# Patient Record
Sex: FEMALE | Race: WHITE | NOT HISPANIC OR LATINO | Employment: OTHER | URBAN - METROPOLITAN AREA
[De-identification: names, ages, dates, MRNs, and addresses within clinical notes are randomized per-mention and may not be internally consistent; named-entity substitution may affect disease eponyms.]

---

## 2017-01-03 ENCOUNTER — TREATMENT (OUTPATIENT)
Dept: PHYSICAL THERAPY | Facility: CLINIC | Age: 67
End: 2017-01-03

## 2017-01-03 ENCOUNTER — TRANSCRIBE ORDERS (OUTPATIENT)
Dept: PHYSICAL THERAPY | Facility: CLINIC | Age: 67
End: 2017-01-03

## 2017-01-03 ENCOUNTER — OFFICE VISIT (OUTPATIENT)
Dept: PHYSICAL THERAPY | Facility: CLINIC | Age: 67
End: 2017-01-03

## 2017-01-03 DIAGNOSIS — M75.92 SUPRASPINATUS TENDONITIS, LEFT: ICD-10-CM

## 2017-01-03 DIAGNOSIS — M79.7 FIBROMYALGIA: ICD-10-CM

## 2017-01-03 DIAGNOSIS — M18.12 PRIMARY OSTEOARTHRITIS OF FIRST CARPOMETACARPAL JOINT OF LEFT HAND: Primary | ICD-10-CM

## 2017-01-03 DIAGNOSIS — M18.32 POST-TRAUMATIC OSTEOARTHRITIS OF FIRST CARPOMETACARPAL JOINT OF LEFT HAND: Primary | ICD-10-CM

## 2017-01-03 DIAGNOSIS — S16.1XXD CERVICAL STRAIN, SUBSEQUENT ENCOUNTER: ICD-10-CM

## 2017-01-03 DIAGNOSIS — M53.3 SACROILIAC JOINT DYSFUNCTION: ICD-10-CM

## 2017-01-03 DIAGNOSIS — M75.22 BICIPITAL TENDONITIS OF LEFT SHOULDER: Primary | ICD-10-CM

## 2017-01-03 DIAGNOSIS — M25.512 LEFT SHOULDER PAIN, UNSPECIFIED CHRONICITY: ICD-10-CM

## 2017-01-03 PROCEDURE — 97035 APP MDLTY 1+ULTRASOUND EA 15: CPT | Performed by: PHYSICAL THERAPIST

## 2017-01-03 PROCEDURE — L3919 HO W/O JOINTS CF: HCPCS | Performed by: PHYSICAL THERAPIST

## 2017-01-03 PROCEDURE — 97530 THERAPEUTIC ACTIVITIES: CPT | Performed by: PHYSICAL THERAPIST

## 2017-01-03 PROCEDURE — 97140 MANUAL THERAPY 1/> REGIONS: CPT | Performed by: PHYSICAL THERAPIST
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
## 2017-01-03 NOTE — PROGRESS NOTES
The patient was sent for splint fabrication/fitting only.  They are independent with donning and doffing of splint.  The patient is discharged from physical therapy.  Declan Diaz P.T., SHIRAZT

## 2017-01-19 ENCOUNTER — TREATMENT (OUTPATIENT)
Dept: PHYSICAL THERAPY | Facility: CLINIC | Age: 67
End: 2017-01-19

## 2017-01-19 DIAGNOSIS — S16.1XXD CERVICAL STRAIN, SUBSEQUENT ENCOUNTER: ICD-10-CM

## 2017-01-19 DIAGNOSIS — M75.22 BICIPITAL TENDONITIS OF LEFT SHOULDER: Primary | ICD-10-CM

## 2017-01-19 DIAGNOSIS — M75.92 SUPRASPINATUS TENDONITIS, LEFT: ICD-10-CM

## 2017-01-19 DIAGNOSIS — M25.512 LEFT SHOULDER PAIN, UNSPECIFIED CHRONICITY: ICD-10-CM

## 2017-01-19 DIAGNOSIS — M79.7 FIBROMYALGIA: ICD-10-CM

## 2017-01-19 PROCEDURE — 97035 APP MDLTY 1+ULTRASOUND EA 15: CPT | Performed by: PHYSICAL THERAPIST

## 2017-01-19 PROCEDURE — 97140 MANUAL THERAPY 1/> REGIONS: CPT | Performed by: PHYSICAL THERAPIST

## 2017-01-19 PROCEDURE — 97112 NEUROMUSCULAR REEDUCATION: CPT | Performed by: PHYSICAL THERAPIST

## 2017-01-19 NOTE — PROGRESS NOTES
"Physical Therapy Note      SUBJECTIVE:     Changes since last seen:    \"I am going to need a new theraband, my dog ripped up the one given last visit.\"   \"I am a little better\". \"I am realizing the healing is slow\".  \"My left thumb is better\", she is wearing her new splint on and off.  \"Sometimes I forget my left shoulder bothers me and then it acts up when I don't expect it.\"  \"I have been putting a pillow under my left arm in sitting like instructed on last visit and it has helped.\"  \"Still feels a strain when doing the ROM exercises\".   She had headaches 1-2/week a couple of weeks ago, in frontal area, none this week.   Pt did a little yard work this am, blew some leaves, so her neck a little sore, but hasn't put any Voltaren gel on today  Pt is pleased that when she self corrects her SI joints after doing something, it decreases pain whereas before her hips were replaced it didn't always work.     Current level of pain:      1/10 left shoulder    1/10 neck          0/10  Low back   Lowest level of pain since last seen:              0/10      0/10    0/10    Highest level of pain since last seen:   2/10       1/10       1/10 after mopping floor, self corrected and helped      OBJECTIVE:     Palpation:    Muscle/Bone Irritation        09/27/16   10/04/16 10/27/16   12/01/16 12/15/16 01/03/17   01/19/17         Right    Left Right  Left  Right    Left  Right  Left Right  Left Right  Left Right  Left        Suboccipitals  +            +    +         +    +          +      +         +    +          +    +         +    +         +        Spinous Processes                     rotated to  C2-7     C2-7  C2-7   C2-7  C2-7 C2-7 C2-7             rotated to                Upper traps    ++       ++     +        ++   +           ++ slight        + Slight  slight Slight  slight Slight  slight        Ant/Mid Scalenes    +      + slight     +          +   +           ++     +         ++ Slight     + Slight  slight Slight  " slight        Sternocleidomastoid    ++         +     +         +   +            +  Slight  slight           Levator Scapula    +           +               +          +   +          ++   slight      + Slight      + Slight      +    -       slight        Pectoralis Major   +           +     +           +  -             +   -             +             slight            slight            slight        Pectoralis Minor   +           -      +         -  -            +    _           +            slight            slight            slight        Sternocostals   +           +     +          +  +             +    +           +    +            +   +           +   +      slight        Bicipital tendon   -            +     -           +   -           ++     -         ++    -              +    -           ++               +        Supraspinatus   +          +    Slight     +  -            ++     -        ++    -              +    -           ++           slight        Infraspinatus   +            -     +           -   -            ++    -           +    -         slight   -            +           slight        Lats Insertion   -            -        -               -    -           -           Rhomboid, Major    -           -    -             -    -           -           Rhomboid, Minor   -             -    -            -    -            -           Lateral Epicondyle   +           +     +          +   +           +   +           + Slight  slight Slight  slight  not tested        Medial Epicondyle   -            -         -           -           Triceps Insertion   -            -      -          + +                  +                 +          Right    Left Right   Left Right    Left Right Left Right  Left Right  Left Right  Left        Pain on resisted...               shld flexion     -             +    -            +     -           +    -       +    -          -    -            -     -          -        shld abduction  Slight        +   Slight     +    Slight     + Slight  ++    -         +    -            +     -           +        Elbow flex             slight    -       slight     -     slight   -        +               -    -             -     -          -                              Muscle/Bone Irritation    10/04/16 10/27/16  12/01/16    12/15/16 01/03/17 01/19/17          Right  Left Right   Left Right Left Right  Left Right  Left Right  Left        Piriformis     +         +  slight  slight Slight slight  slight slight slight   slight         Gr. Trochanter     +         +    -            -            IT Band     -           -             Quadr. Lumborum     -           -             Ischial Tuberosity     -           -             Sacrococcygeal Ligs     -          -             Paraspinals     -           -   +                      +                 +         Spinous Processes                      T rotated to      -           -                    L rotated to   L3-5  L1-5 L4-5             L4-5             L3-5         Adductor Fabio     -             -             Iliopsoas     -              -             Quad Origin     -             -             SI Joint    ++         +   ++       ++   ++        -     +          -   Slight    +    -            -        Pubic Symphysis   not tested                                            Left thumb CMC                     +                 +                  +              10/04/16 10/27/16  12/01/16 12/15/16 01/03/17 01/19/17        SI Joint Positioning  Right   Left Right    Left Right Left   Right  Left Right  Left  Right  Left            Innominate       Symmetrical!               Anterior                                   x                 x              x                x                x                Posterior           x     x      x     x     x            Sacrum                         Unilateral rotation            x     x      x     x     x Neutral                            SI Joint Testing  Right   Left Right    Left Right  Left Right  Left Right  Left Right  Left                Distraction           +           +    +           +  slight slight    -         -  Slight  slight                 Beatriz's           -            -    -            -    -            -                   Compression          -            -    -            -    -            -                   Prone Press Up     -            -    -            -   -             -                          Special Tests    Right   Left Right    Left  Right Left Right  Left Right  Left Right  Left           Straight Leg Raise                               Active      -         -    -           -    -         -                   Passive     -         -      -           -     -         -               Hip Scour Test     -          -     -           -     -          -                                             Monthly Objective Measurement/Functional Activity     Date: 09/27/16 10/27/16 12/01/16 01/03/17       Neck Disability Index 33/100 38/100 24/100 34/100       Headache Index - -         Quick Dash Shoulder Pain Score 38.6 43.1  40.9                  AROM Cervical Spine:  Degrees  Degrees Degrees Degrees          Flexion 51 58 56 51          Extension 58 pain 61 65 57          Right Lat. Flexion 45 45 50 56          Left Lat. Flexion 41 pain right UT 49 45 49          Right Rotation 35 pain right UT 32 35 36          Left Rotation 38 35 39 42                  AROM Shoulders: Degrees Right    Left Right     Left Right  Left Right  Left           Flexion 175     158 175     168      178     162 178     158          Abduction 175     155 175       170 176    168   178      147          Int. Rotation 90       85 93         54 94      90 95     88          Ext. Rotation  90      75 91        90 92      79 93    90        Functional Strength: in lbs Right     Left Right     Left Right  Left Right  Left            Strength  Trial 1 45        35 45       45 25      40 35        40                                  Trial 2 44       47 46       36 45      35 45       45                                  Trial 3 58        47 45        35 45    35 55        40          Michaels Pinch                          11          9 11      8 10       7 10        6                  Root Level  -    Motor Right      Left Right      Left Right  Left Right  Left          C4                Shld Elevation 5/5          5/5 5/5         5/5 5/5      5/5  5/5       5/5          C5                Shld Abduction 5/5          4/5 5/5          5/5 5/5      4+/5 5/5         5/5                               Shld Flexion 5/5        4+/5 5/5          5/5 5/5   4+/5   5/5        5/5          C6                Elbow Flexion 4+/5        4+/5 5/5          5/5 5/5    4+/5 5/5       5/5                              Wrist Extension    4+/5        4+/5 5/5          5/5 5/5     5/5 5/5        5/5          C7               Elbow Extension 5/5        5/5 5/5          5/5 5/5      5/5  5/5        5/5                              Wrist Flexion 5/5        5/5 5/5         5/5 5/5       5/5 5/5        5/5          C8               Thumb Extension 4+/5        4+/5 5/5         4/5 5/5     4+/5 5/5       4/4          T1               Hand Intrinsics 4+/5        4+/5 5/5         5/5 5/5       5/5 5/5        5/5                  Reflexes Right       Left Right      Left Right  Left Right  Left          C5 Biceps +1       +1     -         - -        - -          -          C6 Brachioradialis +1        +1    -         - -        - -          -          C7 Triceps +1        +1 -          - -         - -          -                  Functional Activities:              Sit w/o pain? yes/ 30 min No/60  Low back Yes/ 60 min Yes/ 60 min          Stand w/o pain? Yes/60 min No/15 min low back Yes/ 60 min Yes/ 60 min          Walk w/o pain? Yes/60 min Yes/ 120 min Yes/ 120 min Yes/ 90 min          Drive  "w/o pain? Yes/ 2 hours  Yes/ 1 hour, neck Yes/ 1 hr Yes/ 4 hrs!           Work w/o paiin?  In garden Yes/ 60 min No/ 60 min Yes/ 30 min Yes/ 60 min          # times wakes w/o pain? No/1-2x No/ 3x, left shld  1x back No. 2x neck/ left shoulder No, 4x left shoulder          Self [place] w/o pain? \"Pretty much\" yes if uses hair dryer \"pretty much\" except hair dryer No/  Yes            ASSESSMENT:  Problem List:  1.  Left supraspinatus tendonitis  2.  Left bicipital tendonitis  3.  Beginning adhesive capsulitis, left  4.  Cervical strain  5.  Flare-up SI joint dysfunction    Discussion:   Was doing the biceps stretch wrong, was doing with theraband, corrected this today and reviewed the theraband exercises.  Progressed to prone arm lifts with the inner unit on.  Pt did fairly well with this on the left but had a hard time doing with the right UE.  She is beginning to realize how much she has inappropriately used the upper traps and levator scapulas to stabilize the neck and trunk.  The only pain elicited today was at the end of left shoulder flexion and abduction with capsular stretching.  Left shoulder flexion was 165 and left shoulder abduction was 162, both with pain. Reminded pt to continue with ROM daily.     Short Term Goals:                                     Date Met:                    1.   pt independent in postural correction to minimize cervical and shoulder strain  10/27/16  2.   pt independent in exer to decrease post. cervical disc pressures    10/27/16  3.   pt able to sleep through night without pain  4.   Reduce pt pain to no greater than 3/10 (neck pain)      12/01/16              Reduce Shoulder pain to no greater than 3/10      12/15/16  5.  Decrease Neck Pain Score to no greater than 30/100     12/01/16  6.  Decrease Quick Dash Shoulder Pain Score to no greater than 32  7.  Pt independent in SI joint self-correction technique      12/01/16  8.  Pt able to do rotator cuff theraband exercises without " pain, x10    01/19/17    Long Term Goals:           Date Met:      1.  pt independent in self-management of symptoms       2.  pt independent in home program      3.  pt able to lift arm above head without pain       01/19/17      4.  Reduce pt pain to no greater than 1/10      5.  pt able to sit 60 min without pain        12/01/16      6.  Decrease Neck Pain Score to no greater than 26/100      12/01/16      7.  Decrease Quick Dash Shoulder Pain Score to no greater than 24    PLAN OF CARE  1.  Ultrasound to increase extensibility, decrease pain, if needed  2.  Electrical stimulation to facilitate muscle relaxaation, decrease pain, if needed, possible use of iontophoresis  3.  Manual therapy to increase function, decrease pain  4.  Therapeutic exercise to increase function, decrease pain  5.  Home programming and d/c planning to increase function and decrease pain    Frequency & Duration:  Pt will be seen on a once/week basis for 6 more visits      TREATMENT TODAY:    Total Treatment Time:   (min in clinic):  70   Timed Code Treatment Minutes:   65    Self Care Home Management Training/ Patient Education:    Purpose of RX:  Need for inner unit on when carrying groceries etc.   Proper Body Mechanics - support of left UE in sitting and standing  Postural Instruction - need for sternum-up posturing to reduce tension on rotator cuff  Compliance w/HEP    Orthotic/Equipmt Usage - use of ice pack on shoulder, low back and neck      Manual Therapy.  (Man)  RX min:  25  Manual cervical distraction  Scalene and upper trap stretching   Anterior/Inferior Mobilization of Left shoulder  Sternocostal mobs  Myofascial release upper quarter  Positional Isometric Contraction C2-7    Therapeutic Activities:  (TA)  RX min:  5  Neuromuscular Reeducation:  (NMR)  Rx min:  -  15    Ultrasound:  RX min: 20       1.6 piper/cm2       Location:  Left bicipital and left supraspinatus tendons, ant/inf left shoulder capsule  Supplies given:  -      Procedures:  Code Procedure/Instruction 09/27/16 10/04/16 10/27/16 12/01/16 12/15/16 01/03/17 01/19/17          TA            Sleeping Positions                  reviewed               TA Sternum-Up Posture    review             TA SI jt. self-correction                 TA Use of lumbar pillow reviewed                TA Use of cervical roll reviewed                TA Use of orthotics                 TA Use of SI belt                 TA Use of Nada chair                 TA Lumb Facet PIC                 TA Order of Self-Correct                 TA Decreasing Disc Pressures                 NMR Pelvic Floor Isolation                 NMR Transverse Abdominus                 NMR Multifidus Isolation                 NMR InnerUnit agns [place]                 NMR Sit-stand w/ inner unit                 NMR Roll w/ inner unit                 NMR Walk w/ inner unit                  NMR Up/down steps w/ inner unit                 NMR Water Exer Instruction                 NMR Hip Abd w/o periformis                 NMR Hip Abd w/o iliop/ham                  NMR Lower abs in supine                 NMR Abd obliques in supine                 NMR Prone Knee Flex                 NMR Prone glut max                 NMR Retro Step                 NMR Retro Walk                 NMR Forward walk w/ inner unit                 NMR Balance Instruction                 NMR Ball sit A/P & Lateral                 NMR Ball Catch/Core/Supine                 NMR Ball Catch/Core/Stand                 NMR Ball All 4's Arm Life                 NMR Ball Prone Walk Out                 NMR Ball Supine Obliques                 NMR Ball sit-supine-sit                 NMR Walking Prog Progress                 TA Home prg sequencing                 TA Hamstring stretch                 TA Hip Ext Rot Stretch                 TA Hip Int Rot Stretch                 TA Hip Flex/IT Stretch                 TA Hip Add Stretch                 TA Lats Dorsi  Stretch                 TA Gastric/soleus stretch                 TA QuadLumbormStretch                 TA Quad Stretch                 NMR Lateral Bridge Drop                 NMR [place]                 NMR O'Feldt Lat Pull Down                 NMR O'Feldt Hip Ext                 NMR O'Feldt Trunk Ext                 TA CervDiscExtSup/stnd   instructed              TA Cerv Stretches  instructed               TA Neural Gliding                 NMR Ant/Mid Scalene Strch                 NMR Prone Arm Lifts       instructed          NMR Scapula Stabilization                 NMR Occulomotor Isometrics                 NMR Cervical Isometrics                 TA Ant. Chest Stretch                 TA Standing Wall Slides                 TA Rotator Cuff Stretch  instructed reviewed corrected reviewed            TA Rotator Cuff Theraband     instructed reviewed reviewed          TA ShldAROM w/bar instructed reviewed reviewed  reviewed reviewed           TA Biceps Stretch  instructed reviewed corrected  reviewed reviewed          TA SelfPICThorSpine                 TA   Ant Scalene stretch  instructed reviewed              TA Icing shoulder  instructed reviewed reviewed             TA [employer] for upper trap   instructed              TA Sidelying rot cuff strengthen   instructed                                                                                                                              Karen Chatterjee, PT, MS  Physical Therapist  [place]# [id number]

## 2017-01-23 ENCOUNTER — DOCUMENTATION (OUTPATIENT)
Dept: PHYSICAL THERAPY | Facility: CLINIC | Age: 67
End: 2017-01-23

## 2017-02-02 ENCOUNTER — TREATMENT (OUTPATIENT)
Dept: PHYSICAL THERAPY | Facility: CLINIC | Age: 67
End: 2017-02-02

## 2017-02-02 DIAGNOSIS — M53.3 SACROILIAC JOINT DYSFUNCTION: ICD-10-CM

## 2017-02-02 DIAGNOSIS — M79.7 FIBROMYALGIA: ICD-10-CM

## 2017-02-02 DIAGNOSIS — M75.22 BICIPITAL TENDONITIS OF LEFT SHOULDER: Primary | ICD-10-CM

## 2017-02-02 DIAGNOSIS — M75.92 SUPRASPINATUS TENDONITIS, LEFT: ICD-10-CM

## 2017-02-02 DIAGNOSIS — S16.1XXD CERVICAL STRAIN, SUBSEQUENT ENCOUNTER: ICD-10-CM

## 2017-02-02 PROCEDURE — 97035 APP MDLTY 1+ULTRASOUND EA 15: CPT | Performed by: PHYSICAL THERAPIST

## 2017-02-02 PROCEDURE — 97112 NEUROMUSCULAR REEDUCATION: CPT | Performed by: PHYSICAL THERAPIST

## 2017-02-02 PROCEDURE — 97140 MANUAL THERAPY 1/> REGIONS: CPT | Performed by: PHYSICAL THERAPIST

## 2017-02-02 NOTE — PROGRESS NOTES
Physical Therapy Note      SUBJECTIVE:     Changes since last seen:    I need to know how to walk my dogs, cause I walked them holding my arm up and now I can tell I have tweaked my SI joint/low back  Drove to [place] last week and the low back was good going but slept on a soft bed and it hurt her low back on way home and also strained her right shoulder on the drive home.  Two weeks ago she was scrubbing her kitchen floor and felt pinch in the right rhomboid area, she used moist heat and tried Voltaren gel but couldn't reach back to get it on   No headaches this past week.  While in [place], she had to sit on a plastic bleacher chair and after an hour she needed to get up.   She hasn't been able to do the theraband as she was afraid to do it after tweaking the shoulder  Did do the prone arm lifts but is wondering if this it what set off her right shoulder pain      Current level of pain:      0/10 left shoulder    0/10 neck          3/10  Low back   Lowest level of pain since last seen:              0/10      0/10    0/10    Highest level of pain since last seen:   2/10       0/10       3/10 after walking the dogs 2 days ago.      OBJECTIVE:     Palpation:    Muscle/Bone Irritation        09/27/16   10/04/16 10/27/16   12/01/16 12/15/16 01/03/17   01/19/17 02/02/17        Right    Left Right  Left  Right    Left  Right  Left Right  Left Right  Left Right  Left Right  Left       Suboccipitals  +            +    +         +    +          +      +         +    +          +    +         +    +         +        Spinous Processes                     rotated to  C2-7     C2-7  C2-7   C2-7  C2-7 C2-7 C2-7             rotated to                Upper traps    ++       ++     +        ++   +           ++ slight        + Slight  slight Slight  slight Slight  slight        Ant/Mid Scalenes    +      + slight     +          +   +           ++     +         ++ Slight     + Slight  slight Slight  slight         Sternocleidomastoid    ++         +     +         +   +            +  Slight  slight           Levator Scapula    +           +               +          +   +          ++   slight      + Slight      + Slight      +    -       slight        Pectoralis Major   +           +     +           +  -             +   -             +             slight            slight            slight        Pectoralis Minor   +           -      +         -  -            +    _           +            slight            slight            slight        Sternocostals   +           +     +          +  +             +    +           +    +            +   +           +   +      slight        Bicipital tendon   -            +     -           +   -           ++     -         ++    -              +    -           ++               +        Supraspinatus   +          +    Slight     +  -            ++     -        ++    -              +    -           ++           slight        Infraspinatus   +            -     +           -   -            ++    -           +    -         slight   -            +           slight        Lats Insertion   -            -        -               -    -           -           Rhomboid, Major    -           -    -             -    -           -           Rhomboid, Minor   -             -    -            -    -            -           Lateral Epicondyle   +           +     +          +   +           +   +           + Slight  slight Slight  slight  not tested        Medial Epicondyle   -            -         -           -           Triceps Insertion   -            -      -          + +                  +                 +          Right    Left Right   Left Right    Left Right Left Right  Left Right  Left Right  Left Right  Left       Pain on resisted...               shld flexion     -             +    -            +     -           +    -       +    -          -    -            -     -          -        shld abduction Slight         +   Slight     +    Slight     + Slight  ++    -         +    -            +     -           +        Elbow flex             slight    -       slight     -     slight   -        +               -    -             -     -          -                              Muscle/Bone Irritation    10/04/16 10/27/16  12/01/16    12/15/16 01/03/17 01/19/17 02/02/17         Right  Left Right   Left Right Left Right  Left Right  Left Right  Left Right  Left       Piriformis     +         +  slight  slight Slight slight  slight slight slight   slight         Gr. Trochanter     +         +    -            -            IT Band     -           -             Quadr. Lumborum     -           -             Ischial Tuberosity     -           -             Sacrococcygeal Ligs     -          -             Paraspinals     -           -   +                      +                 +         Spinous Processes                      T rotated to      -           -                    L rotated to   L3-5  L1-5 L4-5             L4-5             L3-5   L2-5        Adductor Fabio     -             -             Iliopsoas     -              -             Quad Origin     -             -             SI Joint    ++         +   ++       ++   ++        -     +          -   Slight    +    -            -    +          +       Pubic Symphysis   not tested                                            Left thumb CMC                     +                 +                  +              10/04/16 10/27/16  12/01/16 12/15/16 01/03/17 01/19/17 02/02/17       SI Joint Positioning  Right   Left Right    Left Right Left   Right  Left Right  Left  Right  Left Right  Left           Innominate       Symmetrical!               Anterior                                   x                 x              x                x                x                 x              Posterior           x     x      x     x     x      x          Sacrum                         Unilateral  rotation            x     x      x     x     x Neutral       x                         SI Joint Testing  Right   Left Right    Left Right  Left Right  Left Right  Left Right  Left Right  Left               Distraction           +           +    +           +  slight slight    -         -  Slight  slight                 Beatriz's           -            -    -            -    -            -                   Compression          -            -    -            -    -            -                   Prone Press Up     -            -    -            -   -             -                          Special Tests    Right   Left Right    Left  Right Left Right  Left Right  Left Right  Left Right  Left          Straight Leg Raise                               Active      -         -    -           -    -         -                   Passive     -         -      -           -     -         -               Hip Scour Test     -          -     -           -     -          -                                             Monthly Objective Measurement/Functional Activity     Date: 09/27/16 10/27/16 12/01/16 01/03/17 02/02/17      Neck Disability Index 33/100 38/100 24/100 34/100       Headache Index - -         Quick Dash Shoulder Pain Score 38.6 43.1  40.9                  AROM Cervical Spine:  Degrees  Degrees Degrees Degrees Degrees         Flexion 51 58 56 51 52 pulls         Extension 58 pain 61 65 57 57         Right Lat. Flexion 45 45 50 56 53         Left Lat. Flexion 41 pain right UT 49 45 49 47         Right Rotation 35 pain right UT 32 35 36 38         Left Rotation 38 35 39 42 43                 AROM Shoulders: Degrees Right    Left Right     Left Right  Left Right  Left  Right  Left         Flexion 175     158 175     168      178     162 178     158 178      170         Abduction 175     155 175       170 176    168   178      147 180    178         Int. Rotation 90       85 93         54 94      90 95     88 95      90          Ext. Rotation  90      75 91        90 92      79 93    90  93      91      Functional Strength: in lbs Right     Left Right     Left Right  Left Right  Left  Right  Left          Strength Trial 1 45        35 45       45 25      40 35        40 40      29                                 Trial 2 44       47 46       36 45      35 45       45 45      31                                 Trial 3 58        47 45        35 45    35 55        40 41     36         Michaels Pinch                          11          9 11      8 10       7 10        6 12       8                 Root Level  -    Motor Right      Left Right      Left Right  Left Right  Left Right  Left         C4                Shld Elevation 5/5          5/5 5/5         5/5 5/5      5/5  5/5       5/5          C5                Shld Abduction 5/5          4/5 5/5          5/5 5/5      4+/5 5/5         5/5                               Shld Flexion 5/5        4+/5 5/5          5/5 5/5   4+/5   5/5        5/5          C6                Elbow Flexion 4+/5        4+/5 5/5          5/5 5/5    4+/5 5/5       5/5                              Wrist Extension    4+/5        4+/5 5/5          5/5 5/5     5/5 5/5        5/5          C7               Elbow Extension 5/5        5/5 5/5          5/5 5/5      5/5  5/5        5/5                              Wrist Flexion 5/5        5/5 5/5         5/5 5/5       5/5 5/5        5/5          C8               Thumb Extension 4+/5        4+/5 5/5         4/5 5/5     4+/5 5/5       4/4          T1               Hand Intrinsics 4+/5        4+/5 5/5         5/5 5/5       5/5 5/5        5/5                  Reflexes Right       Left Right      Left Right  Left Right  Left Right  Left         C5 Biceps +1       +1     -         - -        - -          -          C6 Brachioradialis +1        +1    -         - -        - -          -          C7 Triceps +1        +1 -          - -         - -          -                  Functional  "Activities:              Sit w/o pain? yes/ 30 min No/60  Low back Yes/ 60 min Yes/ 60 min Yes/ 60 min         Stand w/o pain? Yes/60 min No/15 min low back Yes/ 60 min Yes/ 60 min Yes/ 60 min         Walk w/o pain? Yes/60 min Yes/ 120 min Yes/ 120 min Yes/ 90 min Yes/ 60 min         Drive w/o pain? Yes/ 2 hours  Yes/ 1 hour, neck Yes/ 1 hr Yes/ 4 hrs!           Work w/o pain?  In garden Yes/ 60 min No/ 60 min Yes/ 30 min Yes/ 60 min Yes/ 60 min         # times wakes w/o pain? No/1-2x No/ 3x, left shld  1x back No. 2x neck/ left shoulder No, 4x left shoulder 2-3x          Self Groom w/o pain? \"Pretty much\" yes if uses hair dryer \"pretty much\" except hair dryer No/  Yes  yes          ASSESSMENT:  Problem List:  1.  Left supraspinatus tendonitis  2.  Left bicipital tendonitis  3.  Beginning adhesive capsulitis, left  4.  Cervical strain  5.  Flare-up SI joint dysfunction    Discussion:   7 of 8 short term goals met.  6 of 7 long term goals met.     Discharge Summary  Discharge Summary from Physical Therapy Report      Dates  PT visit:    09/27/16 through 02/02/17  Number of Visits:     8    Discharge Status of Patient: See today's Physical Therapy Note dated, 02/02/17    Goals: Partially Met    Discharge Plan: Continue with current home exercise program as instructed    Comments:  Mrs. Hernandez has a thorough home program and is independent in it and in self management.  She should be able to meet the remaining goals on her own as she continues with her home program. If she needs re-assessment and progression of her home program in the future, will be happy to assist as needed. This pt is discharged from physical therapy.     Date of Discharge:  02/02/17    Karen Chatterjee, PT, MS  Physical Therapist        Short Term Goals:                                     Date Met:                    1.   pt independent in postural correction to minimize cervical and shoulder strain  10/27/16  2.   pt independent in exer to decrease " post. cervical disc pressures    10/27/16  3.   pt able to sleep through night without pain  4.   Reduce pt pain to no greater than 3/10 (neck pain)      12/01/16              Reduce Shoulder pain to no greater than 3/10      12/15/16  5.  Decrease Neck Pain Score to no greater than 30/100     12/01/16  6.  Decrease Quick Dash Shoulder Pain Score to no greater than 32    02/02/17  7.  Pt independent in SI joint self-correction technique      12/01/16  8.  Pt able to do rotator cuff theraband exercises without pain, x10    01/19/17    Long Term Goals:           Date Met:      1.  pt independent in self-management of symptoms      02/02/17      2.  pt independent in home program        02/02/17      3.  pt able to lift arm above head without pain       01/19/17      4.  Reduce pt pain to no greater than 1/10      5.  pt able to sit 60 min without pain        12/01/16      6.  Decrease Neck Pain Score to no greater than 26/100      12/01/16      7.  Decrease Quick Dash Shoulder Pain Score to no greater than 24      TREATMENT TODAY:    Total Treatment Time:   (min in clinic):   60  Timed Code Treatment Minutes:   60    Self Care Home Management Training/ Patient Education:    Purpose of RX:  Need for inner unit on when carrying groceries etc.   Proper Body Mechanics - when on floor playing with grandchildren  Postural Instruction - need for sternum-up posturing to reduce tension on rotator cuff  Compliance w/HEP    Orthotic/Equipmt Usage - use of ice pack on shoulder, low back and neck      Manual Therapy.  (Man)  RX min:   25  Manual cervical distraction  Scalene and upper trap stretching   Anterior/Inferior Mobilization of Left shoulder  Sternocostal mobs  Myofascial release upper quarter  Positional Isometric Contraction C2-7    Therapeutic Activities:  (TA)  RX min:   5  Neuromuscular Reeducation:  (NMR)  Rx min:  -   10    Ultrasound:  RX min:  20     1.6 piper/cm2       Location:  Left bicipital and left  supraspinatus tendons, ant/inf left shoulder capsule  Supplies given:  -     Procedures:  Code Procedure/Instruction 09/27/16 10/04/16 10/27/16 12/01/16 12/15/16 01/03/17 01/19/17 02/02/17         TA            Sleeping Positions                  reviewed               TA Sternum-Up Posture    review             TA SI jt. self-correction                 TA Use of lumbar pillow reviewed                TA Use of cervical roll reviewed                TA Use of orthotics                 TA Use of SI belt                 TA Use of Nada chair                 TA Lumb Facet PIC                 TA Order of Self-Correct                 TA Decreasing Disc Pressures                 NMR Pelvic Floor Isolation                 NMR Transverse Abdominus                 NMR Multifidus Isolation                 NMR InnerUnit agns [place]                 NMR Sit-stand w/ inner unit                 NMR Roll w/ inner unit                 NMR Walk w/ inner unit                  NMR Up/down steps w/ inner unit                 NMR Water Exer Instruction                 NMR Hip Abd w/o periformis                 NMR Hip Abd w/o iliop/ham                  NMR Lower abs in supine                 NMR Abd obliques in supine                 NMR Prone Knee Flex                 NMR Prone glut max                 NMR Retro Step                 NMR Retro Walk                 NMR Forward walk w/ inner unit                 NMR Balance Instruction                 NMR Ball sit A/P & Lateral                 NMR Ball Catch/Core/Supine                 NMR Ball Catch/Core/Stand                 NMR Ball All 4's Arm Life                 NMR Ball Prone Walk Out                 NMR Ball Supine Obliques                 NMR Ball sit-supine-sit                 NMR Walking Prog Progress                 TA Home prg sequencing                 TA Hamstring stretch                 TA Hip Ext Rot Stretch                 TA Hip Int Rot Stretch                 TA Hip  Flex/IT Stretch                 TA Hip Add Stretch                 TA Lats Dorsi Stretch                 TA Gastric/soleus stretch                 TA QuadLumbormStretch                 TA Quad Stretch                 NMR Lateral Bridge Drop                 NMR Waiters [place]                 NMR O'Feldt Lat Pull Down                 NMR O'Feldt Hip Ext                 NMR O'Feldt Trunk Ext                 TA CervDiscExtSup/stnd   instructed              TA Cerv Stretches  instructed               TA Neural Gliding                 NMR Ant/Mid Scalene Strch                 NMR Prone Arm Lifts       instructed reviewed         NMR Scapula Stabilization                 NMR Occulomotor Isometrics                 NMR Cervical Isometrics                 TA Ant. Chest Stretch                 TA Standing Wall Slides                 TA Rotator Cuff Stretch  instructed reviewed corrected reviewed            TA Rotator Cuff Theraband     instructed reviewed reviewed          TA ShldAROM w/bar instructed reviewed reviewed  reviewed reviewed  reviewed         TA Biceps Stretch  instructed reviewed corrected  reviewed reviewed reviewed         TA SelfPICThorSpine                 TA   Ant Scalene stretch  instructed reviewed              TA Icing shoulder  instructed reviewed reviewed             TA [employer] for upper trap   instructed              TA Sidelying rot cuff strengthen   instructed                                                                                                                              Karen Chatterjee, PT, MS  Physical Therapist  [place]# [id number]

## 2017-02-24 ENCOUNTER — OFFICE VISIT (OUTPATIENT)
Dept: INTERNAL MEDICINE | Facility: CLINIC | Age: 67
End: 2017-02-24

## 2017-02-24 VITALS
TEMPERATURE: 97.5 F | BODY MASS INDEX: 21.51 KG/M2 | WEIGHT: 121.4 LBS | SYSTOLIC BLOOD PRESSURE: 106 MMHG | DIASTOLIC BLOOD PRESSURE: 74 MMHG | HEIGHT: 63 IN

## 2017-02-24 DIAGNOSIS — E03.9 HYPOTHYROIDISM (ACQUIRED): Primary | ICD-10-CM

## 2017-02-24 LAB
T4 FREE SERPL-MCNC: 1.56 NG/DL (ref 0.89–1.76)
TSH SERPL DL<=0.05 MIU/L-ACNC: 0.07 MIU/ML (ref 0.35–5.35)

## 2017-02-24 PROCEDURE — 86800 THYROGLOBULIN ANTIBODY: CPT | Performed by: FAMILY MEDICINE

## 2017-02-24 PROCEDURE — 86376 MICROSOMAL ANTIBODY EACH: CPT | Performed by: FAMILY MEDICINE

## 2017-02-24 PROCEDURE — 84443 ASSAY THYROID STIM HORMONE: CPT | Performed by: FAMILY MEDICINE

## 2017-02-24 PROCEDURE — 99214 OFFICE O/P EST MOD 30 MIN: CPT | Performed by: FAMILY MEDICINE

## 2017-02-24 PROCEDURE — 84439 ASSAY OF FREE THYROXINE: CPT | Performed by: FAMILY MEDICINE

## 2017-02-24 NOTE — PATIENT INSTRUCTIONS
"1. ENDO- hypothyroid. Time spent today in edu re the pathophysiology and the complexities of interpreting the labs. Will check a TSH, free T4 and thyroid antibodies today. Will adjust the dose as indicated but I expect she needs a lower dose based on her hx and current symptoms. Pt will continue to use brand only, taken fasting, etc.   2. CV- hyperlipid- discussed that this may be primary or secondary to her thyroid. Will recheck her lipid once her thyroid is at goal.  2. RECHECK- 6wk, fasting (will recheck her lipid then with the thyroid recheck)    Patient education regarding hypothyroidism provided today in layman's terms. Pt advised regarding the location and function of the thyroid (thyroid hormone as a regulator for other body systems). Discussed common signs or symptoms of low thyroid including fatigue, hair loss, weight gain, hoarseness, depression, slow speech, dry skin, brittle nails, feeling cold, constipation and joint/ muscle pain. Also discussed the signs/ symptoms of too much (high) thyroid including fatigue, fast heart rate, weight loss, increased appetite, anxiousness, sweating, muscle aches and loose stools.    Discussed that the goal is to be \"euthryoid\", meaning that the patient has the correct amount of thyroid hormone available. This is accomplished using synthetic hormone, levothyroxine (Synthroid). Discussed that decisions regarding the dose of levothyroxine are made using a lab called TSH (thyroid stimulating hormone) and patient symptoms. The patient is also advised of the importance of taking levothyroxine correctly (on an empty stomach and waiting 1 hour before eating, drinking or taking other medicines) to ensure adequate absorption of the medicine.   "

## 2017-02-24 NOTE — PROGRESS NOTES
"Subjective   Barbara Hernandez is a 66 y.o. female.      History of Present Illness   New patient, here to establish. Sees psych. Has an eye cyst that is scheduled for resection. Needs surgical clearance. Has labs in chart from previous PCP from 9/21/16 with normal CBC, CMP and vit D. TSH was 0.078. Lipid was high with , tg 87, HDL 71, . Had her [facility] 10/2016. Has colonoscopy scheduled 3/2017.    ENDO- hypothyroid, currently on synthroid 100. Today pt reports she was diagnosed at 45yo. Does not know if she has thyroid ab's. Saw Dr Heredia for many years but then was stable and changed to her PCP a few years ago. She is taking brand, correctly. Has not done well with generics. Has symptoms of: fatigue, dizziness, feeling anxious, hoarse voice, trouble concentrating, feeding sad, dry skin, feeling cold, muscle aches and bowel issues. Has alt C/D. Is very careful with her diet with no weight loss or gain. Was increased on synthroid from 88 to 100. Last TSH 9/2016 was at 100; had been on 88 for many years while seeing Dr Heredia.     CV- hyperlipid. Today pt reports she has a h/o cardiomyopathy with a persistent low EF. Was seeing Dr Pablo. Last stress echo was done at [facility] after Dr Pablo retired due to dizziness but was advised it was \"ok\". Was released by cardio. On discussion, the cholesterol is a new issue and has not been treated yet.    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 Has \"fibromyalgia\" currently on gabapentin and mobic. Was diagnosed with fibro by rheum around 2008 based on 18 trigger points. Does not sleep well.    The following portions of the patient's history were reviewed and updated as appropriate: current medications, past family history, past medical history, past social history, past surgical history and problem list.    Review of Systems   Constitutional: Positive for appetite change.   HENT: Positive for trouble swallowing.    Eyes: Positive for redness and itching.    " "    Dry eyes   Cardiovascular: Negative for chest pain.        Heart rate slow   Gastrointestinal: Positive for abdominal pain, diarrhea and nausea. Negative for abdominal distention.        Frequent heartburn   Endocrine: Positive for heat intolerance.   Genitourinary: Positive for frequency.   Musculoskeletal: Positive for arthralgias, joint swelling and myalgias.        Chronic pain   Skin: Negative for color change.        itching   Neurological: Positive for dizziness. Negative for tremors, speech difficulty and headaches.        Tingling   Psychiatric/Behavioral: Positive for sleep disturbance. Negative for agitation and confusion. The patient is nervous/anxious.         Depression, sadness   All other systems reviewed and are negative.        Current Outpatient Prescriptions:   •  estradiol (VAGIFEM) 10 MCG tablet vaginal tablet, Insert 1 tablet into the vagina 2 (Two) Times a Week. Patient will use 2-3 times/week as needed, Disp: 10 tablet, Rfl: 10  •  gabapentin (NEURONTIN) 100 MG capsule, Take  by mouth., Disp: , Rfl:   •  levothyroxine (SYNTHROID) 100 MCG tablet, Take 1 tablet by mouth daily., Disp: 90 tablet, Rfl: 1  •  levothyroxine (SYNTHROID, LEVOTHROID) 100 MCG tablet, Take 1 tablet by mouth daily., Disp: 30 tablet, Rfl: 5  •  meloxicam (MOBIC) 7.5 MG tablet, , Disp: , Rfl:     Objective     Visit Vitals   • /74   • Temp 97.5 °F (36.4 °C)   • Ht 62.75\" (159.4 cm)   • Wt 121 lb 6.4 oz (55.1 kg)   • LMP  (LMP Unknown)   • BMI 21.68 kg/m2       Physical Exam   Constitutional: She is oriented to person, place, and time. She appears well-developed and well-nourished.   HENT:   Right Ear: Tympanic membrane and ear canal normal.   Left Ear: Tympanic membrane and ear canal normal.   Mouth/Throat: Oropharynx is clear and moist.   Eyes: Conjunctivae and EOM are normal. Pupils are equal, round, and reactive to light.   Neck: No thyromegaly present.   Cardiovascular: Normal rate and regular rhythm.  "   Pulmonary/Chest: Effort normal and breath sounds normal.   Neurological: She is alert and oriented to person, place, and time.   Skin: Skin is warm and dry.   Psychiatric: She has a normal mood and affect.   Vitals reviewed.      Assessment/Plan   Barbara was seen today for establish care.    Diagnoses and all orders for this visit:    Hypothyroidism (acquired)  -     TSH  -     T4, Free  -     Thyroid Antibodies      1. ENDO- hypothyroid. Time spent today in edu re the pathophysiology and the complexities of interpreting the labs. Will check a TSH, free T4 and thyroid antibodies today. Will adjust the dose as indicated but I expect she needs a lower dose based on her hx and current symptoms. Pt will continue to use brand only, taken fasting, etc.   2. CV- hyperlipid- discussed that this may be primary or secondary to her thyroid. Will recheck her lipid once her thyroid is at goal.  2. RECHECK- 6wk, fasting (will recheck her lipid then with the thyroid recheck)

## 2017-02-27 LAB
THYROGLOB AB SERPL-ACNC: <1 IU/ML (ref 0–0.9)
THYROPEROXIDASE AB SERPL-ACNC: 78 IU/ML (ref 0–34)

## 2017-03-03 RX ORDER — LEVOTHYROXINE SODIUM 88 MCG
88 TABLET ORAL EVERY MORNING
Qty: 30 TABLET | Refills: 1 | Status: SHIPPED | OUTPATIENT
Start: 2017-03-03 | End: 2017-04-02

## 2017-03-03 NOTE — TELEPHONE ENCOUNTER
Pt informed of lab results. And new dose of SONAL Synthroid sent to [address] per pt request.

## 2017-03-22 ENCOUNTER — OFFICE VISIT (OUTPATIENT)
Dept: CARDIOLOGY | Facility: CLINIC | Age: 67
End: 2017-03-22

## 2017-03-22 VITALS
WEIGHT: 120.3 LBS | HEART RATE: 84 BPM | SYSTOLIC BLOOD PRESSURE: 110 MMHG | DIASTOLIC BLOOD PRESSURE: 64 MMHG | HEIGHT: 62 IN | BODY MASS INDEX: 22.14 KG/M2

## 2017-03-22 DIAGNOSIS — I42.9 CARDIOMYOPATHY (HCC): Primary | ICD-10-CM

## 2017-03-22 DIAGNOSIS — E78.2 MIXED HYPERLIPIDEMIA: ICD-10-CM

## 2017-03-22 PROCEDURE — 99213 OFFICE O/P EST LOW 20 MIN: CPT | Performed by: INTERNAL MEDICINE

## 2017-03-22 NOTE — PROGRESS NOTES
[facility] at [facility]  Office Progress Note  Barbara Hernandez  1950  [phone]      Visit Date: 03/22/2017    PCP: Christen Mccarty MD  [address]  [facility] [address]    IDENTIFICATION: A 66 y.o. female former / for [facility] [employer] and resident of [place], [place].    Chief Complaint   Patient presents with   • Follow-up     HLD       PROBLEM LIST:   1. Postprandial dizziness/lightheadedness.  2. History of cardiomyopathy:  a. Left heart catheterization, 06/06/2002:  i. Normal coronary artery anatomy.  ii. LVEF 55% to 60%.    3. Palpitations:  a. Holter monitor, May 2002:  i. Sinus rhythm with rare PAC's, PVC's.  b. Short IN interval.  4. Dyslipidemia.  5. VHD         [id number] multiple echo, [facility]          5/16 SE per [facility] EF 55% mild AI, mild diastolic dysfxn  6. Remote tobacco use.  7. Hypothyroidism.  8. Osteoarthritis.  9. Gastritis.  10. Irritable bowel syndrome/spastic colon.  11. Hay fever.  12. Perimenopause.  13. Herpes zoster, October 2008, (affecting left ear).  14. Lichen sclerosis (affecting vaginal tissue).  15. Fibromyalgia.  16. Surgeries:  a. Right hip replacement, 11/15/2010.  b. Septoplasty, March 2011.  c. Left hip replacement, November 2012.  Allergies  Allergies   Allergen Reactions   • Augmentin [Amoxicillin-Pot Clavulanate]    • Celecoxib    • Ciprofloxacin    • Doxycycline    • Levofloxacin    • Lyrica [Pregabalin]    • Lortab  [Hydrocodone-Acetaminophen]    • Nexium  [Esomeprazole]    • Omeprazole    • Sulfa Antibiotics    • Tramadol        Current Medications    Current Outpatient Prescriptions:   •  estradiol (VAGIFEM) 10 MCG tablet vaginal tablet, Insert 1 tablet into the vagina 2 (Two) Times a Week. Patient will use 2-3 times/week as needed, Disp: 10 tablet, Rfl: 10  •  gabapentin (NEURONTIN) 100 MG capsule, Take  by mouth., Disp: , Rfl:   •  meloxicam (MOBIC) 7.5 MG tablet, , Disp: , Rfl:   •  SYNTHROID 88 MCG tablet,  "Take 1 tablet by mouth Every Morning for 30 days., Disp: 30 tablet, Rfl: 1      History of Present Illness     Pt denies any chest pain, dyspnea, dyspnea on exertion, orthopnea, PND, palpitations, lower extremity edema.  She notes that she is very concerned regarding her most recent stress echo his previous [facility] of which her formal report revealed multiple  \"abnormalities.\"  She received a note from the  stating that it was within normal limits however.  She has had issues regarding lipid modifying therapy with multiple intolerances to statins historically and states that her total to HDL ratio has always been below 4 however.  She wants further evaluation to make sure that she is not at high risk for vascular event.    ROS:  All systems have been reviewed and are negative with the exception of those mentioned in the HPI.    OBJECTIVE:  Vitals:    03/22/17 1412   BP: 110/64   BP Location: Right arm   Patient Position: Sitting   Pulse: 84   Weight: 120 lb 4.8 oz (54.6 kg)   Height: 62\" (157.5 cm)     Physical Exam   Constitutional: She appears well-developed and well-nourished.   Neck: Normal range of motion. Neck supple. No hepatojugular reflux and no JVD present. Carotid bruit is not present. No tracheal deviation present. No thyromegaly present.   Cardiovascular: Normal rate, regular rhythm, S1 normal, S2 normal, intact distal pulses and normal pulses.  PMI is not displaced.  Exam reveals no gallop, no distant heart sounds, no friction rub, no midsystolic click and no opening snap.    No murmur heard.  Pulses:       Radial pulses are 2+ on the right side, and 2+ on the left side.        Dorsalis pedis pulses are 2+ on the right side, and 2+ on the left side.        Posterior tibial pulses are 2+ on the right side, and 2+ on the left side.   Pulmonary/Chest: Effort normal and breath sounds normal. She has no wheezes. She has no rales.   Abdominal: Soft. Bowel sounds are normal. She " exhibits no mass. There is no tenderness. There is no guarding.       Diagnostic Data:  Procedures      ASSESSMENT:   Diagnosis Plan   1. Cardiomyopathy     2. Mixed hyperlipidemia         PLAN:  Cardiomyopathy nonischemic I have discussed with Ms. Hernandez that nonischemic R mouth is excellent prognosis with now preserved LV function.  Certainly it could require titration of her thyroid therapy in that she is had some lability of her ejection fraction over the last 15 years it appears.  Given her low normal blood pressure and sensitivity multiple historical medications only if she has decline in LV function and/or change in New [place] Heart Association class heart failure would I consider re-addition of medications at this point.    Dyslipidemia with historically normal coronaries I would document cardiac CRP with her upcoming recheck of lipid profile and thyroid serologies    Regarding her abnormal echocardiogram and discussed with her that impaired diastolic relaxation and trivial valvular regurgitation were within normal limits for adult population    Christen Mccarty MD, thank you for referring Ms. Hernandez for evaluation.  I have forwarded my electronically generated recommendations to you for review.  Please do not hesitate to call with any questions.      Jonathon Bermudez MD, FACC

## 2017-04-13 ENCOUNTER — OFFICE VISIT (OUTPATIENT)
Dept: INTERNAL MEDICINE | Facility: CLINIC | Age: 67
End: 2017-04-13

## 2017-04-13 VITALS
BODY MASS INDEX: 21.94 KG/M2 | SYSTOLIC BLOOD PRESSURE: 102 MMHG | DIASTOLIC BLOOD PRESSURE: 74 MMHG | WEIGHT: 119.2 LBS | TEMPERATURE: 97.5 F | HEIGHT: 62 IN

## 2017-04-13 DIAGNOSIS — E78.00 PURE HYPERCHOLESTEROLEMIA: ICD-10-CM

## 2017-04-13 DIAGNOSIS — M19.90 ARTHRITIS: ICD-10-CM

## 2017-04-13 DIAGNOSIS — E03.9 ACQUIRED HYPOTHYROIDISM: Primary | ICD-10-CM

## 2017-04-13 LAB
ARTICHOKE IGE QN: 165 MG/DL (ref 0–130)
CHOLEST SERPL-MCNC: 250 MG/DL (ref 0–200)
HDLC SERPL-MCNC: 72 MG/DL (ref 40–60)
T4 FREE SERPL-MCNC: 1.76 NG/DL (ref 0.89–1.76)
TRIGL SERPL-MCNC: 83 MG/DL (ref 0–150)
TSH SERPL DL<=0.05 MIU/L-ACNC: 0.18 MIU/ML (ref 0.35–5.35)

## 2017-04-13 PROCEDURE — 99214 OFFICE O/P EST MOD 30 MIN: CPT | Performed by: FAMILY MEDICINE

## 2017-04-13 PROCEDURE — 84439 ASSAY OF FREE THYROXINE: CPT | Performed by: FAMILY MEDICINE

## 2017-04-13 PROCEDURE — 80061 LIPID PANEL: CPT | Performed by: FAMILY MEDICINE

## 2017-04-13 PROCEDURE — 84443 ASSAY THYROID STIM HORMONE: CPT | Performed by: FAMILY MEDICINE

## 2017-04-13 RX ORDER — LEVOTHYROXINE SODIUM 88 UG/1
TABLET ORAL
Qty: 90 TABLET | Refills: 1 | Status: SHIPPED | OUTPATIENT
Start: 2017-04-13 | End: 2017-11-01 | Stop reason: SDUPTHER

## 2017-04-13 RX ORDER — MELOXICAM 7.5 MG/1
TABLET ORAL
Qty: 30 TABLET | Refills: 5 | Status: SHIPPED | OUTPATIENT
Start: 2017-04-13 | End: 2018-04-19 | Stop reason: SDUPTHER

## 2017-04-13 NOTE — PROGRESS NOTES
"Subjective   Barbara Hernandez is a 66 y.o. female.     History of Present Illness   Here for 6wk recheck. Pt was seen 2/24/17 as a new patient. Sees psych. Lab 2/24/17 demo TSH 0.067, free T4 1.56, + thyroid abs. Lab from previous PCP from 9/21/16 with normal CBC, CMP and vit D. TSH was 0.078. Lipid was high with , tg 87, HDL 71, . Had her MCW 10/2016. Had colonoscopy 3/2017 with no polyps (just diverticulosis).     1.ENDO- hypothyroid, diagnosed 43yo. Pt initially on synthroid 100. Takes brand, correctly. Was having fatigue, dizziness, feeling anxious, hoarse voice, trouble concentrating, feeding sad, dry skin, feeling cold, muscle aches and bowel issues. Has alt C/D. Is very careful with her diet with no weight loss or gain. Was increased on synthroid from 88 to 100 by previous provider but felt better while on synthrouid 88 by endo. TSH was over suppressed and pt was reduced back to synthroid 88. Today pt reports she had flu 3wk ago. Other than that she has felt at goal on her thyroid. No persistent jitters.     2.CV- hyperlipid. Pt has h/o cardiomyopathy with a persistent low EF. Was seeing Dr Pablo. Last stress echo was done at [facility] after Dr Pablo retired due to dizziness but was advised it was \"ok\", by Dr Bermudez. Was released by cardio. Hyperlipid is new. Pt asked to fast today. Today pt reports she did fast. She did see Dr Bermudez recently as she read her echo and had questions. Was advised she needs a CRP with her lipid today. Has not tolerated statins in past; pravachol gave her GI upset, zocor gave her palpitations.     3.ORTHO- hx left total hip 2010 and right total hip 2012. Has \"fibromyalgia\" currently on gabapentin and mobic. Was diagnosed with fibro by rheum around 2008 based on 18 trigger points. Today pt reports she needs RF on mobic and voltaren gel. Is allergic to celebrex with no issues with these NSAIDS. Will be seeing rheumatology for the gabapentin.     The following portions of " "the patient's history were reviewed and updated as appropriate: current medications, past family history, past medical history, past social history, past surgical history and problem list.    Review of Systems   Cardiovascular: Negative for chest pain.   Gastrointestinal: Negative for abdominal distention and abdominal pain.   Skin: Negative for color change.   Neurological: Negative for tremors, speech difficulty and headaches.   Psychiatric/Behavioral: Negative for agitation and confusion.   All other systems reviewed and are negative.        Current Outpatient Prescriptions:   •  diclofenac (VOLTAREN) 1 % gel gel, Apply 4 g topically 4 (Four) Times a Day As Needed (arthritis)., Disp: 100 g, Rfl: 5  •  estradiol (VAGIFEM) 10 MCG tablet vaginal tablet, Insert 1 tablet into the vagina 2 (Two) Times a Week. Patient will use 2-3 times/week as needed, Disp: 10 tablet, Rfl: 10  •  gabapentin (NEURONTIN) 100 MG capsule, Take  by mouth., Disp: , Rfl:   •  levothyroxine (SYNTHROID) 88 MCG tablet, Take 1 tab po qam fasting and wait 1hr for food or other meds. BRAND ONLY., Disp: 90 tablet, Rfl: 1  •  meloxicam (MOBIC) 7.5 MG tablet, Take 1 tab po qd prn pain or inflammation, Disp: 30 tablet, Rfl: 5    Objective     /74  Temp 97.5 °F (36.4 °C)  Ht 62\" (157.5 cm)  Wt 119 lb 3.2 oz (54.1 kg)  LMP  (LMP Unknown)  BMI 21.8 kg/m2    Physical Exam   Constitutional: She is oriented to person, place, and time. She appears well-developed and well-nourished.   HENT:   Right Ear: Tympanic membrane and ear canal normal.   Left Ear: Tympanic membrane and ear canal normal.   Mouth/Throat: Oropharynx is clear and moist.   Eyes: Conjunctivae and EOM are normal. Pupils are equal, round, and reactive to light.   Neck: No thyromegaly present.   Cardiovascular: Normal rate and regular rhythm.    Pulmonary/Chest: Effort normal and breath sounds normal.   Neurological: She is alert and oriented to person, place, and time.   Skin: Skin " is warm and dry.   Psychiatric: She has a normal mood and affect.   Vitals reviewed.      Assessment/Plan   Barbara was seen today for follow-up.    Diagnoses and all orders for this visit:    Acquired hypothyroidism  -     levothyroxine (SYNTHROID) 88 MCG tablet; Take 1 tab po qam fasting and wait 1hr for food or other meds. BRAND ONLY.  -     TSH  -     T4, Free    Pure hypercholesterolemia  -     Lipid Panel    Arthritis  -     meloxicam (MOBIC) 7.5 MG tablet; Take 1 tab po qd prn pain or inflammation  -     diclofenac (VOLTAREN) 1 % gel gel; Apply 4 g topically 4 (Four) Times a Day As Needed (arthritis).      1. ENDO- hypothyroid- clinically at goal on synthroid 88. Will continue this with RF today. Will do TSH and free T4 today to establish her target goal.   2. CV- hyperlipid- discussed today that she has a h/o high cholesterol but she had a good ratio and was not able to tolerate a statin in the past. Will check a CRP and the lipid profile and only recommend treatment if indicated. However, I also advised her that her S/E were all likely GI (including the palpitations which can be esophageal spasm) and we may be able to find a statin she tolerates if needed.  3. GI- diverticulosis. edu re the pathophysiology provided. Discussed there are not concerns with this unless it gets infected which would cause abd pain with fever.   4. ORTHO- will RF her mobic and voltaren gel (pt not allergic to these).  5. RECHECK- 6mo routine with [employer]

## 2017-04-21 ENCOUNTER — OFFICE VISIT (OUTPATIENT)
Dept: INTERNAL MEDICINE | Facility: CLINIC | Age: 67
End: 2017-04-21

## 2017-04-21 VITALS — TEMPERATURE: 97.8 F | HEIGHT: 62 IN | WEIGHT: 117.2 LBS | BODY MASS INDEX: 21.57 KG/M2

## 2017-04-21 DIAGNOSIS — J06.9 ACUTE URI: Primary | ICD-10-CM

## 2017-04-21 DIAGNOSIS — E03.9 ACQUIRED HYPOTHYROIDISM: ICD-10-CM

## 2017-04-21 DIAGNOSIS — E78.00 PURE HYPERCHOLESTEROLEMIA: ICD-10-CM

## 2017-04-21 LAB — CRP SERPL-MCNC: 0.07 MG/DL (ref 0–1)

## 2017-04-21 PROCEDURE — 86140 C-REACTIVE PROTEIN: CPT | Performed by: FAMILY MEDICINE

## 2017-04-21 PROCEDURE — 99214 OFFICE O/P EST MOD 30 MIN: CPT | Performed by: FAMILY MEDICINE

## 2017-04-21 NOTE — PROGRESS NOTES
"Subjective   Barbara Hernandez is a 66 y.o. female.     History of Present Illness   Here today as a work inf or right ear pain. Last seen 4/13/17 for 6wk recheck. Was seen as a new patient 2/24/17.Pt was seen 2/24/17 as a new patient. Sees psych. Lab 4/13/17 demo TSH 0.181 and free T4 1.76 on synthroid 88. Lipid demo , tg 83, HDL 72, . Lab 2/24/17 demo TSH 0.067, free T4 1.56, + thyroid abs. Lab from previous PCP from 9/21/16 with normal CBC, CMP and vit D. TSH was 0.078. Lipid was high with , tg 87, HDL 71, . Had her MCW 10/2016. Had colonoscopy 3/2017 with no polyps (just diverticulosis).     1.ENDO- hypothyroid, diagnosed 45yo. Pt initially on synthroid 100. Takes brand, correctly. Was having fatigue, dizziness, feeling anxious, hoarse voice, trouble concentrating, feeding sad, dry skin, feeling cold, muscle aches and bowel issues. Has alt C/D. Is very careful with her diet with no weight loss or gain. Was increased on synthroid from 88 to 100 by previous provider but felt better while on synthrouid 88 by endo. TSH was over suppressed and pt was reduced back to synthroid 88. At her recheck she felt well with resolution of jitters. Recheck TSH 1.81 with free T4 1.76; pt continued on synthroid 88. .   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 Has \"fibromyalgia\" currently on gabapentin and mobic. Was diagnosed with fibro by rheum around 2008 based on 18 trigger points. Today pt reports she needs RF on mobic and voltaren gel. Is allergic to celebrex with no issues with these NSAIDS. Will be seeing rheumatology for the gabapentin.     3.CV- hyperlipid. Pt has h/o cardiomyopathy with a persistent low EF. Sees Dr Bermudez. Has new issues with elevated lipid. Came in fasting with , tg 83, HDL 72, . CRP was ordered but deleted by [facility]. Has not tolerated statins in the past.      4.RESP- today pt reports that this started last night with an earache. Had flu 4wk ago. " "Has had issues in past with fluid in her ear. Is also having right neck pain and hoarseness with PND. No other sinus symptoms. No fever but is feeling fatigued.     The following portions of the patient's history were reviewed and updated as appropriate: current medications, past family history, past medical history, past social history, past surgical history and problem list.    Review of Systems   HENT: Positive for ear pain (right side).    Cardiovascular: Negative for chest pain.   Gastrointestinal: Negative for abdominal distention and abdominal pain.   Skin: Negative for color change.   Neurological: Negative for tremors, speech difficulty and headaches.   Psychiatric/Behavioral: Negative for agitation and confusion.   All other systems reviewed and are negative.        Current Outpatient Prescriptions:   •  clarithromycin XL (BIAXIN XL) 500 MG 24 hr tablet, Take 2 tablets by mouth Daily., Disp: 14 tablet, Rfl: 0  •  diclofenac (VOLTAREN) 1 % gel gel, Apply 4 g topically 4 (Four) Times a Day As Needed (arthritis)., Disp: 100 g, Rfl: 5  •  estradiol (VAGIFEM) 10 MCG tablet vaginal tablet, Insert 1 tablet into the vagina 2 (Two) Times a Week. Patient will use 2-3 times/week as needed, Disp: 10 tablet, Rfl: 10  •  gabapentin (NEURONTIN) 100 MG capsule, Take  by mouth., Disp: , Rfl:   •  levothyroxine (SYNTHROID) 88 MCG tablet, Take 1 tab po qam fasting and wait 1hr for food or other meds. BRAND ONLY., Disp: 90 tablet, Rfl: 1  •  meloxicam (MOBIC) 7.5 MG tablet, Take 1 tab po qd prn pain or inflammation, Disp: 30 tablet, Rfl: 5    Objective     Temp 97.8 °F (36.6 °C)  Ht 62\" (157.5 cm)  Wt 117 lb 3.2 oz (53.2 kg)  LMP  (LMP Unknown)  BMI 21.44 kg/m2    Physical Exam   Constitutional: She is oriented to person, place, and time. She appears well-developed and well-nourished.   HENT:   Right Ear: Tympanic membrane and ear canal normal.   Left Ear: Tympanic membrane and ear canal normal.   Mouth/Throat: Oropharynx " is clear and moist.   Eyes: Conjunctivae and EOM are normal. Pupils are equal, round, and reactive to light.   Neck: No thyromegaly present.   Cardiovascular: Normal rate and regular rhythm.    Pulmonary/Chest: Effort normal and breath sounds normal.   Neurological: She is alert and oriented to person, place, and time.   Skin: Skin is warm and dry.   Psychiatric: She has a normal mood and affect.   Vitals reviewed.      Assessment/Plan   Barbara was seen today for earache.    Diagnoses and all orders for this visit:    Acute URI  -     clarithromycin XL (BIAXIN XL) 500 MG 24 hr tablet; Take 2 tablets by mouth Daily.    Acquired hypothyroidism    Pure hypercholesterolemia  -     C-reactive Protein        1. RESP- URI. Discussed that this is currently a virus. To use an antihistamine of her choice. Discussed the signs and symptoms of a full sinus infection (staying sick longer than a week, discolored phlegm or a found taste or odor). Will write for an antibiotic for her to add if this occurs.  2.ENDO- hypothyroid- discussed that her TSH is still slightly suppressed but this is her goal range due to her thyroid antiboides. Her T4 is at goal and she feels at goal. Will continue the synthroid 88.  3. CV- hypperlipid- will re order the CRP today. Discussed that her overall ratio is good but her LDL is still high enough that if she has a high CRP we may still consider treatment.  4. RECHECK- prn

## 2017-04-21 NOTE — PATIENT INSTRUCTIONS
1. RESP- URI. Discussed that this is currently a virus. To use an antihistamine of her choice. Discussed the signs and symptoms of a full sinus infection (staying sick longer than a week, discolored phlegm or a found taste or odor). Will write for an antibiotic for her to add if this occurs.  2.ENDO- hypothyroid- discussed that her TSH is still slightly suppressed but this is her goal range due to her thyroid antiboides. Her T4 is at goal and she feels at goal. Will continue the synthroid 88.  3. CV- hypperlipid- will re order the CRP today. Discussed that her overall ratio is good but her LDL is still high enough that if she has a high CRP we may still consider treatment.  4. RECHECK- prn

## 2017-05-01 ENCOUNTER — TELEPHONE (OUTPATIENT)
Dept: INTERNAL MEDICINE | Facility: CLINIC | Age: 67
End: 2017-05-01

## 2017-05-01 RX ORDER — CEFDINIR 300 MG/1
300 CAPSULE ORAL DAILY
Qty: 10 CAPSULE | Refills: 0 | Status: SHIPPED | OUTPATIENT
Start: 2017-05-01 | End: 2017-05-11

## 2017-05-23 ENCOUNTER — TELEPHONE (OUTPATIENT)
Dept: INTERNAL MEDICINE | Facility: CLINIC | Age: 67
End: 2017-05-23

## 2017-05-24 ENCOUNTER — OFFICE VISIT (OUTPATIENT)
Dept: OBSTETRICS AND GYNECOLOGY | Facility: CLINIC | Age: 67
End: 2017-05-24

## 2017-05-24 VITALS
HEIGHT: 62 IN | WEIGHT: 119.6 LBS | BODY MASS INDEX: 22.01 KG/M2 | DIASTOLIC BLOOD PRESSURE: 72 MMHG | SYSTOLIC BLOOD PRESSURE: 106 MMHG

## 2017-05-24 DIAGNOSIS — Z13.820 SCREENING FOR OSTEOPOROSIS: ICD-10-CM

## 2017-05-24 DIAGNOSIS — N90.4 LICHEN SCLEROSUS ET ATROPHICUS OF THE VULVA: ICD-10-CM

## 2017-05-24 DIAGNOSIS — Z78.0 MENOPAUSE: Primary | ICD-10-CM

## 2017-05-24 DIAGNOSIS — N95.2 VAGINAL ATROPHY: ICD-10-CM

## 2017-05-24 DIAGNOSIS — B37.31 RECURRENT CANDIDIASIS OF VAGINA: ICD-10-CM

## 2017-05-24 PROCEDURE — 99214 OFFICE O/P EST MOD 30 MIN: CPT | Performed by: OBSTETRICS & GYNECOLOGY

## 2017-05-24 RX ORDER — FLUCONAZOLE 150 MG/1
150 TABLET ORAL EVERY OTHER DAY
Qty: 3 TABLET | Refills: 2 | Status: SHIPPED | OUTPATIENT
Start: 2017-05-24 | End: 2017-05-29

## 2017-05-24 RX ORDER — ESTRADIOL 10 UG/1
1 INSERT VAGINAL 2 TIMES WEEKLY
Qty: 10 TABLET | Refills: 12 | Status: SHIPPED | OUTPATIENT
Start: 2017-05-25 | End: 2017-05-25

## 2017-05-25 ENCOUNTER — TELEPHONE (OUTPATIENT)
Dept: OBSTETRICS AND GYNECOLOGY | Facility: CLINIC | Age: 67
End: 2017-05-25

## 2017-05-25 RX ORDER — ESTRADIOL 10 UG/1
1 INSERT VAGINAL 3 TIMES WEEKLY
Qty: 12 TABLET | Refills: 12 | Status: SHIPPED | OUTPATIENT
Start: 2017-05-26 | End: 2018-05-31 | Stop reason: ALTCHOICE

## 2017-06-06 ENCOUNTER — TREATMENT (OUTPATIENT)
Dept: PHYSICAL THERAPY | Facility: CLINIC | Age: 67
End: 2017-06-06

## 2017-06-06 DIAGNOSIS — M70.62 GREATER TROCHANTERIC BURSITIS OF LEFT HIP: ICD-10-CM

## 2017-06-06 DIAGNOSIS — M53.3 SACROILIAC JOINT DYSFUNCTION: Primary | ICD-10-CM

## 2017-06-06 PROCEDURE — PTSPVT PR CUSTOM PT TREATMENT OF SELF PAY PATIENT: Performed by: PHYSICAL THERAPIST

## 2017-06-06 NOTE — PROGRESS NOTES
"Physical Therapy Re-Evaluation      SUBJECTIVE:     Changes since last seen:  Mrs. Hernandez was discharged on 02/02/17.  Since that time she was doing well until the month of March and April when she got the flu, strain B, then a sinus infection.  She reports she has never coughed as hard as she did over the two months.  Nor can she remember ever being this sick.  She was sitting a lot more than normal and coughing continually.     \"My neck has been pretty good, but using Voltaren gel every day after takes a shower and it has allowed her to work in the garden without significant pain\".     However, she had onset of \"unbelievable pain the last 4 weeks in left buttock and now having a lot of low back pain\".  She tried to self correct her SI joints but now it is worse.  She can barely walk after sitting, doesn't matter the length of time she is sitting. It takes 20 min of walking and moving around to get rid of or decrease the pain.    She also has pain in the left met heads of her foot.  Today she is starting to have in the right calf.     Pt also complains of onset of bilateral tingling in feet.  \"They feel like they have gone to sleep\".  It comes on after squatting in the garden and seems to go away with movement.     Pt requests to be seen on a self pay basis today.     Updated Medical Hx:   1. The anxiety she has had, was related to her thyroid, it was rechecked and she was put on lower level of thyroid med and much improvement in GI issues, anxiety etc.  2.  Onset of neuroma pain in left foot between 2nd and 3rd toes  3.  Flare up of arthritic pain both feet, met heads of 2nd and 3rd toes      Current level of pain:      0/10 left shoulder    0/10 neck          3/10  Left buttock   Lowest level of pain since last seen:              0/10      0/10    0/10    Highest level of pain since last seen:   1/10       1/10       5-6/10 down left leg when moves to standing       OBJECTIVE:     Palpation:    Monthly Objective " Measurement/Functional Activity    Date: 06/06/17          Oswestry Pain Score           LE Functional Scale                      AROM Lumbar Spine: Degrees   Degrees      Degrees      Degrees      Degrees      Degrees      Degrees    Degrees      Flexion 103                       Extension 17                       Right Lat. Flexion 25                       Left Lat. Flexion 20                       Right Lat Shift Pelvis No change                       Left Lat Shift Pelvis Pain/stiff                               AROM Hips:  (degrees) Right      Left Right Left Right  Left Right  Left Right  Left Right  Left Right  Left Right  Left      Flexion 115     112                       Abduction 45       45                       Int. Rotation 40      38                       Ext. Rotation  45       45                               Flexibility:   (degrees) Right    Left Right  Left Right  Left Right  Left Right  Left Right  Left Right  Left Right  Left      Hamstrings -28       -26                      Quadriceps 55       50                      HipExt/Rot(piriformis) 45       39             Hip Int/Rotators 40       38             Hip Flexors (iliopsoas) Not tested                   IT Band Not tested                          Reflexes: Right      Left Right  Left Right  Left Right  Left Right  Left Right  Left Right  Left Right  Left      L4 (Quad) -       -             S1 (Achilles) -       -                     Root Level  - Motor Right      Left Right  Left Right  Left Right  Left Right  Left Right  Left Right  Left Right  Left     T12             Rectus Abdominus 4+/5             L1              Paraspinals 5/5      5/5             L2              Hip Flexion 5/5      5/5             L3             Quads 5/5      5/5             L4              Anterior Tibialis 5/5      5/5             L5             Gr. Toe Extension Not tested             S1            Gastroc/Sol/Peroneals Not tested                      Functional Strength:             Single LegStanceTime (seconds) R:-  L:-   R:      L: R:      L: R:     L: R:      L: R:      L: R:      L: R:      L:     Pelvic Floor Isolation (seconds) 10 sec             Inner Unit  Isolation (seconds) 10 sec                     Other Functional Outcome Tests               LE Functional Scale               30 Second Chair Rise Test               Timed Up & Go, 9.8 feet                  (fall risk if > 11.3 seconds)               Toe Tap Test                (Young 47 taps, Older 34 taps)                      Functional Activities:              Sit w/o pain? Pain increases (minutes) Yes/ 30 min             Stand w/o pain? Yes/ 15 min             Walk w/o pain? Yes/ 10-15 min             Steps w/o pain? Yes/ 1 fl             Drive w/o pain? Yes/ pain getting in and out              Work w/o pain? n/a             # times wakes w/ pain? 0x               Muscle/Bone Irritation    10/04/16 10/27/16  12/01/16    12/15/16 01/03/17 01/19/17 02/02/17   06/06/17        Right  Left Right   Left Right Left Right  Left Right  Left Right  Left Right  Left Right Left      Piriformis     +         +  slight  slight Slight slight  slight slight slight   slight   Slight   ++      Gr. Trochanter     +         +    -            -        -           +      IT Band     -           -         -       slight      Quadr. Lumborum     -           -             Ischial Tuberosity     -           -             Sacrococcygeal Ligs     -          -             Paraspinals     -           -   +                      +                 +     +      Spinous Processes                      T rotated to      -           -                    L rotated to   L3-5  L1-5 L4-5             L4-5             L3-5   L2-5  L2-5      Adductor Fabio     -             -             Iliopsoas     -              -             Quad Origin     -             -             SI Joint    ++         +   ++       ++   ++        -     +           -   Slight    +    -            -    +          +  ++        +      Pubic Symphysis   not tested                                            Left thumb CMC                     +                 +                  +              10/04/16 10/27/16  12/01/16 12/15/16 01/03/17 01/19/17 02/02/17 06/06/17       SI Joint Positioning  Right   Left Right    Left Right Left   Right  Left Right  Left  Right  Left Right  Left Right Left          Innominate       Symmetrical!               Anterior                                   x                 x              x                x                x                 x     x             Posterior           x     x      x     x     x      x                 x         Sacrum                         Unilateral rotation            x     x      x     x     x Neutral       x    X                         SI Joint Testing  Right   Left Right    Left Right  Left Right  Left Right  Left Right  Left Right  Left Right Left              Distraction           +           +    +           +  slight slight    -         -  Slight  slight      +         +              Beatriz's           -            -    -            -    -            -                   Compression          -            -    -            -    -            -                   Prone Press Up     -            -    -            -   -             -                          Special Tests    Right   Left Right    Left  Right Left Right  Left Right  Left Right  Left Right  Left Right Left          Straight Leg Raise                                      Active      -         -    -           -    -         -        -          -              Passive     -         -      -           -     -         -        -          -          Hip Scour Test     -          -     -           -     -          -                                             ASSESSMENT:  Problem List:  1.  Flare-up SI joint dysfunction  2.  Secondary left greater trochanteric  bursitis.     Discussion:   Neural tension of both LEs is negative.  Pt will monitor the tingling of her feet and see if she can relate this to any movement/position or activity.   Pt will try Voltaren gel on the met heads of both feet as this has worked so well previously.  She will continue with Voltaren gel on the left gr trochanter.  She will also ice the left gr trochanter.    The flare up of the SI joint symptoms is certainly understandable/explainable with the increased sitting and severe coughing over the last couple of months.  She responded well today with manual work.    She requests to return in a few weeks on a self pay basis to reassess and do manual work as needed if she is not able to self manage her symptoms. She has a thorough understanding of how to self correct the sacral position what is needed in terms of posturing and positioning to decrease posterior lumbar disc pressures.  She even noted today that she incorporates these principles into her ADLs without even thinking about it.       Short Term Goals:                                     Date Met:                    1.  Decrease left hip/buttock pain to no greater than 4/10        2.  Eliminate left buttock pain.     Long Term Goals:           Date Met:      1.  pt independent in self-management of symptoms       2.  Decrease left hip/buttock pain to no greater than 2/10           TREATMENT TODAY:    Total Treatment Time:   (min in clinic):   60  Timed Code Treatment Minutes:   55      Manual Therapy.  (Man)  RX min:   45  Manual posterior rotation of the right innominate  Mobs of right hip  PIC left iliopsoas  PIC multifidus  Quadratus lumborum stretching  Piriformis stretching  Myofascial work of LE and trunk  Rib mobs    Therapeutic Activities:  (TA)  RX min:   10  Neuromuscular Reeducation:  (NMR)  Rx min:      Ultrasound:  RX min:       1.6 piper/cm2       Location:  Left bicipital and left supraspinatus tendons, ant/inf left shoulder  capsule  Supplies given:  -     Procedures:  Code Procedure/Instruction 09/27/16 10/04/16 10/27/16 12/01/16 12/15/16 01/03/17 01/19/17 02/02/17  06/06/17       TA            Sleeping Positions                  reviewed               TA Sternum-Up Posture    review             TA SI jt. self-correction          reviewed       TA Use of lumbar pillow reviewed                TA Use of cervical roll reviewed                TA Use of orthotics                 TA Use of SI belt                 TA Use of Nada chair                 TA Lumb Facet PIC          reviewed       TA Order of Self-Correct          reviewed       TA Decreasing Disc Pressures                 NMR Pelvic Floor Isolation                 NMR Transverse Abdominus                 NMR Multifidus Isolation                 NMR InnerUnit agns [place]                 NMR Sit-stand w/ inner unit                 NMR Roll w/ inner unit                 NMR Walk w/ inner unit                  NMR Up/down steps w/ inner unit                 NMR Water Exer Instruction                 NMR Hip Abd w/o periformis                 NMR Hip Abd w/o iliop/ham                  NMR Lower abs in supine                 NMR Abd obliques in supine                 NMR Prone Knee Flex                 NMR Prone glut max                 NMR Retro Step                 NMR Retro Walk                 NMR Forward walk w/ inner unit                 NMR Balance Instruction                 NMR Ball sit A/P & Lateral                 NMR Ball Catch/Core/Supine                 NMR Ball Catch/Core/Stand                 NMR Ball All 4's Arm Life                 NMR Ball Prone Walk Out                 NMR Ball Supine Obliques                 NMR Ball sit-supine-sit                 NMR Walking Prog Progress                 TA Home prg sequencing                 TA Hamstring stretch                 TA Hip Ext Rot Stretch                 TA Hip Int Rot Stretch                 TA Hip Flex/IT Stretch                  TA Hip Add Stretch                 TA Lats Dorsi Stretch                 TA Gastric/soleus stretch                 TA QuadLumbormStretch                 TA Quad Stretch                 NMR Lateral Bridge Drop                 NMR Waiters [place]                 NMR O'Feldt Lat Pull Down                 NMR O'Feldt Hip Ext                 NMR O'Feldt Trunk Ext                 TA CervDiscExtSup/stnd   instructed              TA Cerv Stretches  instructed               TA Neural Gliding                 NMR Ant/Mid Scalene Strch                 NMR Prone Arm Lifts       instructed reviewed         NMR Scapula Stabilization                 NMR Occulomotor Isometrics                 NMR Cervical Isometrics                 TA Ant. Chest Stretch                 TA Standing Wall Slides                 TA Rotator Cuff Stretch  instructed reviewed corrected reviewed            TA Rotator Cuff Theraband     instructed reviewed reviewed          TA ShldAROM w/bar instructed reviewed reviewed  reviewed reviewed  reviewed         TA Biceps Stretch  instructed reviewed corrected  reviewed reviewed reviewed         TA SelfPICThorSpine                 TA   Ant Scalene stretch  instructed reviewed              TA Icing shoulder  instructed reviewed reviewed             TA [employer] for upper trap   instructed              TA Sidelying rot cuff strengthen   instructed                                                                                                                              Karen Chatterjee, PT, MS  Physical Therapist  [place]# [id number]

## 2017-06-07 ENCOUNTER — TELEPHONE (OUTPATIENT)
Dept: CARDIOLOGY | Facility: CLINIC | Age: 67
End: 2017-06-07

## 2017-06-07 NOTE — TELEPHONE ENCOUNTER
Patient Called and stated she has been having some numbness and tingling in her feet and legs. She stated she does have back issues along with fibromyalgia. Patient stated her PT said she didn't think its her back. Advised her to f/u with PCP and call if they want her to f/u with Dr Bermudez. Patient verbalized understanding.

## 2017-06-08 ENCOUNTER — TELEPHONE (OUTPATIENT)
Dept: INTERNAL MEDICINE | Facility: CLINIC | Age: 67
End: 2017-06-08

## 2017-06-08 NOTE — TELEPHONE ENCOUNTER
----- Message from Luz Elena Nichole sent at 6/7/2017 10:08 AM EDT -----  Contact: PATIENT  PATIENT STATES THAT SHE HAS BEEN HAVING TINGLING IN HER FEET FOR THE PAST FEW MONTHS THAT GETS WORSE WITH PHYSICAL ACTIVITY. HER PHYSICAL THERAPIST BELIEVES SHE'S HAVING PERIPHERAL VASCULAR ISSUES. SHE CONTACTED HER CARDIOLOGIST BUT COULDN'T GET AN APPT YET AND WANTED TO KNOW IF SHE COULD BE SEEN ASAP. SHE CAN BE REACHED [phone]

## 2017-06-08 NOTE — TELEPHONE ENCOUNTER
I spoke to Dr. Mccarty and she states that Cardio will need to cover this and she will need to keep the appt with them. Pt informed via Luz Elena.

## 2017-06-14 ENCOUNTER — OFFICE VISIT (OUTPATIENT)
Dept: CARDIOLOGY | Facility: CLINIC | Age: 67
End: 2017-06-14

## 2017-06-14 VITALS
BODY MASS INDEX: 21.71 KG/M2 | DIASTOLIC BLOOD PRESSURE: 70 MMHG | HEART RATE: 78 BPM | WEIGHT: 118 LBS | HEIGHT: 62 IN | SYSTOLIC BLOOD PRESSURE: 116 MMHG

## 2017-06-14 DIAGNOSIS — R20.2 PARESTHESIA OF FOOT, BILATERAL: ICD-10-CM

## 2017-06-14 DIAGNOSIS — I73.9 CLAUDICATION (HCC): Primary | ICD-10-CM

## 2017-06-14 DIAGNOSIS — E78.00 PURE HYPERCHOLESTEROLEMIA: ICD-10-CM

## 2017-06-14 PROCEDURE — 99213 OFFICE O/P EST LOW 20 MIN: CPT | Performed by: INTERNAL MEDICINE

## 2017-06-14 RX ORDER — AMOXICILLIN 500 MG
2 CAPSULE ORAL DAILY
COMMUNITY

## 2017-06-14 RX ORDER — VITAMIN E 268 MG
400 CAPSULE ORAL DAILY
COMMUNITY

## 2017-06-14 RX ORDER — ASPIRIN 81 MG/1
81 TABLET ORAL 3 TIMES WEEKLY
COMMUNITY
End: 2019-05-24

## 2017-06-14 RX ORDER — GLUCOSAMINE HCL 500 MG
1 TABLET ORAL DAILY
COMMUNITY

## 2017-06-14 RX ORDER — ACETAMINOPHEN 500 MG
1000 TABLET ORAL
COMMUNITY

## 2017-06-14 RX ORDER — DIMENHYDRINATE 50 MG
200 TABLET ORAL DAILY
COMMUNITY

## 2017-06-14 NOTE — PROGRESS NOTES
[facility] at [facility]  Office Progress Note  Barbara Hernandez  1950  [phone]      Visit Date: 06/14/2017    PCP: Christen Mccarty MD  [address]  [facility] [address]    IDENTIFICATION: A 66 y.o. 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 Postprandial dizziness/lightheadedness.  2. History of cardiomyopathy:  a. Left heart catheterization, 06/06/2002:  i. Normal coronary artery anatomy.  b. LVEF 55% to 60%.   3. Palpitations:  4. Holter monitor, May 2002:  a. Sinus rhythm with rare PAC's, PVC's.  b. Short SC interval.  5. Dyslipidemia.  6. VHD  a. [id number] multiple echo, muga   b. 5/16 SE per CAK EF 55% mild AI, mild diastolic dysfxn  7. Remote tobacco use.  8. Hypothyroidism.  9. Osteoarthritis.  10. Gastritis.  11. Irritable bowel syndrome/spastic colon.  12. Hay fever.  13. Perimenopause.  14. Herpes zoster, October 2008, (affecting left ear).  15. Lichen sclerosis (affecting vaginal tissue).  16. Fibromyalgia.  17. Surgeries:  a. Right hip replacement, 11/15/2010.  b. Septoplasty, March 2011.  c. Left hip replacement, November 2012.    Allergies  Allergies   Allergen Reactions   • Augmentin [Amoxicillin-Pot Clavulanate]    • Celecoxib    • Ciprofloxacin    • Clarithromycin    • Doxycycline    • Erythromycin    • Levofloxacin    • Lyrica [Pregabalin]    • Lortab  [Hydrocodone-Acetaminophen]    • Nexium  [Esomeprazole]    • Omeprazole    • Sulfa Antibiotics    • Tramadol        Current Medications    Current Outpatient Prescriptions:   •  acetaminophen (TYLENOL) 325 MG tablet, Take 650 mg by mouth every night at bedtime., Disp: , Rfl:   •  aspirin 81 MG EC tablet, Take 81 mg by mouth 3 (Three) Times a Week., Disp: , Rfl:   •  Calcium Carbonate-Vit D-Min (CALCIUM 1200  "PO), Take 1,200 mg by mouth., Disp: , Rfl:   •  Cholecalciferol (VITAMIN D3) 3000 UNITS tablet, Take  by mouth., Disp: , Rfl:   •  Coenzyme Q10 (CO Q-10) 100 MG capsule, Take 400 mg by mouth., Disp: , Rfl:   •  diclofenac (VOLTAREN) 1 % gel gel, Apply 4 g topically 4 (Four) Times a Day As Needed (arthritis)., Disp: 100 g, Rfl: 5  •  estradiol (VAGIFEM) 10 MCG tablet vaginal tablet, Insert 1 tablet into the vagina 3 (Three) Times a Week., Disp: 12 tablet, Rfl: 12  •  gabapentin (NEURONTIN) 100 MG capsule, Take 100 mg by mouth every night at bedtime., Disp: , Rfl:   •  Glucosamine HCl 1000 MG tablet, Take  by mouth., Disp: , Rfl:   •  levothyroxine (SYNTHROID) 88 MCG tablet, Take 1 tab po qam fasting and wait 1hr for food or other meds. BRAND ONLY., Disp: 90 tablet, Rfl: 1  •  meloxicam (MOBIC) 7.5 MG tablet, Take 1 tab po qd prn pain or inflammation, Disp: 30 tablet, Rfl: 5  •  Misc Natural Products (LUTEIN 20 PO), Take 20 mg by mouth., Disp: , Rfl:   •  Multiple Vitamins-Minerals (PRESERVISION AREDS PO), Take  by mouth., Disp: , Rfl:   •  Omega-3 Fatty Acids (FISH OIL) 1200 MG capsule capsule, Take 1,200 mg by mouth Daily., Disp: , Rfl:   •  vitamin E 400 UNIT capsule, Take 400 Units by mouth Daily., Disp: , Rfl:       History of Present Illness   Pt reports having lower extremity pain, especially when walking, onset in February. She endorses some paresthesias as well with this.   Pt denies any chest pain, dyspnea, dyspnea on exertion, orthopnea, PND, palpitations, lower extremity edema.    ROS:  All systems have been reviewed and are negative with the exception of those mentioned in the HPI.    OBJECTIVE:  Vitals:    06/14/17 1354   BP: 116/70   BP Location: Left arm   Patient Position: Sitting   Pulse: 78   Weight: 118 lb (53.5 kg)   Height: 62\" (157.5 cm)     Physical Exam   Constitutional: She is oriented to person, place, and time. She appears well-developed and well-nourished. No distress.   Neck: Neck supple. " No JVD present. No tracheal deviation present.   Cardiovascular: Normal rate, regular rhythm, normal heart sounds and intact distal pulses.    No murmur heard.  Pulses:       Radial pulses are 2+ on the right side, and 2+ on the left side.        Dorsalis pedis pulses are 1+ on the right side, and 1+ on the left side.        Posterior tibial pulses are 1+ on the right side, and 1+ on the left side.   Pulmonary/Chest: Effort normal and breath sounds normal. She has no wheezes. She has no rales.   Abdominal: Soft. Bowel sounds are normal. There is no tenderness. There is no guarding.   Musculoskeletal: She exhibits no edema or tenderness.   Neurological: She is alert and oriented to person, place, and time.   Nursing note and vitals reviewed.      Diagnostic Data:  Procedures      ASSESSMENT:   Diagnosis Plan   1. Claudication     2. Pure hypercholesterolemia     3. Paresthesia of foot, bilateral         PLAN:  1. Will evaluate for arterial disease with arterial doppler of the lower extremities    Christen Mccarty MD, thank you for referring Ms. Hernandez for evaluation.  I have forwarded my electronically generated recommendations to you for review.  Please do not hesitate to call with any questions.    Scribed for Jonathon Bermudez MD by Rosa Ford PA-C. 6/14/2017  2:12 PM  I, Jonathon Bermudez MD, personally performed the services described in this documentation as scribed by the above named individual in my presence, and it is both accurate and complete.  6/14/2017  2:23 PM    Jonathon Bermudez MD, [facility]

## 2017-06-15 ENCOUNTER — HOSPITAL ENCOUNTER (OUTPATIENT)
Dept: BONE DENSITY | Facility: HOSPITAL | Age: 67
Discharge: HOME | End: 2017-06-15
Attending: OBSTETRICS & GYNECOLOGY

## 2017-06-21 DIAGNOSIS — I73.9 CLAUDICATION (HCC): Primary | ICD-10-CM

## 2017-06-22 ENCOUNTER — HOSPITAL ENCOUNTER (OUTPATIENT)
Dept: ULTRASOUND IMAGING | Facility: HOSPITAL | Age: 67
End: 2017-06-22

## 2017-06-28 ENCOUNTER — HOSPITAL ENCOUNTER (OUTPATIENT)
Dept: BONE DENSITY | Facility: HOSPITAL | Age: 67
Discharge: HOME | End: 2017-06-28
Attending: OBSTETRICS & GYNECOLOGY | Admitting: OBSTETRICS & GYNECOLOGY

## 2017-06-28 DIAGNOSIS — Z13.820 SCREENING FOR OSTEOPOROSIS: ICD-10-CM

## 2017-06-28 PROCEDURE — 77080 DXA BONE DENSITY AXIAL: CPT | Performed by: RADIOLOGY

## 2017-06-28 PROCEDURE — 77080 DXA BONE DENSITY AXIAL: CPT

## 2017-06-29 ENCOUNTER — OFFICE VISIT (OUTPATIENT)
Dept: INTERNAL MEDICINE | Facility: CLINIC | Age: 67
End: 2017-06-29

## 2017-06-29 VITALS
WEIGHT: 119 LBS | TEMPERATURE: 97.8 F | DIASTOLIC BLOOD PRESSURE: 72 MMHG | SYSTOLIC BLOOD PRESSURE: 110 MMHG | BODY MASS INDEX: 21.9 KG/M2 | HEIGHT: 62 IN

## 2017-06-29 DIAGNOSIS — G62.9 PERIPHERAL POLYNEUROPATHY: Primary | ICD-10-CM

## 2017-06-29 DIAGNOSIS — E03.9 ACQUIRED HYPOTHYROIDISM: ICD-10-CM

## 2017-06-29 LAB
ALBUMIN SERPL-MCNC: 4.4 G/DL (ref 3.2–4.8)
ALBUMIN/GLOB SERPL: 1.6 G/DL (ref 1.5–2.5)
ALP SERPL-CCNC: 78 U/L (ref 25–100)
ALT SERPL W P-5'-P-CCNC: 14 U/L (ref 7–40)
ANION GAP SERPL CALCULATED.3IONS-SCNC: 5 MMOL/L (ref 3–11)
AST SERPL-CCNC: 17 U/L (ref 0–33)
BASOPHILS # BLD AUTO: 0.03 10*3/MM3 (ref 0–0.2)
BASOPHILS NFR BLD AUTO: 0.5 % (ref 0–1)
BILIRUB SERPL-MCNC: 0.4 MG/DL (ref 0.3–1.2)
BUN BLD-MCNC: 29 MG/DL (ref 9–23)
BUN/CREAT SERPL: 36.3 (ref 7–25)
CALCIUM SPEC-SCNC: 9.8 MG/DL (ref 8.7–10.4)
CHLORIDE SERPL-SCNC: 103 MMOL/L (ref 99–109)
CO2 SERPL-SCNC: 32 MMOL/L (ref 20–31)
CREAT BLD-MCNC: 0.8 MG/DL (ref 0.6–1.3)
DEPRECATED RDW RBC AUTO: 45.5 FL (ref 37–54)
EOSINOPHIL # BLD AUTO: 0.13 10*3/MM3 (ref 0–0.3)
EOSINOPHIL NFR BLD AUTO: 2.3 % (ref 0–3)
ERYTHROCYTE [DISTWIDTH] IN BLOOD BY AUTOMATED COUNT: 12.8 % (ref 11.3–14.5)
GFR SERPL CREATININE-BSD FRML MDRD: 72 ML/MIN/1.73
GLOBULIN UR ELPH-MCNC: 2.8 GM/DL
GLUCOSE BLD-MCNC: 92 MG/DL (ref 70–100)
HCT VFR BLD AUTO: 40.8 % (ref 34.5–44)
HGB BLD-MCNC: 13.2 G/DL (ref 11.5–15.5)
IMM GRANULOCYTES # BLD: 0.01 10*3/MM3 (ref 0–0.03)
IMM GRANULOCYTES NFR BLD: 0.2 % (ref 0–0.6)
LYMPHOCYTES # BLD AUTO: 1.44 10*3/MM3 (ref 0.6–4.8)
LYMPHOCYTES NFR BLD AUTO: 25.2 % (ref 24–44)
MCH RBC QN AUTO: 31.6 PG (ref 27–31)
MCHC RBC AUTO-ENTMCNC: 32.4 G/DL (ref 32–36)
MCV RBC AUTO: 97.6 FL (ref 80–99)
MONOCYTES # BLD AUTO: 0.6 10*3/MM3 (ref 0–1)
MONOCYTES NFR BLD AUTO: 10.5 % (ref 0–12)
NEUTROPHILS # BLD AUTO: 3.51 10*3/MM3 (ref 1.5–8.3)
NEUTROPHILS NFR BLD AUTO: 61.3 % (ref 41–71)
PLATELET # BLD AUTO: 234 10*3/MM3 (ref 150–450)
PMV BLD AUTO: 10.3 FL (ref 6–12)
POTASSIUM BLD-SCNC: 4.2 MMOL/L (ref 3.5–5.5)
PROT SERPL-MCNC: 7.2 G/DL (ref 5.7–8.2)
RBC # BLD AUTO: 4.18 10*6/MM3 (ref 3.89–5.14)
SODIUM BLD-SCNC: 140 MMOL/L (ref 132–146)
T4 FREE SERPL-MCNC: 1.63 NG/DL (ref 0.89–1.76)
TSH SERPL DL<=0.05 MIU/L-ACNC: 0.23 MIU/ML (ref 0.35–5.35)
VIT B12 BLD-MCNC: 349 PG/ML (ref 211–911)
WBC NRBC COR # BLD: 5.72 10*3/MM3 (ref 3.5–10.8)

## 2017-06-29 PROCEDURE — 82607 VITAMIN B-12: CPT | Performed by: FAMILY MEDICINE

## 2017-06-29 PROCEDURE — 85025 COMPLETE CBC W/AUTO DIFF WBC: CPT | Performed by: FAMILY MEDICINE

## 2017-06-29 PROCEDURE — 99214 OFFICE O/P EST MOD 30 MIN: CPT | Performed by: FAMILY MEDICINE

## 2017-06-29 PROCEDURE — 80053 COMPREHEN METABOLIC PANEL: CPT | Performed by: FAMILY MEDICINE

## 2017-06-29 PROCEDURE — 84439 ASSAY OF FREE THYROXINE: CPT | Performed by: FAMILY MEDICINE

## 2017-06-29 PROCEDURE — 84443 ASSAY THYROID STIM HORMONE: CPT | Performed by: FAMILY MEDICINE

## 2017-06-29 RX ORDER — ALPRAZOLAM 0.5 MG/1
TABLET ORAL
Qty: 3 TABLET | Refills: 0 | Status: SHIPPED | OUTPATIENT
Start: 2017-06-29 | End: 2018-05-31

## 2017-06-29 RX ORDER — GABAPENTIN 100 MG/1
100 CAPSULE ORAL
Qty: 30 CAPSULE | Refills: 5 | Status: SHIPPED | OUTPATIENT
Start: 2017-06-29 | End: 2018-01-29 | Stop reason: SDUPTHER

## 2017-06-29 NOTE — PROGRESS NOTES
"Subjective   Barbara Hernandez is a 66 y.o. female.     History of Present Illness   Here today for numbness in her legs and feet. Last seen 4/21/17 for a URI. Was seen 4/13/17 for 6wk recheck. Was seen as a new patient 2/24/17.Pt was seen 2/24/17 as a new patient. Sees psych. Lab 4/13/17 demo TSH 0.181 and free T4 1.76 on synthroid 88. Lipid demo , tg 83, HDL 72, . Lab 2/24/17 demo TSH 0.067, free T4 1.56, + thyroid abs. Lab from previous PCP from 9/21/16 with normal CBC, CMP and vit D. TSH was 0.078. Lipid was high with , tg 87, HDL 71, . Had her MCW 10/2016. Had colonoscopy 3/2017 with no polyps (just diverticulosis).      1.ENDO- hypothyroid, diagnosed 43yo. Pt initially on synthroid 100. Takes brand, correctly. Was having fatigue, dizziness, feeling anxious, hoarse voice, trouble concentrating, feeding sad, dry skin, feeling cold, muscle aches and bowel issues. Has alt C/D. Is very careful with her diet with no weight loss or gain. Was increased on synthroid from 88 to 100 by previous provider but felt better while on synthrouid 88 by endo. TSH was over suppressed and pt was reduced back to synthroid 88. At her recheck she felt well with resolution of jitters. Recheck TSH 1.81 with free T4 1.76; pt continued on synthroid 88.   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 Has \"fibromyalgia\" currently on gabapentin and mobic. Was diagnosed with fibro by rheum around 2008 based on 18 trigger points. Today pt reports she needs RF on mobic and voltaren gel. Is allergic to celebrex with no issues with these NSAIDS. Will be seeing rheumatology for the gabapentin.   3.CV- hyperlipid. Pt has h/o cardiomyopathy with a persistent low EF. Sees Dr Bermudez. Has new issues with elevated lipid. Came in fasting with , tg 83, HDL 72, . CRP was normal with decision to not use a statin.       4. NEURO- today pt reports she is having numbness in her legs and feet since 2/2017. It " occurs when she sits or walks or if she stands for a prolonged period. Does not occur when lying down. Describes that her feet feel cold when lying down but no numbness.  Does not She saw cardio and was advised she had good pulses. Has venous doppler pending but they did not feel that this was likely the issue. She is taking calcium, vit D and B12.     The following portions of the patient's history were reviewed and updated as appropriate: current medications, past family history, past medical history, past social history, past surgical history and problem list.    Review of Systems   Cardiovascular: Negative for chest pain.   Gastrointestinal: Negative for abdominal distention and abdominal pain.   Skin: Negative for color change.   Neurological: Positive for weakness and numbness (hands). Negative for tremors, speech difficulty and headaches.        Tingling in feet and legs   Psychiatric/Behavioral: Negative for agitation and confusion.   All other systems reviewed and are negative.        Current Outpatient Prescriptions:   •  acetaminophen (TYLENOL) 325 MG tablet, Take 650 mg by mouth every night at bedtime., Disp: , Rfl:   •  aspirin 81 MG EC tablet, Take 81 mg by mouth 3 (Three) Times a Week., Disp: , Rfl:   •  Calcium Carbonate-Vit D-Min (CALCIUM 1200 PO), Take 1,200 mg by mouth., Disp: , Rfl:   •  Cholecalciferol (VITAMIN D3) 3000 UNITS tablet, Take  by mouth., Disp: , Rfl:   •  Coenzyme Q10 (CO Q-10) 100 MG capsule, Take 400 mg by mouth., Disp: , Rfl:   •  diclofenac (VOLTAREN) 1 % gel gel, Apply 4 g topically 4 (Four) Times a Day As Needed (arthritis)., Disp: 100 g, Rfl: 5  •  estradiol (VAGIFEM) 10 MCG tablet vaginal tablet, Insert 1 tablet into the vagina 3 (Three) Times a Week., Disp: 12 tablet, Rfl: 12  •  gabapentin (NEURONTIN) 100 MG capsule, Take 100 mg by mouth every night at bedtime., Disp: , Rfl:   •  Glucosamine HCl 1000 MG tablet, Take  by mouth., Disp: , Rfl:   •  levothyroxine (SYNTHROID)  "88 MCG tablet, Take 1 tab po qam fasting and wait 1hr for food or other meds. BRAND ONLY., Disp: 90 tablet, Rfl: 1  •  meloxicam (MOBIC) 7.5 MG tablet, Take 1 tab po qd prn pain or inflammation, Disp: 30 tablet, Rfl: 5  •  Misc Natural Products (LUTEIN 20 PO), Take 20 mg by mouth., Disp: , Rfl:   •  Multiple Vitamins-Minerals (PRESERVISION AREDS PO), Take  by mouth., Disp: , Rfl:   •  Omega-3 Fatty Acids (FISH OIL) 1200 MG capsule capsule, Take 1,200 mg by mouth Daily., Disp: , Rfl:   •  vitamin E 400 UNIT capsule, Take 400 Units by mouth Daily., Disp: , Rfl:     Objective     /72  Temp 97.8 °F (36.6 °C)  Ht 62\" (157.5 cm)  Wt 119 lb (54 kg)  LMP  (LMP Unknown)  BMI 21.77 kg/m2    Physical Exam   Constitutional: She is oriented to person, place, and time. She appears well-developed and well-nourished.   HENT:   Right Ear: Tympanic membrane and ear canal normal.   Left Ear: Tympanic membrane and ear canal normal.   Mouth/Throat: Oropharynx is clear and moist.   Eyes: Conjunctivae and EOM are normal. Pupils are equal, round, and reactive to light.   Neck: No thyromegaly present.   Cardiovascular: Normal rate and regular rhythm.    Pulmonary/Chest: Effort normal and breath sounds normal.   Neurological: She is alert and oriented to person, place, and time.   Skin: Skin is warm and dry.   Psychiatric: She has a normal mood and affect.   Vitals reviewed.      Assessment/Plan   Barbara was seen today for follow-up.    Diagnoses and all orders for this visit:    Peripheral polyneuropathy      1. NEURO- BLE neuropathy. Discussed that this could be metabolic. Will check labs including recheck of her thyroid now. Advised to stop the calcium but continue vit D at 2933-3758 IU a day. Ok to continue B12 unless the lab shows over replacement.  Discussed that this is more likely a pinched nerve from her back arthritis. Discussed and MRI or a nerve conduction study. Will do an MRI and then she will address this with her " ortho ([facility]). Will use #3 xanax for the MRI. Will start to Rx her gabapentin here as she only sees her rheumatologist annually and this is now controlled. Will sign contract now and start Rx here today.  2. RECHECK- keep routine

## 2017-06-29 NOTE — PATIENT INSTRUCTIONS
1. NEURO- BLE neuropathy. Discussed that this could be metabolic. Will check labs including recheck of her thyroid now. Advised to stop the calcium but continue vit D at 3758-1157 IU a day. Ok to continue B12 unless the lab shows over replacement.  Discussed that this is more likely a pinched nerve from her back arthritis. Discussed and MRI or a nerve conduction study. Will do an MRI and then she will address this with her ortho ([facility]). Will use #3 xanax for the MRI. Will start to Rx her gabapentin here as she only sees her rheumatologist annually and this is now controlled. Will sign contract now and start Rx here today.  2. RECHECK- keep routine

## 2017-07-03 ENCOUNTER — APPOINTMENT (OUTPATIENT)
Dept: CARDIOLOGY | Facility: HOSPITAL | Age: 67
End: 2017-07-03
Attending: INTERNAL MEDICINE

## 2017-07-05 ENCOUNTER — HOSPITAL ENCOUNTER (OUTPATIENT)
Dept: MRI IMAGING | Facility: HOSPITAL | Age: 67
Discharge: HOME | End: 2017-07-05
Attending: FAMILY MEDICINE | Admitting: FAMILY MEDICINE

## 2017-07-05 PROCEDURE — A9577 INJ MULTIHANCE: HCPCS | Performed by: FAMILY MEDICINE

## 2017-07-05 PROCEDURE — 72158 MRI LUMBAR SPINE W/O & W/DYE: CPT

## 2017-07-05 PROCEDURE — 0 GADOBENATE DIMEGLUMINE 529 MG/ML SOLUTION: Performed by: FAMILY MEDICINE

## 2017-07-05 RX ADMIN — GADOBENATE DIMEGLUMINE 10 ML: 529 INJECTION, SOLUTION INTRAVENOUS at 15:05

## 2017-07-18 ENCOUNTER — HOSPITAL ENCOUNTER (OUTPATIENT)
Dept: CARDIOLOGY | Facility: HOSPITAL | Age: 67
Discharge: HOME | End: 2017-07-18
Attending: INTERNAL MEDICINE | Admitting: INTERNAL MEDICINE

## 2017-07-18 DIAGNOSIS — I73.9 CLAUDICATION (HCC): ICD-10-CM

## 2017-07-18 PROCEDURE — 93923 UPR/LXTR ART STDY 3+ LVLS: CPT | Performed by: INTERNAL MEDICINE

## 2017-07-18 PROCEDURE — 93923 UPR/LXTR ART STDY 3+ LVLS: CPT

## 2017-07-19 LAB
BH CV LOWER ARTERIAL LEFT ABI RATIO: 1.07
BH CV LOWER ARTERIAL LEFT DORSALIS PEDIS SYS MAX: 136 MMHG
BH CV LOWER ARTERIAL LEFT POST EX ABI RATIO: 1.05
BH CV LOWER ARTERIAL LEFT POST TIBIAL SYS MAX: 135 MMHG
BH CV LOWER ARTERIAL RIGHT ABI RATIO: 1.05
BH CV LOWER ARTERIAL RIGHT DORSALIS PEDIS SYS MAX: 133 MMHG
BH CV LOWER ARTERIAL RIGHT POST EX ABI RATIO: 1.14
BH CV LOWER ARTERIAL RIGHT POST TIBIAL SYS MAX: 131 MMHG
UPPER ARTERIAL LEFT ARM BRACHIAL SYS MAX: 127 MMHG
UPPER ARTERIAL RIGHT ARM BRACHIAL SYS MAX: 121 MMHG

## 2017-08-17 ENCOUNTER — TELEPHONE (OUTPATIENT)
Dept: INTERNAL MEDICINE | Facility: CLINIC | Age: 67
End: 2017-08-17

## 2017-08-17 NOTE — TELEPHONE ENCOUNTER
Pt called and said that she had pain in her ear, dizzness and lost balance. Pt said that it scared her really bad. This happened yesterday and balance is still off but not as bad as yesterday. Pt wants to no is she needs to come in and be seen or if she can just get the meds that she was on last time for the same problem back in may. The name of the meds was cesdinir 300 mg caps take it 1 time a day for 10 days. I told pt that you would call them back

## 2017-08-18 ENCOUNTER — OFFICE VISIT (OUTPATIENT)
Dept: INTERNAL MEDICINE | Facility: CLINIC | Age: 67
End: 2017-08-18

## 2017-08-18 VITALS — HEIGHT: 62 IN | BODY MASS INDEX: 22.23 KG/M2 | WEIGHT: 120.8 LBS | TEMPERATURE: 97.8 F

## 2017-08-18 DIAGNOSIS — G57.51 TARSAL TUNNEL SYNDROME, RIGHT: ICD-10-CM

## 2017-08-18 DIAGNOSIS — H81.11 BENIGN PAROXYSMAL VERTIGO, RIGHT: Primary | ICD-10-CM

## 2017-08-18 PROBLEM — G57.50 TARSAL TUNNEL SYNDROME: Status: ACTIVE | Noted: 2017-08-18

## 2017-08-18 PROCEDURE — 99214 OFFICE O/P EST MOD 30 MIN: CPT | Performed by: FAMILY MEDICINE

## 2017-08-18 RX ORDER — MECLIZINE HCL 12.5 MG/1
12.5 TABLET ORAL 3 TIMES DAILY PRN
Qty: 30 TABLET | Refills: 0 | Status: SHIPPED | OUTPATIENT
Start: 2017-08-18 | End: 2018-04-07

## 2017-08-18 NOTE — PROGRESS NOTES
Subjective   Barbara Henrandez is a 66 y.o. female.     History of Present Illness   Here today as a work in for dizziness and ear pain. Last seen 6/29/17 for peripheral neuropathy. Was seen 4/21/16 for URI, treated with biaxin. Called with intolerance and was changed to omnicef. Lab 4/13/17 demo TSH 0.181 and free T4 1.76 on synthroid 88. Lipid demo , tg 83, HDL 72, . Lab 2/24/17 demo TSH 0.067, free T4 1.56, + thyroid abs.     ENT- today pt reports that 2 days ago she turned her head to the right and felt like she lost consciousness for 1 second. She then had a sharp pain in her right ear. Tried to stand up and lost her balance. Was off balance with ear pain for several hours. Took mobic which helped the pain. She is not dizzy today but if she moves her head a certain way she has ear pain. On discussion she hit her head a couple of days ago.    NEURO- at visit 6/29/17 pt was seen c/o numbness in her legs and feet since 2/2017. Occurred with prlonged stnading, no reclining. Had seen cardio with good pulses. Was seen here and had MRI L spine with no source. Today pt reports she tried using voltaren gel on her feet and all of the pain and numbness improved. Has a h/o neuromas. On additional discussion, issues started after she used a shovel in non protective shoes.    The following portions of the patient's history were reviewed and updated as appropriate: current medications, past family history, past medical history, past social history, past surgical history and problem list.    Review of Systems   HENT: Positive for ear pain.    Cardiovascular: Negative for chest pain.   Gastrointestinal: Negative for abdominal distention and abdominal pain.   Skin: Negative for color change.   Neurological: Positive for dizziness. Negative for tremors, speech difficulty and headaches.   Psychiatric/Behavioral: Negative for agitation and confusion.   All other systems reviewed and are negative.        Current Outpatient  "Prescriptions:   •  acetaminophen (TYLENOL) 325 MG tablet, Take 650 mg by mouth every night at bedtime., Disp: , Rfl:   •  ALPRAZolam (XANAX) 0.5 MG tablet, As directed for MRI, Disp: 3 tablet, Rfl: 0  •  aspirin 81 MG EC tablet, Take 81 mg by mouth 3 (Three) Times a Week., Disp: , Rfl:   •  Calcium Carbonate-Vit D-Min (CALCIUM 1200 PO), Take 1,200 mg by mouth., Disp: , Rfl:   •  Cholecalciferol (VITAMIN D3) 3000 UNITS tablet, Take  by mouth., Disp: , Rfl:   •  Coenzyme Q10 (CO Q-10) 100 MG capsule, Take 400 mg by mouth., Disp: , Rfl:   •  diclofenac (VOLTAREN) 1 % gel gel, Apply 4 g topically 4 (Four) Times a Day As Needed (arthritis)., Disp: 100 g, Rfl: 5  •  estradiol (VAGIFEM) 10 MCG tablet vaginal tablet, Insert 1 tablet into the vagina 3 (Three) Times a Week., Disp: 12 tablet, Rfl: 12  •  gabapentin (NEURONTIN) 100 MG capsule, Take 1 capsule by mouth every night at bedtime., Disp: 30 capsule, Rfl: 5  •  Glucosamine HCl 1000 MG tablet, Take  by mouth., Disp: , Rfl:   •  levothyroxine (SYNTHROID) 88 MCG tablet, Take 1 tab po qam fasting and wait 1hr for food or other meds. BRAND ONLY., Disp: 90 tablet, Rfl: 1  •  meclizine (ANTIVERT) 12.5 MG tablet, Take 1 tablet by mouth 3 (Three) Times a Day As Needed for dizziness., Disp: 30 tablet, Rfl: 0  •  meloxicam (MOBIC) 7.5 MG tablet, Take 1 tab po qd prn pain or inflammation, Disp: 30 tablet, Rfl: 5  •  Misc Natural Products (LUTEIN 20 PO), Take 20 mg by mouth., Disp: , Rfl:   •  Multiple Vitamins-Minerals (PRESERVISION AREDS PO), Take  by mouth., Disp: , Rfl:   •  Omega-3 Fatty Acids (FISH OIL) 1200 MG capsule capsule, Take 1,200 mg by mouth Daily., Disp: , Rfl:   •  vitamin E 400 UNIT capsule, Take 400 Units by mouth Daily., Disp: , Rfl:     Objective     Temp 97.8 °F (36.6 °C)  Ht 62\" (157.5 cm)  Wt 120 lb 12.8 oz (54.8 kg)  LMP  (LMP Unknown)  BMI 22.09 kg/m2    Physical Exam   Constitutional: She is oriented to person, place, and time. She appears " well-developed and well-nourished.   HENT:   Right Ear: Tympanic membrane and ear canal normal.   Left Ear: Tympanic membrane and ear canal normal.   Mouth/Throat: Oropharynx is clear and moist.   Eyes: Conjunctivae and EOM are normal. Pupils are equal, round, and reactive to light.   Neck: No thyromegaly present.   Cardiovascular: Normal rate and regular rhythm.    Pulmonary/Chest: Effort normal and breath sounds normal.   Neurological: She is alert and oriented to person, place, and time.   Skin: Skin is warm and dry.   Psychiatric: She has a normal mood and affect.   Vitals reviewed.      Assessment/Plan   Barbara was seen today for illness.    Diagnoses and all orders for this visit:    Benign paroxysmal vertigo, right  -     meclizine (ANTIVERT) 12.5 MG tablet; Take 1 tablet by mouth 3 (Three) Times a Day As Needed for dizziness.    Tarsal tunnel syndrome, right      1. ORTHO- tarsal tunnel. Discussed that her symptoms and results suggest that she actually had a tendontitis in her feet that was pinching a nerve and causing referred numbness, most c/w tarsal tunnel. Education provided re neuroma/ ganglion cysts.  2. RESP- bening positional vertigo. Discussed that this was caused by the head trauma with naif pathophysiology describes. Will treat with meclizine. Pt instructed in extinguishing maneuver to do at home.  3. RECHECK- prn

## 2017-09-08 ENCOUNTER — OFFICE VISIT (OUTPATIENT)
Dept: INTERNAL MEDICINE | Facility: CLINIC | Age: 67
End: 2017-09-08

## 2017-09-08 ENCOUNTER — TRANSCRIBE ORDERS (OUTPATIENT)
Dept: PHYSICAL THERAPY | Facility: CLINIC | Age: 67
End: 2017-09-08

## 2017-09-08 VITALS
TEMPERATURE: 97 F | HEIGHT: 62 IN | SYSTOLIC BLOOD PRESSURE: 128 MMHG | WEIGHT: 120 LBS | DIASTOLIC BLOOD PRESSURE: 76 MMHG | BODY MASS INDEX: 22.08 KG/M2

## 2017-09-08 DIAGNOSIS — H81.10 BENIGN PAROXYSMAL POSITIONAL VERTIGO, UNSPECIFIED LATERALITY: Primary | ICD-10-CM

## 2017-09-08 DIAGNOSIS — IMO0002 POSITIONAL VERTIGO, UNSPECIFIED LATERALITY: Primary | ICD-10-CM

## 2017-09-08 DIAGNOSIS — K29.00 OTHER ACUTE GASTRITIS: ICD-10-CM

## 2017-09-08 DIAGNOSIS — K58.9 IRRITABLE BOWEL SYNDROME, UNSPECIFIED TYPE: ICD-10-CM

## 2017-09-08 PROCEDURE — 99214 OFFICE O/P EST MOD 30 MIN: CPT | Performed by: FAMILY MEDICINE

## 2017-09-08 RX ORDER — RANITIDINE 300 MG/1
TABLET ORAL
Qty: 30 TABLET | Refills: 0 | Status: SHIPPED | OUTPATIENT
Start: 2017-09-08 | End: 2017-10-18 | Stop reason: SDUPTHER

## 2017-09-08 RX ORDER — DICYCLOMINE HYDROCHLORIDE 10 MG/1
CAPSULE ORAL
Qty: 30 CAPSULE | Refills: 0 | Status: SHIPPED | OUTPATIENT
Start: 2017-09-08 | End: 2017-11-06 | Stop reason: SDUPTHER

## 2017-09-08 NOTE — PATIENT INSTRUCTIONS
"1. ENT- BPPV- will refer to PT.  2. GI- gastritis and IBS- education today re the pathophysiology of gastritis with secondary IBS provided today. Will start with zantac 300 and \"as needed\" bentyl. Education printout re gastritis/ GERD provided. If at recheck pt is still having IBS, will address it as a primary issue.   3. RECHECK- 1mo GI    Patient education provided today in layman's terms re GERD (acid reflux) and gastritis. The patient is shown a diagram of the gastrointestinal tract with a brief description of the digestive process. The role of stomach acid (to kill germs and digest food) and stomach mucus (to protect the stomach against its own acid) is discussed. The need for a balance between these and the problems that result when this balance is disrupted are discussed.     The patient is advised that things that promote too much stomach acid include:  - NSAIDs (anti inflammatories such as aspirin, ibuprofen, naproxen and certain prescriptions)  - high acid foods (tomato products, onions, citrus, etc)  - caffeine  - tobacco  - alcohol  - stress.   It is discussed that long term or high exposure to these can lead to an acid imbalance resulting in microscopic burns in the stomach or esophagus. Furthermore, that with repeated burns, a visible wound called an ulcer can form.    The treatment options are discussed, including avoiding the causes if possible. The first level of treatment can include antacids which neutralize the acid after the fact, such as TUMS, rolaids or gaviscon. The second level of treatment includes H2 blockers which reduce the production of acid in the stomach and include medicines such as pepcid (famotadine) and zantac (ranitadine). The third level of treatment includes PPI's which turn the stomach acid off and include meducines such as prilosec (omeprazole), prevacid (lansoprazole) or protonix (pantoprazole). Caution is advised regarding the long term use of PPI's due to the possible side " effect of malabsorption of key nutrients such as B12 or calcium if the acid level is driven too low for too long.

## 2017-09-08 NOTE — PROGRESS NOTES
"Subjective   Barbara Hernandez is a 67 y.o. female.     History of Present Illness   Here for stomach pain. Last seen 8/18/17 as a work in for vertigo. Was seen 6/29/17 for peripheral neuropathy. Was seen 4/21/16 for URI, treated with omnicef (called intolerant to biaxin). Lab 4/13/17 demo TSH 0.181 and free T4 1.76 on synthroid 88. Lipid demo , tg 83, HDL 72, . Lab 2/24/17 demo TSH 0.067, free T4 1.56, + thyroid abs.       1. GI- today pt reports she has a h/o gastritis and IBS. Had been treating with diet but since she had thyroid issues she has had a flair that is not responding to diet. Is having pp nausea followed by diffuse abd pain and diarrhea. Has also woken up at night with diarrhea. Tried to see Dr Saldana but could not get in. Gets joint pain with prilosec. Was on protonix in the past. Has done well with zantac prn.     2.ENT- vertigo. Today pt reports that this has come back since she was laid back in the dental chair. Would like to do PT.    3.NEURO- at visit 6/29/17 pt was seen c/o numbness in her legs and feet since 2/2017. Occurred with prolonged standing, none with reclining. Had seen cardio with good pulses. Was seen here and had MRI L spine with no source. Was eventually diagnosed with tarsal tunnel syndrome with h/o ganglion; did well with voltaren gel.    4. 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 Has \"fibromyalgia\" currently on gabapentin and mobic. Was diagnosed with fibro by rheum around 2008 based on 18 trigger points. Today pt reports she needs RF on mobic and voltaren gel. Is allergic to celebrex with no issues with these NSAIDS. Will be seeing rheumatology for the gabapentin.   5.CV- hyperlipid. Pt has h/o cardiomyopathy with a persistent low EF. Sees Dr Bermudez. Has new issues with elevated lipid. Came in fasting with , tg 83, HDL 72, . CRP was normal with decision to not use a statin.  6.ENDO- hypothyroid, diagnosed 43yo. Pt initially on synthroid " 100. Takes brand, correctly. Was having fatigue, dizziness, feeling anxious, hoarse voice, trouble concentrating, feeding sad, dry skin, feeling cold, muscle aches and bowel issues. Has alt C/D. Is very careful with her diet with no weight loss or gain. Was increased on synthroid from 88 to 100 by previous provider but felt better while on synthrouid 88 by endo. TSH was over suppressed and pt was reduced back to synthroid 88. At her recheck she felt well with resolution of jitters. Recheck TSH 1.81 with free T4 1.76; pt continued on synthroid 88.    The following portions of the patient's history were reviewed and updated as appropriate: current medications, past family history, past medical history, past social history, past surgical history and problem list.    Review of Systems   Cardiovascular: Negative for chest pain.   Gastrointestinal: Positive for abdominal pain and diarrhea. Negative for abdominal distention.   Skin: Negative for color change.   Neurological: Positive for dizziness (would like PT order). Negative for tremors, speech difficulty and headaches.   Psychiatric/Behavioral: Negative for agitation and confusion.   All other systems reviewed and are negative.        Current Outpatient Prescriptions:   •  acetaminophen (TYLENOL) 325 MG tablet, Take 650 mg by mouth every night at bedtime., Disp: , Rfl:   •  ALPRAZolam (XANAX) 0.5 MG tablet, As directed for MRI, Disp: 3 tablet, Rfl: 0  •  aspirin 81 MG EC tablet, Take 81 mg by mouth 3 (Three) Times a Week., Disp: , Rfl:   •  Calcium Carbonate-Vit D-Min (CALCIUM 1200 PO), Take 1,200 mg by mouth., Disp: , Rfl:   •  Cholecalciferol (VITAMIN D3) 3000 UNITS tablet, Take  by mouth., Disp: , Rfl:   •  Coenzyme Q10 (CO Q-10) 100 MG capsule, Take 400 mg by mouth., Disp: , Rfl:   •  diclofenac (VOLTAREN) 1 % gel gel, Apply 4 g topically 4 (Four) Times a Day As Needed (arthritis)., Disp: 100 g, Rfl: 5  •  dicyclomine (BENTYL) 10 MG capsule, Take 1 cap q8hr prn  "bowel spasm or diarrhea, Disp: 30 capsule, Rfl: 0  •  estradiol (VAGIFEM) 10 MCG tablet vaginal tablet, Insert 1 tablet into the vagina 3 (Three) Times a Week., Disp: 12 tablet, Rfl: 12  •  gabapentin (NEURONTIN) 100 MG capsule, Take 1 capsule by mouth every night at bedtime., Disp: 30 capsule, Rfl: 5  •  Glucosamine HCl 1000 MG tablet, Take  by mouth., Disp: , Rfl:   •  levothyroxine (SYNTHROID) 88 MCG tablet, Take 1 tab po qam fasting and wait 1hr for food or other meds. BRAND ONLY., Disp: 90 tablet, Rfl: 1  •  meclizine (ANTIVERT) 12.5 MG tablet, Take 1 tablet by mouth 3 (Three) Times a Day As Needed for dizziness., Disp: 30 tablet, Rfl: 0  •  meloxicam (MOBIC) 7.5 MG tablet, Take 1 tab po qd prn pain or inflammation, Disp: 30 tablet, Rfl: 5  •  Misc Natural Products (LUTEIN 20 PO), Take 20 mg by mouth., Disp: , Rfl:   •  Multiple Vitamins-Minerals (PRESERVISION AREDS PO), Take  by mouth., Disp: , Rfl:   •  Omega-3 Fatty Acids (FISH OIL) 1200 MG capsule capsule, Take 1,200 mg by mouth Daily., Disp: , Rfl:   •  raNITIdine (ZANTAC) 300 MG tablet, 1 tab po qd prn stomach acid or acid reflux, Disp: 30 tablet, Rfl: 0  •  vitamin E 400 UNIT capsule, Take 400 Units by mouth Daily., Disp: , Rfl:     Objective     /76  Temp 97 °F (36.1 °C)  Ht 62\" (157.5 cm)  Wt 120 lb (54.4 kg)  LMP  (LMP Unknown)  BMI 21.95 kg/m2    Physical Exam   Constitutional: She is oriented to person, place, and time. She appears well-developed and well-nourished.   HENT:   Right Ear: Tympanic membrane and ear canal normal.   Left Ear: Tympanic membrane and ear canal normal.   Mouth/Throat: Oropharynx is clear and moist.   Eyes: Conjunctivae and EOM are normal. Pupils are equal, round, and reactive to light.   Neck: No thyromegaly present.   Cardiovascular: Normal rate and regular rhythm.    Pulmonary/Chest: Effort normal and breath sounds normal.   Neurological: She is alert and oriented to person, place, and time.   Skin: Skin is warm " "and dry.   Psychiatric: She has a normal mood and affect.   Vitals reviewed.      Assessment/Plan   Barbara was seen today for follow-up.    Diagnoses and all orders for this visit:    Benign paroxysmal positional vertigo, unspecified laterality    Irritable bowel syndrome, unspecified type  -     dicyclomine (BENTYL) 10 MG capsule; Take 1 cap q8hr prn bowel spasm or diarrhea    Other acute gastritis  -     raNITIdine (ZANTAC) 300 MG tablet; 1 tab po qd prn stomach acid or acid reflux      1. ENT- BPPV- will refer to PT.  2. GI- gastritis and IBS- education today re the pathophysiology of gastritis with secondary IBS provided today. Will start with zantac 300 and \"as needed\" bentyl. Education printout re gastritis/ GERD provided. If at recheck pt is still having IBS, will address it as a primary issue.   3. RECHECK- 1mo GI       "

## 2017-09-11 ENCOUNTER — TREATMENT (OUTPATIENT)
Dept: PHYSICAL THERAPY | Facility: CLINIC | Age: 67
End: 2017-09-11

## 2017-09-11 DIAGNOSIS — R42 DIZZINESS: ICD-10-CM

## 2017-09-11 DIAGNOSIS — H81.10 BPPV (BENIGN PAROXYSMAL POSITIONAL VERTIGO), UNSPECIFIED LATERALITY: ICD-10-CM

## 2017-09-11 DIAGNOSIS — R42 DISEQUILIBRIUM: Primary | ICD-10-CM

## 2017-09-11 PROCEDURE — G8979 MOBILITY GOAL STATUS: HCPCS | Performed by: PHYSICAL THERAPIST

## 2017-09-11 PROCEDURE — 97162 PT EVAL MOD COMPLEX 30 MIN: CPT | Performed by: PHYSICAL THERAPIST

## 2017-09-11 PROCEDURE — G8978 MOBILITY CURRENT STATUS: HCPCS | Performed by: PHYSICAL THERAPIST

## 2017-09-11 NOTE — PROGRESS NOTES
Physical Therapy Initial Evaluation and Plan of Care        Subjective Evaluation    History of Present Illness  Mechanism of injury: About 3 weeks hit her head, and few days later she started experiencing dizziness. Was improving a little, until she recently saw the dentist and dizziness/nausea became severe. Balance has been off and dizziness has been intermittently.  But has had 2-3 really good days now.    Subjective comment: Dizziness and poor balancePain  Location: Dizziness/room spinning/poor balance  Quality: Rolling over in bed especially to the left, or tilting the head to the right.  Aggravating factors: overhead activity and sleeping  Progression: improved             Objective     Special Questions  Patient is experiencing dizziness.     Postural Observations  Seated posture: fair  Standing posture: fair  Correction of posture: has no consistent effect        Neurological Testing   Sensation   Cervical/Thoracic   Left   Intact: light touch, pin prick and sharp/dull discrimination    Right   Intact: light touch, pin prick and sharp/dull discrimination    Reflexes   Left   Biceps (C5/C6): trace (1+)  Brachioradialis (C6): trace (1+)  Triceps (C7): trace (1+)    Right   Biceps (C5/C6): trace (1+)  Brachioradialis (C6): trace (1+)  Triceps (C7): trace (1+)    Active Range of Motion   Cervical/Thoracic Spine   Cervical    Flexion: 60 degrees   Extension: 50 degrees   Left lateral flexion: 30 degrees   Right lateral flexion: 30 degrees   Left rotation: 55 degrees   Right rotation: 45 degrees     Tests   Cervical     Left   Negative Spurling's sign.     Right   Negative Spurling's sign.     Additional Tests Details  Stepping Fakuda= negative  Cuero-Hallpike= negative bilaterally  Roll Test=Negative bilaterally  Smooth Pursuit= Normal  Saccades=Normal           Assessment & Plan     Assessment  Impairments: abnormal gait, activity intolerance and impaired balance  Other impairment: DHI score=34, ABC  scale=22%  Assessment details: This patient history sounds as though she may have recently been experiencing BPPV, at a relatively severe level.  Today all test are negative for BPPV and her balance is good today.  It appears, as she looks today, that the pathology has self-resolved.  However, this kind of pathology may be easily aroused on some days better than other days.  I feel we may need to treat the pt in future the next time symptoms arise.  We did talk to pt about diet and dehydration precautions that can contribute to exacerbating BPPV issues.  It was best not to treat patient today since she is symptom free and not knowing which canal is involved could be counter productive.  Prognosis: good  Prognosis details:   GOALS  STG to be met in 4 weeks  1.  Resolve symptoms that arise through positioning therapy.  2.  Pt DHI score improves from 34 to 25.  LTG to be met in 12 weeks  1.  Pt can self correct symptoms independently.  2.  Pt is independent with HEP.  3.  Pt DHI score improves to 10 or less.  Functional Limitations: walking, standing, stooping and reaching overhead  Plan  Therapy options: will be seen for skilled physical therapy services  Planned therapy interventions: gait training, home exercise program, manual therapy, balance/weight-bearing training, therapeutic activities and neuromuscular re-education  Other planned therapy interventions: JOO shea  Frequency: 1x week  Duration in weeks: 12  Plan details: We will basically see pt on PRN bases if symptoms return, we can better determine which canal may be involved and treat her appropriately.          PT SIGNATURE: Declan Diaz, PT   DATE TREATMENT INITIATED: 9/11/2017    Medicare Initial Certification  Certification Period: 12/10/2017  I certify that the therapy services are furnished while this patient is under my care.  The services outlined above are required by this patient, and will be reviewed every 90  days.     PHYSICIAN:_________________________________________________________        Christen Mccarty MD        DATE: _____________________________________________________________    Please sign and return via fax to [phone]. Thank you, [facility].

## 2017-10-18 ENCOUNTER — OFFICE VISIT (OUTPATIENT)
Dept: INTERNAL MEDICINE | Facility: CLINIC | Age: 67
End: 2017-10-18

## 2017-10-18 VITALS
BODY MASS INDEX: 22.23 KG/M2 | WEIGHT: 120.8 LBS | DIASTOLIC BLOOD PRESSURE: 74 MMHG | SYSTOLIC BLOOD PRESSURE: 126 MMHG | TEMPERATURE: 97.8 F | HEIGHT: 62 IN

## 2017-10-18 DIAGNOSIS — Z00.00 ANNUAL PHYSICAL EXAM: Primary | ICD-10-CM

## 2017-10-18 DIAGNOSIS — K21.9 GASTROESOPHAGEAL REFLUX DISEASE, ESOPHAGITIS PRESENCE NOT SPECIFIED: ICD-10-CM

## 2017-10-18 DIAGNOSIS — Z23 ENCOUNTER FOR IMMUNIZATION: ICD-10-CM

## 2017-10-18 LAB
ALBUMIN SERPL-MCNC: 4.5 G/DL (ref 3.2–4.8)
ALBUMIN/GLOB SERPL: 1.7 G/DL (ref 1.5–2.5)
ALP SERPL-CCNC: 77 U/L (ref 25–100)
ALT SERPL W P-5'-P-CCNC: 17 U/L (ref 7–40)
ANION GAP SERPL CALCULATED.3IONS-SCNC: 17 MMOL/L (ref 3–11)
ARTICHOKE IGE QN: 188 MG/DL (ref 0–130)
AST SERPL-CCNC: 18 U/L (ref 0–33)
BASOPHILS # BLD AUTO: 0.03 10*3/MM3 (ref 0–0.2)
BASOPHILS NFR BLD AUTO: 0.6 % (ref 0–1)
BILIRUB SERPL-MCNC: 0.7 MG/DL (ref 0.3–1.2)
BUN BLD-MCNC: 29 MG/DL (ref 9–23)
BUN/CREAT SERPL: 41.4 (ref 7–25)
CALCIUM SPEC-SCNC: 9.7 MG/DL (ref 8.7–10.4)
CHLORIDE SERPL-SCNC: 97 MMOL/L (ref 99–109)
CHOLEST SERPL-MCNC: 261 MG/DL (ref 0–200)
CO2 SERPL-SCNC: 23 MMOL/L (ref 20–31)
CREAT BLD-MCNC: 0.7 MG/DL (ref 0.6–1.3)
DEPRECATED RDW RBC AUTO: 44.2 FL (ref 37–54)
EOSINOPHIL # BLD AUTO: 0.1 10*3/MM3 (ref 0–0.3)
EOSINOPHIL NFR BLD AUTO: 2 % (ref 0–3)
ERYTHROCYTE [DISTWIDTH] IN BLOOD BY AUTOMATED COUNT: 12.5 % (ref 11.3–14.5)
GFR SERPL CREATININE-BSD FRML MDRD: 84 ML/MIN/1.73
GLOBULIN UR ELPH-MCNC: 2.7 GM/DL
GLUCOSE BLD-MCNC: 89 MG/DL (ref 70–100)
HCT VFR BLD AUTO: 41.3 % (ref 34.5–44)
HDLC SERPL-MCNC: 75 MG/DL (ref 40–60)
HGB BLD-MCNC: 13.5 G/DL (ref 11.5–15.5)
IMM GRANULOCYTES # BLD: 0.01 10*3/MM3 (ref 0–0.03)
IMM GRANULOCYTES NFR BLD: 0.2 % (ref 0–0.6)
LYMPHOCYTES # BLD AUTO: 1.47 10*3/MM3 (ref 0.6–4.8)
LYMPHOCYTES NFR BLD AUTO: 28.8 % (ref 24–44)
MCH RBC QN AUTO: 31.5 PG (ref 27–31)
MCHC RBC AUTO-ENTMCNC: 32.7 G/DL (ref 32–36)
MCV RBC AUTO: 96.3 FL (ref 80–99)
MONOCYTES # BLD AUTO: 0.54 10*3/MM3 (ref 0–1)
MONOCYTES NFR BLD AUTO: 10.6 % (ref 0–12)
NEUTROPHILS # BLD AUTO: 2.96 10*3/MM3 (ref 1.5–8.3)
NEUTROPHILS NFR BLD AUTO: 57.8 % (ref 41–71)
PLATELET # BLD AUTO: 258 10*3/MM3 (ref 150–450)
PMV BLD AUTO: 9.9 FL (ref 6–12)
POTASSIUM BLD-SCNC: 4.1 MMOL/L (ref 3.5–5.5)
PROT SERPL-MCNC: 7.2 G/DL (ref 5.7–8.2)
RBC # BLD AUTO: 4.29 10*6/MM3 (ref 3.89–5.14)
SODIUM BLD-SCNC: 137 MMOL/L (ref 132–146)
T4 FREE SERPL-MCNC: 1.66 NG/DL (ref 0.89–1.76)
TRIGL SERPL-MCNC: 78 MG/DL (ref 0–150)
TSH SERPL DL<=0.05 MIU/L-ACNC: 0.33 MIU/ML (ref 0.35–5.35)
VIT B12 BLD-MCNC: 607 PG/ML (ref 211–911)
WBC NRBC COR # BLD: 5.11 10*3/MM3 (ref 3.5–10.8)

## 2017-10-18 PROCEDURE — 85025 COMPLETE CBC W/AUTO DIFF WBC: CPT | Performed by: FAMILY MEDICINE

## 2017-10-18 PROCEDURE — 82607 VITAMIN B-12: CPT | Performed by: FAMILY MEDICINE

## 2017-10-18 PROCEDURE — 80053 COMPREHEN METABOLIC PANEL: CPT | Performed by: FAMILY MEDICINE

## 2017-10-18 PROCEDURE — 80061 LIPID PANEL: CPT | Performed by: FAMILY MEDICINE

## 2017-10-18 PROCEDURE — 84443 ASSAY THYROID STIM HORMONE: CPT | Performed by: FAMILY MEDICINE

## 2017-10-18 PROCEDURE — G0439 PPPS, SUBSEQ VISIT: HCPCS | Performed by: FAMILY MEDICINE

## 2017-10-18 PROCEDURE — 84439 ASSAY OF FREE THYROXINE: CPT | Performed by: FAMILY MEDICINE

## 2017-10-18 RX ORDER — RANITIDINE 300 MG/1
TABLET ORAL
Qty: 30 TABLET | Refills: 5 | Status: SHIPPED | OUTPATIENT
Start: 2017-10-18 | End: 2018-09-14 | Stop reason: SDUPTHER

## 2017-10-18 NOTE — PATIENT INSTRUCTIONS
1. Medicare Wellness- no paps indicated. Labs today. Will continue annual mammo. Next bone density due in 1yr; can be ordered by Gyn or here. Prevnar 13 today and then pt is complete with the pneumonia sereis. Will do the flu shot when available. Pt will bring in a copy of her Living Will.   2. RECHECK- 6mo routine

## 2017-10-18 NOTE — PROGRESS NOTES
QUICK REFERENCE INFORMATION:  The ABCs of the Annual Wellness Visit    Subsequent Medicare Wellness Visit    HEALTH RISK ASSESSMENT    1950    Recent Hospitalizations:  No hospitalization(s) within the last year..        Current Medical Providers:  Patient Care Team:  Christen Mccarty MD as PCP - General (Family Medicine)  Christen Mccarty MD as PCP - Claims Attributed  Zain France MD as Consulting Physician (Gynecology)        Smoking Status:  History   Smoking Status   • Former Smoker   Smokeless Tobacco   • Never Used     Comment: 19 YR PACK HISTORY       Alcohol Consumption:  History   Alcohol Use   • Yes     Comment: SOCIAL       Depression Screen:   PHQ-2/PHQ-9 Depression Screening 10/18/2017   Little interest or pleasure in doing things 0   Feeling down, depressed, or hopeless 0   Trouble falling or staying asleep, or sleeping too much 3   Feeling tired or having little energy 3   Poor appetite or overeating 0   Feeling bad about yourself - or that you are a failure or have let yourself or your family down 0   Trouble concentrating on things, such as reading the newspaper or watching television 0   Moving or speaking so slowly that other people could have noticed. Or the opposite - being so fidgety or restless that you have been moving around a lot more than usual 0   Thoughts that you would be better off dead, or of hurting yourself in some way 0   Total Score 6   If you checked off any problems, how difficult have these problems made it for you to do your work, take care of things at home, or get along with other people? Somewhat difficult       Health Habits and Functional and Cognitive Screening:  Functional & Cognitive Status 10/18/2017   Do you have difficulty preparing food and eating? No   Do you have difficulty bathing yourself? No   Do you have difficulty getting dressed? No   Do you have difficulty using the toilet? No   Do you have difficulty moving around from place to place?  No   In the past year have you fallen or experienced a near fall? No   Do you need help using the phone?  No   Are you deaf or do you have serious difficulty hearing?  No   Do you need help with transportation? No   Do you need help shopping? No   Do you need help preparing meals?  No   Do you need help with housework?  No   Do you need help with laundry? No   Do you need help taking your medications? No   Do you need help managing money? No   Do you have difficulty concentrating, remembering or making decisions? No       Health Habits  Current Diet: Well Balanced Diet  Dental Exam: Up to date  Eye Exam: Up to date  Exercise (times per week): 3 times per week  Current Exercise Activities Include: Gardening      Does the patient have evidence of cognitive impairment? No    Aspirin use counseling: Taking ASA appropriately as indicated      Recent Lab Results:  CMP:  Lab Results   Component Value Date    BUN 29 (H) 06/29/2017    CREATININE 0.80 06/29/2017    EGFRIFNONA 72 06/29/2017    BCR 36.3 (H) 06/29/2017     06/29/2017    K 4.2 06/29/2017    CO2 32.0 (H) 06/29/2017    CALCIUM 9.8 06/29/2017    ALBUMIN 4.40 06/29/2017    LABIL2 1.6 06/29/2017    BILITOT 0.4 06/29/2017    ALKPHOS 78 06/29/2017    AST 17 06/29/2017    ALT 14 06/29/2017     Lipid Panel:  Lab Results   Component Value Date    CHOL 250 (H) 04/13/2017    TRIG 83 04/13/2017    HDL 72 (H) 04/13/2017    LDLDIRECT 165 (H) 04/13/2017     HbA1c:       Visual Acuity:  No exam data present    Age-appropriate Screening Schedule:  Refer to the list below for future screening recommendations based on patient's age, sex and/or medical conditions. Orders for these recommended tests are listed in the plan section. The patient has been provided with a written plan.    Health Maintenance   Topic Date Due   • INFLUENZA VACCINE  08/01/2017   • PNEUMOCOCCAL VACCINES (65+ LOW/MEDIUM RISK) (2 of 2 - PPSV23) 09/21/2017   • LIPID PANEL  04/13/2018   • DXA SCAN   "06/28/2019   • MAMMOGRAM  08/21/2019   • TDAP/TD VACCINES (2 - Td) 08/06/2022   • COLONOSCOPY  03/02/2027   • ZOSTER VACCINE  Completed        Subjective   History of Present Illness    Barbara Hernandez is a 67 y.o. female.Here for [facility]. Last seen 8/18/17 as a work in for vertigo. Was seen 6/29/17 for peripheral neuropathy. Was seen 4/21/16 for URI, treated with omnicef (called intolerant to biaxin). Pt was seen 2/24/17 as a new patient. Sees psych. Lab 2/24/17 demo TSH 0.067, free T4 1.56, + thyroid abs. Lab from previous PCP from 9/21/16 with normal CBC, CMP and vit D. TSH was 0.078. Lipid was high with , tg 87, HDL 71, .       1. NEURO- at visit 6/29/17 pt was seen c/o numbness in her legs and feet since 2/2017. Occurred with prolonged standing, none with reclining. Had seen cardio with good pulses. Was seen here and had MRI L spine with no source. Was eventually diagnosed with tarsal tunnel syndrome with h/o ganglion; did well with voltaren gel.    2. 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 Has \"fibromyalgia\" currently on gabapentin and mobic. Was diagnosed with fibro by rheum around 2008 based on 18 trigger points. Today pt reports she needs RF on mobic and voltaren gel. Is allergic to celebrex with no issues with these NSAIDS. Will be seeing rheumatology for the gabapentin.   3. CV- hyperlipid. Pt has h/o cardiomyopathy with a persistent low EF. Sees Dr Bermudez. Has new issues with elevated lipid. Came in fasting with , tg 83, HDL 72, . CRP was normal with decision to not use a statin.  4. ENDO- hypothyroid, diagnosed 45yo. Pt initially on synthroid 100. Takes brand, correctly. Was having fatigue, dizziness, feeling anxious, hoarse voice, trouble concentrating, feeding sad, dry skin, feeling cold, muscle aches and bowel issues. Has alt C/D. Is very careful with her diet with no weight loss or gain. Was increased on synthroid from 88 to 100 by previous provider but felt " better while on synthrouid 88 by endo. TSH was over suppressed and pt was reduced back to synthroid 88. At her recheck she felt well with resolution of jitters. Recheck TSH 1.81 with free T4 1.76; pt continued on synthroid 88.  5. GI- gastritis, IBS. Pt was diagnosed with gastritis based on excess gas. Was given zantac 300. Today she reports she did well with this. Does need it qd.    6. [facility]- previous 10/2016 by previous PCP  SCREENING:   Bone Density: 5/19/16 with osteopenia   Colonoscopy: 3/2017 with no polyps (just diverticulosis).   Mammogram: 8/20/17   Pap: NA. No DUB. Still sees Gyn for lichen sclerosis.   Glaucoma: [facility] 8/2017  IMMUNIZATIONS:   Pneumovax: 9/21/16   Zostavax: 8/4/14   Flu: due   Hep B series: NA              TDaP: within last 10 yr  LIVING WILL: yes.   SPECIALTY: GYN- Dr France   CARDIO- Dr Bermudez   RHEUMATOLOGY- Dr Norman    The following portions of the patient's history were reviewed and updated as appropriate: current medications, past family history, past medical history, past social history, past surgical history and problem list.    Outpatient Medications Prior to Visit   Medication Sig Dispense Refill   • acetaminophen (TYLENOL) 325 MG tablet Take 650 mg by mouth every night at bedtime.     • ALPRAZolam (XANAX) 0.5 MG tablet As directed for MRI 3 tablet 0   • aspirin 81 MG EC tablet Take 81 mg by mouth 3 (Three) Times a Week.     • Calcium Carbonate-Vit D-Min (CALCIUM 1200 PO) Take 1,200 mg by mouth.     • Cholecalciferol (VITAMIN D3) 3000 UNITS tablet Take  by mouth.     • Coenzyme Q10 (CO Q-10) 100 MG capsule Take 400 mg by mouth.     • diclofenac (VOLTAREN) 1 % gel gel Apply 4 g topically 4 (Four) Times a Day As Needed (arthritis). 100 g 5   • dicyclomine (BENTYL) 10 MG capsule Take 1 cap q8hr prn bowel spasm or diarrhea 30 capsule 0   • estradiol (VAGIFEM) 10 MCG tablet vaginal tablet Insert 1 tablet into the vagina 3 (Three) Times a Week. 12 tablet 12   • gabapentin  (NEURONTIN) 100 MG capsule Take 1 capsule by mouth every night at bedtime. 30 capsule 5   • Glucosamine HCl 1000 MG tablet Take  by mouth.     • levothyroxine (SYNTHROID) 88 MCG tablet Take 1 tab po qam fasting and wait 1hr for food or other meds. BRAND ONLY. 90 tablet 1   • meclizine (ANTIVERT) 12.5 MG tablet Take 1 tablet by mouth 3 (Three) Times a Day As Needed for dizziness. 30 tablet 0   • meloxicam (MOBIC) 7.5 MG tablet Take 1 tab po qd prn pain or inflammation 30 tablet 5   • Misc Natural Products (LUTEIN 20 PO) Take 20 mg by mouth.     • Multiple Vitamins-Minerals (PRESERVISION AREDS PO) Take  by mouth.     • Omega-3 Fatty Acids (FISH OIL) 1200 MG capsule capsule Take 1,200 mg by mouth Daily.     • vitamin E 400 UNIT capsule Take 400 Units by mouth Daily.     • raNITIdine (ZANTAC) 300 MG tablet 1 tab po qd prn stomach acid or acid reflux 30 tablet 0     No facility-administered medications prior to visit.        Patient Active Problem List   Diagnosis   • Osteopenia   • Arthritis   • Lichen sclerosus et atrophicus of the vulva   • GERD (gastroesophageal reflux disease)   • Fibromyalgia   • Acquired hypothyroidism   • Pure hypercholesterolemia   • ETD (eustachian tube dysfunction)   • Menopause   • IBS (irritable bowel syndrome)   • H/O cardiomyopathy   • Vaginal atrophy   • Tarsal tunnel syndrome       Advance Care Planning:  has an advance directive - a copy HAS NOT been provided    Identification of Risk Factors:  Risk factors include: chronic pain.    Review of Systems   Cardiovascular: Negative for chest pain.   Gastrointestinal: Negative for abdominal distention and abdominal pain.   Skin: Negative for color change.   Neurological: Negative for tremors, speech difficulty and headaches.   Psychiatric/Behavioral: Negative for agitation and confusion.   All other systems reviewed and are negative.      Compared to one year ago, the patient feels her physical health is better.  Compared to one year ago, the  "patient feels her mental health is better.    Objective     Physical Exam   Constitutional: She is oriented to person, place, and time. She appears well-developed and well-nourished.   HENT:   Right Ear: Tympanic membrane and ear canal normal.   Left Ear: Tympanic membrane and ear canal normal.   Mouth/Throat: Oropharynx is clear and moist.   Eyes: Conjunctivae and EOM are normal. Pupils are equal, round, and reactive to light.   Neck: No thyromegaly present.   Cardiovascular: Normal rate and regular rhythm.    Pulmonary/Chest: Effort normal and breath sounds normal.   Neurological: She is alert and oriented to person, place, and time.   Skin: Skin is warm and dry.   Psychiatric: She has a normal mood and affect.   Vitals reviewed.      Vitals:    10/18/17 1036   BP: 126/74   Temp: 97.8 °F (36.6 °C)   Weight: 120 lb 12.8 oz (54.8 kg)   Height: 62\" (157.5 cm)       Body mass index is 22.09 kg/(m^2).  Discussed the patient's BMI with her. The BMI is in the acceptable range.    Assessment/Plan   Patient Self-Management and Personalized Health Advice  The patient has been provided with information about: Discussion today with patient regarding current guidelines for timing/ frequency of paps, mammograms, colonoscopy (or cologuard), bone density tests and lab tests.     Immunizations discussed today with current recommendations advised for DTaP, Zostavax, Pneumovax and Prevnar 13.    Appropriate diet and stretching discussed with handouts provided.    Visit Diagnoses:    ICD-10-CM ICD-9-CM   1. Annual physical exam Z00.00 V70.0   2. Encounter for immunization  Z23 V03.89   3. Gastroesophageal reflux disease, esophagitis presence not specified K21.9 530.81       Orders Placed This Encounter   Procedures   • Pneumococcal Conjugate Vaccine 13-Valent All   • Comprehensive Metabolic Panel   • Lipid Panel   • Vitamin B12   • TSH   • T4, Free   • CBC & Differential     Order Specific Question:   Manual Differential     Answer:  "  No       Outpatient Encounter Prescriptions as of 10/18/2017   Medication Sig Dispense Refill   • acetaminophen (TYLENOL) 325 MG tablet Take 650 mg by mouth every night at bedtime.     • ALPRAZolam (XANAX) 0.5 MG tablet As directed for MRI 3 tablet 0   • aspirin 81 MG EC tablet Take 81 mg by mouth 3 (Three) Times a Week.     • Calcium Carbonate-Vit D-Min (CALCIUM 1200 PO) Take 1,200 mg by mouth.     • Cholecalciferol (VITAMIN D3) 3000 UNITS tablet Take  by mouth.     • Coenzyme Q10 (CO Q-10) 100 MG capsule Take 400 mg by mouth.     • diclofenac (VOLTAREN) 1 % gel gel Apply 4 g topically 4 (Four) Times a Day As Needed (arthritis). 100 g 5   • dicyclomine (BENTYL) 10 MG capsule Take 1 cap q8hr prn bowel spasm or diarrhea 30 capsule 0   • estradiol (VAGIFEM) 10 MCG tablet vaginal tablet Insert 1 tablet into the vagina 3 (Three) Times a Week. 12 tablet 12   • gabapentin (NEURONTIN) 100 MG capsule Take 1 capsule by mouth every night at bedtime. 30 capsule 5   • Glucosamine HCl 1000 MG tablet Take  by mouth.     • levothyroxine (SYNTHROID) 88 MCG tablet Take 1 tab po qam fasting and wait 1hr for food or other meds. BRAND ONLY. 90 tablet 1   • meclizine (ANTIVERT) 12.5 MG tablet Take 1 tablet by mouth 3 (Three) Times a Day As Needed for dizziness. 30 tablet 0   • meloxicam (MOBIC) 7.5 MG tablet Take 1 tab po qd prn pain or inflammation 30 tablet 5   • Misc Natural Products (LUTEIN 20 PO) Take 20 mg by mouth.     • Multiple Vitamins-Minerals (PRESERVISION AREDS PO) Take  by mouth.     • Omega-3 Fatty Acids (FISH OIL) 1200 MG capsule capsule Take 1,200 mg by mouth Daily.     • raNITIdine (ZANTAC) 300 MG tablet 1 tab po qd prn stomach acid or acid reflux 30 tablet 5   • vitamin E 400 UNIT capsule Take 400 Units by mouth Daily.     • [DISCONTINUED] raNITIdine (ZANTAC) 300 MG tablet 1 tab po qd prn stomach acid or acid reflux 30 tablet 0     No facility-administered encounter medications on file as of 10/18/2017.         Reviewed use of high risk medication in the elderly: yes  Reviewed for potential of harmful drug interactions in the elderly: not applicable    Follow Up:  No Follow-up on file.     An After Visit Summary and PPPS with all of these plans were given to the patient.   1. Medicare Wellness- no paps indicated. Labs today. Will continue annual mammo. Next bone density due in 1yr; can be ordered by Gyn or here. Prevnar 13 today (OUT WILL DO WHEN BACK IN STOCK) and then pt is complete with the pneumonia sereis. Will do the flu shot when available. Pt will bring in a copy of her Living Will.   2. RECHECK- 6mo routine

## 2017-10-24 ENCOUNTER — CLINICAL SUPPORT (OUTPATIENT)
Dept: INTERNAL MEDICINE | Facility: CLINIC | Age: 67
End: 2017-10-24

## 2017-10-24 DIAGNOSIS — R35.0 FREQUENCY OF URINATION: ICD-10-CM

## 2017-10-24 DIAGNOSIS — Z23 NEED FOR VACCINATION: Primary | ICD-10-CM

## 2017-10-24 LAB
BILIRUB BLD-MCNC: NEGATIVE MG/DL
CLARITY, POC: CLEAR
COLOR UR: NORMAL
GLUCOSE UR STRIP-MCNC: NEGATIVE MG/DL
KETONES UR QL: NEGATIVE
LEUKOCYTE EST, POC: NEGATIVE
NITRITE UR-MCNC: NEGATIVE MG/ML
PH UR: 6 [PH] (ref 5–8)
PROT UR STRIP-MCNC: NEGATIVE MG/DL
RBC # UR STRIP: NEGATIVE /UL
SP GR UR: 1.01 (ref 1–1.03)
UROBILINOGEN UR QL: NORMAL

## 2017-10-24 PROCEDURE — 90662 IIV NO PRSV INCREASED AG IM: CPT | Performed by: FAMILY MEDICINE

## 2017-10-24 PROCEDURE — G0009 ADMIN PNEUMOCOCCAL VACCINE: HCPCS | Performed by: FAMILY MEDICINE

## 2017-10-24 PROCEDURE — 90670 PCV13 VACCINE IM: CPT | Performed by: FAMILY MEDICINE

## 2017-10-24 PROCEDURE — G0008 ADMIN INFLUENZA VIRUS VAC: HCPCS | Performed by: FAMILY MEDICINE

## 2017-10-24 PROCEDURE — 81003 URINALYSIS AUTO W/O SCOPE: CPT | Performed by: FAMILY MEDICINE

## 2017-10-31 ENCOUNTER — TELEPHONE (OUTPATIENT)
Dept: INTERNAL MEDICINE | Facility: CLINIC | Age: 67
End: 2017-10-31

## 2017-10-31 DIAGNOSIS — E03.9 ACQUIRED HYPOTHYROIDISM: ICD-10-CM

## 2017-11-01 RX ORDER — LEVOTHYROXINE SODIUM 88 MCG
TABLET ORAL
Qty: 90 TABLET | Refills: 1 | Status: SHIPPED | OUTPATIENT
Start: 2017-11-01 | End: 2018-04-23 | Stop reason: SDUPTHER

## 2017-11-01 NOTE — TELEPHONE ENCOUNTER
Pt informed via Eugenie Albrecht that her labs are fine and her Synthroid has been sent to the pharmacy.

## 2017-11-06 ENCOUNTER — TELEPHONE (OUTPATIENT)
Dept: INTERNAL MEDICINE | Facility: CLINIC | Age: 67
End: 2017-11-06

## 2017-11-06 DIAGNOSIS — K58.9 IRRITABLE BOWEL SYNDROME, UNSPECIFIED TYPE: ICD-10-CM

## 2017-11-06 RX ORDER — DICYCLOMINE HYDROCHLORIDE 10 MG/1
CAPSULE ORAL
Qty: 30 CAPSULE | Refills: 0 | Status: SHIPPED | OUTPATIENT
Start: 2017-11-06 | End: 2018-08-16 | Stop reason: SDUPTHER

## 2017-11-15 ENCOUNTER — TELEPHONE (OUTPATIENT)
Dept: INTERNAL MEDICINE | Facility: CLINIC | Age: 67
End: 2017-11-15

## 2017-12-23 ENCOUNTER — HOSPITAL ENCOUNTER (EMERGENCY)
Facility: HOSPITAL | Age: 67
Discharge: HOME | End: 2017-12-23
Attending: EMERGENCY MEDICINE | Admitting: EMERGENCY MEDICINE

## 2017-12-23 VITALS
OXYGEN SATURATION: 96 % | TEMPERATURE: 99.1 F | BODY MASS INDEX: 20.73 KG/M2 | HEIGHT: 63 IN | DIASTOLIC BLOOD PRESSURE: 68 MMHG | SYSTOLIC BLOOD PRESSURE: 114 MMHG | WEIGHT: 117 LBS | HEART RATE: 93 BPM | RESPIRATION RATE: 16 BRPM

## 2017-12-23 DIAGNOSIS — R10.84 GENERALIZED ABDOMINAL PAIN: Primary | ICD-10-CM

## 2017-12-23 DIAGNOSIS — R11.2 NAUSEA, VOMITING, AND DIARRHEA: ICD-10-CM

## 2017-12-23 DIAGNOSIS — K58.0 IRRITABLE BOWEL SYNDROME WITH DIARRHEA: ICD-10-CM

## 2017-12-23 DIAGNOSIS — R19.7 NAUSEA, VOMITING, AND DIARRHEA: ICD-10-CM

## 2017-12-23 LAB
ALBUMIN SERPL-MCNC: 4.9 G/DL (ref 3.2–4.8)
ALBUMIN/GLOB SERPL: 1.6 G/DL (ref 1.5–2.5)
ALP SERPL-CCNC: 77 U/L (ref 25–100)
ALT SERPL W P-5'-P-CCNC: 18 U/L (ref 7–40)
ANION GAP SERPL CALCULATED.3IONS-SCNC: 9 MMOL/L (ref 3–11)
AST SERPL-CCNC: 21 U/L (ref 0–33)
BACTERIA UR QL AUTO: ABNORMAL /HPF
BASOPHILS # BLD AUTO: 0.02 10*3/MM3 (ref 0–0.2)
BASOPHILS NFR BLD AUTO: 0.1 % (ref 0–1)
BILIRUB SERPL-MCNC: 0.8 MG/DL (ref 0.3–1.2)
BILIRUB UR QL STRIP: NEGATIVE
BUN BLD-MCNC: 35 MG/DL (ref 9–23)
BUN/CREAT SERPL: 50 (ref 7–25)
CALCIUM SPEC-SCNC: 10.1 MG/DL (ref 8.7–10.4)
CHLORIDE SERPL-SCNC: 102 MMOL/L (ref 99–109)
CLARITY UR: ABNORMAL
CO2 SERPL-SCNC: 27 MMOL/L (ref 20–31)
COLOR UR: YELLOW
CREAT BLD-MCNC: 0.7 MG/DL (ref 0.6–1.3)
DEPRECATED RDW RBC AUTO: 43.7 FL (ref 37–54)
EOSINOPHIL # BLD AUTO: 0.04 10*3/MM3 (ref 0–0.3)
EOSINOPHIL NFR BLD AUTO: 0.3 % (ref 0–3)
ERYTHROCYTE [DISTWIDTH] IN BLOOD BY AUTOMATED COUNT: 12.4 % (ref 11.3–14.5)
GFR SERPL CREATININE-BSD FRML MDRD: 84 ML/MIN/1.73
GLOBULIN UR ELPH-MCNC: 3 GM/DL
GLUCOSE BLD-MCNC: 126 MG/DL (ref 70–100)
GLUCOSE UR STRIP-MCNC: NEGATIVE MG/DL
HCT VFR BLD AUTO: 45 % (ref 34.5–44)
HGB BLD-MCNC: 15 G/DL (ref 11.5–15.5)
HGB UR QL STRIP.AUTO: NEGATIVE
HOLD SPECIMEN: NORMAL
HOLD SPECIMEN: NORMAL
HYALINE CASTS UR QL AUTO: ABNORMAL /LPF
IMM GRANULOCYTES # BLD: 0.07 10*3/MM3 (ref 0–0.03)
IMM GRANULOCYTES NFR BLD: 0.5 % (ref 0–0.6)
KETONES UR QL STRIP: NEGATIVE
LEUKOCYTE ESTERASE UR QL STRIP.AUTO: NEGATIVE
LIPASE SERPL-CCNC: 35 U/L (ref 6–51)
LYMPHOCYTES # BLD AUTO: 0.35 10*3/MM3 (ref 0.6–4.8)
LYMPHOCYTES NFR BLD AUTO: 2.3 % (ref 24–44)
MCH RBC QN AUTO: 32 PG (ref 27–31)
MCHC RBC AUTO-ENTMCNC: 33.3 G/DL (ref 32–36)
MCV RBC AUTO: 95.9 FL (ref 80–99)
MONOCYTES # BLD AUTO: 0.52 10*3/MM3 (ref 0–1)
MONOCYTES NFR BLD AUTO: 3.5 % (ref 0–12)
MUCOUS THREADS URNS QL MICRO: ABNORMAL /HPF
NEUTROPHILS # BLD AUTO: 13.98 10*3/MM3 (ref 1.5–8.3)
NEUTROPHILS NFR BLD AUTO: 93.3 % (ref 41–71)
NITRITE UR QL STRIP: NEGATIVE
PH UR STRIP.AUTO: <=5 [PH] (ref 5–8)
PLATELET # BLD AUTO: 207 10*3/MM3 (ref 150–450)
PMV BLD AUTO: 10 FL (ref 6–12)
POTASSIUM BLD-SCNC: 4.3 MMOL/L (ref 3.5–5.5)
PROT SERPL-MCNC: 7.9 G/DL (ref 5.7–8.2)
PROT UR QL STRIP: NEGATIVE
RBC # BLD AUTO: 4.69 10*6/MM3 (ref 3.89–5.14)
RBC # UR: ABNORMAL /HPF
REF LAB TEST METHOD: ABNORMAL
SODIUM BLD-SCNC: 138 MMOL/L (ref 132–146)
SP GR UR STRIP: 1.02 (ref 1–1.03)
SQUAMOUS #/AREA URNS HPF: ABNORMAL /HPF
UROBILINOGEN UR QL STRIP: ABNORMAL
WBC NRBC COR # BLD: 14.98 10*3/MM3 (ref 3.5–10.8)
WBC UR QL AUTO: ABNORMAL /HPF
WHOLE BLOOD HOLD SPECIMEN: NORMAL
WHOLE BLOOD HOLD SPECIMEN: NORMAL
YEAST URNS QL MICRO: ABNORMAL /HPF

## 2017-12-23 PROCEDURE — 25010000002 ONDANSETRON PER 1 MG: Performed by: EMERGENCY MEDICINE

## 2017-12-23 PROCEDURE — 96361 HYDRATE IV INFUSION ADD-ON: CPT

## 2017-12-23 PROCEDURE — 83690 ASSAY OF LIPASE: CPT | Performed by: EMERGENCY MEDICINE

## 2017-12-23 PROCEDURE — 96372 THER/PROPH/DIAG INJ SC/IM: CPT

## 2017-12-23 PROCEDURE — 85025 COMPLETE CBC W/AUTO DIFF WBC: CPT | Performed by: EMERGENCY MEDICINE

## 2017-12-23 PROCEDURE — 96374 THER/PROPH/DIAG INJ IV PUSH: CPT

## 2017-12-23 PROCEDURE — 87086 URINE CULTURE/COLONY COUNT: CPT | Performed by: EMERGENCY MEDICINE

## 2017-12-23 PROCEDURE — 25010000002 DICYCLOMINE PER 20 MG: Performed by: EMERGENCY MEDICINE

## 2017-12-23 PROCEDURE — 80053 COMPREHEN METABOLIC PANEL: CPT | Performed by: EMERGENCY MEDICINE

## 2017-12-23 PROCEDURE — 99284 EMERGENCY DEPT VISIT MOD MDM: CPT

## 2017-12-23 PROCEDURE — 25010000002 KETOROLAC TROMETHAMINE PER 15 MG: Performed by: EMERGENCY MEDICINE

## 2017-12-23 PROCEDURE — 96375 TX/PRO/DX INJ NEW DRUG ADDON: CPT

## 2017-12-23 PROCEDURE — 81001 URINALYSIS AUTO W/SCOPE: CPT | Performed by: EMERGENCY MEDICINE

## 2017-12-23 RX ORDER — ONDANSETRON 8 MG/1
8 TABLET, ORALLY DISINTEGRATING ORAL EVERY 8 HOURS PRN
Qty: 15 TABLET | Refills: 0 | Status: SHIPPED | OUTPATIENT
Start: 2017-12-23 | End: 2018-04-19

## 2017-12-23 RX ORDER — SODIUM CHLORIDE 0.9 % (FLUSH) 0.9 %
10 SYRINGE (ML) INJECTION AS NEEDED
Status: DISCONTINUED | OUTPATIENT
Start: 2017-12-23 | End: 2017-12-23 | Stop reason: HOSPADM

## 2017-12-23 RX ORDER — ONDANSETRON 2 MG/ML
4 INJECTION INTRAMUSCULAR; INTRAVENOUS ONCE
Status: COMPLETED | OUTPATIENT
Start: 2017-12-23 | End: 2017-12-23

## 2017-12-23 RX ORDER — KETOROLAC TROMETHAMINE 15 MG/ML
7.5 INJECTION, SOLUTION INTRAMUSCULAR; INTRAVENOUS ONCE
Status: COMPLETED | OUTPATIENT
Start: 2017-12-23 | End: 2017-12-23

## 2017-12-23 RX ORDER — DICYCLOMINE HYDROCHLORIDE 10 MG/ML
20 INJECTION INTRAMUSCULAR ONCE
Status: COMPLETED | OUTPATIENT
Start: 2017-12-23 | End: 2017-12-23

## 2017-12-23 RX ADMIN — KETOROLAC TROMETHAMINE 7.5 MG: 15 INJECTION, SOLUTION INTRAMUSCULAR; INTRAVENOUS at 16:14

## 2017-12-23 RX ADMIN — SODIUM CHLORIDE 1000 ML: 9 INJECTION, SOLUTION INTRAVENOUS at 15:27

## 2017-12-23 RX ADMIN — DICYCLOMINE HYDROCHLORIDE 20 MG: 20 INJECTION, SOLUTION INTRAMUSCULAR at 15:31

## 2017-12-23 RX ADMIN — ONDANSETRON 4 MG: 2 INJECTION INTRAMUSCULAR; INTRAVENOUS at 15:32

## 2017-12-25 LAB
BACTERIA SPEC AEROBE CULT: NORMAL
BACTERIA SPEC AEROBE CULT: NORMAL

## 2018-01-09 ENCOUNTER — TREATMENT (OUTPATIENT)
Dept: PHYSICAL THERAPY | Facility: CLINIC | Age: 68
End: 2018-01-09

## 2018-01-09 PROCEDURE — PTSPVT PR CUSTOM PT TREATMENT OF SELF PAY PATIENT: Performed by: PHYSICAL THERAPIST

## 2018-01-09 NOTE — PROGRESS NOTES
Physical Therapy Initial Evaluation and Plan of Care        Subjective Evaluation    History of Present Illness  Mechanism of injury: About 3 weeks hit her head, and few days later she started experiencing dizziness. Was improving a little, until she recently saw the dentist and dizziness/nausea became severe. Balance has been off and dizziness has been intermittently.  But has had 2-3 really good days now.    Subjective comment: Dizziness and poor balancePain  Location: Dizziness/room spinning/poor balance  Quality: Rolling over in bed especially to the left, or tilting the head to the right.  Aggravating factors: overhead activity and sleeping  Progression: improved             Objective     Special Questions  Patient is experiencing dizziness.       Postural Observations  Seated posture: fair  Standing posture: fair  Correction of posture: has no consistent effect        Neurological Testing     Sensation   Cervical/Thoracic   Left   Intact: light touch, pin prick and sharp/dull discrimination    Right   Intact: light touch, pin prick and sharp/dull discrimination    Reflexes   Left   Biceps (C5/C6): trace (1+)  Brachioradialis (C6): trace (1+)  Triceps (C7): trace (1+)    Right   Biceps (C5/C6): trace (1+)  Brachioradialis (C6): trace (1+)  Triceps (C7): trace (1+)    Active Range of Motion   Cervical/Thoracic Spine   Cervical    Flexion: 60 degrees   Extension: 50 degrees   Left lateral flexion: 30 degrees   Right lateral flexion: 30 degrees   Left rotation: 55 degrees   Right rotation: 45 degrees     Tests   Cervical     Left   Negative Spurling's sign.     Right   Negative Spurling's sign.     Additional Tests Details  Stepping Fakuda= negative  Lena-Hallpike= negative bilaterally  Roll Test=Negative bilaterally  Smooth Pursuit= Normal  Saccades=Normal           Assessment & Plan     Assessment  Impairments: abnormal gait, activity intolerance and impaired balance  Other impairment: DHI score=34, ABC  scale=22%  Assessment details: This patient history sounds as though she may have recently been experiencing BPPV, at a relatively severe level.  Today all test are negative for BPPV and her balance is good today.  It appears, as she looks today, that the pathology has self-resolved.  However, this kind of pathology may be easily aroused on some days better than other days.  I feel we may need to treat the pt in future the next time symptoms arise.  We did talk to pt about diet and dehydration precautions that can contribute to exacerbating BPPV issues.  It was best not to treat patient today since she is symptom free and not knowing which canal is involved could be counter productive.  Prognosis: good  Prognosis details:   GOALS  STG to be met in 4 weeks  1.  Resolve symptoms that arise through positioning therapy.  2.  Pt DHI score improves from 34 to 25.  LTG to be met in 12 weeks  1.  Pt can self correct symptoms independently.  2.  Pt is independent with HEP.  3.  Pt DHI score improves to 10 or less.  Functional Limitations: walking, standing, stooping and reaching overhead  Plan  Therapy options: will be seen for skilled physical therapy services  Planned therapy interventions: gait training, home exercise program, manual therapy, balance/weight-bearing training, therapeutic activities and neuromuscular re-education  Other planned therapy interventions: JOO shea  Frequency: 1x week  Duration in weeks: 12  Plan details: We will basically see pt on PRN bases if symptoms return, we can better determine which canal may be involved and treat her appropriately.          PT SIGNATURE: Karen Chatterjee, PT   DATE TREATMENT INITIATED: 1/9/2018    Medicare Initial Certification  Certification Period: 4/9/2018  I certify that the therapy services are furnished while this patient is under my care.  The services outlined above are required by this patient, and will be reviewed every 90  days.     PHYSICIAN:_________________________________________________________        Christen Mccarty MD        DATE: _____________________________________________________________    Please sign and return via fax to [phone]. Thank you, [facility].

## 2018-01-09 NOTE — PROGRESS NOTES
"Physical Therapy Re-Evaluation      SUBJECTIVE:     Changes since last seen:  Pt  had vertigo issues in Sept and saw Declan DiazPT but couldn't recreate her symptoms.  She cancelled her appointment with me the following day as she was still unsteady.   Once the vertigo resolved she got the flu and then gastroenteritis, ending up in the ER on 12/23/17.   She fell yesterday on behind her house, slipped on icy grass and slid down slight enbankment, took most of the weight of her body on her hands and shoulders so now tightness and soreness in shoulders. She arrives today for assessment.  She requests this be paid on self pay basis.      Prior to the fall, pt reports she had been doing fairly well.   \"The Voltarin gel has been keeping me going!\"   \"Most of my issues have been with my neck and shoulders not as much with the low back.  \"When I have low back pain its in the right QL area which seemed to originate in October from trimming bushes and leaning to right to do this.\"  I recreate that trimming position today and the pain returns      Current level of pain:      0/10 Right QL    0/10 neck            1/10  Both shoulders    Lowest level of pain since last seen:              0/10     0/10      0/10    Highest level of pain since last seen:   3/10  When moves    1/10 Stiffness        2/10  left > right ever since flu shot in left arm last year.       OBJECTIVE:   Slight lateral shift of pelvis to left, flattened thoracic and lumbar spine; forward head from C7    Palpation:    Monthly Objective Measurement/Functional Activity    Date: 06/06/17 01/09/18         Oswestry Pain Score           LE Functional Scale                      AROM Lumbar Spine: Degrees   Degrees      Degrees      Degrees      Degrees      Degrees      Degrees    Degrees      Flexion 103          88             Extension 17           29            Right Lat. Flexion 25           34            Left Lat. Flexion 20           30             Right " Lat Shift Pelvis No change   No change            Left Lat Shift Pelvis Pain/stiff  No change                     AROM Hips:  (degrees) Right      Left Right Left Right  Left Right  Left Right  Left Right  Left Right  Left Right  Left      Flexion 115     112  110    114               Abduction 45       45 45       45                Int. Rotation 40      38  38      37            Ext. Rotation  45       45  40      38                        Flexibility:   (degrees) Right    Left Right  Left Right  Left Right  Left Right  Left Right  Left Right  Left Right  Left      Hamstrings -28       -26 -29       -30             Quadriceps 55       50  Not tested             HipExt/Rot(piriformis) 45       39 38        37             Hip Int/Rotators 40       38 39         36            Hip Flexors (iliopsoas) Not tested       Not tested            IT Band Not tested      Not tesetd                    Reflexes: Right      Left Right  Left Right  Left Right  Left Right  Left Right  Left Right  Left Right  Left      L4 (Quad) -       -   -         -            S1 (Achilles) -       -   -          -                    Root Level  - Motor Right      Left Right  Left Right  Left Right  Left Right  Left Right  Left Right  Left Right  Left     T12             Rectus Abdominus 4+/5         4+/5            L1              Paraspinals 5/5      5/5 5/5         5/5            L2              Hip Flexion 5/5      5/5 5/5        5/5            L3             Quads 5/5      5/5 5/5        5/5            L4              Anterior Tibialis 5/5      5/5 5/5        5/5            L5             Gr. Toe Extension Not tested Not tested             S1            Gastroc/Sol/Peroneals Not tested Not tested                    Functional Strength:             Single LegStanceTime (seconds) R:-  L:-   R:      L: R:      L: R:     L: R:      L: R:      L: R:      L: R:      L:     Pelvic Floor Isolation (seconds) 10 sec   10 sec            Inner Unit   Isolation (seconds) 10 sec 10 sec                    Other Functional Outcome Tests               LE Functional Scale               30 Second Chair Rise Test               Timed Up & Go, 9.8 feet                  (fall risk if > 11.3 seconds)               Toe Tap Test                (Young 47 taps, Older 34 taps)                      Functional Activities:              Sit w/o pain? Pain increases (minutes) Yes/ 30 min Yes/ 30-60            Stand w/o pain? Yes/ 15 min Yes 15-30            Walk w/o pain? Yes/ 10-15 min Yes 15-30            Steps w/o pain? Yes/ 1 fl Yes / 1 fl            Drive w/o pain? Yes/ pain getting in and out  Yes/ pain getting out            Work w/o pain? n/a    n/a            # times wakes w/ pain? 0x       0x              Monthly Objective Measurement/Functional Activity     Date: 01/09/18                     AROM Cervical Spine:  Degrees  Degrees Degrees Degrees Degrees Degrees Degrees Degrees      Flexion 53             Extension 50             Right Lat. Flexion 48             Left Lat. Flexion 28 stiff, stuck             Right Rotation 55             Left Rotation 45 pain right upper trap                      AROM Shoulders: Degrees Right    Left Right  Left Right  Left Right  Left Right  Left Right  Left Right  Left Right  Left      Flexion 175     175             Abduction 175       172             Int. Rotation 90       90             Ext. Rotation  94       92          Functional Strength: in lbs Right     Left Right  Left Right  Left Right  Left Right  Left Right  Left Right  Left Right  Left       Strength Trial 1                                               Trial 2                                               Trial 3                       Michaels Pinch                                                        Root Level  -    Motor Right      Left Right  Left Right  Left Right  Left Right  Left Right  Left Right  Left Right  Leeft      C4                Shld Elevation 5/5        5/5             C5                Shld Abduction 5/5       5/5                                 Shld Flexion 5/5       5/5             C6                Elbow Flexion 5/5 5/5                                 Wrist Extension       Not tested                     C7               Elbow Extension Not tested                                         Wrist Flexion Not tested                     C8               Thumb Extension Not tested                     T1               Hand Intrinsics Not tested                             Reflexes Right       Left Right  Left Right  Left Right  Left Right  Left Right  Left Right  Left Right  Left      C5 Biceps                      C6 Brachioradialis                       C7 Triceps                               Functional Activities:              Sit w/o pain? yes             Stand w/o pain? yes             Walk w/o pain? yes             Drive w/o pain? no             Work w/o pain? n/a             # times wakes w/ pain? 0-1             Self Hoffman w/o pain? yes              Muscle/Bone Irritation   10/04/16 10/27/16  12/01/16    12/15/16 01/03/17 01/19/17 02/02/17   06/06/17  01/09/18      Right  Left Right   Left Right Left Right  Left Right  Left Right  Left Right  Left Right Left Right Left     Piriformis    +         +  slight  slight Slight slight  slight slight slight   slight   Slight   ++  sl        Sl      Gr. Trochanter    +         +    -            -        -           +   sl     IT Band    -           -         -       slight   +      Quadr. Lumborum    -           -             Ischial Tuberosity    -           -             Sacrococcygeal Ligs    -          -             Paraspinals    -           -   +                      +                 +     +      Spinous Processes                     T rotated to     -           -                    L rotated to  L3-5  L1-5 L4-5             L4-5             L3-5   L2-5  L2-5 L3-5      Adductor Fabio    -             -              Iliopsoas    -              -             Quad Origin    -             -             SI Joint   ++         +   ++       ++   ++        -     +          -   Slight    +    -            -    +          +  ++        +  ++     Pubic Symphysis  not tested                  +                                 Left thumb CMC                    +                 +                  +           01/09/18                  Right  Left Right  Left Right  Left Right  Left Right  Left Right  Left Right  Left Right  Left Right  Left Right  Left Right  Left Right  Left Right  Left Right  Left Right  Left   Suboccipitals  +       +                 Spinous Processes                                 C-spine rotated to           C2-5                      T-spine rotated to  T3-7                     Upper traps  sl       sl                 Ant/Mid Scalenes  sl       sl                 Sternocleidomastoid  sl       -                 Levator Scapula  sl       -                 Pectoralis Major  -       -                 Pectoralis Minor  -       -                 Sternocostals  -       -                 Bicipital tendon   +       sl                 Supraspinatus   -      -                 Infraspinatus   -      -                 Lats Insertion   -       -                 Rhomboid, Major   Sl          -                 Rhomboid, Minor   sl       -                 Lateral Epicondyle   +    -                 Medial Epicondyle   -       -                 Triceps Insertion   -       -                                                                                              10/04/16 10/27/16  12/01/16 12/15/16 01/03/17 01/19/17 02/02/17 06/06/17   01/09/18     SI Joint Positioning Right   Left Right    Left Right Left   Right  Left Right  Left  Right  Left Right  Left Right Left Right Left         Innominate      Symmetrical!               Anterior                x                 x              x                x                x                  x     x                x            Posterior     x     x      x     x     x      x                 x   x        Sacrum                        Unilateral rotation     x     x      x     x     x Neutral       x    X     x                      SI Joint Testing Right   Left Right    Left Right  Left Right  Left Right  Left Right  Left Right  Left Right Left Right left             Distraction    +           +    +           +  slight slight    -         -  Slight  slight      +         +   +          +             Beatriz's    -            -    -            -    -            -                   Compression    -            -    -            -    -            -                   Prone Press Up    -            -    -            -   -             -                         Special Tests  Right   Left Right    Left  Right Left Right  Left Right  Left Right  Left Right  Left Right Left  Right Left        Straight Leg Raise                                      Active     -         -    -           -    -         -        -          -   -         -             Passive    -         -      -           -     -         -        -          -   -          -         Hip Scour Test    -          -     -           -     -          -                                           ASSESSMENT:  Problem List:  1.  Flare-up SI joint dysfunction  2.  Secondary left greater trochanteric bursitis.     Discussion:   Maribel has had a slight flare up of upper quarter symptoms secondary to catching her self as she slid down an icy grassy embankment behind her house yesterday.  This also tweaked her SI joint a little.  Reviewed with her how to do the self corrections for any limitations/restriction or pain in the SI joints /QLs.    The upper quarter symptoms are strain from the fall and were easily resolved with manual work.        Short Term Goals:                                     Date Met:                    1.  Decrease hip/buttock pain to no  greater than 2/10        2.  Eliminate neck shoulder strain    Long Term Goals:           Date Met:      1.  pt independent in self-management of symptoms           PLAN:  Pt will return in 3 weeks for follow up and manual work if needed.  If she is doing well she will cancel this appointment.  She has a thorough home program and has been taught all posture and positioning as needed for SI joints/ lumbar spine, thoracic spine and cervical spine.  She will return on self pay basis if needed manual work to augment her home program.            TREATMENT TODAY:    Total Treatment Time:   (min in clinic):   60  Timed Code Treatment Minutes:   55      Manual Therapy.  (Man)  RX min:   45  Manual posterior rotation of the left innominate  Mobs of leftt hip  PIC right iliopsoas  PIC multifidus  Quadratus lumborum stretching  Piriformis stretching  Myofascial work of LE and trunk  Rib mobs  Manual cervical work  Myofascial work head/neck/trunk    Therapeutic Activities:  (TA)  RX min:   10  Neuromuscular Reeducation:  (NMR)  Rx min:      Ultrasound:  RX min:       1.6 piper/cm2       Location:  Left bicipital and left supraspinatus tendons, ant/inf left shoulder capsule  Supplies given:  -     Procedures:  Code Procedure/Instruction 09/27/16 10/04/16 10/27/16 12/01/16 12/15/16 01/03/17 01/19/17 02/02/17  06/06/17       TA            Sleeping Positions                  reviewed               TA Sternum-Up Posture    review             TA SI jt. self-correction          reviewed       TA Use of lumbar pillow reviewed                TA Use of cervical roll reviewed                TA Use of orthotics                 TA Use of SI belt                 TA Use of Nada chair                 TA Lumb Facet PIC          reviewed       TA Order of Self-Correct          reviewed       TA Decreasing Disc Pressures                 NMR Pelvic Floor Isolation                 NMR Transverse Abdominus                 NMR Multifidus Isolation                  NMR InnerUnit agns Capron                 NMR Sit-stand w/ inner unit                 NMR Roll w/ inner unit                 NMR Walk w/ inner unit                  NMR Up/down steps w/ inner unit                 NMR Water Exer Instruction                 NMR Hip Abd w/o periformis                 NMR Hip Abd w/o iliop/ham                  NMR Lower abs in supine                 NMR Abd obliques in supine                 NMR Prone Knee Flex                 NMR Prone glut max                 NMR Retro Step                 NMR Retro Walk                 NMR Forward walk w/ inner unit                 NMR Balance Instruction                 NMR Ball sit A/P & Lateral                 NMR Ball Catch/Core/Supine                 NMR Ball Catch/Core/Stand                 NMR Ball All 4's Arm Life                 NMR Ball Prone Walk Out                 NMR Ball Supine Obliques                 NMR Ball sit-supine-sit                 NMR Walking Prog Progress                 TA Home prg sequencing                 TA Hamstring stretch                 TA Hip Ext Rot Stretch                 TA Hip Int Rot Stretch                 TA Hip Flex/IT Stretch                 TA Hip Add Stretch                 TA Lats Dorsi Stretch                 TA Gastric/soleus stretch                 TA QuadLumbormStretch                 TA Quad Stretch                 NMR Lateral Bridge Drop                 NMR [place]                 NMR O'Feldt Lat Pull Down                 NMR O'Feldt Hip Ext                 NMR O'Feldt Trunk Ext                 TA CervDiscExtSup/stnd   instructed              TA Cerv Stretches  instructed               TA Neural Gliding                 NMR Ant/Mid Scalene Strch                 NMR Prone Arm Lifts       instructed reviewed         NMR Scapula Stabilization                 NMR Occulomotor Isometrics                 NMR Cervical Isometrics                 TA Ant. Chest Stretch                 TA  Standing Wall Slides                 TA Rotator Cuff Stretch  instructed reviewed corrected reviewed            TA Rotator Cuff Theraband     instructed reviewed reviewed          TA ShldAROM w/bar instructed reviewed reviewed  reviewed reviewed  reviewed         TA Biceps Stretch  instructed reviewed corrected  reviewed reviewed reviewed         TA SelfPICThorSpine                 TA   Ant Scalene stretch  instructed reviewed              TA Icing shoulder  instructed reviewed reviewed             TA [employer] for upper trap   instructed              TA Sidelying rot cuff strengthen   instructed                                                                                                                              Karen Chatterjee, PT, MS  Physical Therapist  [place]# [id number]

## 2018-01-27 DIAGNOSIS — G62.9 PERIPHERAL POLYNEUROPATHY: ICD-10-CM

## 2018-01-29 ENCOUNTER — TELEPHONE (OUTPATIENT)
Dept: INTERNAL MEDICINE | Facility: CLINIC | Age: 68
End: 2018-01-29

## 2018-01-29 DIAGNOSIS — G62.9 PERIPHERAL POLYNEUROPATHY: ICD-10-CM

## 2018-01-29 RX ORDER — GABAPENTIN 100 MG/1
CAPSULE ORAL
Qty: 30 CAPSULE | Refills: 3 | OUTPATIENT
Start: 2018-01-29

## 2018-01-29 RX ORDER — GABAPENTIN 100 MG/1
100 CAPSULE ORAL
Qty: 30 CAPSULE | Refills: 2 | OUTPATIENT
Start: 2018-01-29 | End: 2018-04-19 | Stop reason: SDUPTHER

## 2018-01-29 NOTE — TELEPHONE ENCOUNTER
Rx called in to pharmacy as the pt has been receiving this from us and has a signed contract in the chart.

## 2018-01-29 NOTE — TELEPHONE ENCOUNTER
PT IS CALLING BECAUSE SHE SAID THAT PHARMACY IS WANTING YOU TO SEND OVER SOMETHING FOR HER GABAPENTIN. PT SAID THAT SHE WILL BE RUNNING OUT SOON.

## 2018-01-30 ENCOUNTER — OFFICE VISIT (OUTPATIENT)
Dept: INTERNAL MEDICINE | Facility: CLINIC | Age: 68
End: 2018-01-30

## 2018-01-30 VITALS — TEMPERATURE: 99.2 F | HEIGHT: 62 IN | WEIGHT: 120 LBS | BODY MASS INDEX: 22.08 KG/M2

## 2018-01-30 DIAGNOSIS — J06.9 ACUTE URI: Primary | ICD-10-CM

## 2018-01-30 LAB
EXPIRATION DATE: NORMAL
FLUAV AG NPH QL: NORMAL
FLUBV AG NPH QL: NORMAL
INTERNAL CONTROL: NORMAL
Lab: NORMAL

## 2018-01-30 PROCEDURE — 99213 OFFICE O/P EST LOW 20 MIN: CPT | Performed by: FAMILY MEDICINE

## 2018-01-30 PROCEDURE — 87804 INFLUENZA ASSAY W/OPTIC: CPT | Performed by: FAMILY MEDICINE

## 2018-01-30 NOTE — PROGRESS NOTES
Subjective   Barbara Hernandez is a 67 y.o. female.     History of Present Illness   Here today as a work in for cough and fever. Last seen 10/18/17 for Medicare Wellness.    ERSP- today pt reports she had gastroenterits 12/23/17. Was seen in the ER. Had normal CBC, CMP and lipase other than elevated WBC and mildly elevated. States she resolved from this. Got sick with current 4 days ago. Is having head and chest congestion. Phlegm is clear.     The following portions of the patient's history were reviewed and updated as appropriate: current medications, past family history, past medical history, past social history, past surgical history and problem list.    Review of Systems   Constitutional: Positive for chills, fatigue and fever.   HENT: Positive for postnasal drip, rhinorrhea and sore throat.    Respiratory: Positive for cough.    Cardiovascular: Negative for chest pain.   Gastrointestinal: Negative for abdominal distention and abdominal pain.   Skin: Negative for color change.   Neurological: Positive for headaches. Negative for tremors and speech difficulty.   Psychiatric/Behavioral: Negative for agitation and confusion.   All other systems reviewed and are negative.        Current Outpatient Prescriptions:   •  acetaminophen (TYLENOL) 325 MG tablet, Take 650 mg by mouth every night at bedtime., Disp: , Rfl:   •  ALPRAZolam (XANAX) 0.5 MG tablet, As directed for MRI, Disp: 3 tablet, Rfl: 0  •  aspirin 81 MG EC tablet, Take 81 mg by mouth 3 (Three) Times a Week., Disp: , Rfl:   •  Calcium Carbonate-Vit D-Min (CALCIUM 1200 PO), Take 1,200 mg by mouth., Disp: , Rfl:   •  Cholecalciferol (VITAMIN D3) 3000 UNITS tablet, Take  by mouth., Disp: , Rfl:   •  Coenzyme Q10 (CO Q-10) 100 MG capsule, Take 400 mg by mouth., Disp: , Rfl:   •  diclofenac (VOLTAREN) 1 % gel gel, Apply 4 g topically 4 (Four) Times a Day As Needed (arthritis)., Disp: 100 g, Rfl: 5  •  dicyclomine (BENTYL) 10 MG capsule, Take 1 cap q8hr prn bowel  "spasm or diarrhea, Disp: 30 capsule, Rfl: 0  •  estradiol (VAGIFEM) 10 MCG tablet vaginal tablet, Insert 1 tablet into the vagina 3 (Three) Times a Week., Disp: 12 tablet, Rfl: 12  •  gabapentin (NEURONTIN) 100 MG capsule, Take 1 capsule by mouth every night at bedtime., Disp: 30 capsule, Rfl: 2  •  Glucosamine HCl 1000 MG tablet, Take  by mouth., Disp: , Rfl:   •  meclizine (ANTIVERT) 12.5 MG tablet, Take 1 tablet by mouth 3 (Three) Times a Day As Needed for dizziness., Disp: 30 tablet, Rfl: 0  •  meloxicam (MOBIC) 7.5 MG tablet, Take 1 tab po qd prn pain or inflammation, Disp: 30 tablet, Rfl: 5  •  Misc Natural Products (LUTEIN 20 PO), Take 20 mg by mouth., Disp: , Rfl:   •  Multiple Vitamins-Minerals (PRESERVISION AREDS PO), Take  by mouth., Disp: , Rfl:   •  Omega-3 Fatty Acids (FISH OIL) 1200 MG capsule capsule, Take 1,200 mg by mouth Daily., Disp: , Rfl:   •  ondansetron ODT (ZOFRAN-ODT) 8 MG disintegrating tablet, Take 1 tablet by mouth Every 8 (Eight) Hours As Needed for Nausea or Vomiting., Disp: 15 tablet, Rfl: 0  •  raNITIdine (ZANTAC) 300 MG tablet, 1 tab po qd prn stomach acid or acid reflux, Disp: 30 tablet, Rfl: 5  •  SYNTHROID 88 MCG tablet, Take 1 tab po qam fasting and wait 1hr for food or other meds. BRAND ONLY., Disp: 90 tablet, Rfl: 1  •  vitamin E 400 UNIT capsule, Take 400 Units by mouth Daily., Disp: , Rfl:     Objective     Temp 99.2 °F (37.3 °C)  Ht 157.5 cm (62\")  Wt 54.4 kg (120 lb)  LMP  (LMP Unknown) Comment: postmenopausal  BMI 21.95 kg/m2    Physical Exam   Constitutional: She is oriented to person, place, and time. She appears well-developed and well-nourished.   HENT:   Right Ear: Tympanic membrane and ear canal normal.   Left Ear: Tympanic membrane and ear canal normal.   Mouth/Throat: Oropharynx is clear and moist.   Eyes: Conjunctivae and EOM are normal. Pupils are equal, round, and reactive to light.   Neck: No thyromegaly present.   Cardiovascular: Normal rate and regular " rhythm.    Pulmonary/Chest: Effort normal and breath sounds normal.   Neurological: She is alert and oriented to person, place, and time.   Skin: Skin is warm and dry.   Psychiatric: She has a normal mood and affect.   Vitals reviewed.      Assessment/Plan   Barbara was seen today for illness.    Diagnoses and all orders for this visit:    Acute URI      1. RESP- flu test neg. Viral URI. Advised mucinex.  2. RECHECK- prn

## 2018-02-16 ENCOUNTER — OFFICE VISIT (OUTPATIENT)
Dept: INTERNAL MEDICINE | Facility: CLINIC | Age: 68
End: 2018-02-16

## 2018-02-16 VITALS
SYSTOLIC BLOOD PRESSURE: 138 MMHG | WEIGHT: 121 LBS | BODY MASS INDEX: 22.13 KG/M2 | TEMPERATURE: 97.8 F | DIASTOLIC BLOOD PRESSURE: 82 MMHG

## 2018-02-16 DIAGNOSIS — J01.90 ACUTE BACTERIAL SINUSITIS: Primary | ICD-10-CM

## 2018-02-16 DIAGNOSIS — B96.89 ACUTE BACTERIAL SINUSITIS: Primary | ICD-10-CM

## 2018-02-16 PROCEDURE — 99213 OFFICE O/P EST LOW 20 MIN: CPT | Performed by: FAMILY MEDICINE

## 2018-02-16 RX ORDER — CEFDINIR 300 MG/1
300 CAPSULE ORAL DAILY
Qty: 10 CAPSULE | Refills: 0 | Status: SHIPPED | OUTPATIENT
Start: 2018-02-16 | End: 2018-04-19

## 2018-02-16 NOTE — PROGRESS NOTES
Subjective   Barbara Hernandez is a 67 y.o. female.     History of Present Illness   Here today as a work in for cough and congestion. Pt was seen 1/30/17 for viral URI, neg flu test. Has done well with omnicef qd x10 days in past.    RESP- today pt reports that she never really got better after the cold 3wk ago. Is currently having head and chest congestion with cough productive of yellow phlegm.     The following portions of the patient's history were reviewed and updated as appropriate: allergies, past family history, past medical history, past social history, past surgical history and problem list.    Review of Systems   Cardiovascular: Negative for chest pain.   Gastrointestinal: Negative for abdominal distention and abdominal pain.   Skin: Negative for color change.   Neurological: Negative for tremors, speech difficulty and headaches.   Psychiatric/Behavioral: Negative for agitation and confusion.   All other systems reviewed and are negative.        Current Outpatient Prescriptions:   •  acetaminophen (TYLENOL) 325 MG tablet, Take 650 mg by mouth every night at bedtime., Disp: , Rfl:   •  ALPRAZolam (XANAX) 0.5 MG tablet, As directed for MRI, Disp: 3 tablet, Rfl: 0  •  aspirin 81 MG EC tablet, Take 81 mg by mouth 3 (Three) Times a Week., Disp: , Rfl:   •  Calcium Carbonate-Vit D-Min (CALCIUM 1200 PO), Take 1,200 mg by mouth., Disp: , Rfl:   •  Cholecalciferol (VITAMIN D3) 3000 UNITS tablet, Take  by mouth., Disp: , Rfl:   •  Coenzyme Q10 (CO Q-10) 100 MG capsule, Take 400 mg by mouth., Disp: , Rfl:   •  diclofenac (VOLTAREN) 1 % gel gel, Apply 4 g topically 4 (Four) Times a Day As Needed (arthritis)., Disp: 100 g, Rfl: 5  •  dicyclomine (BENTYL) 10 MG capsule, Take 1 cap q8hr prn bowel spasm or diarrhea, Disp: 30 capsule, Rfl: 0  •  estradiol (VAGIFEM) 10 MCG tablet vaginal tablet, Insert 1 tablet into the vagina 3 (Three) Times a Week., Disp: 12 tablet, Rfl: 12  •  gabapentin (NEURONTIN) 100 MG capsule, Take 1  capsule by mouth every night at bedtime., Disp: 30 capsule, Rfl: 2  •  Glucosamine HCl 1000 MG tablet, Take  by mouth., Disp: , Rfl:   •  meclizine (ANTIVERT) 12.5 MG tablet, Take 1 tablet by mouth 3 (Three) Times a Day As Needed for dizziness., Disp: 30 tablet, Rfl: 0  •  meloxicam (MOBIC) 7.5 MG tablet, Take 1 tab po qd prn pain or inflammation, Disp: 30 tablet, Rfl: 5  •  Misc Natural Products (LUTEIN 20 PO), Take 20 mg by mouth., Disp: , Rfl:   •  Multiple Vitamins-Minerals (PRESERVISION AREDS PO), Take  by mouth., Disp: , Rfl:   •  Omega-3 Fatty Acids (FISH OIL) 1200 MG capsule capsule, Take 1,200 mg by mouth Daily., Disp: , Rfl:   •  ondansetron ODT (ZOFRAN-ODT) 8 MG disintegrating tablet, Take 1 tablet by mouth Every 8 (Eight) Hours As Needed for Nausea or Vomiting., Disp: 15 tablet, Rfl: 0  •  raNITIdine (ZANTAC) 300 MG tablet, 1 tab po qd prn stomach acid or acid reflux, Disp: 30 tablet, Rfl: 5  •  SYNTHROID 88 MCG tablet, Take 1 tab po qam fasting and wait 1hr for food or other meds. BRAND ONLY., Disp: 90 tablet, Rfl: 1  •  vitamin E 400 UNIT capsule, Take 400 Units by mouth Daily., Disp: , Rfl:   •  cefdinir (OMNICEF) 300 MG capsule, Take 1 capsule by mouth Daily., Disp: 10 capsule, Rfl: 0    Objective     /82  Temp 97.8 °F (36.6 °C)  Wt 54.9 kg (121 lb)  LMP  (LMP Unknown) Comment: postmenopausal  BMI 22.13 kg/m2    Physical Exam   Constitutional: She is oriented to person, place, and time. She appears well-developed and well-nourished.   HENT:   Right Ear: Tympanic membrane and ear canal normal.   Left Ear: Tympanic membrane and ear canal normal.   Mouth/Throat: Oropharynx is clear and moist.   Eyes: Conjunctivae and EOM are normal. Pupils are equal, round, and reactive to light.   Neck: No thyromegaly present.   Cardiovascular: Normal rate and regular rhythm.    Pulmonary/Chest: Effort normal and breath sounds normal.   Neurological: She is alert and oriented to person, place, and time.    Skin: Skin is warm and dry.   Psychiatric: She has a normal mood and affect.   Vitals reviewed.      Assessment/Plan   Barbara was seen today for sinusitis.    Diagnoses and all orders for this visit:    Acute bacterial sinusitis  -     cefdinir (OMNICEF) 300 MG capsule; Take 1 capsule by mouth Daily.      1. RESP- sinusitis. Will treat with omnicef daily for 10 days.  2. RECHECK- prn

## 2018-04-07 PROBLEM — R21 RASH AND NONSPECIFIC SKIN ERUPTION: Status: ACTIVE | Noted: 2018-04-07

## 2018-04-19 ENCOUNTER — OFFICE VISIT (OUTPATIENT)
Dept: INTERNAL MEDICINE | Facility: CLINIC | Age: 68
End: 2018-04-19

## 2018-04-19 VITALS
TEMPERATURE: 97.7 F | WEIGHT: 119.4 LBS | SYSTOLIC BLOOD PRESSURE: 112 MMHG | HEIGHT: 62 IN | BODY MASS INDEX: 21.97 KG/M2 | DIASTOLIC BLOOD PRESSURE: 68 MMHG

## 2018-04-19 DIAGNOSIS — E78.00 PURE HYPERCHOLESTEROLEMIA: ICD-10-CM

## 2018-04-19 DIAGNOSIS — E03.9 ACQUIRED HYPOTHYROIDISM: Primary | ICD-10-CM

## 2018-04-19 DIAGNOSIS — K21.9 GASTROESOPHAGEAL REFLUX DISEASE, ESOPHAGITIS PRESENCE NOT SPECIFIED: ICD-10-CM

## 2018-04-19 DIAGNOSIS — G62.9 PERIPHERAL POLYNEUROPATHY: ICD-10-CM

## 2018-04-19 DIAGNOSIS — K29.30 CHRONIC SUPERFICIAL GASTRITIS WITHOUT BLEEDING: ICD-10-CM

## 2018-04-19 DIAGNOSIS — M19.90 ARTHRITIS: ICD-10-CM

## 2018-04-19 PROBLEM — R21 RASH AND NONSPECIFIC SKIN ERUPTION: Status: RESOLVED | Noted: 2018-04-07 | Resolved: 2018-04-19

## 2018-04-19 PROBLEM — G57.50 TARSAL TUNNEL SYNDROME: Status: RESOLVED | Noted: 2017-08-18 | Resolved: 2018-04-19

## 2018-04-19 LAB
T4 FREE SERPL-MCNC: 1.82 NG/DL (ref 0.89–1.76)
TSH SERPL DL<=0.05 MIU/L-ACNC: 0.4 MIU/ML (ref 0.35–5.35)

## 2018-04-19 PROCEDURE — 84481 FREE ASSAY (FT-3): CPT | Performed by: FAMILY MEDICINE

## 2018-04-19 PROCEDURE — 84443 ASSAY THYROID STIM HORMONE: CPT | Performed by: FAMILY MEDICINE

## 2018-04-19 PROCEDURE — 84439 ASSAY OF FREE THYROXINE: CPT | Performed by: FAMILY MEDICINE

## 2018-04-19 PROCEDURE — 99214 OFFICE O/P EST MOD 30 MIN: CPT | Performed by: FAMILY MEDICINE

## 2018-04-19 RX ORDER — MELOXICAM 7.5 MG/1
TABLET ORAL
Qty: 30 TABLET | Refills: 5 | Status: SHIPPED | OUTPATIENT
Start: 2018-04-19 | End: 2019-04-11 | Stop reason: SDUPTHER

## 2018-04-19 RX ORDER — GABAPENTIN 100 MG/1
100 CAPSULE ORAL
Qty: 30 CAPSULE | Refills: 5 | OUTPATIENT
Start: 2018-04-19 | End: 2018-10-26 | Stop reason: SDUPTHER

## 2018-04-19 NOTE — PATIENT INSTRUCTIONS
1. ENDO- hashimotos hypothyroid- will check TSH, free T4 and free T3. Will adjust her dose if indicated by any of these labs. Continue synthroid 88 now; may consider using combo cytomel (T3) and synthroid (T4). Will send RF once dose confirmed.  2. ORTHO- arthritis- discussed that the numbness is a pinched nerve that can be from a variety of sources. Discussed a nerve conduction study or ortho referral but as her symptoms are not severe, these can be postponed. Discussed that orthotic shoes should help. RF gabapentin and mobic x6mo. Will change the voltaren gel to pennsaid solution.  3. CV- hyperlipid- pros and cons of treatment discussed again today with decision not to treat.  4. RECHECK- 6mo MCW

## 2018-04-19 NOTE — PROGRESS NOTES
"Subjective   Barbara Hernandez is a 67 y.o. female.     History of Present Illness   Here for 6mo routine. Last seen 2/16/18 for sinusitis, treated with omnicef. Was seen 10/18/17 for MCW.. Pt was seen 2/24/17 as a new patient. Sees psych. Lab 10/18/17 demo normal CBC, CMP and B12. Thyroid was at goal on synthroid 88 with TSH 0.328, free T4 1.66. Lipid was still high with , tg 78, HDL 75, . Lab 2/24/17 demo + thyroid abs.      1. 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 Has \"fibromyalgia\" currently on gabapentin and mobic. Was diagnosed with fibro by rheum around 2008 based on 18 trigger points. Was seen with foot numbness with neg MRI L spine and then found to have a pinched nerve from a ganglion which improved on its own. Today pt reports she has had a recurrent of the tarsal tunnel with numbness but no pain. Is still on gabapentin 100 and cannot tolerate a higher dose.     2. CV- hyperlipid. Pt has h/o cardiomyopathy with a persistent low EF. Sees Dr Bermudez. Has new issues with elevated lipid. Came in fasting with , tg 83, HDL 72, . CRP was normal with decision to not use a statin. Lipid was higher on last recheck and pt was advised to discuss this with Dr Bermudez again. Today she reports tshe decided not to treat due to her overall ratio and her intolerance to the statins. Lastly, her father lived into his 80's with no MI and had high lipid.    3. ENDO- hashimotots hypothyroid, diagnosed at 45yo. Pt initially on synthroid 100. Takes brand, correctly. Was having fatigue, dizziness, feeling anxious, hoarse voice, trouble concentrating, feeding sad, dry skin, feeling cold, muscle aches and bowel issues. Has alt C/D. Is very careful with her diet with no weight loss or gain. Was increased on synthroid from 88 to 100 by previous provider but felt better while on synthrouid 88 by endo. TSH was over suppressed and pt was reduced back to synthroid 88. At her recheck she felt well with " resolution of jitters. Recheck TSH 1.81 with free T4 1.76; pt continued on synthroid 88. Lab at goal 10/18/17. Today she reports she cannot tell if she is at goal due to her other health issues.     4. GI- gastritis, IBS. Pt was diagnosed with gastritis based on excess gas and did well with zantac 300 qd.     The following portions of the patient's history were reviewed and updated as appropriate: current medications, past family history, past medical history, past social history, past surgical history and problem list.    Review of Systems   Cardiovascular: Negative for chest pain.   Gastrointestinal: Negative for abdominal distention and abdominal pain.   Skin: Negative for color change.   Neurological: Negative for tremors, speech difficulty and headaches.   Psychiatric/Behavioral: Negative for agitation and confusion.   All other systems reviewed and are negative.        Current Outpatient Prescriptions:   •  acetaminophen (TYLENOL) 325 MG tablet, Take 650 mg by mouth every night at bedtime., Disp: , Rfl:   •  ALPRAZolam (XANAX) 0.5 MG tablet, As directed for MRI, Disp: 3 tablet, Rfl: 0  •  aspirin 81 MG EC tablet, Take 81 mg by mouth 3 (Three) Times a Week., Disp: , Rfl:   •  Cholecalciferol (VITAMIN D3) 3000 UNITS tablet, Take  by mouth., Disp: , Rfl:   •  Coenzyme Q10 (CO Q-10) 100 MG capsule, Take 400 mg by mouth., Disp: , Rfl:   •  dicyclomine (BENTYL) 10 MG capsule, Take 1 cap q8hr prn bowel spasm or diarrhea, Disp: 30 capsule, Rfl: 0  •  estradiol (VAGIFEM) 10 MCG tablet vaginal tablet, Insert 1 tablet into the vagina 3 (Three) Times a Week., Disp: 12 tablet, Rfl: 12  •  gabapentin (NEURONTIN) 100 MG capsule, Take 1 capsule by mouth every night at bedtime., Disp: 30 capsule, Rfl: 5  •  meloxicam (MOBIC) 7.5 MG tablet, Take 1 tab po qd prn pain or inflammation, Disp: 30 tablet, Rfl: 5  •  Omega-3 Fatty Acids (FISH OIL) 1200 MG capsule capsule, Take 1,200 mg by mouth Daily., Disp: , Rfl:   •  raNITIdine  "(ZANTAC) 300 MG tablet, 1 tab po qd prn stomach acid or acid reflux, Disp: 30 tablet, Rfl: 5  •  SYNTHROID 88 MCG tablet, Take 1 tab po qam fasting and wait 1hr for food or other meds. BRAND ONLY., Disp: 90 tablet, Rfl: 1  •  triamcinolone (KENALOG) 0.025 % ointment, Apply  topically 3 (Three) Times a Day., Disp: 15 g, Rfl: 0  •  vitamin E 400 UNIT capsule, Take 400 Units by mouth Daily., Disp: , Rfl:     Objective     /68   Temp 97.7 °F (36.5 °C)   Ht 157.5 cm (62\")   Wt 54.2 kg (119 lb 6.4 oz)   LMP  (LMP Unknown) Comment: postmenopausal  BMI 21.84 kg/m²     Physical Exam   Constitutional: She is oriented to person, place, and time. She appears well-developed and well-nourished.   HENT:   Right Ear: Tympanic membrane and ear canal normal.   Left Ear: Tympanic membrane and ear canal normal.   Mouth/Throat: Oropharynx is clear and moist.   Eyes: Conjunctivae and EOM are normal. Pupils are equal, round, and reactive to light.   Neck: No thyromegaly present.   Cardiovascular: Normal rate and regular rhythm.    Pulmonary/Chest: Effort normal and breath sounds normal.   Neurological: She is alert and oriented to person, place, and time.   Skin: Skin is warm and dry.   Psychiatric: She has a normal mood and affect.   Vitals reviewed.      Assessment/Plan   Barbara was seen today for follow-up.    Diagnoses and all orders for this visit:    Acquired hypothyroidism  -     TSH  -     T4, Free  -     T3, Free    Chronic superficial gastritis without bleeding    Pure hypercholesterolemia    Peripheral polyneuropathy  -     gabapentin (NEURONTIN) 100 MG capsule; Take 1 capsule by mouth every night at bedtime.    Gastroesophageal reflux disease, esophagitis presence not specified    Arthritis  -     meloxicam (MOBIC) 7.5 MG tablet; Take 1 tab po qd prn pain or inflammation        1. ENDO- hashimotos hypothyroid- will check TSH, free T4 and free T3. Will adjust her dose if indicated by any of these labs. Continue " synthroid 88 now; may consider using combo cytomel (T3) and synthroid (T4). Will send RF once dose confirmed.  2. ORTHO- arthritis- discussed that the numbness is a pinched nerve that can be from a variety of sources. Discussed a nerve conduction study or ortho referral but as her symptoms are not severe, these can be postponed. Discussed that orthotic shoes should help. RF gabapentin and mobic x6mo. Will change the voltaren gel to pennsaid solution.  3. CV- hyperlipid- pros and cons of treatment discussed again today with decision not to treat.  4. RECHECK- 6mo MCW

## 2018-04-20 RX ORDER — GABAPENTIN 100 MG/1
CAPSULE ORAL
Qty: 30 CAPSULE | Refills: 1 | OUTPATIENT
Start: 2018-04-20

## 2018-04-21 LAB — T3FREE SERPL-MCNC: 3.1 PG/ML (ref 2–4.4)

## 2018-04-23 DIAGNOSIS — E03.9 ACQUIRED HYPOTHYROIDISM: ICD-10-CM

## 2018-04-23 RX ORDER — LEVOTHYROXINE SODIUM 88 MCG
TABLET ORAL
Qty: 90 TABLET | Refills: 1 | Status: SHIPPED | OUTPATIENT
Start: 2018-04-23 | End: 2018-10-26 | Stop reason: SDUPTHER

## 2018-05-31 ENCOUNTER — OFFICE VISIT (OUTPATIENT)
Dept: OBSTETRICS AND GYNECOLOGY | Facility: CLINIC | Age: 68
End: 2018-05-31

## 2018-05-31 VITALS
SYSTOLIC BLOOD PRESSURE: 102 MMHG | BODY MASS INDEX: 21.94 KG/M2 | WEIGHT: 119.2 LBS | DIASTOLIC BLOOD PRESSURE: 62 MMHG | HEIGHT: 62 IN

## 2018-05-31 DIAGNOSIS — Z01.419 ENCOUNTER FOR GYNECOLOGICAL EXAMINATION WITHOUT ABNORMAL FINDING: ICD-10-CM

## 2018-05-31 DIAGNOSIS — Z78.0 MENOPAUSE: Primary | ICD-10-CM

## 2018-05-31 DIAGNOSIS — N90.4 LICHEN SCLEROSUS ET ATROPHICUS OF THE VULVA: ICD-10-CM

## 2018-05-31 DIAGNOSIS — B37.31 RECURRENT CANDIDIASIS OF VAGINA: ICD-10-CM

## 2018-05-31 DIAGNOSIS — N95.2 VAGINAL ATROPHY: ICD-10-CM

## 2018-05-31 PROCEDURE — G0101 CA SCREEN;PELVIC/BREAST EXAM: HCPCS | Performed by: OBSTETRICS & GYNECOLOGY

## 2018-05-31 RX ORDER — ESTRADIOL 10 UG/1
1 INSERT VAGINAL 3 TIMES WEEKLY
Qty: 12 TABLET | Refills: 12 | Status: SHIPPED | OUTPATIENT
Start: 2018-06-01 | End: 2019-06-06 | Stop reason: SDUPTHER

## 2018-05-31 RX ORDER — FLUCONAZOLE 150 MG/1
150 TABLET ORAL EVERY OTHER DAY
Qty: 3 TABLET | Refills: 3 | Status: SHIPPED | OUTPATIENT
Start: 2018-05-31 | End: 2018-06-05

## 2018-05-31 RX ORDER — LUTEIN 6 MG
1 TABLET ORAL DAILY
COMMUNITY

## 2018-05-31 RX ORDER — ASCORBIC ACID 500 MG
500 TABLET ORAL DAILY
COMMUNITY
End: 2020-06-30

## 2018-05-31 NOTE — PROGRESS NOTES
Chief Complaint   Patient presents with   • Gynecologic Exam   • Med Refill       Barbara Hernandez is a 67 y.o. year old  presenting to be seen for her annual exam.This patient is menopausal and does not use systemic estrogen/progestin therapy.  She is treated for vaginal atrophy with Yuvafem, 10 µg 3 times a week.  She treats lichen sclerosis of the vulva with over-the-counter hydrocortisone ointment 1%.  She has a history of recurrent vaginal candidiasis and is intermittently treated with fluconazole, 150 mg.  She denies urinary symptoms.  She has irritable bowel syndrome with loose stools.    SCREENING TESTS    Year 2012   Age                         PAP                         HPV high risk                         Mammogram      benign                   KOREY score                         Breast MRI                         Lipids                         Vitamin D                         Colonoscopy                         DEXA  Frax (hip/any)      osteopenia                   Ovarian Screen                             She exercises regularly: yes.  She wears her seat belt: yes.  She has concerns about domestic violence: no.  She has noticed changes in height: no    GYN screening history:  · Last mammogram: was done on approximately 2017 and the result was: Birads I (Normal).  · Last DEXA: was done on approximately 2017 and the results were: osteopenia of the radius.    No Additional Complaints Reported    The following portions of the patient's history were reviewed and updated as appropriate:vital signs and   She  has a past medical history of ETD (eustachian tube dysfunction); Fibromyalgia; Fibromyositis; GERD (gastroesophageal reflux disease); H/O cardiomyopathy; HLD (hyperlipidemia); Hypothyroidism; IBS (irritable bowel syndrome); Lichen sclerosus et atrophicus of the vulva; Menopause;  Osteoarthritis; Osteopenia; and Preventative health care.  She  does not have any pertinent problems on file.  She  has a past surgical history that includes Total hip arthroplasty; Cryotherapy; Septoplasty; and Eye surgery (Right).  Her family history includes Alzheimer's disease in her father; Heart attack in her maternal grandmother; Hyperlipidemia in her brother; Hypertension in her brother; Hypothyroidism in her mother; Osteoarthritis in her mother; Stroke in her paternal grandmother.  She  reports that she has quit smoking. She has never used smokeless tobacco. She reports that she drinks alcohol. She reports that she does not use drugs.  Current Outpatient Prescriptions   Medication Sig Dispense Refill   • Multiple Vitamins-Minerals (ICAPS AREDS 2) capsule Take 1 capsule by mouth Daily.     • vitamin C (ASCORBIC ACID) 500 MG tablet Take 500 mg by mouth Daily.     • acetaminophen (TYLENOL) 325 MG tablet Take 650 mg by mouth every night at bedtime.     • aspirin 81 MG EC tablet Take 81 mg by mouth 3 (Three) Times a Week.     • Cholecalciferol (VITAMIN D3) 3000 UNITS tablet Take  by mouth.     • Coenzyme Q10 (CO Q-10) 100 MG capsule Take 400 mg by mouth.     • diclofenac (VOLTAREN) 1 % gel gel Apply 1 application topically As Needed.     • dicyclomine (BENTYL) 10 MG capsule Take 1 cap q8hr prn bowel spasm or diarrhea 30 capsule 0   • [START ON 6/1/2018] estradiol (VAGIFEM) 10 MCG tablet vaginal tablet Insert 1 tablet into the vagina 3 (Three) Times a Week. 12 tablet 12   • fluconazole (DIFLUCAN) 150 MG tablet Take 1 tablet by mouth Every Other Day for 3 doses. 1 Every other day 3 tablet 3   • gabapentin (NEURONTIN) 100 MG capsule Take 1 capsule by mouth every night at bedtime. 30 capsule 5   • Lutein 20 MG tablet Take 1 tablet by mouth Daily.     • meloxicam (MOBIC) 7.5 MG tablet Take 1 tab po qd prn pain or inflammation 30 tablet 5   • Omega-3 Fatty Acids (FISH OIL) 1200 MG capsule capsule Take 1,200 mg by  "mouth Daily.     • raNITIdine (ZANTAC) 300 MG tablet 1 tab po qd prn stomach acid or acid reflux 30 tablet 5   • SYNTHROID 88 MCG tablet Take 1 tab po qam fasting and wait 1hr for food or other meds. BRAND ONLY. 90 tablet 1   • vitamin E 400 UNIT capsule Take 400 Units by mouth Daily.       No current facility-administered medications for this visit.      She is allergic to celecoxib; ciprofloxacin; clarithromycin; doxycycline; erythromycin; levofloxacin; lortab [hydrocodone-acetaminophen]; lyrica [pregabalin]; nexium [esomeprazole]; omeprazole; phenergan [promethazine hcl]; tramadol; bacitracin-polymyxin b; and sulfa antibiotics..    Review of Systems  A comprehensive review of systems was taken.  Constitutional: negative for fever, chills, activity change, appetite change, fatigue and unexpected weight change.  Respiratory: negative  Cardiovascular: negative  Gastrointestinal: positive for diarrhea  Genitourinary:negative  Musculoskeletal:negative  Behavioral/Psych: negative       /62   Ht 157.5 cm (62\")   Wt 54.1 kg (119 lb 3.2 oz)   LMP  (LMP Unknown) Comment: postmenopausal  BMI 21.80 kg/m²     Physical Exam    General:  alert; cooperative; well developed; well nourished   Skin:  No suspicious lesions seen   Thyroid: normal to inspection and palpation   Lungs:  clear to auscultation bilaterally   Heart:  regular rate and rhythm, S1, S2 normal, no murmur, click, rub or gallop   Breasts:  Examined in supine position  Symmetric without masses or skin dimpling  Nipples normal without inversion, lesions or discharge  There are no palpable axillary nodes   Abdomen: soft, non-tender; no masses  no umbilical or inginual hernias are present  no hepato-splenomegaly   Pelvis: Clinical staff was present for exam  External genitalia:  Minimal lichen sclerosus  Vaginal:  normal pink mucosa without prolapse or lesions.  Cervix:  normal appearance.  Uterus:  normal size, shape and consistency. anteverted;  Adnexa:  " non palpable bilaterally.  Rectal:  anus visually normal appearing. recto-vaginal exam unremarkable and confirms findings;     Lab Review   No data reviewed    Imaging  Mammogram results- Birads I, and DEXA- moderate osteopenia of the left radius         ASSESSMENT  Problems Addressed this Visit        Musculoskeletal and Integument    Lichen sclerosus et atrophicus of the vulva    Relevant Medications    diclofenac (VOLTAREN) 1 % gel gel       Genitourinary    Menopause - Primary    Vaginal atrophy    Relevant Medications    estradiol (VAGIFEM) 10 MCG tablet vaginal tablet (Start on 6/1/2018)    Recurrent candidiasis of vagina    Relevant Medications    fluconazole (DIFLUCAN) 150 MG tablet      Other Visit Diagnoses     Encounter for gynecological examination without abnormal finding        Relevant Orders    Liquid-based Pap Smear, Screening          PLAN    Medications prescribed this encounter:    New Medications Ordered This Visit   Medications   • fluconazole (DIFLUCAN) 150 MG tablet     Sig: Take 1 tablet by mouth Every Other Day for 3 doses. 1 Every other day     Dispense:  3 tablet     Refill:  3     QOD   • estradiol (VAGIFEM) 10 MCG tablet vaginal tablet     Sig: Insert 1 tablet into the vagina 3 (Three) Times a Week.     Dispense:  12 tablet     Refill:  12   · Monthly self breast assessment and annual breast imaging  · Fall prevention instructions  · Pap test done  · Calcium, 600 mg/ Vit. D, 400 IU daily; regular weight-bearing exercise  · Follow up: 12 month(s)  *Please note that portions of this documentation may have been completed with a voice recognition program.  Efforts were made to edit this dictation, but occasional words may have been mistranscribed.       This note was electronically signed.    ANNE MARIE France MD  May 31, 2018  4:35 PM

## 2018-08-16 DIAGNOSIS — K58.9 IRRITABLE BOWEL SYNDROME, UNSPECIFIED TYPE: ICD-10-CM

## 2018-08-16 RX ORDER — DICYCLOMINE HYDROCHLORIDE 10 MG/1
CAPSULE ORAL
Qty: 30 CAPSULE | Refills: 0 | Status: SHIPPED | OUTPATIENT
Start: 2018-08-16 | End: 2019-01-17 | Stop reason: SDUPTHER

## 2018-09-10 ENCOUNTER — TELEPHONE (OUTPATIENT)
Dept: INTERNAL MEDICINE | Facility: CLINIC | Age: 68
End: 2018-09-10

## 2018-09-12 NOTE — TELEPHONE ENCOUNTER
OK to give sick visit for the stomach issues. I think she means that OTC stomach meds are not working.

## 2018-09-14 ENCOUNTER — OFFICE VISIT (OUTPATIENT)
Dept: INTERNAL MEDICINE | Facility: CLINIC | Age: 68
End: 2018-09-14

## 2018-09-14 VITALS — BODY MASS INDEX: 21.97 KG/M2 | TEMPERATURE: 97.9 F | HEIGHT: 62 IN | WEIGHT: 119.4 LBS

## 2018-09-14 DIAGNOSIS — K29.30 CHRONIC SUPERFICIAL GASTRITIS WITHOUT BLEEDING: Primary | ICD-10-CM

## 2018-09-14 PROCEDURE — 99213 OFFICE O/P EST LOW 20 MIN: CPT | Performed by: FAMILY MEDICINE

## 2018-09-14 RX ORDER — RANITIDINE 150 MG/1
TABLET ORAL
Qty: 30 TABLET | Refills: 5 | Status: SHIPPED | OUTPATIENT
Start: 2018-09-14 | End: 2018-10-26 | Stop reason: SDUPTHER

## 2018-09-14 NOTE — PROGRESS NOTES
"Subjective   Barbara Henrandez is a 68 y.o. female.     History of Present Illness   Here today as a work in for GI issues. Last seen 4/19/18 for 6mo routine. Last seen 2/16/18 for sinusitis, treated with omnicef. Was seen 10/18/17 for MCW.. Pt was seen 2/24/17 as a new patient. Sees psych.  Lab 4/19/18 demo TSH 0.399, T4 1.82 and T3 3.1 on synthroid 88. Lab 10/18/17 demo normal CBC, CMP and B12. Thyroid was at goal on synthroid 88 with TSH 0.328, free T4 1.66. Lipid was still high with , tg 78, HDL 75, . Lab 2/24/17 demo + thyroid abs.      1. 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 Has \"fibromyalgia\" currently on gabapentin and mobic. Was diagnosed with fibro by rheum around 2008 based on 18 trigger points. Was seen with foot numbness with neg MRI L spine and then found to have a pinched nerve from a ganglion.   2. CV- hyperlipid. Pt has h/o cardiomyopathy with a persistent low EF. Sees Dr Bermudez. Has new issues with elevated lipid. Came in fasting with , tg 83, HDL 72, . CRP was normal with decision to not use a statin. Lipid was higher on last recheck but ratio still good with decision with Dr Bermudez to not treat.    3. ENDO- hashimotots hypothyroid, diagnosed at 45yo. Pt initially on synthroid 100. Was having fatigue, dizziness, feeling anxious, hoarse voice, trouble concentrating, feeding sad, dry skin, feeling cold, muscle aches and bowel issues. Has alt C/D. Had been increased on synthroid from 88 to 100 by previous provider but felt better while on synthroid 88 by endo. TSH was over suppressed and pt was reduced back to synthroid 88.  Pt felt better with resolution of jitters. Lab 10/18/17 at goal; lab 4/19/18 demo slightly high T4 but TSH and T3 normal with pt still clinically at goal. No change in synthroid 88.      4. GI- gastritis, IBS. Pt was diagnosed with gastritis based on excess gas and did well with zantac 300 qd. Today pt reports she had cut down on the " zantac to 150 due to S/E of HA and then eventually went to prn about a month ago. Current symptoms started 2wk ago. One night after she ate she got watery diarrhea. The next day she started getting epigastric pain that was sharp with nausea and belching. Restarted zantac recently.    The following portions of the patient's history were reviewed and updated as appropriate: current medications, past family history, past medical history, past social history, past surgical history and problem list.    Review of Systems   Cardiovascular: Negative for chest pain.   Gastrointestinal: Positive for abdominal pain and diarrhea. Negative for abdominal distention.        Belching   Skin: Negative for color change.   Neurological: Negative for tremors, speech difficulty and headaches.   Psychiatric/Behavioral: Negative for agitation and confusion.   All other systems reviewed and are negative.        Current Outpatient Prescriptions:   •  acetaminophen (TYLENOL) 325 MG tablet, Take 650 mg by mouth every night at bedtime., Disp: , Rfl:   •  aspirin 81 MG EC tablet, Take 81 mg by mouth 3 (Three) Times a Week., Disp: , Rfl:   •  Cholecalciferol (VITAMIN D3) 3000 UNITS tablet, Take  by mouth., Disp: , Rfl:   •  Coenzyme Q10 (CO Q-10) 100 MG capsule, Take 400 mg by mouth., Disp: , Rfl:   •  diclofenac (VOLTAREN) 1 % gel gel, Apply 1 application topically As Needed., Disp: , Rfl:   •  dicyclomine (BENTYL) 10 MG capsule, TAKE ONE CAPSULE BY MOUTH EVERY 8 HOURS AS NEEDED FOR BOWEL SPASM OR DIARRHEA, Disp: 30 capsule, Rfl: 0  •  estradiol (VAGIFEM) 10 MCG tablet vaginal tablet, Insert 1 tablet into the vagina 3 (Three) Times a Week., Disp: 12 tablet, Rfl: 12  •  gabapentin (NEURONTIN) 100 MG capsule, Take 1 capsule by mouth every night at bedtime., Disp: 30 capsule, Rfl: 5  •  Lutein 20 MG tablet, Take 1 tablet by mouth Daily., Disp: , Rfl:   •  meloxicam (MOBIC) 7.5 MG tablet, Take 1 tab po qd prn pain or inflammation, Disp: 30 tablet,  "Rfl: 5  •  Multiple Vitamins-Minerals (ICAPS AREDS 2) capsule, Take 1 capsule by mouth Daily., Disp: , Rfl:   •  Omega-3 Fatty Acids (FISH OIL) 1200 MG capsule capsule, Take 1,200 mg by mouth Daily., Disp: , Rfl:   •  raNITIdine (ZANTAC) 150 MG tablet, 1 tab po qd prn stomach acid or acid reflux, Disp: 30 tablet, Rfl: 5  •  SYNTHROID 88 MCG tablet, Take 1 tab po qam fasting and wait 1hr for food or other meds. BRAND ONLY., Disp: 90 tablet, Rfl: 1  •  vitamin C (ASCORBIC ACID) 500 MG tablet, Take 500 mg by mouth Daily., Disp: , Rfl:   •  vitamin E 400 UNIT capsule, Take 400 Units by mouth Daily., Disp: , Rfl:     Objective     Temp 97.9 °F (36.6 °C)   Ht 157.5 cm (62\")   Wt 54.2 kg (119 lb 6.4 oz)   LMP  (LMP Unknown) Comment: postmenopausal  BMI 21.84 kg/m²     Physical Exam   Constitutional: She is oriented to person, place, and time. She appears well-developed and well-nourished.   HENT:   Right Ear: Tympanic membrane and ear canal normal.   Left Ear: Tympanic membrane and ear canal normal.   Mouth/Throat: Oropharynx is clear and moist.   Eyes: Pupils are equal, round, and reactive to light. Conjunctivae and EOM are normal.   Neck: No thyromegaly present.   Cardiovascular: Normal rate and regular rhythm.    Pulmonary/Chest: Effort normal and breath sounds normal.   Neurological: She is alert and oriented to person, place, and time.   Skin: Skin is warm and dry.   Psychiatric: She has a normal mood and affect.   Vitals reviewed.      Assessment/Plan   Barbara was seen today for abdominal pain.    Diagnoses and all orders for this visit:    Chronic superficial gastritis without bleeding  -     raNITIdine (ZANTAC) 150 MG tablet; 1 tab po qd prn stomach acid or acid reflux        1. GI- gastritis- education that she has chronic issues and needs to stay on zantac routinely but she can reduce the dose. Will go down to 150mg but if she gets side effects, she can cut this in half. Then if needed, she can go back up on " the dose depending on triggers. Education handout provided.  2. RECHECK- prn

## 2018-09-14 NOTE — PATIENT INSTRUCTIONS
1. GI- gastritis- education that she has chronic issues and needs to stay on zantac routinely but she can reduce the dose. Will go down to 150mg but if she gets side effects, she can cut this in half. Then if needed, she can go back up on the dose depending on triggers. Education handout provided.  2. RECHECK- prn    Patient education provided today in layman's terms re GERD (acid reflux) and gastritis. The patient is shown a diagram of the gastrointestinal tract with a brief description of the digestive process. The role of stomach acid (to kill germs and digest food) and stomach mucus (to protect the stomach against its own acid) is discussed. The need for a balance between these and the problems that result when this balance is disrupted are discussed.     The patient is advised that things that promote too much stomach acid include:  - NSAIDs (anti inflammatories such as aspirin, ibuprofen, naproxen and certain prescriptions)  - high acid foods (tomato products, onions, citrus, etc)  - caffeine  - tobacco  - alcohol  - stress.   It is discussed that long term or high exposure to these can lead to an acid imbalance resulting in microscopic burns in the stomach or esophagus. Furthermore, that with repeated burns, a visible wound called an ulcer can form.    The treatment options are discussed, including avoiding the causes if possible. The first level of treatment can include antacids which neutralize the acid after the fact, such as TUMS, rolaids or gaviscon. The second level of treatment includes H2 blockers which reduce the production of acid in the stomach and include medicines such as pepcid (famotadine) and zantac (ranitadine). The third level of treatment includes PPI's which turn the stomach acid off and include meducines such as prilosec (omeprazole), prevacid (lansoprazole) or protonix (pantoprazole). Caution is advised regarding the long term use of PPI's due to the possible side effect of malabsorption  of key nutrients such as B12 or calcium if the acid level is driven too low for too long.

## 2018-10-02 ENCOUNTER — FLU SHOT (OUTPATIENT)
Dept: INTERNAL MEDICINE | Facility: CLINIC | Age: 68
End: 2018-10-02

## 2018-10-02 PROCEDURE — 90662 IIV NO PRSV INCREASED AG IM: CPT | Performed by: FAMILY MEDICINE

## 2018-10-02 PROCEDURE — G0008 ADMIN INFLUENZA VIRUS VAC: HCPCS | Performed by: FAMILY MEDICINE

## 2018-10-10 ENCOUNTER — TELEPHONE (OUTPATIENT)
Dept: OBSTETRICS AND GYNECOLOGY | Facility: CLINIC | Age: 68
End: 2018-10-10

## 2018-10-10 NOTE — TELEPHONE ENCOUNTER
Patient calls today to state that she has been using Yuvafem without problems.  She has known drug ingredient sensitivities.  She went to [facility] for a refill recently and was given a different product from [employer] and this product was produced in [place].  She was told by [employer] that this was the product available to her at this time.    She changed pharmacies and is now using [facility] on [address], they have been able to fill Yuvafem.  She wanted us to know that there is another company making estradiol inserts and that she is not willing to use these at this time.

## 2018-10-26 ENCOUNTER — OFFICE VISIT (OUTPATIENT)
Dept: INTERNAL MEDICINE | Facility: CLINIC | Age: 68
End: 2018-10-26

## 2018-10-26 VITALS
WEIGHT: 122.6 LBS | BODY MASS INDEX: 22.56 KG/M2 | HEIGHT: 62 IN | SYSTOLIC BLOOD PRESSURE: 106 MMHG | DIASTOLIC BLOOD PRESSURE: 70 MMHG | TEMPERATURE: 98.2 F

## 2018-10-26 DIAGNOSIS — G62.9 PERIPHERAL POLYNEUROPATHY: ICD-10-CM

## 2018-10-26 DIAGNOSIS — Z00.00 ANNUAL PHYSICAL EXAM: Primary | ICD-10-CM

## 2018-10-26 DIAGNOSIS — E03.9 ACQUIRED HYPOTHYROIDISM: ICD-10-CM

## 2018-10-26 DIAGNOSIS — K29.30 CHRONIC SUPERFICIAL GASTRITIS WITHOUT BLEEDING: ICD-10-CM

## 2018-10-26 PROBLEM — B37.31 RECURRENT CANDIDIASIS OF VAGINA: Status: RESOLVED | Noted: 2018-05-31 | Resolved: 2018-10-26

## 2018-10-26 LAB
ALBUMIN SERPL-MCNC: 4.58 G/DL (ref 3.2–4.8)
ALBUMIN/GLOB SERPL: 2.2 G/DL (ref 1.5–2.5)
ALP SERPL-CCNC: 69 U/L (ref 25–100)
ALT SERPL W P-5'-P-CCNC: 18 U/L (ref 7–40)
ANION GAP SERPL CALCULATED.3IONS-SCNC: 7 MMOL/L (ref 3–11)
ARTICHOKE IGE QN: 175 MG/DL (ref 0–130)
AST SERPL-CCNC: 21 U/L (ref 0–33)
BASOPHILS # BLD AUTO: 0.04 10*3/MM3 (ref 0–0.2)
BASOPHILS NFR BLD AUTO: 0.8 % (ref 0–1)
BILIRUB SERPL-MCNC: 0.8 MG/DL (ref 0.3–1.2)
BUN BLD-MCNC: 21 MG/DL (ref 9–23)
BUN/CREAT SERPL: 30.4 (ref 7–25)
CALCIUM SPEC-SCNC: 9.5 MG/DL (ref 8.7–10.4)
CHLORIDE SERPL-SCNC: 100 MMOL/L (ref 99–109)
CHOLEST SERPL-MCNC: 241 MG/DL (ref 0–200)
CO2 SERPL-SCNC: 30 MMOL/L (ref 20–31)
CREAT BLD-MCNC: 0.69 MG/DL (ref 0.6–1.3)
DEPRECATED RDW RBC AUTO: 45.6 FL (ref 37–54)
EOSINOPHIL # BLD AUTO: 0.09 10*3/MM3 (ref 0–0.3)
EOSINOPHIL NFR BLD AUTO: 1.9 % (ref 0–3)
ERYTHROCYTE [DISTWIDTH] IN BLOOD BY AUTOMATED COUNT: 12.7 % (ref 11.3–14.5)
GFR SERPL CREATININE-BSD FRML MDRD: 85 ML/MIN/1.73
GLOBULIN UR ELPH-MCNC: 2.1 GM/DL
GLUCOSE BLD-MCNC: 86 MG/DL (ref 70–100)
HCT VFR BLD AUTO: 42.1 % (ref 34.5–44)
HCV AB SER DONR QL: NORMAL
HDLC SERPL-MCNC: 71 MG/DL (ref 40–60)
HGB BLD-MCNC: 13.6 G/DL (ref 11.5–15.5)
IMM GRANULOCYTES # BLD: 0.02 10*3/MM3 (ref 0–0.03)
IMM GRANULOCYTES NFR BLD: 0.4 % (ref 0–0.6)
LYMPHOCYTES # BLD AUTO: 1.46 10*3/MM3 (ref 0.6–4.8)
LYMPHOCYTES NFR BLD AUTO: 30.8 % (ref 24–44)
MCH RBC QN AUTO: 31.9 PG (ref 27–31)
MCHC RBC AUTO-ENTMCNC: 32.3 G/DL (ref 32–36)
MCV RBC AUTO: 98.8 FL (ref 80–99)
MONOCYTES # BLD AUTO: 0.51 10*3/MM3 (ref 0–1)
MONOCYTES NFR BLD AUTO: 10.8 % (ref 0–12)
NEUTROPHILS # BLD AUTO: 2.62 10*3/MM3 (ref 1.5–8.3)
NEUTROPHILS NFR BLD AUTO: 55.3 % (ref 41–71)
PLATELET # BLD AUTO: 247 10*3/MM3 (ref 150–450)
PMV BLD AUTO: 10 FL (ref 6–12)
POTASSIUM BLD-SCNC: 4.2 MMOL/L (ref 3.5–5.5)
PROT SERPL-MCNC: 6.7 G/DL (ref 5.7–8.2)
RBC # BLD AUTO: 4.26 10*6/MM3 (ref 3.89–5.14)
SODIUM BLD-SCNC: 137 MMOL/L (ref 132–146)
T4 FREE SERPL-MCNC: 1.5 NG/DL (ref 0.89–1.76)
TRIGL SERPL-MCNC: 58 MG/DL (ref 0–150)
TSH SERPL DL<=0.05 MIU/L-ACNC: 0.5 MIU/ML (ref 0.35–5.35)
VIT B12 BLD-MCNC: 589 PG/ML (ref 211–911)
WBC NRBC COR # BLD: 4.74 10*3/MM3 (ref 3.5–10.8)

## 2018-10-26 PROCEDURE — 84439 ASSAY OF FREE THYROXINE: CPT | Performed by: FAMILY MEDICINE

## 2018-10-26 PROCEDURE — 80053 COMPREHEN METABOLIC PANEL: CPT | Performed by: FAMILY MEDICINE

## 2018-10-26 PROCEDURE — 80061 LIPID PANEL: CPT | Performed by: FAMILY MEDICINE

## 2018-10-26 PROCEDURE — 85025 COMPLETE CBC W/AUTO DIFF WBC: CPT | Performed by: FAMILY MEDICINE

## 2018-10-26 PROCEDURE — 84443 ASSAY THYROID STIM HORMONE: CPT | Performed by: FAMILY MEDICINE

## 2018-10-26 PROCEDURE — G0439 PPPS, SUBSEQ VISIT: HCPCS | Performed by: FAMILY MEDICINE

## 2018-10-26 PROCEDURE — 86803 HEPATITIS C AB TEST: CPT | Performed by: FAMILY MEDICINE

## 2018-10-26 PROCEDURE — 82607 VITAMIN B-12: CPT | Performed by: FAMILY MEDICINE

## 2018-10-26 RX ORDER — LEVOTHYROXINE SODIUM 88 MCG
TABLET ORAL
Qty: 90 TABLET | Refills: 3 | Status: SHIPPED | OUTPATIENT
Start: 2018-10-26 | End: 2022-04-18 | Stop reason: DRUGHIGH

## 2018-10-26 RX ORDER — RANITIDINE 150 MG/1
TABLET ORAL
Qty: 90 TABLET | Refills: 3 | Status: SHIPPED | OUTPATIENT
Start: 2018-10-26 | End: 2020-06-30

## 2018-10-26 RX ORDER — GABAPENTIN 100 MG/1
100 CAPSULE ORAL
Qty: 30 CAPSULE | Refills: 5 | Status: SHIPPED | OUTPATIENT
Start: 2018-10-26 | End: 2019-03-26 | Stop reason: SDUPTHER

## 2018-10-26 NOTE — PATIENT INSTRUCTIONS
1. MCW- discussed that her L spine was normal on bone density, next check due in 2019. Advised next colonoscopy in 9yr. Discussed that she can do her mammo every other year if preferred. Discussed zostarix (2 shots for shingles, done at the pharmacy).   2. ENDO- hypothyroid- clinically at goal. RF synthroid x1yr now. Will confirm dose with TSH and free T4.  3. GI- gastritis- stable on zantac 150. RF x1yr today.  4. ORTHO- arthritis- RF gabapentin x6mo today. No concerns with this pt.   5. RECHECK- 6mo routine (gabapentin)

## 2018-10-26 NOTE — PROGRESS NOTES
QUICK REFERENCE INFORMATION:  The ABCs of the Annual Wellness Visit    Subsequent Medicare Wellness Visit    HEALTH RISK ASSESSMENT    1950    Recent Hospitalizations:  No hospitalization(s) within the last year..        Current Medical Providers:  Patient Care Team:  Christen Mccarty MD as PCP - General (Family Medicine)  Christen Mccarty MD as PCP - Claims Attributed  Zain France MD as Consulting Physician (Gynecology)        Smoking Status:  History   Smoking Status   • Former Smoker   Smokeless Tobacco   • Never Used     Comment: 19 YR PACK HISTORY       Alcohol Consumption:  History   Alcohol Use   • Yes     Comment: SOCIAL       Depression Screen:   PHQ-2/PHQ-9 Depression Screening 10/26/2018   Little interest or pleasure in doing things 0   Feeling down, depressed, or hopeless 0   Trouble falling or staying asleep, or sleeping too much 1   Feeling tired or having little energy 1   Poor appetite or overeating 0   Feeling bad about yourself - or that you are a failure or have let yourself or your family down 0   Trouble concentrating on things, such as reading the newspaper or watching television 0   Moving or speaking so slowly that other people could have noticed. Or the opposite - being so fidgety or restless that you have been moving around a lot more than usual 0   Thoughts that you would be better off dead, or of hurting yourself in some way 0   Total Score 2   If you checked off any problems, how difficult have these problems made it for you to do your work, take care of things at home, or get along with other people? Not difficult at all       Health Habits and Functional and Cognitive Screening:  Functional & Cognitive Status 10/26/2018   Do you have difficulty preparing food and eating? No   Do you have difficulty bathing yourself, getting dressed or grooming yourself? No   Do you have difficulty using the toilet? No   Do you have difficulty moving around from place to place? No    Do you have trouble with steps or getting out of a bed or a chair? No   In the past year have you fallen or experienced a near fall? No   Current Diet Well Balanced Diet   Dental Exam Up to date   Eye Exam Up to date   Exercise (times per week) 2 times per week   Current Exercise Activities Include Walking   Do you need help using the phone?  No   Are you deaf or do you have serious difficulty hearing?  No   Do you need help with transportation? No   Do you need help shopping? No   Do you need help preparing meals?  No   Do you need help with housework?  No   Do you need help with laundry? No   Do you need help taking your medications? No   Do you need help managing money? No   Do you ever drive or ride in a car without wearing a seat belt? No   Have you felt unusual stress, anger or loneliness in the last month? No   Who do you live with? Spouse   If you need help, do you have trouble finding someone available to you? No   Have you been bothered in the last four weeks by sexual problems? No   Do you have difficulty concentrating, remembering or making decisions? No           Does the patient have evidence of cognitive impairment? No    Aspirin use counseling: Taking ASA appropriately as indicated      Recent Lab Results:  CMP:  Lab Results   Component Value Date    BUN 35 (H) 12/23/2017    CREATININE 0.70 12/23/2017    EGFRIFNONA 84 12/23/2017    BCR 50.0 (H) 12/23/2017     12/23/2017    K 4.3 12/23/2017    CO2 27.0 12/23/2017    CALCIUM 10.1 12/23/2017    ALBUMIN 4.90 (H) 12/23/2017    BILITOT 0.8 12/23/2017    ALKPHOS 77 12/23/2017    AST 21 12/23/2017    ALT 18 12/23/2017     Lipid Panel:  Lab Results   Component Value Date    CHOL 261 (H) 10/18/2017    TRIG 78 10/18/2017    HDL 75 (H) 10/18/2017     HbA1c:       Visual Acuity:  No exam data present    Age-appropriate Screening Schedule:  Refer to the list below for future screening recommendations based on patient's age, sex and/or medical conditions.  "Orders for these recommended tests are listed in the plan section. The patient has been provided with a written plan.    Health Maintenance   Topic Date Due   • ZOSTER VACCINE (2 of 2) 09/29/2014   • LIPID PANEL  10/18/2018   • PNEUMOCOCCAL VACCINES (65+ LOW/MEDIUM RISK) (2 of 2 - PPSV23) 10/24/2018   • DXA SCAN  06/28/2019   • MAMMOGRAM  08/24/2020   • TDAP/TD VACCINES (2 - Td) 08/06/2022   • COLONOSCOPY  03/02/2027   • INFLUENZA VACCINE  Addressed        Subjective   History of Present Illness    Barbara Hernandez is a 68 y.o. female who presents for an Subsequent Wellness Visit.  Here for MCW and routine 6mo. Last seen 6/29/18 for gastritis. Was seen 4/19/18 for 6mo routine. Last seen 2/16/18 for sinusitis, treated with omnicef. Was seen 10/18/17 for MCW.. Pt was seen 2/24/17 as a new patient. Sees psych.  Lab 4/19/18 demo TSH 0.399, T4 1.82 and T3 3.1 on synthroid 88. Lab 10/18/17 demo normal CBC, CMP and B12. Thyroid was at goal on synthroid 88 with TSH 0.328, free T4 1.66. Lipid was still high with , tg 78, HDL 75, . Lab 2/24/17 demo + thyroid abs.      1. 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 Has \"fibromyalgia\" currently on gabapentin and mobic. Was diagnosed with fibro by rheum around 2008 based on 18 trigger points. Was seen with foot numbness with neg MRI L spine and then found to have a pinched nerve from a ganglion. Transferred her gabapentin here as she only sees rheumatologist annually now. Has contract. Today pt reports she is still doing well on the gabapentin. Has plenty of mobic but needs RF gabapentin. No signs of abuse or misuse.     2. CV- hyperlipid. Pt has h/o cardiomyopathy with a persistent low EF. Sees Dr Bermudez. Has new issues with elevated lipid. Came in fasting with , tg 83, HDL 72, . CRP was normal with decision to not use a statin. Lipid was higher on last recheck but ratio still good with decision with Dr Bermudez to not treat.      3. ENDO- " hashimotots hypothyroid, diagnosed at 45yo. Pt initially on synthroid 100. Was having fatigue, dizziness, feeling anxious, hoarse voice, trouble concentrating, feeding sad, dry skin, feeling cold, muscle aches and bowel issues. Has alt C/D. Had been increased on synthroid from 88 to 100 by previous provider but felt better while on synthroid 88 by endo. TSH was over suppressed and pt was reduced back to synthroid 88.  Pt felt better with resolution of jitters. Lab 10/18/17 at goal; lab 4/19/18 demo slightly high T4 but TSH and T3 normal with pt still clinically at goal. No change in synthroid 88. Today pt reports she still feels at goal.      4. GI- gastritis, IBS. Pt has done well with zantac. Had HA with 300mg but did well with 150mg. Eventually went to prn. Had gastritis flare 6/29/18 and was advised to stay on the zantac routinely at 150mg but that she could go to 75mg qd if able. Today she reports she tried the 75 and it did not work. She is back to the 150mg and taking it hs and it does very well.     5. MCW- previous 10/18/17  SCREENING:              Bone Density: 6/28/17 by Gyn (1.0 L spine, -1.7 radius- pt has h/o bilat total hip)              Colonoscopy: 3/2/17 with no polyps (just diverticulosis). No fam hx colon ca.              Mammogram: 8/24/18 at [facility] normal. No fam hx breast ca. Has had false + in her 40's              Pap: NA. No DUB. Still sees Gyn for lichen sclerosis.              Glaucoma: [facility] 6/2018  IMMUNIZATIONS:              Pneumovax: 9/21/16   Prevnar 13: 10/18/17              Zostavax: 8/4/14              Flu: 10/2/18              Hep B series: NA              TDaP: within last 10 yr  LIVING WILL: yes.   SPECIALTY: GYN- Dr France              CARDIO- Dr Bermudez              RHEUMATOLOGY- Dr Norman  CONCERNS: none    The following portions of the patient's history were reviewed and updated as appropriate: current medications, past family history, past medical history, past  social history, past surgical history and problem list.    Outpatient Medications Prior to Visit   Medication Sig Dispense Refill   • acetaminophen (TYLENOL) 325 MG tablet Take 650 mg by mouth every night at bedtime.     • aspirin 81 MG EC tablet Take 81 mg by mouth 3 (Three) Times a Week.     • Cholecalciferol (VITAMIN D3) 3000 UNITS tablet Take  by mouth.     • Coenzyme Q10 (CO Q-10) 100 MG capsule Take 400 mg by mouth.     • diclofenac (VOLTAREN) 1 % gel gel Apply 1 application topically As Needed.     • dicyclomine (BENTYL) 10 MG capsule TAKE ONE CAPSULE BY MOUTH EVERY 8 HOURS AS NEEDED FOR BOWEL SPASM OR DIARRHEA 30 capsule 0   • estradiol (VAGIFEM) 10 MCG tablet vaginal tablet Insert 1 tablet into the vagina 3 (Three) Times a Week. 12 tablet 12   • gabapentin (NEURONTIN) 100 MG capsule Take 1 capsule by mouth every night at bedtime. 30 capsule 5   • Lutein 20 MG tablet Take 1 tablet by mouth Daily.     • meloxicam (MOBIC) 7.5 MG tablet Take 1 tab po qd prn pain or inflammation 30 tablet 5   • Multiple Vitamins-Minerals (ICAPS AREDS 2) capsule Take 1 capsule by mouth Daily.     • Omega-3 Fatty Acids (FISH OIL) 1200 MG capsule capsule Take 1,200 mg by mouth Daily.     • raNITIdine (ZANTAC) 150 MG tablet 1 tab po qd prn stomach acid or acid reflux 30 tablet 5   • SYNTHROID 88 MCG tablet Take 1 tab po qam fasting and wait 1hr for food or other meds. BRAND ONLY. 90 tablet 1   • vitamin C (ASCORBIC ACID) 500 MG tablet Take 500 mg by mouth Daily.     • vitamin E 400 UNIT capsule Take 400 Units by mouth Daily.       No facility-administered medications prior to visit.        Patient Active Problem List   Diagnosis   • Osteopenia   • Arthritis   • Lichen sclerosus et atrophicus of the vulva   • Chronic superficial gastritis without bleeding   • Fibromyalgia   • Acquired hypothyroidism   • Pure hypercholesterolemia   • ETD (eustachian tube dysfunction)   • Menopause   • IBS (irritable bowel syndrome)   • H/O  "cardiomyopathy   • Vaginal atrophy   • Osteoarthritis   • Hypothyroidism   • HLD (hyperlipidemia)   • GERD (gastroesophageal reflux disease)   • Recurrent candidiasis of vagina       Advance Care Planning:  has an advance directive - a copy HAS NOT been provided    Identification of Risk Factors:  Risk factors include: N/A.    Review of Systems   Constitutional: Positive for fatigue.   Cardiovascular: Negative for chest pain.   Gastrointestinal: Negative for abdominal distention and abdominal pain.   Skin: Negative for color change.   Neurological: Negative for tremors, speech difficulty and headaches.   Psychiatric/Behavioral: Positive for sleep disturbance. Negative for agitation and confusion.   All other systems reviewed and are negative.      Compared to one year ago, the patient feels her physical health is the same.  Compared to one year ago, the patient feels her mental health is the same.    Objective     Physical Exam   Constitutional: She is oriented to person, place, and time. She appears well-developed and well-nourished.   HENT:   Right Ear: Tympanic membrane and ear canal normal.   Left Ear: Tympanic membrane and ear canal normal.   Mouth/Throat: Oropharynx is clear and moist.   Eyes: Pupils are equal, round, and reactive to light. Conjunctivae and EOM are normal.   Neck: No thyromegaly present.   Cardiovascular: Normal rate and regular rhythm.    Pulmonary/Chest: Effort normal and breath sounds normal.   Neurological: She is alert and oriented to person, place, and time.   Skin: Skin is warm and dry.   Psychiatric: She has a normal mood and affect.   Vitals reviewed.      Vitals:    10/26/18 0912   BP: 106/70   Temp: 98.2 °F (36.8 °C)   Weight: 55.6 kg (122 lb 9.6 oz)   Height: 157.5 cm (62\")   PainSc: 0-No pain       Patient's Body mass index is 22.42 kg/m². BMI is within normal parameters. No follow-up required.      Assessment/Plan   Patient Self-Management and Personalized Health Advice  The " patient has been provided with information about: Discussion today with patient regarding current guidelines for timing/ frequency of paps, mammograms, colonoscopy (or cologuard), bone density tests and lab tests.     Immunizations discussed today with current recommendations advised for DTaP, Zostavax, Pneumovax and Prevnar 13.    · Appropriate diet and stretching discussed with handouts provided.  ·     Visit Diagnoses:  No diagnosis found.    No orders of the defined types were placed in this encounter.      Outpatient Encounter Prescriptions as of 10/26/2018   Medication Sig Dispense Refill   • acetaminophen (TYLENOL) 325 MG tablet Take 650 mg by mouth every night at bedtime.     • aspirin 81 MG EC tablet Take 81 mg by mouth 3 (Three) Times a Week.     • Cholecalciferol (VITAMIN D3) 3000 UNITS tablet Take  by mouth.     • Coenzyme Q10 (CO Q-10) 100 MG capsule Take 400 mg by mouth.     • diclofenac (VOLTAREN) 1 % gel gel Apply 1 application topically As Needed.     • dicyclomine (BENTYL) 10 MG capsule TAKE ONE CAPSULE BY MOUTH EVERY 8 HOURS AS NEEDED FOR BOWEL SPASM OR DIARRHEA 30 capsule 0   • estradiol (VAGIFEM) 10 MCG tablet vaginal tablet Insert 1 tablet into the vagina 3 (Three) Times a Week. 12 tablet 12   • gabapentin (NEURONTIN) 100 MG capsule Take 1 capsule by mouth every night at bedtime. 30 capsule 5   • Lutein 20 MG tablet Take 1 tablet by mouth Daily.     • meloxicam (MOBIC) 7.5 MG tablet Take 1 tab po qd prn pain or inflammation 30 tablet 5   • Multiple Vitamins-Minerals (ICAPS AREDS 2) capsule Take 1 capsule by mouth Daily.     • Omega-3 Fatty Acids (FISH OIL) 1200 MG capsule capsule Take 1,200 mg by mouth Daily.     • raNITIdine (ZANTAC) 150 MG tablet 1 tab po qd prn stomach acid or acid reflux 30 tablet 5   • SYNTHROID 88 MCG tablet Take 1 tab po qam fasting and wait 1hr for food or other meds. BRAND ONLY. 90 tablet 1   • vitamin C (ASCORBIC ACID) 500 MG tablet Take 500 mg by mouth Daily.     •  vitamin E 400 UNIT capsule Take 400 Units by mouth Daily.       No facility-administered encounter medications on file as of 10/26/2018.        Reviewed use of high risk medication in the elderly: yes  Reviewed for potential of harmful drug interactions in the elderly: yes    Follow Up:  No Follow-up on file.     An After Visit Summary and PPPS with all of these plans were given to the patient.    1. MCW- discussed that her L spine was normal on bone density, next check due in 2019. Advised next colonoscopy in 9yr. Discussed that she can do her mammo every other year if preferred. Discussed zostarix (2 shots for shingles, done at the pharmacy).   2. ENDO- hypothyroid- clinically at goal. RF synthroid x1yr now. Will confirm dose with TSH and free T4.  3. GI- gastritis- stable on zantac 150. RF x1yr today.  4. ORTHO- arthritis- RF gabapentin x6mo today. No concerns with this pt.   5. RECHECK- 6mo routine (gabapentin)

## 2018-12-11 NOTE — LETTER
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  Since has developed OA in the 1st CMC joint, and has become very painful. She would like to avoid surgery or further injections.  Medical/Vocational History/ Medications: See Medical records.    Pain: Pain at the 1st CMC joint    Edema: Mild  Sensibility: WNL   Wound Status:N/A  ROM/ Strength: WNL    Splinting:  · Patient was measure and fit with a custom fabricated Hand Based thumb spica splint (the pt refused to do a forearm splint, but was agreeable to the hand based splint.   · Patient was instructed in wearing schedule, precautions and care of the splint during this visit.   · Patient was instructed in proper donning/doffing of splint.   Assessment:  · Patient was fitted and appropriate splint was fabricated this date.  · Patient reported that splint was comfortable and had no complications with the fit of the splint.  · Patient was instructed and patient verbalized understanding of precautions, wear and care of the splint.   · Patient demonstrated independent donning/doffing of splint during treatment today.  Goals:  · Patient was fitted properly with appropriate splint for diagnosis  · Patient was educated on precautions, wear schedule and care of splint  · Patient demonstrated independence with donning/doffing of the splint.  · Splint was provided to Protect Healing Structures, Restrict Mobility, Improve joint alignment.  Plan:  · No additional treatment is required for this patient at this time. The patient is therefore discharged from therapy.  · Patient advised to contact therapist with any additional questions or concerns regarding the fit and function of the splint.  · Patient will be seen for  splint issues as needed   · Wear Instructions: Off for hygiene       PT SIGNATURE: Declan Diaz, PT, CHT   DATE TREATMENT INITIATED: 1/3/2017    Physician Signature____________________________________ Date____________                                                 [place] M.D.   Family

## 2019-01-17 ENCOUNTER — TELEPHONE (OUTPATIENT)
Dept: INTERNAL MEDICINE | Facility: CLINIC | Age: 69
End: 2019-01-17

## 2019-01-17 DIAGNOSIS — K58.9 IRRITABLE BOWEL SYNDROME, UNSPECIFIED TYPE: ICD-10-CM

## 2019-01-17 RX ORDER — DICYCLOMINE HYDROCHLORIDE 10 MG/1
CAPSULE ORAL
Qty: 30 CAPSULE | Refills: 0 | Status: SHIPPED | OUTPATIENT
Start: 2019-01-17 | End: 2019-03-26 | Stop reason: SDUPTHER

## 2019-02-26 ENCOUNTER — OFFICE VISIT (OUTPATIENT)
Dept: INTERNAL MEDICINE | Facility: CLINIC | Age: 69
End: 2019-02-26

## 2019-02-26 VITALS — WEIGHT: 120.8 LBS | BODY MASS INDEX: 22.23 KG/M2 | HEIGHT: 62 IN | TEMPERATURE: 97.7 F

## 2019-02-26 DIAGNOSIS — K58.0 IRRITABLE BOWEL SYNDROME WITH DIARRHEA: ICD-10-CM

## 2019-02-26 DIAGNOSIS — K21.9 GASTROESOPHAGEAL REFLUX DISEASE, ESOPHAGITIS PRESENCE NOT SPECIFIED: Primary | ICD-10-CM

## 2019-02-26 PROCEDURE — 99214 OFFICE O/P EST MOD 30 MIN: CPT | Performed by: FAMILY MEDICINE

## 2019-02-26 RX ORDER — BUSPIRONE HYDROCHLORIDE 10 MG/1
TABLET ORAL
Qty: 60 TABLET | Refills: 0 | Status: SHIPPED | OUTPATIENT
Start: 2019-02-26 | End: 2019-03-19

## 2019-02-26 RX ORDER — BUSPIRONE HYDROCHLORIDE 10 MG/1
TABLET ORAL
Qty: 60 TABLET | Refills: 0 | Status: SHIPPED | OUTPATIENT
Start: 2019-02-26 | End: 2019-02-26 | Stop reason: SDUPTHER

## 2019-02-26 NOTE — PROGRESS NOTES
"Subjective   Barbara Hernandez is a 68 y.o. female.     History of Present Illness   Here today for breast pain. Last seen 10/26/18 for MCW.  Was seen 6/29/18 for gastritis. Was seen 4/19/18 for 6mo routine. Last seen 2/16/18 for sinusitis, treated with omnicef. Was seen 10/18/17 for MCW.. Pt was seen 2/24/17 as a new patient. Sees psych.  Lab 4/19/18 demo TSH 0.399, T4 1.82 and T3 3.1 on synthroid 88. Lab 10/18/17 demo normal CBC, CMP and B12. Thyroid was at goal on synthroid 88 with TSH 0.328, free T4 1.66. Lipid was still high with , tg 78, HDL 75, . Lab 2/24/17 demo + thyroid abs.      1. 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 Has \"fibromyalgia\" currently on gabapentin and mobic. Was diagnosed with fibro by rheum around 2008 based on 18 trigger points. Was seen with foot numbness with neg MRI L spine and then found to have a pinched nerve from a ganglion. Transferred her gabapentin here as she only sees rheumatologist annually now. Has contract.2. CV- hyperlipid. Pt has h/o cardiomyopathy with a persistent low EF. Sees Dr Bermudez. Has new issues with elevated lipid. Came in fasting with , tg 83, HDL 72, . CRP was normal with decision to not use a statin. Lipid was higher on last recheck but ratio still good with decision with Dr Bermudez to not treat.    3. ENDO- hashimotots hypothyroid, diagnosed at 43yo. Pt initially on synthroid 100. Was having fatigue, dizziness, feeling anxious, hoarse voice, trouble concentrating, feeding sad, dry skin, feeling cold, muscle aches and bowel issues. Has alt C/D. Had been increased on synthroid from 88 to 100 by previous provider but felt better while on synthroid 88 by endo. TSH was over suppressed and pt was reduced back to synthroid 88.  Pt felt better with resolution of jitters. Lab 10/18/17 at goal; lab 4/19/18 demo slightly high T4 but TSH and T3 normal with pt still clinically at goal. No change in synthroid 88.      4. GI- " gastritis, IBS. Pt has done well with zantac. Had HA with 300mg but did well with 150mg. Eventually went to prn. Had gastritis flare 6/29/18 and was advised to stay on the zantac routinely at 150mg but that she could go to 75mg qd if able. Was still needing 150mg at last visit. Today she reports she has had increased stress with her 's health for the past couple of months. Then she started a probiotic for loose stools and then she became constipated with BM every 3-4 days that was still loose. Was doing well with zantac until then. Started having epigastric burning and increased the zantac but then she got left breast pain with burning into her nipple, like when nursing. Did not help the epigastric pain.    The following portions of the patient's history were reviewed and updated as appropriate: current medications, past family history, past medical history, past social history, past surgical history and problem list.    Review of Systems   Cardiovascular: Negative for chest pain.   Gastrointestinal: Negative for abdominal distention and abdominal pain.        Zantac taken for GERD is giving her breast pain   Skin: Negative for color change.   Neurological: Negative for tremors, speech difficulty and headaches.   Psychiatric/Behavioral: Negative for agitation and confusion.   All other systems reviewed and are negative.        Current Outpatient Medications:   •  acetaminophen (TYLENOL) 325 MG tablet, Take 650 mg by mouth every night at bedtime., Disp: , Rfl:   •  aspirin 81 MG EC tablet, Take 81 mg by mouth 3 (Three) Times a Week., Disp: , Rfl:   •  busPIRone (BUSPAR) 10 MG tablet, 1/2- 1 tab po q8hr prn anxiety or irritable bowel. 1 tab hs prn sleep, Disp: 60 tablet, Rfl: 0  •  Cholecalciferol (VITAMIN D3) 3000 UNITS tablet, Take  by mouth., Disp: , Rfl:   •  Coenzyme Q10 (CO Q-10) 100 MG capsule, Take 400 mg by mouth., Disp: , Rfl:   •  diclofenac (VOLTAREN) 1 % gel gel, Apply 1 application topically As  "Needed., Disp: , Rfl:   •  dicyclomine (BENTYL) 10 MG capsule, Take 1 capsule by mouth every 8 hours as needed for bowel spasm or diarrhea, Disp: 30 capsule, Rfl: 0  •  estradiol (VAGIFEM) 10 MCG tablet vaginal tablet, Insert 1 tablet into the vagina 3 (Three) Times a Week., Disp: 12 tablet, Rfl: 12  •  gabapentin (NEURONTIN) 100 MG capsule, Take 1 capsule by mouth every night at bedtime., Disp: 30 capsule, Rfl: 5  •  Lutein 20 MG tablet, Take 1 tablet by mouth Daily., Disp: , Rfl:   •  meloxicam (MOBIC) 7.5 MG tablet, Take 1 tab po qd prn pain or inflammation, Disp: 30 tablet, Rfl: 5  •  Multiple Vitamins-Minerals (ICAPS AREDS 2) capsule, Take 1 capsule by mouth Daily., Disp: , Rfl:   •  Omega-3 Fatty Acids (FISH OIL) 1200 MG capsule capsule, Take 1,200 mg by mouth Daily., Disp: , Rfl:   •  raNITIdine (ZANTAC) 150 MG tablet, 1 tab po qd prn stomach acid or acid reflux, Disp: 90 tablet, Rfl: 3  •  SYNTHROID 88 MCG tablet, Take 1 tab po qam fasting and wait 1hr for food or other meds. BRAND ONLY., Disp: 90 tablet, Rfl: 3  •  vitamin C (ASCORBIC ACID) 500 MG tablet, Take 500 mg by mouth Daily., Disp: , Rfl:   •  vitamin E 400 UNIT capsule, Take 400 Units by mouth Daily., Disp: , Rfl:     Objective     Temp 97.7 °F (36.5 °C)   Ht 157.5 cm (62\")   Wt 54.8 kg (120 lb 12.8 oz)   LMP  (LMP Unknown) Comment: postmenopausal  BMI 22.09 kg/m²     Physical Exam   Constitutional: She is oriented to person, place, and time. She appears well-developed and well-nourished.   HENT:   Right Ear: Tympanic membrane and ear canal normal.   Left Ear: Tympanic membrane and ear canal normal.   Mouth/Throat: Oropharynx is clear and moist.   Eyes: Conjunctivae and EOM are normal. Pupils are equal, round, and reactive to light.   Neck: No thyromegaly present.   Cardiovascular: Normal rate and regular rhythm.   Pulmonary/Chest: Effort normal and breath sounds normal.   Neurological: She is alert and oriented to person, place, and time. "   Skin: Skin is warm and dry.   Psychiatric: She has a normal mood and affect.   Vitals reviewed.      Assessment/Plan   Barbara was seen today for breast pain.    Diagnoses and all orders for this visit:    Gastroesophageal reflux disease, esophagitis presence not specified    Irritable bowel syndrome with diarrhea  -     busPIRone (BUSPAR) 10 MG tablet; 1/2- 1 tab po q8hr prn anxiety or irritable bowel. 1 tab hs prn sleep        1. GI- GERD, IBS- discussed the different aspects of IBS. Discussed that she does not need probiotic and so it was counter productive. Discussed that her issues are more likely related to stress and serotonin depletion. Will do a trial with buspar for the stress and IBS. Discussed that the increased acid issues are also likely due to stress and the subsequent dysmotility. To reduce the zantac back to 150mg a day and the side effects should resolve and the zantac should work again as we address the serotonin issue. Discussed that the breast pain is likely just fluid as a S/E from the zantac. Ok to use bentyl as needed for bowel spasm. Discussed that she can keep or hold the gabapentin while taking buspar.  2. RECHECK- 1mo

## 2019-02-26 NOTE — PATIENT INSTRUCTIONS
1. GI- GERD, IBS- discussed the different aspects of IBS. Discussed that she does not need probiotic and so it was counter productive. Discussed that her issues are more likely related to stress and serotonin depletion. Will do a trial with buspar for the stress and IBS. Discussed that the increased acid issues are also likely due to stress and the subsequent dysmotility. To reduce the zantac back to 150mg a day and the side effects should resolve and the zantac should work again as we address the serotonin issue. Discussed that the breast pain is likely just fluid as a S/E from the zantac. Ok to use bentyl as needed for bowel spasm. Discussed that she can keep or hold the gabapentin while taking buspar.  2. RECHECK- 1mo

## 2019-03-19 ENCOUNTER — OFFICE VISIT (OUTPATIENT)
Dept: INTERNAL MEDICINE | Facility: CLINIC | Age: 69
End: 2019-03-19

## 2019-03-19 VITALS
HEART RATE: 80 BPM | BODY MASS INDEX: 23 KG/M2 | TEMPERATURE: 98 F | SYSTOLIC BLOOD PRESSURE: 114 MMHG | DIASTOLIC BLOOD PRESSURE: 70 MMHG | OXYGEN SATURATION: 99 % | RESPIRATION RATE: 16 BRPM | WEIGHT: 125 LBS | HEIGHT: 62 IN

## 2019-03-19 DIAGNOSIS — J01.40 ACUTE NON-RECURRENT PANSINUSITIS: Primary | ICD-10-CM

## 2019-03-19 PROCEDURE — 96372 THER/PROPH/DIAG INJ SC/IM: CPT | Performed by: NURSE PRACTITIONER

## 2019-03-19 PROCEDURE — 99214 OFFICE O/P EST MOD 30 MIN: CPT | Performed by: NURSE PRACTITIONER

## 2019-03-19 RX ORDER — CEFDINIR 300 MG/1
300 CAPSULE ORAL 2 TIMES DAILY
Qty: 20 CAPSULE | Refills: 0 | Status: SHIPPED | OUTPATIENT
Start: 2019-03-19 | End: 2019-03-26

## 2019-03-19 RX ORDER — TRIAMCINOLONE ACETONIDE 40 MG/ML
40 INJECTION, SUSPENSION INTRA-ARTICULAR; INTRAMUSCULAR ONCE
Status: COMPLETED | OUTPATIENT
Start: 2019-03-19 | End: 2019-03-19

## 2019-03-19 RX ADMIN — TRIAMCINOLONE ACETONIDE 40 MG: 40 INJECTION, SUSPENSION INTRA-ARTICULAR; INTRAMUSCULAR at 09:51

## 2019-03-19 NOTE — PROGRESS NOTES
Subjective   Barbara Hernandez is a 68 y.o. female here today for facial pain/ ear fullness    Chief Complaint   Patient presents with   • Facial Pain   • Ear Fullness       Barbara reports a few week history of allergy symptoms.  She has been taking OTC allergy medication and sudafed.  A few days ago she developed ear pressure/ popping, jaw popping, PND, and facial pressure- even pain in her teeth.  She is also having myalgias. She does not get recurrent sinus infections.  She denies fever/ chills/  Even to wear her glasses hurts her.  She cannot tolerate nasal sprays due to headaches.  She does have a mild cough- non-productive.  She denies wheezing/ SOA.  She does have multiple medications allergies and intolerances- does tolerate cefdinir.      Review of Systems   Constitutional: Positive for fatigue. Negative for activity change, appetite change, chills and fever.   HENT: Positive for congestion, ear pain, postnasal drip and sinus pressure. Negative for ear discharge, rhinorrhea, sore throat and voice change.    Eyes: Negative for photophobia, redness and itching.   Respiratory: Positive for cough. Negative for shortness of breath and wheezing.    Cardiovascular: Negative for chest pain and leg swelling.   Musculoskeletal: Positive for myalgias. Negative for arthralgias.   Skin: Negative for color change and rash.   Allergic/Immunologic: Negative for environmental allergies and immunocompromised state.   Neurological: Positive for headache. Negative for dizziness and weakness.   Hematological: Negative for adenopathy. Does not bruise/bleed easily.       Past Medical History:   Diagnosis Date   • ETD (eustachian tube dysfunction)    • Fibromyalgia    • Fibromyositis    • GERD (gastroesophageal reflux disease)    • H/O cardiomyopathy    • HLD (hyperlipidemia)    • Hypothyroidism    • IBS (irritable bowel syndrome)    • Lichen sclerosus et atrophicus of the vulva    • Menopause    • Osteoarthritis    • Osteopenia    •  Preventative health care     · Age-appropriate counseling discussed. Will notify patient of lab result     Family History   Problem Relation Age of Onset   • Osteoarthritis Mother          A-86   • Hypothyroidism Mother    • Alzheimer's disease Father    • Hyperlipidemia Brother    • Hypertension Brother    • Stroke Paternal Grandmother    • Heart attack Maternal Grandmother      Past Surgical History:   Procedure Laterality Date   • CRYOTHERAPY      cervix   • EYE SURGERY Right     cyst removal right eyelid   • SEPTOPLASTY     • TOTAL HIP ARTHROPLASTY      2010 total right hip Dr. Lovelace; 2012 total Left hip     Social History     Socioeconomic History   • Marital status:      Spouse name: Not on file   • Number of children: Not on file   • Years of education: Not on file   • Highest education level: Not on file   Social Needs   • Financial resource strain: Not on file   • Food insecurity - worry: Not on file   • Food insecurity - inability: Not on file   • Transportation needs - medical: Not on file   • Transportation needs - non-medical: Not on file   Occupational History   • Not on file   Tobacco Use   • Smoking status: Former Smoker   • Smokeless tobacco: Never Used   • Tobacco comment: 19 YR PACK HISTORY   Substance and Sexual Activity   • Alcohol use: Yes     Comment: SOCIAL   • Drug use: No   • Sexual activity: No     Birth control/protection: Post-menopausal   Other Topics Concern   • Not on file   Social History Narrative   • Not on file         Current Outpatient Medications:   •  acetaminophen (TYLENOL) 325 MG tablet, Take 650 mg by mouth every night at bedtime., Disp: , Rfl:   •  aspirin 81 MG EC tablet, Take 81 mg by mouth 3 (Three) Times a Week., Disp: , Rfl:   •  Cholecalciferol (VITAMIN D3) 3000 UNITS tablet, Take  by mouth., Disp: , Rfl:   •  Coenzyme Q10 (CO Q-10) 100 MG capsule, Take 400 mg by mouth., Disp: , Rfl:   •  diclofenac (VOLTAREN) 1 % gel gel, Apply 1 application topically As  "Needed., Disp: , Rfl:   •  dicyclomine (BENTYL) 10 MG capsule, Take 1 capsule by mouth every 8 hours as needed for bowel spasm or diarrhea, Disp: 30 capsule, Rfl: 0  •  estradiol (VAGIFEM) 10 MCG tablet vaginal tablet, Insert 1 tablet into the vagina 3 (Three) Times a Week., Disp: 12 tablet, Rfl: 12  •  gabapentin (NEURONTIN) 100 MG capsule, Take 1 capsule by mouth every night at bedtime., Disp: 30 capsule, Rfl: 5  •  Lutein 20 MG tablet, Take 1 tablet by mouth Daily., Disp: , Rfl:   •  meloxicam (MOBIC) 7.5 MG tablet, Take 1 tab po qd prn pain or inflammation, Disp: 30 tablet, Rfl: 5  •  Multiple Vitamins-Minerals (ICAPS AREDS 2) capsule, Take 1 capsule by mouth Daily., Disp: , Rfl:   •  Omega-3 Fatty Acids (FISH OIL) 1200 MG capsule capsule, Take 1,200 mg by mouth Daily., Disp: , Rfl:   •  raNITIdine (ZANTAC) 150 MG tablet, 1 tab po qd prn stomach acid or acid reflux, Disp: 90 tablet, Rfl: 3  •  SYNTHROID 88 MCG tablet, Take 1 tab po qam fasting and wait 1hr for food or other meds. BRAND ONLY., Disp: 90 tablet, Rfl: 3  •  vitamin C (ASCORBIC ACID) 500 MG tablet, Take 500 mg by mouth Daily., Disp: , Rfl:   •  vitamin E 400 UNIT capsule, Take 400 Units by mouth Daily., Disp: , Rfl:   •  cefdinir (OMNICEF) 300 MG capsule, Take 1 capsule by mouth 2 (Two) Times a Day., Disp: 20 capsule, Rfl: 0  No current facility-administered medications for this visit.     Objective   Vitals:    03/19/19 0918   BP: 114/70   Pulse: 80   Resp: 16   Temp: 98 °F (36.7 °C)   TempSrc: Temporal   SpO2: 99%   Weight: 56.7 kg (125 lb)   Height: 157.5 cm (62\")     Body mass index is 22.86 kg/m².  Physical Exam   Constitutional: She appears well-developed and well-nourished. She has a sickly appearance. No distress.   HENT:   Head: Normocephalic and atraumatic.   Right Ear: Tympanic membrane and external ear normal.   Left Ear: Tympanic membrane and external ear normal.   Nose: No mucosal edema or rhinorrhea. Right sinus exhibits maxillary " sinus tenderness and frontal sinus tenderness. Left sinus exhibits maxillary sinus tenderness and frontal sinus tenderness.   Mouth/Throat: Mucous membranes are normal. Posterior oropharyngeal erythema present. No oropharyngeal exudate, posterior oropharyngeal edema or tonsillar abscesses. No tonsillar exudate.   Eyes: Right eye exhibits no discharge. Left eye exhibits no discharge. Right conjunctiva is not injected. Left conjunctiva is not injected. No scleral icterus.   Neck: No neck rigidity. No thyromegaly present.   Cardiovascular: Normal rate and regular rhythm.   Pulmonary/Chest: Effort normal and breath sounds normal.   Lymphadenopathy:        Head (right side): No submental, no submandibular, no tonsillar, no preauricular, no posterior auricular and no occipital adenopathy present.        Head (left side): No submental, no submandibular, no tonsillar, no preauricular, no posterior auricular and no occipital adenopathy present.     She has no cervical adenopathy.        Right cervical: No superficial cervical, no deep cervical and no posterior cervical adenopathy present.       Left cervical: No superficial cervical, no deep cervical and no posterior cervical adenopathy present.   Neurological: She is alert.   Skin: Skin is warm, dry and intact. Capillary refill takes 2 to 3 seconds. She is not diaphoretic. No pallor.   Psychiatric: She has a normal mood and affect. Her speech is normal and behavior is normal. Cognition and memory are normal.   Nursing note and vitals reviewed.      Assessment/Plan   Problem List Items Addressed This Visit     None      Visit Diagnoses     Acute non-recurrent pansinusitis    -  Primary    Relevant Medications    cefdinir (OMNICEF) 300 MG capsule    triamcinolone acetonide (KENALOG-40) injection 40 mg (Completed)        Barbara was seen today for facial pain and ear fullness.    Diagnoses and all orders for this visit:    Acute non-recurrent pansinusitis  -     cefdinir  (OMNICEF) 300 MG capsule; Take 1 capsule by mouth 2 (Two) Times a Day.  -     triamcinolone acetonide (KENALOG-40) injection 40 mg; Inject 1 mL into the appropriate muscle as directed by prescriber 1 (One) Time.        -     Call or return to clinic with no improvement of symptoms or new/concerning symptoms.           The patient was counseled regarding diagnostic results, impressions, prognosis, instructions for management, risk factor reductions, education, and importance of treatment compliance.  The patient verbalized understanding of and agreement with the plan of care.    Advised patient to call with any further questions and any new or worsening symptoms.     Return if symptoms worsen or fail to improve.      Wanda York, APRN

## 2019-03-26 ENCOUNTER — OFFICE VISIT (OUTPATIENT)
Dept: INTERNAL MEDICINE | Facility: CLINIC | Age: 69
End: 2019-03-26

## 2019-03-26 VITALS
DIASTOLIC BLOOD PRESSURE: 66 MMHG | SYSTOLIC BLOOD PRESSURE: 110 MMHG | OXYGEN SATURATION: 98 % | BODY MASS INDEX: 22.31 KG/M2 | RESPIRATION RATE: 18 BRPM | HEART RATE: 76 BPM | TEMPERATURE: 97.4 F | WEIGHT: 122 LBS

## 2019-03-26 DIAGNOSIS — G62.9 PERIPHERAL POLYNEUROPATHY: Primary | ICD-10-CM

## 2019-03-26 DIAGNOSIS — K58.9 IRRITABLE BOWEL SYNDROME, UNSPECIFIED TYPE: ICD-10-CM

## 2019-03-26 PROCEDURE — 99213 OFFICE O/P EST LOW 20 MIN: CPT | Performed by: FAMILY MEDICINE

## 2019-03-26 RX ORDER — GABAPENTIN 100 MG/1
100 CAPSULE ORAL
Qty: 30 CAPSULE | Refills: 5 | Status: SHIPPED | OUTPATIENT
Start: 2019-03-26

## 2019-03-26 RX ORDER — DICYCLOMINE HYDROCHLORIDE 10 MG/1
CAPSULE ORAL
Qty: 30 CAPSULE | Refills: 0 | Status: SHIPPED | OUTPATIENT
Start: 2019-03-26

## 2019-03-26 NOTE — PATIENT INSTRUCTIONS
"1. GI- IBS- will continue current OTC options. Will keep bentyl on hand for \"as needed\" use.  2. RESP- allergies- ok to stop abx now and resume her allergy meds.   3. RECHECK- with Dr Ham.   "

## 2019-03-26 NOTE — PROGRESS NOTES
"Subjective   Barbara Hernandez is a 68 y.o. female.     History of Present Illness   Here for 1mo recheck IBS. Last seen 2/26/19 for breast pain and GERD. Was seen 3/19/19 by NP for sinusitis, treated with omnicef and kenalog. Was seen 10/26/18 for MCW.  Was seen 6/29/18 for gastritis. Was seen 4/19/18 for 6mo routine. Last seen 2/16/18 for sinusitis, treated with omnicef. Was seen 10/18/17 for MCW.. Pt was seen 2/24/17 as a new patient. Sees psych.  Lab 4/19/18 demo TSH 0.399, T4 1.82 and T3 3.1 on synthroid 88. Lab 10/18/17 demo normal CBC, CMP and B12. Thyroid was at goal on synthroid 88 with TSH 0.328, free T4 1.66. Lipid was still high with , tg 78, HDL 75, . Lab 2/24/17 demo + thyroid abs.      1. 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 Has \"fibromyalgia\" currently on gabapentin and mobic. Was diagnosed with fibro by rheum around 2008 based on 18 trigger points. Was seen with foot numbness with neg MRI L spine and then found to have a pinched nerve from a ganglion. Transferred her gabapentin here as she only sees rheumatologist annually now. Has contract.2. CV- hyperlipid. Pt has h/o cardiomyopathy with a persistent low EF. Sees Dr Bermudez. Has new issues with elevated lipid. Came in fasting with , tg 83, HDL 72, . CRP was normal with decision to not use a statin. Lipid was higher on last recheck but ratio still good with decision with Dr Bermudez to not treat.    2. ENDO- hashimotots hypothyroid, diagnosed at 45yo. Pt initially on synthroid 100. Was having fatigue, dizziness, feeling anxious, hoarse voice, trouble concentrating, feeding sad, dry skin, feeling cold, muscle aches and bowel issues. Has alt C/D. Had been increased on synthroid from 88 to 100 by previous provider but felt better while on synthroid 88 by endo. TSH was over suppressed and pt was reduced back to synthroid 88.  Pt felt better with resolution of jitters. Lab 10/18/17 at goal; lab 4/19/18 demo slightly " "high T4 but TSH and T3 normal with pt still clinically at goal. No change in synthroid 88.      3. GI- gastritis, IBS. Pt has done well with zantac. Had HA with 300mg but did well with 150mg. Eventually went to prn. Had gastritis flare 6/29/18 and was advised to stay on the zantac routinely at 150mg but that she could go to 75mg qd if able. Was still needing 150mg (doing well) until her last visit when she was having IBS flare related to stress. Had loose stools and took probiotic but then became constipated with BM every 3-4 days that was still loose. She then developed increased reflux. Increased the zantac but it did not help and then she got left breast pain (like when nursing). Discussed breast change as S/E and to reduce zantac dose again. Was started on buspar qhs and given bentyl to add prn. Today she reports she had S/E with buspar; felt \"out of body\". She is still taking bentyl. She then used CBD oil and improved with normal stools and less anxiety. Is not currently needing bentyl but would like to keep this available.     4. RESP- today pt reports she saw the NP 3/19/19 with sinusitis, treated with omnicef. Had to back the dose down from bid to qd. Is doing much better. Still has sinus drainage with clear phlegm. Uses OTC for her allergies.     The following portions of the patient's history were reviewed and updated as appropriate: allergies, past family history, past medical history, past social history, past surgical history and problem list.    Review of Systems   Cardiovascular: Negative for chest pain.   Gastrointestinal: Negative for abdominal distention and abdominal pain.   Skin: Negative for color change.   Neurological: Negative for tremors, speech difficulty and headaches.   Psychiatric/Behavioral: Negative for agitation and confusion.   All other systems reviewed and are negative.        Current Outpatient Medications:   •  acetaminophen (TYLENOL) 325 MG tablet, Take 650 mg by mouth every " night at bedtime., Disp: , Rfl:   •  aspirin 81 MG EC tablet, Take 81 mg by mouth 3 (Three) Times a Week., Disp: , Rfl:   •  CBD (cannabidiol) oral oil, Take  by mouth., Disp: , Rfl:   •  Cholecalciferol (VITAMIN D3) 3000 UNITS tablet, Take  by mouth., Disp: , Rfl:   •  Coenzyme Q10 (CO Q-10) 100 MG capsule, Take 400 mg by mouth., Disp: , Rfl:   •  diclofenac (VOLTAREN) 1 % gel gel, Apply 1 application topically As Needed., Disp: , Rfl:   •  dicyclomine (BENTYL) 10 MG capsule, Take 1 capsule by mouth every 8 hours as needed for bowel spasm or diarrhea, Disp: 30 capsule, Rfl: 0  •  estradiol (VAGIFEM) 10 MCG tablet vaginal tablet, Insert 1 tablet into the vagina 3 (Three) Times a Week., Disp: 12 tablet, Rfl: 12  •  gabapentin (NEURONTIN) 100 MG capsule, Take 1 capsule by mouth every night at bedtime., Disp: 30 capsule, Rfl: 5  •  Lutein 20 MG tablet, Take 1 tablet by mouth Daily., Disp: , Rfl:   •  meloxicam (MOBIC) 7.5 MG tablet, Take 1 tab po qd prn pain or inflammation, Disp: 30 tablet, Rfl: 5  •  Multiple Vitamins-Minerals (ICAPS AREDS 2) capsule, Take 1 capsule by mouth Daily., Disp: , Rfl:   •  Omega-3 Fatty Acids (FISH OIL) 1200 MG capsule capsule, Take 1,200 mg by mouth Daily., Disp: , Rfl:   •  raNITIdine (ZANTAC) 150 MG tablet, 1 tab po qd prn stomach acid or acid reflux, Disp: 90 tablet, Rfl: 3  •  SYNTHROID 88 MCG tablet, Take 1 tab po qam fasting and wait 1hr for food or other meds. BRAND ONLY., Disp: 90 tablet, Rfl: 3  •  vitamin C (ASCORBIC ACID) 500 MG tablet, Take 500 mg by mouth Daily., Disp: , Rfl:   •  vitamin E 400 UNIT capsule, Take 400 Units by mouth Daily., Disp: , Rfl:     Objective     /66 (BP Location: Left arm, Patient Position: Sitting, Cuff Size: Adult)   Pulse 76   Temp 97.4 °F (36.3 °C) (Temporal)   Resp 18   Wt 55.3 kg (122 lb)   LMP  (LMP Unknown) Comment: postmenopausal  SpO2 98%   BMI 22.31 kg/m²     Physical Exam   Constitutional: She is oriented to person, place, and  "time. She appears well-developed and well-nourished.   HENT:   Right Ear: Tympanic membrane and ear canal normal.   Left Ear: Tympanic membrane and ear canal normal.   Mouth/Throat: Oropharynx is clear and moist.   Eyes: Conjunctivae and EOM are normal. Pupils are equal, round, and reactive to light.   Neck: No thyromegaly present.   Cardiovascular: Normal rate and regular rhythm.   Pulmonary/Chest: Effort normal and breath sounds normal.   Neurological: She is alert and oriented to person, place, and time.   Skin: Skin is warm and dry.   Psychiatric: She has a normal mood and affect.   Vitals reviewed.      Assessment/Plan   Barbara was seen today for follow-up.    Diagnoses and all orders for this visit:    Peripheral polyneuropathy  -     gabapentin (NEURONTIN) 100 MG capsule; Take 1 capsule by mouth every night at bedtime.    Irritable bowel syndrome, unspecified type  -     dicyclomine (BENTYL) 10 MG capsule; Take 1 capsule by mouth every 8 hours as needed for bowel spasm or diarrhea        1. GI- IBS- will continue current OTC options. Will keep bentyl on hand for \"as needed\" use.  2. RESP- allergies- ok to stop abx now and resume her allergy meds.   3. RECHECK- with Dr Ham.        "

## 2019-04-10 ENCOUNTER — TELEPHONE (OUTPATIENT)
Dept: INTERNAL MEDICINE | Facility: CLINIC | Age: 69
End: 2019-04-10

## 2019-04-10 NOTE — TELEPHONE ENCOUNTER
PATIENT CALLED BACK ABOUT HER BACK PAIN AND WANTING TO GET AN ORDER FOR PHYSICAL THERAPY. I SCHEDULED HER WITH [facility] IF THAT'S OKAY?

## 2019-04-10 NOTE — TELEPHONE ENCOUNTER
PT WOULD LIKE FOR YOU TO GIVE HER A CALL, SHE IS WANTING AN ORDER TO GO TO PT TOMORROW FOR HER BACK. SHE SAYS SHE HAS SOMEONE AVAILABLE TO SCHEDULE HER TOMORROW.

## 2019-04-11 ENCOUNTER — TREATMENT (OUTPATIENT)
Dept: PHYSICAL THERAPY | Facility: CLINIC | Age: 69
End: 2019-04-11

## 2019-04-11 ENCOUNTER — OFFICE VISIT (OUTPATIENT)
Dept: INTERNAL MEDICINE | Facility: CLINIC | Age: 69
End: 2019-04-11

## 2019-04-11 VITALS
WEIGHT: 117 LBS | RESPIRATION RATE: 20 BRPM | TEMPERATURE: 98.6 F | BODY MASS INDEX: 21.53 KG/M2 | HEIGHT: 62 IN | HEART RATE: 79 BPM | SYSTOLIC BLOOD PRESSURE: 116 MMHG | DIASTOLIC BLOOD PRESSURE: 76 MMHG | OXYGEN SATURATION: 98 %

## 2019-04-11 DIAGNOSIS — M54.50 ACUTE MIDLINE LOW BACK PAIN WITHOUT SCIATICA: ICD-10-CM

## 2019-04-11 DIAGNOSIS — M19.90 ARTHRITIS: ICD-10-CM

## 2019-04-11 DIAGNOSIS — M53.3 SACROILIAC JOINT DYSFUNCTION: Primary | ICD-10-CM

## 2019-04-11 DIAGNOSIS — M79.7 FIBROMYALGIA: Primary | ICD-10-CM

## 2019-04-11 DIAGNOSIS — R39.9 URINARY SYMPTOM OR SIGN: ICD-10-CM

## 2019-04-11 LAB
BILIRUB BLD-MCNC: NEGATIVE MG/DL
CLARITY, POC: CLEAR
COLOR UR: YELLOW
GLUCOSE UR STRIP-MCNC: NEGATIVE MG/DL
KETONES UR QL: NEGATIVE
LEUKOCYTE EST, POC: ABNORMAL
NITRITE UR-MCNC: NEGATIVE MG/ML
PH UR: 5 [PH] (ref 5–8)
PROT UR STRIP-MCNC: NEGATIVE MG/DL
RBC # UR STRIP: NEGATIVE /UL
SP GR UR: 1.02 (ref 1–1.03)
UROBILINOGEN UR QL: NORMAL

## 2019-04-11 PROCEDURE — 97164 PT RE-EVAL EST PLAN CARE: CPT | Performed by: PHYSICAL THERAPIST

## 2019-04-11 PROCEDURE — 99214 OFFICE O/P EST MOD 30 MIN: CPT | Performed by: NURSE PRACTITIONER

## 2019-04-11 PROCEDURE — 81003 URINALYSIS AUTO W/O SCOPE: CPT | Performed by: NURSE PRACTITIONER

## 2019-04-11 PROCEDURE — 87086 URINE CULTURE/COLONY COUNT: CPT | Performed by: NURSE PRACTITIONER

## 2019-04-11 PROCEDURE — 97035 APP MDLTY 1+ULTRASOUND EA 15: CPT | Performed by: PHYSICAL THERAPIST

## 2019-04-11 RX ORDER — MELOXICAM 7.5 MG/1
TABLET ORAL
Qty: 30 TABLET | Refills: 3 | Status: SHIPPED | OUTPATIENT
Start: 2019-04-11

## 2019-04-11 NOTE — PROGRESS NOTES
"Subjective   Barbara Hernandez is a 68 y.o. female here today for back pain    Chief Complaint   Patient presents with   • Back Pain     lot of drivng for the last 4 days. would like PT order @ [facility]     Barbara is here today with a 4-day history of low back soreness and pain.  She reports that she has had a long history of low back pain that she has seen physical therapy for, this is been very helpful for her in the past.  She reports that she was recently driving on a long trip, this is the first time that she is driven this long in a very long time, and this seems to have exacerbated her low back pain.  She has been taking meloxicam, using ice and heat, and Voltaren gel.  She reports that nothing is really \"kicking it. \"She also reports that she is prone to UTIs-in the past usually has dysuria and pelvic pain however she is not having this today.  She does report fatigue however no fever/ chills.  She does report nausea associated with the pain.    Review of Systems   Constitutional: Negative for activity change, fatigue, fever, unexpected weight gain and unexpected weight loss.   Eyes: Negative for photophobia and visual disturbance.   Respiratory: Negative for cough and shortness of breath.    Cardiovascular: Negative for chest pain and leg swelling.   Gastrointestinal: Positive for nausea. Negative for abdominal pain, anal bleeding, constipation, diarrhea, vomiting and GERD.   Genitourinary: Negative for difficulty urinating, dysuria, flank pain, hematuria, pelvic pain and urgency.   Musculoskeletal: Positive for back pain. Negative for gait problem, joint swelling, myalgias and neck pain.   Skin: Negative for rash and skin lesions.   Neurological: Negative for dizziness, tremors, weakness, numbness and headache.   Hematological: Negative for adenopathy. Does not bruise/bleed easily.       Past Medical History:   Diagnosis Date   • ETD (eustachian tube dysfunction)    • Fibromyalgia    • Fibromyositis    • " GERD (gastroesophageal reflux disease)    • H/O cardiomyopathy    • HLD (hyperlipidemia)    • Hypothyroidism    • IBS (irritable bowel syndrome)    • Lichen sclerosus et atrophicus of the vulva    • Menopause    • Osteoarthritis    • Osteopenia    • Preventative health care     · Age-appropriate counseling discussed. Will notify patient of lab result     Family History   Problem Relation Age of Onset   • Osteoarthritis Mother          A-[id number]   • Hypothyroidism Mother    • Alzheimer's disease Father    • Hyperlipidemia Brother    • Hypertension Brother    • Stroke Paternal Grandmother    • Heart attack Maternal Grandmother      Past Surgical History:   Procedure Laterality Date   • CRYOTHERAPY      cervix   • EYE SURGERY Right     cyst removal right eyelid   • SEPTOPLASTY     • TOTAL HIP ARTHROPLASTY      2010 total right hip Dr. Lovelace; 2012 total Left hip     Social History     Socioeconomic History   • Marital status:      Spouse name: Not on file   • Number of children: Not on file   • Years of education: Not on file   • Highest education level: Not on file   Tobacco Use   • Smoking status: Former Smoker   • Smokeless tobacco: Never Used   • Tobacco comment: 19 YR PACK HISTORY   Substance and Sexual Activity   • Alcohol use: Yes     Comment: SOCIAL   • Drug use: No   • Sexual activity: No     Birth control/protection: Post-menopausal         Current Outpatient Medications:   •  acetaminophen (TYLENOL) 325 MG tablet, Take 650 mg by mouth every night at bedtime., Disp: , Rfl:   •  aspirin 81 MG EC tablet, Take 81 mg by mouth 3 (Three) Times a Week., Disp: , Rfl:   •  CBD (cannabidiol) oral oil, Take  by mouth., Disp: , Rfl:   •  Cholecalciferol (VITAMIN D3) 3000 UNITS tablet, Take  by mouth., Disp: , Rfl:   •  Coenzyme Q10 (CO Q-10) 100 MG capsule, Take 400 mg by mouth., Disp: , Rfl:   •  diclofenac (VOLTAREN) 1 % gel gel, Apply 1 application topically As Needed., Disp: , Rfl:   •  dicyclomine (BENTYL) 10 MG  "capsule, Take 1 capsule by mouth every 8 hours as needed for bowel spasm or diarrhea, Disp: 30 capsule, Rfl: 0  •  estradiol (VAGIFEM) 10 MCG tablet vaginal tablet, Insert 1 tablet into the vagina 3 (Three) Times a Week., Disp: 12 tablet, Rfl: 12  •  gabapentin (NEURONTIN) 100 MG capsule, Take 1 capsule by mouth every night at bedtime., Disp: 30 capsule, Rfl: 5  •  Lutein 20 MG tablet, Take 1 tablet by mouth Daily., Disp: , Rfl:   •  Multiple Vitamins-Minerals (ICAPS AREDS 2) capsule, Take 1 capsule by mouth Daily., Disp: , Rfl:   •  Omega-3 Fatty Acids (FISH OIL) 1200 MG capsule capsule, Take 1,200 mg by mouth Daily., Disp: , Rfl:   •  raNITIdine (ZANTAC) 150 MG tablet, 1 tab po qd prn stomach acid or acid reflux, Disp: 90 tablet, Rfl: 3  •  SYNTHROID 88 MCG tablet, Take 1 tab po qam fasting and wait 1hr for food or other meds. BRAND ONLY., Disp: 90 tablet, Rfl: 3  •  vitamin C (ASCORBIC ACID) 500 MG tablet, Take 500 mg by mouth Daily., Disp: , Rfl:   •  vitamin E 400 UNIT capsule, Take 400 Units by mouth Daily., Disp: , Rfl:   •  meloxicam (MOBIC) 7.5 MG tablet, TAKE 1 TABLET EVERY DAY AS NEEDED FOR PAIN AND INFLAMMATION, Disp: 30 tablet, Rfl: 3    Objective   Vitals:    04/11/19 1015   BP: 116/76   Pulse: 79   Resp: 20   Temp: 98.6 °F (37 °C)   TempSrc: Temporal   SpO2: 98%   Weight: 53.1 kg (117 lb)   Height: 157.5 cm (62\")     Body mass index is 21.4 kg/m².  Physical Exam   Constitutional: She is oriented to person, place, and time. She appears well-developed and well-nourished. No distress.   Pulmonary/Chest: Effort normal. No accessory muscle usage. No respiratory distress.   Abdominal: Soft. She exhibits no distension. There is no tenderness. There is no CVA tenderness.   Musculoskeletal: Normal range of motion.   Neurological: She is alert and oriented to person, place, and time.   Skin: No erythema. No pallor.   Psychiatric: She has a normal mood and affect. Her speech is normal and behavior is normal. " Judgment and thought content normal.   Nursing note and vitals reviewed.      Assessment/Plan   Problem List Items Addressed This Visit        Nervous and Auditory    Fibromyalgia - Primary    Relevant Orders    Ambulatory Referral to Physical Therapy Evaluate and treat       Musculoskeletal and Integument    Arthritis    Relevant Medications    meloxicam (MOBIC) 7.5 MG tablet    Other Relevant Orders    Ambulatory Referral to Physical Therapy Evaluate and treat      Other Visit Diagnoses     Acute midline low back pain without sciatica        Relevant Orders    Ambulatory Referral to Physical Therapy Evaluate and treat    POCT urinalysis dipstick, automated (Completed)    Urine Culture - Urine, Urine, Clean Catch    Urinary symptom or sign        Relevant Orders    Urine Culture - Urine, Urine, Clean Catch        Barbara was seen today for back pain.    Diagnoses and all orders for this visit:    Fibromyalgia  -     Ambulatory Referral to Physical Therapy Evaluate and treat    Arthritis  -     Ambulatory Referral to Physical Therapy Evaluate and treat    Acute midline low back pain without sciatica  -     Ambulatory Referral to Physical Therapy Evaluate and treat  -     POCT urinalysis dipstick, automated  -     Urine Culture - Urine, Urine, Clean Catch; Future  -     Urine Culture - Urine, Urine, Clean Catch    Urinary symptom or sign  -     Urine Culture - Urine, Urine, Clean Catch; Future  -     Urine Culture - Urine, Urine, Clean Catch    UA shows only 25 leuks, will send for culture and treat accordingly.  For now we will treat low back pain as acute on chronic and refer her to physical therapy.  Advised that should she develop any urinary symptoms or fever to call the clinic and will prescribe antibiotic, otherwise will wait for culture results to see if she truly has a UTI         The patient was counseled regarding diagnostic results, impressions, prognosis, instructions for management, risk factor  reductions, education, and importance of treatment compliance.  The patient verbalized understanding of and agreement with the plan of care.    Advised patient to call with any further questions and any new or worsening symptoms.     Return if symptoms worsen or fail to improve.      Wanda York, APRN

## 2019-04-11 NOTE — PROGRESS NOTES
"Physical Therapy Re-Evaluation      SUBJECTIVE:   Changes since last seen:  Barbara reports she had been doing very well self managing her low back pain, incorporating all previous instruction into her ADLs, modifying her movements, and was even been able to work in her garden without discomfort, until sast Tues when she took a long car trip to [facility]. Then she sat all day in a theater for the [employer] dance competition.  The theater had 3 levels with no elevator.  She had to go up and down the stairs a lot that day.  She thought she was managing well, used handrail and drove back to [place] that day. She had no significant pain.  Then drove back to [place] the next day.  She was doing well on Sunday, so she did normal things around the house. On Monday she was fatigued but doing ok.  She woke up Tues and could barely walk. She complains of pain in right buttock and gr trochanter. She started using ice and Mobic.  Yesterday, she took another Mobic and used heat.  She saw her PCP this am who took a urine specimen and referred her here.    \"When I start to feel like I'm going to feel pain, I get a little nauseated.\"  \"Also have a little tingling in entire right foot, which I have had for several years.\"   \"My right hip bursitis is bad this time.\"    Current level of pain:      1/10  Right buttock/gr troch               Lowest level of pain:                0/10     Highest level of pain:     5/10  When moves                    Functional Limitations:  Sitting, standing, walking, stairs, driving and sleeping  Patient goals for Physical Therapy:  Improve discomfort, review ways of movement        OBJECTIVE:   Slight left lateral shift of pelvis in stance.     Palpation:    Monthly Objective Measurement/Functional Activity  Date: 06/06/17 01/09/18 04/11/19       Oswestry Pain Score        46/100       LE Functional Scale                      AROM Lumbar Spine: Degrees   Degrees      Degrees      Degrees      " Degrees      Degrees      Degrees    Degrees      Flexion 103          88           102          Extension 17           29           15          Right Lat. Flexion 25           34            36          Left Lat. Flexion 20           30            34          Right Lat Shift Pelvis No change   No change  No change          Left Lat Shift Pelvis Pain/stiff  No change   No change                  AROM Hips:  (degrees) Right      Left Right Left Right  Left Right  Left Right  Left Right  Left Right  Left Right  Left      Flexion 115     112  110    114     115      118          Abduction 45       45 45       45        40       45          Int. Rotation 40      38  38      37    18       20          Ext. Rotation  45       45  40      38        35       40                  Flexibility:   (degrees) Right    Left Right  Left Right  Left Right  Left Right  Left Right  Left Right  Left Right  Left      Hamstrings -28       -26 -29       -30     -35     -30          Quadriceps 55       50  Not tested      45       48          HipExt/Rot(piriformis) 45       39 38        37     35       40          Hip Int/Rotators 40       38 39         36    35       38          Hip Flexors (iliopsoas) Not tested       Not tested  Not tested          IT Band Not tested      Not tesetd  Not tested                  Reflexes: Right      Left Right  Left Right  Left Right  Left Right  Left Right  Left Right  Left Right  Left      L4 (Quad) -       -   -         -            S1 (Achilles) -       -   -          -                    Root Level  - Motor Right      Left Right  Left Right  Left Right  Left Right  Left Right  Left Right  Left Right  Left     T12             Rectus Abdominus 4+/5         4+/5      4+/5          L1              Paraspinals 5/5      5/5 5/5         5/5  5/5      5/5          L2              Hip Flexion 5/5      5/5 5/5        5/5  5/5     5/5          L3             Quads 5/5      5/5 5/5        5/5  5/5     5/5           L4              Anterior Tibialis 5/5 5/5 5/5 5/5 5/5 5/5          L5             Gr. Toe Extension Not tested Not tested    5/5 5/5          S1            Gastroc/Sol/Peroneals Not tested Not tested   5/5 5/5                  Functional Strength:             Single LegStanceTime (seconds) R:-  L:-   R:      L: R:      L: R:9     L:30 R:      L: R:      L: R:      L: R:      L:     Pelvic Floor Isolation (seconds) 10 sec   10 sec       10 sec          Inner Unit  Isolation (seconds) 10 sec 10 sec    10 sec                   Other Functional Outcome Tests               LE Functional Scale               30 Second Chair Rise Test      x9 pain right SI jt            Timed Up & Go, 9.8 feet                  (fall risk if > 11.3 seconds)               Toe Tap Test                (Young 47 taps, Older 34 taps)                      Functional Activities:              Sit w/o pain? Pain increases (minutes) Yes/ 30 min Yes/ 30-60  No/ 60 min          Stand w/o pain? Yes/ 15 min Yes 15-30  No/ 20 min          Walk w/o pain? Yes/ 10-15 min Yes 15-30  Yes/ 60 min          Steps w/o pain? Yes/ 1 fl Yes / 1 fl  No/ 1 fl          Drive w/o pain? Yes/ pain getting in and out  Yes/ pain getting out  No/ 30 min          Work w/o pain? n/a    n/a  n/a          # times wakes w/ pain? 0x       0x   3x            Muscle/Bone Irritation   10/04/16 10/27/16  12/01/16    12/15/16 01/03/17 01/19/17 02/02/17   06/06/17  01/09/18  04/11/19          Right  Left Right   Left Right Left Right  Left Right  Left Right  Left Right  Left Right Left Right Left  Right Left         Piriformis    +         +  slight  slight Slight slight  slight slight slight   slight   Slight   ++  sl        Sl             Gr. Trochanter    +         +    -            -        -           +   sl    +           ++         IT Band    -           -         -       slight   +     Sl          Sl          Quadr. Lumborum    -           -             -           -         Ischial Tuberosity    -           -            -           -         Sacrococcygeal Ligs    -          -            -            -         Paraspinals    -           -   +                      +                 +     +     -           -         Spinous Processes                           T rotated to     -           -                          L rotated to  L3-5  L1-5 L4-5             L4-5             L3-5   L2-5  L2-5 L3-5     -           -         Adductor Fabio    -             -                   Iliopsoas    -              -            +            +         Quad Origin    -             -            -           -         SI Joint   ++         +   ++       ++   ++        -     +          -   Slight    +    -            -    +          +  ++        +  ++    +        ++         Pubic Symphysis  not tested                  +          -                                                 Left thumb CMC                    +                 +                  +                   10/04/16 10/27/16  12/01/16 12/15/16 01/03/17 01/19/17 02/02/17 06/06/17   01/09/18  04/11/19         SI Joint Positioning Right   Left Right    Left Right Left   Right  Left Right  Left  Right  Left Right  Left Right Left Right Left  Right left             Innominate      Symmetrical!                     Anterior                x                 x              x                x                x                 x     x                x                x                Posterior     x     x      x     x     x      x                 x   x     x            Sacrum                              Unilateral rotation     x     x      x     x     x Neutral       x    X     x     x                                SI Joint Testing Right   Left Right    Left Right  Left Right  Left Right  Left Right  Left Right  Left Right Left Right left  Right Left                 Distraction    +           +    +           +  slight slight    -          "-  Slight  slight      +         +   +          +    +           +                 Beatriz's    -            -    -            -    -            -          Sl          Sl                  Compression    -            -    -            -    -            -          -           -                 Prone Press Up    -            -    -            -   -             -          -             -                             Special Tests  Right   Left Right    Left  Right Left Right  Left Right  Left Right  Left Right  Left Right Left  Right Left  Right Left            Straight Leg Raise                                            Active     -         -    -           -    -         -        -          -   -         -    -          -                 Passive    -         -      -           -     -         -        -          -   -          -    -          -             Hip Scour Test    -          -     -           -     -          -          -          -                                                     ASSESSMENT:  Problem List:  1.  Flare-up SI joint dysfunction, secondary piriformis and gr trochanteric bursa irritation      Discussion:   It appears the long car trip and excessive stair climbing flared up her SI joint dysfunction.  She was surprised she did not have symptoms until her return home, but reminded her that if she sat with increased lumbar flexion, causing increased posterior lumbar disc pressures then she would've laterally shifted her pelvis to \"get away from\" any disc bulging and thus she would not have any symptoms.  However, after a couple of days of the pelvis being laterally shifted, this would have put strain on the SI ligaments causing the SI joint symptoms to appear a couple of days after the increased lumbar disc pressures.  Surprisingly, although she felt like this caused a significant flare up of the gr trochanteric bursitis, it was only slightly aggravated.  She had been icing this before she arrived " today.   Reviewed the SI joint and lumbar facet self correction techniques to eliminate SI joint pain.  She was also in need of a review of the lifestyle activities to decrease lumbar disc pressures.  She remembered how to do all of this with the review done here in the clinic today.     Manual work, including myofascial work and stretching, facilitated pain relief.  She had no pain by the end of today's visit.         Short Term Goals: (4-6 weeks)                               Date Met:                1.   pt independent in postural correction  2.   pt independent in SI joint self-corrections  3.   pt independent in exer to decrease post. disc pressures  4.   pt able to sleep through night without pain  5.   pt able to sit 30 minutes without pain  6.   Reduce pt pain to no greater than 2/10  7.   Decrease Oswestry Pain Score to no greater than 36/100    Long Term Goals:(3-5 months)      Date Met:      1.  pt independent in self-management of symptoms       2.  pt independent in home program      3.  Decrease Oswestry Pain Score to no greater than 26/100      4.  pt able to sit 60  minutes without pain          Treatment Plan:   1.  Ultrasound to increase extensibility, decrease pain, if needed  2.  Electrical stimulation for muscle relaxation, decrease pain, if needed, iontophoresis  3.  Manual therapy to increase function, decrease pain  4.  Therapeutic exercise to increase function, decrease pain  5.  Home programming and d/c planning to increase function and decrease pain    Frequency & Duration:  No plan to see pt at this time as she feels like she will be able to self manage her symptoms again after today's treatment and review. However, will be happy to see her on a once/week basis for 8 more visits if she realizes that she is not able to self manage her symptoms.     Patient Participated in and Agrees With This Plan of Care and Goals:  YES  Rehab Potential:  martha Chatterjee, PT, MS  Physical  Therapist  [place]# [id number]      Initial Certification  Certification Period: 04/11/19 through 07/11/19  I certify that the therapy services are furnished while this patient is under my care.  The services outlined above are required by this patient, and will be reviewed every 90 days.     PHYSICIAN:     DATE:     Please sign and return via fax to [facility] [place] [address] Fax # [phone]. Thank you, [facility].              TREATMENT TODAY:  Total Treatment Time:   (min in clinic):   90  Timed Code Treatment Minutes:   90  RE-Evaluation: 99978    Manual Therapy.  (Man)  RX min:   30  Manual posterior rotation of the left innominate  Mobs of left hip  PIC right iliopsoas  PIC multifidus  Quadratus lumborum stretching  Piriformis stretching  Myofascial work of LE and trunk  Rib mobs  Myofascial work /trunk    Therapeutic Activities:  (TA)  RX min:   15  Neuromuscular Reeducation:  (NMR)  Rx min:      Ultrasound:  RX min:  15     1.6 piper/cm2       Location:  Bilateral piriformis  Supplies given:  -     Procedures:  Code Procedure/Instruction 09/27/16 10/04/16 10/27/16 12/01/16 12/15/16 01/03/17 01/19/17 02/02/17  06/06/17  04/11/19     TA            Sleeping Positions                  reviewed               TA Sternum-Up Posture    review             TA SI jt. self-correction          reviewed  reviewed     TA Use of lumbar pillow reviewed                TA Use of cervical roll reviewed                TA Use of orthotics                 TA Use of SI belt                 TA Use of Nada chair                 TA Lumb Facet PIC          reviewed       TA Order of Self-Correct          reviewed       TA Decreasing Disc Pressures            reviewed     NMR Pelvic Floor Isolation                 NMR Transverse Abdominus                 NMR Multifidus Isolation                 NMR InnerUnit agns [place]                 NMR Sit-stand w/ inner unit                 NMR Roll w/ inner unit                  NMR Walk w/ inner unit                  NMR Up/down steps w/ inner unit                 NMR Water Exer Instruction                 NMR Hip Abd w/o periformis                 NMR Hip Abd w/o iliop/ham                  NMR Lower abs in supine                 NMR Abd obliques in supine                 NMR Prone Knee Flex                 NMR Prone glut max                 NMR Retro Step                 NMR Retro Walk                 NMR Forward walk w/ inner unit                 NMR Balance Instruction                 NMR Ball sit A/P & Lateral                 NMR Ball Catch/Core/Supine                 NMR Ball Catch/Core/Stand                 NMR Ball All 4's Arm Life                 NMR Ball Prone Walk Out                 NMR Ball Supine Obliques                 NMR Ball sit-supine-sit                 NMR Walking Prog Progress                 TA Home prg sequencing                 TA Hamstring stretch                 TA Hip Ext Rot Stretch                 TA Hip Int Rot Stretch                 TA Hip Flex/IT Stretch                 TA Hip Add Stretch                 TA Lats Dorsi Stretch                 TA Gastric/soleus stretch                 TA QuadLumbormStretch                 TA Quad Stretch                 NMR Lateral Bridge Drop                 NMR Waiters [place]                 NMR O'Feldt Lat Pull Down                 NMR O'Feldt Hip Ext                 NMR O'Feldt Trunk Ext                 TA CervDiscExtSup/stnd   instructed              TA Cerv Stretches  instructed               TA Neural Gliding                 NMR Ant/Mid Scalene Strch                 NMR Prone Arm Lifts       instructed reviewed         NMR Scapula Stabilization                 NMR Occulomotor Isometrics                 NMR Cervical Isometrics                 TA Ant. Chest Stretch                 TA Standing Wall Slides                 TA Rotator Cuff Stretch  instructed reviewed corrected reviewed            TA Rotator  Cuff Theraband     instructed reviewed reviewed          TA ShldAROM w/bar instructed reviewed reviewed  reviewed reviewed  reviewed         TA Biceps Stretch  instructed reviewed corrected  reviewed reviewed reviewed         TA SelfPICThorSpine                 TA   Ant Scalene stretch  instructed reviewed              TA Icing shoulder  instructed reviewed reviewed             TA [employer]macare for upper trap   instructed              TA Sidelying rot cuff strengthen   instructed                                                                                                                              Karen Chatterjee, PT, MS  Physical Therapist  [place]# [id number]

## 2019-04-12 LAB — BACTERIA SPEC AEROBE CULT: NO GROWTH

## 2019-05-21 ENCOUNTER — TREATMENT (OUTPATIENT)
Dept: PHYSICAL THERAPY | Facility: CLINIC | Age: 69
End: 2019-05-21

## 2019-05-21 DIAGNOSIS — M53.3 SACROILIAC JOINT DYSFUNCTION: Primary | ICD-10-CM

## 2019-05-21 PROCEDURE — 97530 THERAPEUTIC ACTIVITIES: CPT | Performed by: PHYSICAL THERAPIST

## 2019-05-21 PROCEDURE — 97140 MANUAL THERAPY 1/> REGIONS: CPT | Performed by: PHYSICAL THERAPIST

## 2019-05-21 NOTE — PROGRESS NOTES
"Physical Therapy Note      SUBJECTIVE:   Changes since last seen:  She went to her daughter's in [place], slept in soft bed, parties and graduation for grand daughter, had to sit in bleachers for a long time.  She used her TENS unit the whole time and the electrodes irritated her back. \" It was my typical low back pain.\"   She reports she feels like she is putting more weight on her right leg than left.   Left lateral upper intercostal muscles really painful the last month, only hurt when poke, eat or lift something or lay on either side.   \"I feel worn out and beat up!'     Barbara's going back to [place] next week and will sleep on a firmer mattress.  She is worried about the drive again and the stairs at her daughters stairs which are steep and hardwood.     Also c/o left supraspinatus pain.   Has appointment with cardiologist this Friday as she is feeling fatigued and light headed.  She also complains of pain when palpate the left upper lateral ribs.        Current level of pain:      1/10  Right buttock/gr troch        Left lateral intercostal  1/10     Continued numbness and tingling right later thigh to foot  (been there 4 yrs worsens if back pain increased)    Lowest level of pain:                0/10        1/10       Doesn't have tingling when laying down.   Highest level of pain:     2/10          3-4/10               Functional Limitations:  Sitting, standing, walking, stairs, driving and sleeping  Patient goals for Physical Therapy:  Improve discomfort, review ways of movement        OBJECTIVE:   Slight left lateral shift of pelvis in stance.     Muscle/Bone Irritation   10/04/16 10/27/16  12/01/16    12/15/16 01/03/17 01/19/17 02/02/17   06/06/17  01/09/18  04/11/19 05/21/19         Right  Left Right   Left Right Left Right  Left Right  Left Right  Left Right  Left Right Left Right Left  Right Left Right Left        Piriformis    +         +  slight  slight Slight slight  slight slight slight   " slight   Slight   ++  sl        Sl       +           +        Gr. Trochanter    +         +    -            -        -           +   sl    +           ++  ++         +        IT Band    -           -         -       slight   +     Sl          Sl    +          s;        Quadr. Lumborum    -           -            -           -   Sl       sl        Ischial Tuberosity    -           -            -           -    +         -        Sacrococcygeal Ligs    -          -            -            -   -          -        Paraspinals    -           -   +                      +                 +     +     -           -   -           -        Spinous Processes                           T rotated to     -           -                          L rotated to  L3-5  L1-5 L4-5             L4-5             L3-5   L2-5  L2-5 L3-5     -           -   -          -        Adductor Fabio    -             -                   Iliopsoas    -              -            +            +         Quad Origin    -             -            -           -         SI Joint   ++         +   ++       ++   ++        -     +          -   Slight    +    -            -    +          +  ++        +  ++    +        ++   ++       -        Pubic Symphysis  not tested                  +          -         Lateral upper intercostal muscles               Sl         +                            Left thumb CMC                    +                 +                  +                   10/04/16 10/27/16  12/01/16 12/15/16 01/03/17 01/19/17 02/02/17 06/06/17   01/09/18  04/11/19 05/21/19        SI Joint Positioning Right   Left Right    Left Right Left   Right  Left Right  Left  Right  Left Right  Left Right Left Right Left  Right left Right Left            Innominate      Symmetrical!                     Anterior                x                 x              x                x                x                 x     x                x                x              x                Posterior     x     x      x     x     x      x                 x   x     x    x           Sacrum                              Unilateral rotation     x     x      x     x     x Neutral       x    X     x     x    x                               SI Joint Testing Right   Left Right    Left Right  Left Right  Left Right  Left Right  Left Right  Left Right Left Right left  Right Left Right Left                Distraction    +           +    +           +  slight slight    -         -  Slight  slight      +         +   +          +    +           +   +         +                Beatriz's    -            -    -            -    -            -          Sl          Sl   sl        sl                Compression    -            -    -            -    -            -          -           -                 Prone Press Up    -            -    -            -   -             -          -             -                             Special Tests  Right   Left Right    Left  Right Left Right  Left Right  Left Right  Left Right  Left Right Left  Right Left  Right Left            Straight Leg Raise                                            Active     -         -    -           -    -         -        -          -   -         -    -          -                 Passive    -         -      -           -     -         -        -          -   -          -    -          -             Hip Scour Test    -          -     -           -     -          -          -          -                                                       Palpation:    Monthly Objective Measurement/Functional Activity  Date: 06/06/17 01/09/18 04/11/19 05/21/19      Oswestry Pain Score        46/100   46/100      LE Functional Scale                      AROM Lumbar Spine: Degrees   Degrees      Degrees      Degrees      Degrees      Degrees      Degrees    Degrees      Flexion 103          88           102     105         Extension 17           29           15        22         Right Lat. Flexion 25           34            36      30         Left Lat. Flexion 20           30            34      32         Right Lat Shift Pelvis No change   No change  No change No pain         Left Lat Shift Pelvis Pain/stiff  No change   No change  no pain                  AROM Hips:  (degrees) Right      Left Right Left Right  Left Right  Left Right  Left Right  Left Right  Left Right  Left      Flexion 115     112  110    114     115      118   115      120         Abduction 45       45 45       45        40       45 40         45         Int. Rotation 40      38  38      37    18       20 20         20         Ext. Rotation  45       45  40      38        35       40  35        35                  Flexibility:   (degrees) Right    Left Right  Left Right  Left Right  Left  Right  Left Right  Left Right  Left Right  Left      Hamstrings -28       -26 -29       -30     -35     -30 -30        -30         Quadriceps 55       50  Not tested      45       48 50         50         HipExt/Rot(piriformis) 45       39 38        37     35       40 40        40         Hip Int/Rotators 40       38 39         36    35       38  35         35         Hip Flexors (iliopsoas) Not tested       Not tested  Not tested Not tested         IT Band Not tested      Not tesetd  Not tested Not tested                 Reflexes: Right      Left Right  Left Right  Left Right  Left Right  Left Right  Left Right  Left Right  Left      L4 (Quad) -       -   -         -            S1 (Achilles) -       -   -          -                    Root Level  - Motor Right      Left Right  Left Right  Left Right  Left Right  Left Right  Left Right  Left Right  Left     T12             Rectus Abdominus 4+/5         4+/5      4+/5          4+/5         L1              Paraspinals 5/5      5/5 5/5         5/5  5/5      5/5          L2              Hip Flexion 5/5      5/5 5/5        5/5  5/5     5/5          L3             Quads 5/5       "5/5 5/5 5/5 5/5     5/5          L4              Anterior Tibialis 5/5 5/5 5/5 5/5 5/5 5/5          L5             Gr. Toe Extension Not tested Not tested    5/5 5/5          S1            Gastroc/Sol/Peroneals Not tested Not tested   5/5 5/5                  Functional Strength:             Single LegStanceTime (seconds) R:-  L:-   R:      L: R:      L: R:9     L:30 R:      L: R:      L: R:      L: R:      L:     Pelvic Floor Isolation (seconds) 10 sec   10 sec       10 sec  > 10 sec         Inner Unit  Isolation (seconds) 10 sec 10 sec    10 sec  > 10 sec                 Other Functional Outcome Tests               LE Functional Scale               30 Second Chair Rise Test      x9 pain right SI jt            Timed Up & Go, 9.8 feet                  (fall risk if > 11.3 seconds)               Toe Tap Test                (Young 47 taps, Older 34 taps)                      Functional Activities:              Sit w/o pain? Pain increases (minutes) Yes/ 30 min Yes/ 30-60  No/ 60 min Yes/ 20-30         Stand w/o pain? Yes/ 15 min Yes 15-30  No/ 20 min Yes/ 2 min          Walk w/o pain? Yes/ 10-15 min Yes 15-30  Yes/ 60 min Yes/ 15min          Steps w/o pain? Yes/ 1 fl Yes / 1 fl  No/ 1 fl Yes/ 1 fl          Drive w/o pain? Yes/ pain getting in and out  Yes/ pain getting out  No/ 30 min Yes/ 30-60 min         Work w/o pain? n/a    n/a  n/a n/a         # times wakes w/ pain? 0x       0x   3x      2x                   ASSESSMENT:  Problem List:  1.  Flare-up SI joint dysfunction, secondary piriformis and gr trochanteric bursa irritation      Discussion:   Went over how to use pillow as arm rests as a  and as a passenger.   Went over how to use a Nada chair if she gets in a situation where she has to sit in a \"bad chair\" or on bleachers.  She was surprised how much this relieved strain on low back.     There is a cyst like stsructure in left, upper, lateral ribs, almost up in " axilla.  Maribel notes she does have dense irritated breast tissue secondary to fibrocystic breast disease, but intercostal appears to be pain  from coughing.   She is scheduled to she her GYN and will have this evaluated on Jun 4.  Stretching of the QLs did not have any impact on her intercostal rib pain.  It appears this has been aggravated by her coughing.     Went over how to brace her self for when she coughs and sneezes to not aggravate her lumbar disc symptoms.     By the end of today's visit, she had minimal to no pain.      Short Term Goals: (4-6 weeks)                               Date Met:                1.   pt independent in postural correction     05/21/19  2.   pt independent in SI joint self-corrections    05/21/19  3.   pt independent in exer to decrease post. disc pressures  05/21/19  4.   pt able to sleep through night without pain  5.   pt able to sit 30 minutes without pain  6.   Reduce pt pain to no greater than 2/10  7.   Decrease Oswestry Pain Score to no greater than 36/100    Long Term Goals:(3-5 months)      Date Met:      1.  pt independent in self-management of symptoms       2.  pt independent in home program      3.  Decrease Oswestry Pain Score to no greater than 26/100      4.  pt able to sit 60  minutes without pain          Treatment Plan:   1.  Ultrasound to increase extensibility, decrease pain, if needed  2.  Electrical stimulation for muscle relaxation, decrease pain, if needed, iontophoresis  3.  Manual therapy to increase function, decrease pain  4.  Therapeutic exercise to increase function, decrease pain  5.  Home programming and d/c planning to increase function and decrease pain    Frequency & Duration:  Maribel returned today as she was concerned about driving back to [place] after having this slight flare up.  She was able to self manage most of it, but she needed a little reassurance she was doing the correct thing and needed the manual work to augment her home program.   Again, will leave her chart open if she needs further manual work over the next 6 weeks, otherwise she will continue with self management.     Patient Participated in and Agrees With This Plan of Care and Goals:  YES  Rehab Potential:  martha Chatterjee PT, MS  Physical Therapist  [place]# [id number]      Medicare Initial Certification  Certification Period: 04/11/19 through 07/11/19            TREATMENT TODAY:  Total Treatment Time:   (min in clinic):   60  Timed Code Treatment Minutes:   60      Manual Therapy.  (Man)  RX min:   45  Manual posterior rotation of the left innominate  Mobs of left hip  PIC right iliopsoas  PIC multifidus  Quadratus lumborum stretching  Piriformis stretching  Myofascial work of LE and trunk  Rib mobs  Myofascial work /trunk    Therapeutic Activities:  (TA)  RX min:   15  Neuromuscular Reeducation:  (NMR)  Rx min:      Ultrasound:  RX min:      1.6 piper/cm2       Location:  Bilateral piriformis  Supplies given:  -     Procedures:  Code Procedure/Instruction 09/27/16 10/04/16 10/27/16 12/01/16 12/15/16 01/03/17 01/19/17 02/02/17  06/06/17  04/11/19 05/21/19         TA            Sleeping Positions                  reviewed                    TA Sternum-Up Posture    review                  TA SI jt. self-correction          reviewed  reviewed          TA Use of lumbar pillow reviewed                     TA Use of cervical roll reviewed                     TA Use of orthotics                      TA Use of SI belt                      TA Use of Nada chair             instructed in use         TA Lumb Facet PIC          reviewed            TA Order of Self-Correct          reviewed            TA Decreasing Disc Pressures            reviewed          NMR Pelvic Floor Isolation                      NMR Transverse Abdominus                      NMR Multifidus Isolation                      NMR InnerUnit agns Gravity                      NMR Sit-stand w/ inner unit                      NMR  Roll w/ inner unit                      NMR Walk w/ inner unit                       NMR Up/down steps w/ inner unit                      NMR Water Exer Instruction                      NMR Hip Abd w/o periformis                      NMR Hip Abd w/o iliop/ham                       NMR Lower abs in supine                      NMR Abd obliques in supine                      NMR Prone Knee Flex                      NMR Prone glut max                      NMR Retro Step                      NMR Retro Walk                      NMR Forward walk w/ inner unit                      NMR Balance Instruction                      NMR Ball sit A/P & Lateral                      NMR Ball Catch/Core/Supine                      NMR Ball Catch/Core/Stand                      NMR Ball All 4's Arm Life                      NMR Ball Prone Walk Out                      NMR Ball Supine Obliques                      NMR Ball sit-supine-sit                      NMR Walking Prog Progress                      TA Home prg sequencing                      TA Hamstring stretch                      TA Hip Ext Rot Stretch                      TA Hip Int Rot Stretch                      TA Hip Flex/IT Stretch                      TA Hip Add Stretch                      TA Lats Dorsi Stretch                      TA Gastric/soleus stretch                      TA QuadLumbormStretch                      TA Quad Stretch                      NMR Lateral Bridge Drop                      NMR [place]                      NMR O'Feldt Lat Pull Down                      NMR O'Feldt Hip Ext                      NMR O'Feldt Trunk Ext                      TA CervDiscExtSup/stnd   instructed                   TA Cerv Stretches  instructed                    TA Neural Gliding                      NMR Ant/Mid Scalene Strch                      NMR Prone Arm Lifts       instructed reviewed              NMR Scapula Stabilization                      NMR Occulomotor  Isometrics                      NMR Cervical Isometrics                      TA Ant. Chest Stretch                      TA Standing Wall Slides                      TA Rotator Cuff Stretch  instructed reviewed corrected reviewed                 TA Rotator Cuff Theraband     instructed reviewed reviewed               TA ShldAROM w/bar instructed reviewed reviewed  reviewed reviewed  reviewed              TA Biceps Stretch  instructed reviewed corrected  reviewed reviewed reviewed              TA SelfPICThorSpine                      TA   Ant Scalene stretch  instructed reviewed                   TA Icing shoulder  instructed reviewed reviewed                  TA [employer] for upper trap   instructed                   TA Sidelying rot cuff strengthen   instructed                                                                                                                                                                 Karen Chatterjee, PT, MS  Physical Therapist  [place]# [id number]

## 2019-05-24 ENCOUNTER — OFFICE VISIT (OUTPATIENT)
Dept: CARDIOLOGY | Facility: CLINIC | Age: 69
End: 2019-05-24

## 2019-05-24 VITALS
WEIGHT: 117 LBS | SYSTOLIC BLOOD PRESSURE: 122 MMHG | HEIGHT: 62 IN | DIASTOLIC BLOOD PRESSURE: 78 MMHG | HEART RATE: 92 BPM | BODY MASS INDEX: 21.53 KG/M2

## 2019-05-24 DIAGNOSIS — R53.82 CHRONIC FATIGUE: Primary | ICD-10-CM

## 2019-05-24 PROCEDURE — 99213 OFFICE O/P EST LOW 20 MIN: CPT | Performed by: INTERNAL MEDICINE

## 2019-05-24 NOTE — PROGRESS NOTES
[facility]  Office Progress Note  Barbara Hernandez  1950  [phone]      Visit Date: 5/24/2019      PCP: Fiordaliza Suh MD  [address] [address]    IDENTIFICATION: A 68 y.o. 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 Postprandial dizziness/lightheadedness.  2. History of cardiomyopathy:  a. Left heart catheterization, 06/06/2002:  i. Normal coronary artery anatomy.  b. LVEF 55% to 60%.   3. Palpitations:  4. Holter monitor, May 2002:  a. Sinus rhythm with rare PAC's, PVC's.  b. Short MS interval.  5. Dyslipidemia.  6. VHD  a. 4299-1967 multiple echo, muga   b. 5/16 SE per CAK EF 55% mild AI, mild diastolic dysfxn  7. Remote tobacco use.  8. Hypothyroidism.  9. Osteoarthritis.  10. Gastritis.  11. Irritable bowel syndrome/spastic colon.  12. Hay fever.  13. Perimenopause.  14. Herpes zoster, October 2008, (affecting left ear).  15. Lichen sclerosis (affecting vaginal tissue).  16. Fibromyalgia.  17. Surgeries:  a. Right hip replacement, 11/15/2010.  b. Septoplasty, March 2011.  c. Left hip replacement, November 2012.    Allergies  Allergies   Allergen Reactions   • Celecoxib    • Ciprofloxacin GI Intolerance   • Clarithromycin GI Intolerance   • Doxycycline GI Intolerance   • Erythromycin Nausea And Vomiting   • Levofloxacin GI Intolerance   • Lortab [Hydrocodone-Acetaminophen] GI Intolerance   • Lyrica [Pregabalin]    • Nexium [Esomeprazole] Myalgia   • Omeprazole Myalgia   • Phenergan [Promethazine Hcl] Delirium   • Tramadol    • Bacitracin-Polymyxin B Rash   • Sulfa Antibiotics Rash       Current Medications    Current Outpatient Medications:   •  acetaminophen (TYLENOL) 325 MG tablet, Take 650 mg by mouth every night at bedtime., Disp: , Rfl:   •  B Complex Vitamins (VITAMIN B COMPLEX PO), Take  by mouth Daily., Disp: , Rfl:   •   Cholecalciferol (VITAMIN D3) 3000 UNITS tablet, Take  by mouth Daily., Disp: , Rfl:   •  Coenzyme Q10 (CO Q-10) 100 MG capsule, Take 400 mg by mouth., Disp: , Rfl:   •  diclofenac (VOLTAREN) 1 % gel gel, Apply 1 application topically As Needed., Disp: , Rfl:   •  dicyclomine (BENTYL) 10 MG capsule, Take 1 capsule by mouth every 8 hours as needed for bowel spasm or diarrhea, Disp: 30 capsule, Rfl: 0  •  estradiol (VAGIFEM) 10 MCG tablet vaginal tablet, Insert 1 tablet into the vagina 3 (Three) Times a Week., Disp: 12 tablet, Rfl: 12  •  gabapentin (NEURONTIN) 100 MG capsule, Take 1 capsule by mouth every night at bedtime., Disp: 30 capsule, Rfl: 5  •  Lutein 20 MG tablet, Take 1 tablet by mouth Daily., Disp: , Rfl:   •  meloxicam (MOBIC) 7.5 MG tablet, TAKE 1 TABLET EVERY DAY AS NEEDED FOR PAIN AND INFLAMMATION, Disp: 30 tablet, Rfl: 3  •  Multiple Vitamins-Minerals (ICAPS AREDS 2) capsule, Take 1 capsule by mouth Daily., Disp: , Rfl:   •  Omega-3 Fatty Acids (FISH OIL) 1200 MG capsule capsule, Take 1,200 mg by mouth Daily., Disp: , Rfl:   •  raNITIdine (ZANTAC) 150 MG tablet, 1 tab po qd prn stomach acid or acid reflux (Patient taking differently: Take 150 mg by mouth Daily. 1 tab po qd prn stomach acid or acid reflux), Disp: 90 tablet, Rfl: 3  •  SYNTHROID 88 MCG tablet, Take 1 tab po qam fasting and wait 1hr for food or other meds. BRAND ONLY., Disp: 90 tablet, Rfl: 3  •  vitamin C (ASCORBIC ACID) 500 MG tablet, Take 500 mg by mouth Daily., Disp: , Rfl:   •  vitamin E 400 UNIT capsule, Take 400 Units by mouth Daily., Disp: , Rfl:       History of Present Illness   Pt reports having worsening fatigue.  She states she is caring for her aging  that has autonomic insufficiency.  She notes that she is not doing activities of exercise capacity due to being primary caregiver.  She notes no overt palpitations or shortness of breath.  She has had a left-sided chest wall/breast pain for which she is having evaluated  "per her gynecologist in the near future.  She continues with market irritable bowel symptoms and problems tolerant of different food sources          OBJECTIVE:  Vitals:    05/24/19 1109   BP: 122/78   BP Location: Right arm   Patient Position: Sitting   Pulse: 92   Weight: 53.1 kg (117 lb)   Height: 157.5 cm (62\")     Physical Exam   Constitutional: She is oriented to person, place, and time. She appears well-developed and well-nourished. No distress.   Neck: Neck supple. No JVD present. No tracheal deviation present.   Cardiovascular: Normal rate, regular rhythm, normal heart sounds and intact distal pulses.   No murmur heard.  Pulses:       Radial pulses are 2+ on the right side, and 2+ on the left side.        Dorsalis pedis pulses are 1+ on the right side, and 1+ on the left side.        Posterior tibial pulses are 1+ on the right side, and 1+ on the left side.   Pulmonary/Chest: Effort normal and breath sounds normal. She has no wheezes. She has no rales.   Abdominal: Soft. Bowel sounds are normal. There is no tenderness. There is no guarding.   Musculoskeletal: She exhibits no edema or tenderness.   Neurological: She is alert and oriented to person, place, and time.   Nursing note and vitals reviewed.      Diagnostic Data:  Procedures      ASSESSMENT:   Diagnosis Plan   1. Chronic fatigue          PLAN:  1. Patient to have serologies to assess for anemia metabolic abnormalities and thyroid function if not recently obtained.  This does not appear to be cardiac etiology at current    Angeli, Fiordaliza Herr MD, thank you for referring Ms. Hernandez for evaluation.  I have forwarded my electronically generated recommendations to you for review.  Please do not hesitate to call with any questions.    Scribed for Jonathon Bermudez MD by Rosa Ford PA-C. 5/24/2019  1:13 PM  I, Jonathon Bermudez MD, personally performed the services described in this documentation as scribed by the above named individual in my presence, " and it is both accurate and complete.  5/24/2019  1:13 PM    Jonathon Bermudez MD, FACC

## 2019-05-28 ENCOUNTER — OFFICE VISIT (OUTPATIENT)
Dept: OBSTETRICS AND GYNECOLOGY | Facility: CLINIC | Age: 69
End: 2019-05-28

## 2019-05-28 VITALS
DIASTOLIC BLOOD PRESSURE: 68 MMHG | SYSTOLIC BLOOD PRESSURE: 120 MMHG | BODY MASS INDEX: 21.68 KG/M2 | HEIGHT: 62 IN | WEIGHT: 117.8 LBS

## 2019-05-28 DIAGNOSIS — N60.12 FIBROCYSTIC BREAST CHANGES, LEFT: ICD-10-CM

## 2019-05-28 DIAGNOSIS — N64.4 MASTODYNIA OF LEFT BREAST: ICD-10-CM

## 2019-05-28 DIAGNOSIS — Z78.0 MENOPAUSE: Primary | ICD-10-CM

## 2019-05-28 PROCEDURE — 99213 OFFICE O/P EST LOW 20 MIN: CPT | Performed by: OBSTETRICS & GYNECOLOGY

## 2019-05-28 NOTE — PROGRESS NOTES
Chief Complaint   Patient presents with   • Breast Problem     left breast pain       Barbara Hernandez is a 68 y.o. year old  presenting to be seen for a breast evaluation.  This patient has a history of fibrocystic changes of the breast.  She was started on Zantac, 150 mg twice a day to treat reflux symptoms.  She noted that after starting Zantac she developed significant pain in her left breast.  She has had no pain in the right breast.  She has had no color change of the skin of the breast.  She has had no breast discharge.  She takes vitamin E, 400 IUs daily.  She uses a minimum amount of caffeine.        Last mammogram: was done on approximately 2018 and the result was: Birads I (Normal).    A comprehensive review of systems was done.  Constitutional: negative for fever, chills, activity change, appetite change, fatigue and unexpected weight change.  Respiratory: negative  Cardiovascular: negative  Gastrointestinal: negative  Genitourinary:negative  Musculoskeletal:negative  Behavioral/Psych: negative      right breast normal without mass, skin or nipple changes or axillary nodes, tender, fibrocystic changes in the UOQ of the left breast.  No palpable masses.       normal appearance, no masses or tenderness, No nipple retraction or dimpling, No nipple discharge or bleeding, No axillary or supraclavicular adenopathy       Repeat exam with physician in 6 weeks.  Repeat mammogram in  3 months.      PLAN  1. Follow up: 6 week(s)   2. I have counseled the patient to avoid all caffeine and chocolate, and to continue taking vitamin E, 400 IUs daily  3. I have counseled the patient to not use a underwire bra, and to use good support  4. I have counseled her to avoid checking the breast too frequently.    *Please note that portions of this documentation may have been completed with a voice recognition program.  Efforts were made to edit this dictation, but occasional words may have been  mistransribed.    This note was electronically signed.    ANNE MARIE France MD  May 28, 2019  3:07 PM

## 2019-06-06 ENCOUNTER — TELEPHONE (OUTPATIENT)
Dept: OBSTETRICS AND GYNECOLOGY | Facility: CLINIC | Age: 69
End: 2019-06-06

## 2019-06-06 RX ORDER — ESTRADIOL 10 UG/1
1 INSERT VAGINAL 3 TIMES WEEKLY
Qty: 12 TABLET | Refills: 0 | Status: SHIPPED | OUTPATIENT
Start: 2019-06-07 | End: 2019-06-25 | Stop reason: SDUPTHER

## 2019-06-06 NOTE — TELEPHONE ENCOUNTER
JANICE JUST CALLED AND STATED THAT THE PHARMACY SAID THAT WE HAVE DENIED HER VAGIFEM AND SHE IS CALLING TO ASK WHY AND WHAT NEEDS TO BE DONE TO CORRECT THIS

## 2019-06-06 NOTE — TELEPHONE ENCOUNTER
Patient attempted to refill Vagifem today.  She was told by the pharmacy that they could not refill and that she needs a new prescription.  She was then told that they sent a new prescription and our office denied it.  We did not receive nor deny a prescription for Maribel, but I will send a prescription today.  She has an appointment for an annual exam 6/25/19.

## 2019-06-25 ENCOUNTER — OFFICE VISIT (OUTPATIENT)
Dept: OBSTETRICS AND GYNECOLOGY | Facility: CLINIC | Age: 69
End: 2019-06-25

## 2019-06-25 VITALS
DIASTOLIC BLOOD PRESSURE: 60 MMHG | WEIGHT: 116 LBS | SYSTOLIC BLOOD PRESSURE: 96 MMHG | BODY MASS INDEX: 21.35 KG/M2 | HEIGHT: 62 IN

## 2019-06-25 DIAGNOSIS — N60.11 FIBROCYSTIC BREAST CHANGES, BILATERAL: ICD-10-CM

## 2019-06-25 DIAGNOSIS — N90.4 LICHEN SCLEROSUS ET ATROPHICUS OF THE VULVA: ICD-10-CM

## 2019-06-25 DIAGNOSIS — N95.2 VAGINAL ATROPHY: ICD-10-CM

## 2019-06-25 DIAGNOSIS — Z78.0 MENOPAUSE: Primary | ICD-10-CM

## 2019-06-25 DIAGNOSIS — N60.12 FIBROCYSTIC BREAST CHANGES, BILATERAL: ICD-10-CM

## 2019-06-25 DIAGNOSIS — B37.31 RECURRENT CANDIDIASIS OF VAGINA: ICD-10-CM

## 2019-06-25 PROCEDURE — 99214 OFFICE O/P EST MOD 30 MIN: CPT | Performed by: OBSTETRICS & GYNECOLOGY

## 2019-06-25 RX ORDER — FLUCONAZOLE 150 MG/1
150 TABLET ORAL EVERY OTHER DAY
Qty: 3 TABLET | Refills: 4 | Status: SHIPPED | OUTPATIENT
Start: 2019-06-25 | End: 2019-06-30

## 2019-06-25 RX ORDER — ESTRADIOL 10 UG/1
1 INSERT VAGINAL 3 TIMES WEEKLY
Qty: 36 TABLET | Refills: 3 | Status: SHIPPED | OUTPATIENT
Start: 2019-06-26 | End: 2020-06-04

## 2019-06-25 NOTE — PROGRESS NOTES
Chief Complaint   Patient presents with   • Gynecologic Exam   • Med Refill       Barbara Hernandez is a 68 y.o. year old  presenting to be seen because of related to menopause and recurrent vaginitis as well as lichen sclerosus.  This patient is treated for hypothyroidism, GERD, and IBS.  She uses Yuvafem vaginal tablets, 10 mcg 3 times a week to treat vaginal atrophy.  She uses over-the-counter 1% hydrocortisone cream to the vulva daily to treat lichen sclerosus symptoms.  She has a history of recurrent candidal vaginitis and uses fluconazole as needed.  She recently was treated with antibiotics and feels that she needs fluconazole now.  She has a history of moderate osteopenia of the radius with the spine being normal.  She denies bowel or urinary symptoms.    Social History     Substance and Sexual Activity   Sexual Activity No   • Birth control/protection: Post-menopausal       SCREENING TESTS    Year 2012   Age                         PAP       Neg.                  HPV high risk                         Mammogram       benign                  KOREY score                         Breast MRI                         Lipids                         Vitamin D                         Colonoscopy                         DEXA  Frax (hip/any)      Penia radius                   Ovarian Screen                             No Additional Complaints Reported    The following portions of the patient's history were reviewed and updated as appropriate:vital signs and   She  has a past medical history of Allergic, Anxiety, Chronic diarrhea, Depression, ETD (eustachian tube dysfunction), Fibromyalgia, Fibromyositis, GERD (gastroesophageal reflux disease), H/O cardiomyopathy, Headache, HLD (hyperlipidemia), Hypothyroidism, IBS (irritable bowel syndrome), Lichen sclerosus et atrophicus of the vulva, Menopause,  Osteoarthritis, Osteopenia, Preventative health care, and Urinary tract infection.  She does not have any pertinent problems on file.  She  has a past surgical history that includes Total hip arthroplasty; Cryotherapy; Septoplasty; and Eye surgery (Right).  Her family history includes Alzheimer's disease in her father; Heart attack in her maternal grandmother; Hyperlipidemia in her brother; Hypertension in her brother; Hypothyroidism in her mother; Osteoarthritis in her mother; Stroke in her paternal grandmother.  She  reports that she quit smoking about 29 years ago. Her smoking use included cigarettes. She started smoking about 37 years ago. She has a 16.00 pack-year smoking history. She has never used smokeless tobacco. She reports that she does not drink alcohol or use drugs.  Current Outpatient Medications   Medication Sig Dispense Refill   • Multiple Vitamins-Minerals (MULTIVITAMIN WOMEN 50+) tablet Take 1 tablet by mouth Daily.     • acetaminophen (TYLENOL) 325 MG tablet Take 650 mg by mouth every night at bedtime.     • B Complex Vitamins (VITAMIN B COMPLEX PO) Take  by mouth Daily.     • Cholecalciferol (VITAMIN D3) 3000 UNITS tablet Take  by mouth Daily.     • Coenzyme Q10 (CO Q-10) 100 MG capsule Take 400 mg by mouth.     • diclofenac (VOLTAREN) 1 % gel gel Apply 1 application topically As Needed.     • dicyclomine (BENTYL) 10 MG capsule Take 1 capsule by mouth every 8 hours as needed for bowel spasm or diarrhea 30 capsule 0   • [START ON 6/26/2019] estradiol (VAGIFEM) 10 MCG tablet vaginal tablet Insert 1 tablet into the vagina 3 (Three) Times a Week. 36 tablet 3   • fluconazole (DIFLUCAN) 150 MG tablet Take 1 tablet by mouth Every Other Day for 3 doses. 1 Every other day 3 tablet 4   • gabapentin (NEURONTIN) 100 MG capsule Take 1 capsule by mouth every night at bedtime. 30 capsule 5   • Lutein 20 MG tablet Take 1 tablet by mouth Daily.     • meloxicam (MOBIC) 7.5 MG tablet TAKE 1 TABLET EVERY DAY AS  "NEEDED FOR PAIN AND INFLAMMATION 30 tablet 3   • Multiple Vitamins-Minerals (ICAPS AREDS 2) capsule Take 1 capsule by mouth Daily.     • Omega-3 Fatty Acids (FISH OIL) 1200 MG capsule capsule Take 1,200 mg by mouth Daily.     • raNITIdine (ZANTAC) 150 MG tablet 1 tab po qd prn stomach acid or acid reflux (Patient taking differently: Take 150 mg by mouth Daily. 1 tab po qd prn stomach acid or acid reflux) 90 tablet 3   • SYNTHROID 88 MCG tablet Take 1 tab po qam fasting and wait 1hr for food or other meds. BRAND ONLY. 90 tablet 3   • vitamin C (ASCORBIC ACID) 500 MG tablet Take 500 mg by mouth Daily.     • vitamin E 400 UNIT capsule Take 400 Units by mouth Daily.       No current facility-administered medications for this visit.      She is allergic to celecoxib; ciprofloxacin; clarithromycin; doxycycline; erythromycin; levofloxacin; lortab [hydrocodone-acetaminophen]; lyrica [pregabalin]; nexium [esomeprazole]; omeprazole; phenergan [promethazine hcl]; tramadol; bacitracin-polymyxin b; and sulfa antibiotics..        Review of Systems   A comprehensive review of systems was not done.  Constitutional: negative for fever, chills, activity change, appetite change, fatigue and unexpected weight change.  Respiratory: not done  Cardiovascular: negative  Gastrointestinal: negative  Genitourinary:positive for vaginal itching  Musculoskeletal:negative  Behavioral/Psych: not done          BP 96/60   Ht 157.5 cm (62\")   Wt 52.6 kg (116 lb)   LMP  (LMP Unknown) Comment: postmenopausal  Breastfeeding? No   BMI 21.22 kg/m²     Physical Exam    General:  alert; cooperative; well developed; well nourished   Skin:  Not performed.   Thyroid: not examined   Lungs:  clear to auscultation bilaterally   Heart:  regular rate and rhythm, S1, S2 normal, no murmur, click, rub or gallop   Breasts:  Examined in supine position  Symmetric without masses or skin dimpling  Nipples normal without inversion, lesions or discharge  There are no " palpable axillary nodes  Fibrocystic changes are present both breasts without a discrete mass  tender in left UQ   Abdomen: soft, non-tender; no masses  no umbilical or inguinal hernias are present  no hepato-splenomegaly   Pelvis: Clinical staff was present for exam  External genitalia:  normal appearance of the external genitalia including Bartholin's and Barclay's glands. lichen sclerosus improved  Vaginal:  normal pink mucosa without prolapse or lesions.  Cervix:  normal appearance.  Uterus:  normal size, shape and consistency. anteverted;  Adnexa:  non palpable bilaterally.  Rectal:  anus visually normal appearing. recto-vaginal exam unremarkable and confirms findings;     Lab Review   No data reviewed    Imaging   Mammogram results- Birads I, and DEXA results- moderate osteopenia of the radius with the spine normal    Medical counseling was given to patient for the following topics: diagnostic results, instructions for management, risk factor reductions, prognosis, impressions, risks and benefits of treatment options and importance of treatment compliance . Total time of the encounter was 27 minute(s) and 18 minute(s)  was spent in face to face counseling.  This patient desires to know if her lichen sclerosis and vaginal atrophy are improved.  I have counseled her that the vaginal atrophy is significantly improved and that the lichen sclerosus is stable to improved.  I have counseled her to continue using Yuvafem intravaginally 3 times a week and hydrocortisone cream, 1% to the vulva daily.  I have counseled the patient that she has moderate osteopenia of the radius and normal bone density of the spine.  I have counseled her in regular weight-bearing exercise and the intake of calcium with D daily.  Risk questions about fibrocystic changes of the breast and the pain in her left breast.  I have counseled her that her breast exam findings are stable.  I have counseled her that her imaging has been stable.  I have  advised her to refrain from caffeine and chocolate.  I have advised her to take vitamin E, 400 IUs daily.  I have counseled her in monthly self breast assessment and the need for continued breast imaging.          ASSESSMENT  Problems Addressed this Visit        Musculoskeletal and Integument    Lichen sclerosus et atrophicus of the vulva       Genitourinary    Menopause - Primary    Vaginal atrophy    Relevant Medications    estradiol (VAGIFEM) 10 MCG tablet vaginal tablet (Start on 6/26/2019)      Other Visit Diagnoses     Recurrent candidiasis of vagina        Relevant Medications    fluconazole (DIFLUCAN) 150 MG tablet            PLAN    Medications prescribed this encounter:    New Medications Ordered This Visit   Medications   • estradiol (VAGIFEM) 10 MCG tablet vaginal tablet     Sig: Insert 1 tablet into the vagina 3 (Three) Times a Week.     Dispense:  36 tablet     Refill:  3   • fluconazole (DIFLUCAN) 150 MG tablet     Sig: Take 1 tablet by mouth Every Other Day for 3 doses. 1 Every other day     Dispense:  3 tablet     Refill:  4   · Monthly self breast assessment and annual breast imaging  · Follow up: 12 month(s)  · Calcium, 600 mg/ Vit. D, 400 IU daily     *Please note that portions of this documentation may have been completed with a voice recognition program.  Efforts were made to edit this dictation, but occasional words may have been mistranscribed.     This note was electronically signed.    ANNE MARIE France MD  June 25, 2019  4:16 PM

## 2019-06-27 ENCOUNTER — TREATMENT (OUTPATIENT)
Dept: PHYSICAL THERAPY | Facility: CLINIC | Age: 69
End: 2019-06-27

## 2019-06-27 DIAGNOSIS — M53.3 SACROILIAC JOINT DYSFUNCTION: Primary | ICD-10-CM

## 2019-06-27 PROCEDURE — 97530 THERAPEUTIC ACTIVITIES: CPT | Performed by: PHYSICAL THERAPIST

## 2019-06-27 PROCEDURE — 97140 MANUAL THERAPY 1/> REGIONS: CPT | Performed by: PHYSICAL THERAPIST

## 2019-06-27 NOTE — PROGRESS NOTES
"Physical Therapy Note      SUBJECTIVE:   Changes since last seen:  Has been back to [place] again and thus has had good and bad days.  Most trouble lateley with right QL, lats and shoulder which recalls having this same pain last year after clipping shrubs, but its not bothering her today.   \"The humidity has caused me to be really achey;it flares up my fibromyalgia.\"  She went to her cardiologist, as she was concerned the left lateral upper rib pain might have been cardiac in nature. Cardiologist cleared her on this and recommended she return to PCP for possible indigestion issues.  She's had indigestion for years and was switched to Zantac, 6 months ago.  This cleared up her indigestion so she quit the zantac.  Realized she needed to get back on it, right before she was here last.  Then she noticed she was very fatigued.  PCP did metabolic panel, low on B12, found out that Zantac lowers B12 and increases fibrocystic breast pain.  Saw GYN who confirmned fibrocystic breast exacerbation, so back off Zantac and fibrocystic breast tissue pain is slowly calming down.     \"I feel like my back needs to be straightened out today, all it takes is moving one way wrong, like reaching for glass, and it tweaks my pain.\"  \"My left shoulder still hasn't been the same since I got the flu shot.\"  \"I feel like whenever I hurt myself it never heals afterwards.\"    She states she has been using a 4 lb electric weed eater, at least a couple times a week.  She has also noticed pain flares up when she vacuums.        Current level of pain:      1/10  Right buttock/gr troch        Left lateral intercostal  1/10     Continued numbness and tingling right later thigh to foot  (been there 4 yrs worsens if back pain increased)    Lowest level of pain:                1/10        1/10       Doesn't have tingling when laying down.   Highest level of pain:     4/10          3-4/10               Functional Limitations:  Sitting, standing, " walking, stairs, driving and sleeping  Patient goals for Physical Therapy:  Improve discomfort, review ways of movement        OBJECTIVE:   Still the samde...slight left lateral shift of pelvis in stance.     Muscle/Bone Irritation   10/04/16 10/27/16  12/01/16    12/15/16 01/03/17 01/19/17 02/02/17   06/06/17  01/09/18  04/11/19 05/21/19 06/27/19        Right  Left Right   Left Right Left Right  Left Right  Left Right  Left Right  Left Right Left Right Left  Right Left Right Left Right Left       Piriformis    +         +  slight  slight Slight slight  slight slight slight   slight   Slight   ++  sl        Sl       +           +  ++         ++       Gr. Trochanter    +         +    -            -        -           +   sl    +           ++  ++         +   ++        ++       IT Band    -           -         -       slight   +     Sl          Sl    +          s;   Sl         Sl        Quadr. Lumborum    -           -            -           -   Sl       sl   Sl          sl       Ischial Tuberosity    -           -            -           -    +         -   -              -       Sacrococcygeal Ligs    -          -            -            -   -          -   -           -       Paraspinals    -           -   +                      +                 +     +     -           -   -           -   -           -       Spinous Processes                             T rotated to     -           -            T4-8              L rotated to  L3-5  L1-5 L4-5             L4-5             L3-5   L2-5  L2-5 L3-5     -           -   -          - L2-5       Adductor Fabio    -             -              Sl        sl       Iliopsoas    -              -            +            +    -          -       Quad Origin    -             -            -           -    -          -       SI Joint   ++         +   ++       ++   ++        -     +          -   Slight    +    -            -    +          +  ++        +  ++    +        ++   ++       -   +           +       Pubic Symphysis  not tested                  +          -         +       Lateral upper intercostal muscles               Sl         +    Sl        sl                           Lats Dorsi                +         +       Left thumb CMC                    +                 +                  +                   10/04/16 10/27/16  12/01/16 12/15/16 01/03/17 01/19/17 02/02/17 06/06/17   01/09/18  04/11/19 05/21/19 06/27/19       SI Joint Positioning Right   Left Right    Left Right Left   Right  Left Right  Left  Right  Left Right  Left Right Left Right Left  Right left Right Left Right Left           Innominate      Symmetrical!                     Anterior                x                 x              x                x                x                 x     x                x                x              x             x              Posterior     x     x      x     x     x      x                 x   x     x    x    x          Sacrum                              Unilateral rotation     x     x      x     x     x Neutral       x    X     x     x    x   x                              SI Joint Testing Right   Left Right    Left Right  Left Right  Left Right  Left Right  Left Right  Left Right Left Right left  Right Left Right Left Right Left               Distraction    +           +    +           +  slight slight    -         -  Slight  slight      +         +   +          +    +           +   +         +   +        +               Beatriz's    -            -    -            -    -            -          Sl          Sl   sl        sl                Compression    -            -    -            -    -            -          -           -                 Prone Press Up    -            -    -            -   -             -          -             -                             Special Tests  Right   Left Right    Left  Right Left Right  Left Right  Left Right  Left Right  Left Right Left  Right Left  Right  Left Right Left Right  Left          Straight Leg Raise                                            Active     -         -    -           -    -         -        -          -   -         -    -          -                 Passive    -         -      -           -     -         -        -          -   -          -    -          -             Hip Scour Test    -          -     -           -     -          -          -          -                                                       Palpation:    Monthly Objective Measurement/Functional Activity  Date: 06/06/17 01/09/18 04/11/19 05/21/19 06/27/19     Oswestry Pain Score        46/100   46/100    48/100     LE Functional Scale                      AROM Lumbar Spine: Degrees   Degrees      Degrees      Degrees      Degrees      Degrees      Degrees    Degrees      Flexion 103          88           102     105       113        Extension 17           29           15       22      18 pain right ql        Right Lat. Flexion 25           34            36      30     39 pain right QL        Left Lat. Flexion 20           30            34      32     33        Right Lat Shift Pelvis No change   No change  No change No pain No pain         Left Lat Shift Pelvis Pain/stiff  No change   No change  no pain  Slight pain                 AROM Hips:  (degrees) Right      Left Right Left Right  Left Right  Left Right  Left Right  Left Right  Left Right  Left      Flexion 115     112  110    114     115      118   115      120  115      115        Abduction 45       45 45       45        40       45 40         45  43         45        Int. Rotation 40      38  38      37    18       20 20         20  25         28        Ext. Rotation  45       45  40      38        35       40  35        35   38         40                 Flexibility:   (degrees) Right    Left Right  Left Right  Left Right  Left  Right  Left Right  Left Right  Left Right  Left      Hamstrings -28 -26 -29        -30     -35     -30 -30        -30   -28       -25        Quadriceps 55       50  Not tested      45       48 50         50   55         52        HipExt/Rot(piriformis) 45       39 38        37     35       40 40        40   38         37        Hip Int/Rotators 40       38 39         36    35       38  35         35   32         35        Hip Flexors (iliopsoas) Not tested       Not tested  Not tested Not tested Not tested        IT Band Not tested      Not tesetd  Not tested Not tested Not tested                Reflexes: Right      Left Right  Left Right  Left Right  Left Right  Left Right  Left Right  Left Right  Left      L4 (Quad) -       -   -         -            S1 (Achilles) -       -   -          -                    Root Level  - Motor Right      Left Right  Left Right  Left Right  Left Right  Left Right  Left Right  Left Right  Left     T12             Rectus Abdominus 4+/5         4+/5      4+/5          4+/5         4+/5        L1              Paraspinals 5/5      5/5 5/5         5/5  5/5      5/5          L2              Hip Flexion 5/5      5/5 5/5        5/5  5/5     5/5          L3             Quads 5/5      5/5 5/5        5/5  5/5     5/5          L4              Anterior Tibialis 5/5      5/5 5/5        5/5  5/5     5/5          L5             Gr. Toe Extension Not tested Not tested    5/5      5/5          S1            Gastroc/Sol/Peroneals Not tested Not tested   5/5      5/5                  Functional Strength:             Single LegStanceTime (seconds) R:-  L:-   R:      L: R:      L: R:9     L:30 R:      L: R:      L: R:      L: R:      L:     Pelvic Floor Isolation (seconds) 10 sec   10 sec       10 sec  > 10 sec   > 10 sec        Inner Unit  Isolation (seconds) 10 sec 10 sec    10 sec  > 10 sec  > 10 sec                Other Functional Outcome Tests               LE Functional Scale               30 Second Chair Rise Test      x9 pain right SI jt            Timed Up & Go, 9.8 feet                   (fall risk if > 11.3 seconds)               Toe Tap Test                (Young 47 taps, Older 34 taps)                      Functional Activities:              Sit w/o pain? Pain increases (minutes) Yes/ 30 min Yes/ 30-60  No/ 60 min Yes/ 20-30 Yes/ 60 min if changes positions         Stand w/o pain? Yes/ 15 min Yes 15-30  No/ 20 min Yes/ 2 min  Yes/ 60 min if moves         Walk w/o pain? Yes/ 10-15 min Yes 15-30  Yes/ 60 min Yes/ 15min  Yes/ fatigued, 30 min         Steps w/o pain? Yes/ 1 fl Yes / 1 fl  No/ 1 fl Yes/ 1 fl  Yes/ all day long doesn't increase her pain         Drive w/o pain? Yes/ pain getting in and out  Yes/ pain getting out  No/ 30 min Yes/ 30-60 min Yes/ 45 min         Work w/o pain? n/a    n/a  n/a n/a         # times wakes w/ pain? 0x       0x   3x      2x     1-2x                 ASSESSMENT:  Problem List:  1.  Flare-up SI joint dysfunction, secondary piriformis and gr trochanteric bursa irritation  2.  Onset of irritation of both lats dorsi from weed eating with 4 lb weed whacker.     Discussion:   Maribel did get pillows and put them under her arms when driving and it has helped.  She will put a wedge in the seat now, for trips out of town.   Her coughing and sneezing has decreased which has helped to decrease lumbar symptoms.     Her Oswestry Low Back Pain score was up 2 points, which was surprising as functionally, she is sitting, standing and walking more before onset of her SI joint/right gr trochanter pain.    She knows how to do her self corrections for the SI joints.  She will continue with this on her own at home.    She has tweaked both of her lats dorsi, the right > left, with the weed eating she is doing.  Went over how to stretch them in an all 4s position as doing it in supine irritated the right shoulder.    Reviewed how to vacuum to not irritate the SI joints.   Reviewed how to do self correction for thoracic facet positioning using positional isometric contraction  techniques.  This is recorded in a video on her phone.         Short Term Goals: (4-6 weeks)                               Date Met:                1.   pt independent in postural correction     05/21/19  2.   pt independent in SI joint self-corrections    05/21/19  3.   pt independent in exer to decrease post. disc pressures  05/21/19  4.   pt able to sleep through night without pain  5.   pt able to sit 30 minutes without pain     06/27/19  6.   Reduce pt pain to no greater than 2/10  7.   Decrease Oswestry Pain Score to no greater than 36/100    Long Term Goals:(3-5 months)      Date Met:      1.  pt independent in self-management of symptoms       2.  pt independent in home program      3.  Decrease Oswestry Pain Score to no greater than 26/100      4.  pt able to sit 60  minutes without pain     06/27/19          Treatment Plan:   1.  Ultrasound to increase extensibility, decrease pain, if needed  2.  Electrical stimulation for muscle relaxation, decrease pain, if needed, iontophoresis  3.  Manual therapy to increase function, decrease pain  4.  Therapeutic exercise to increase function, decrease pain  5.  Home programming and d/c planning to increase function and decrease pain    Frequency & Duration:  Maribel will continue with her home program and return in 4-6 weeks if she is not able to manage her symptoms on her own.  She appears to need manual work on the trunk/SI joints within 4-6 weeks to allow her to self manage in between visit.  She her on a once/4-6 week basis for 6 more visit.         Patient Participated in and Agrees With This Plan of Care and Goals:  YES  Rehab Potential:  martha Chatterjee, PT, MS  Physical Therapist  [place]# [id number]      Medicare Initial Certification  Certification Period: 04/11/19 through 07/11/19            TREATMENT TODAY:  Total Treatment Time:   (min in clinic):     60     min  Timed Code Treatment Minutes:   60      Manual Therapy.  (Man)  RX min:   40  Manual posterior  rotation of the left innominate  Mobs of left hip  PIC right iliopsoas  PIC multifidus  Quadratus lumborum stretching  Piriformis stretching  Myofascial work of LE and trunk  Rib mobs  Myofascial work /trunk    Therapeutic Activities:  (TA)  RX min:   20  Neuromuscular Reeducation:  (NMR)  Rx min:      Ultrasound:  RX min:      1.6 piper/cm2       Location:   Supplies given:  -     Procedures:  Code Procedure/Instruction 09/27/16 10/04/16 10/27/16 12/01/16 12/15/16 01/03/17 01/19/17 02/02/17  06/06/17  04/11/19 05/21/19 06/27/19        TA            Sleeping Positions                  reviewed                    TA Sternum-Up Posture    review                  TA SI jt. self-correction          reviewed  reviewed          TA Use of lumbar pillow reviewed                     TA Use of cervical roll reviewed                     TA Use of orthotics                      TA Use of SI belt                      TA Use of Nada chair             instructed in use         TA Use of wedge in car seat              instructed        TA Lumb Facet PIC          reviewed    reviewed thoracic PIC        TA Order of Self-Correct          reviewed            TA Decreasing Disc Pressures            reviewed          NMR Pelvic Floor Isolation                      NMR Transverse Abdominus                      NMR Multifidus Isolation                      NMR InnerUnit agns Gravity                      NMR Sit-stand w/ inner unit                      NMR Roll w/ inner unit                      NMR Walk w/ inner unit                       NMR Up/down steps w/ inner unit                      NMR Water Exer Instruction                      NMR Hip Abd w/o periformis                      NMR Hip Abd w/o iliop/ham                       NMR Lower abs in supine                      NMR Abd obliques in supine                      NMR Prone Knee Flex                      NMR Prone glut max                      NMR Retro Step                       NMR Retro Walk                      NMR Forward walk w/ inner unit                      NMR Balance Instruction                      NMR Ball sit A/P & Lateral                      NMR Ball Catch/Core/Supine                      NMR Ball Catch/Core/Stand                      NMR Ball All 4's Arm Life                      NMR Ball Prone Walk Out                      NMR Ball Supine Obliques                      NMR Ball sit-supine-sit                      NMR Walking Prog Progress                      TA Home prg sequencing                      TA Hamstring stretch                      TA Hip Ext Rot Stretch                      TA Hip Int Rot Stretch                      TA Hip Flex/IT Stretch                      TA Hip Add Stretch                      TA Lats Dorsi Stretch              instructed adapted         TA Gastric/soleus stretch                      TA QuadLumbormStretch                      TA Quad Stretch                      NMR Lateral Bridge Drop                      NMR [place]                      NMR O'Feldt Lat Pull Down                      NMR O'Feldt Hip Ext                      NMR O'Feldt Trunk Ext                      TA CervDiscExtSup/stnd   instructed                   TA Cerv Stretches  instructed                    TA Neural Gliding                      NMR Ant/Mid Scalene Strch                      NMR Prone Arm Lifts       instructed reviewed              NMR Scapula Stabilization                      NMR Occulomotor Isometrics                      NMR Cervical Isometrics                      TA Ant. Chest Stretch                      TA Standing Wall Slides                      TA Rotator Cuff Stretch  instructed reviewed corrected reviewed                 TA Rotator Cuff Theraband     instructed reviewed reviewed               TA ShldAROM w/bar instructed reviewed reviewed  reviewed reviewed  reviewed              TA Biceps Stretch  instructed reviewed corrected  reviewed  reviewed reviewed              TA SelfPICThorSpine                      TA   Ant Scalene stretch  instructed reviewed                   TA Icing shoulder  instructed reviewed reviewed                  TA [employer] for upper trap   instructed                   TA Sidelying rot cuff strengthen   instructed                                                                                                                                                                 Karen Chatterjee, PT, MS  Physical Therapist  [place]# [id number]

## 2019-08-12 ENCOUNTER — TRANSCRIBE ORDERS (OUTPATIENT)
Dept: ADMINISTRATIVE | Facility: HOSPITAL | Age: 69
End: 2019-08-12

## 2019-08-12 DIAGNOSIS — R74.01 TRANSAMINITIS: Primary | ICD-10-CM

## 2019-08-13 ENCOUNTER — HOSPITAL ENCOUNTER (OUTPATIENT)
Dept: ULTRASOUND IMAGING | Facility: HOSPITAL | Age: 69
Discharge: HOME | End: 2019-08-13
Admitting: INTERNAL MEDICINE

## 2019-08-13 DIAGNOSIS — R74.01 TRANSAMINITIS: ICD-10-CM

## 2019-08-13 PROCEDURE — 76705 ECHO EXAM OF ABDOMEN: CPT

## 2019-09-12 ENCOUNTER — TREATMENT (OUTPATIENT)
Dept: PHYSICAL THERAPY | Facility: CLINIC | Age: 69
End: 2019-09-12

## 2019-09-12 DIAGNOSIS — M53.3 SACROILIAC JOINT DYSFUNCTION: Primary | ICD-10-CM

## 2019-09-12 PROCEDURE — 97140 MANUAL THERAPY 1/> REGIONS: CPT | Performed by: PHYSICAL THERAPIST

## 2019-09-12 PROCEDURE — 97530 THERAPEUTIC ACTIVITIES: CPT | Performed by: PHYSICAL THERAPIST

## 2019-09-12 PROCEDURE — 97164 PT RE-EVAL EST PLAN CARE: CPT | Performed by: PHYSICAL THERAPIST

## 2019-09-12 NOTE — PROGRESS NOTES
"Physical Therapy Re-Evaluation      SUBJECTIVE:   Changes since last seen:  Barbara returns today for follow up on her low back pain.     However, she has had significant changes in her health since last seen....  On Sunday, July 28th she watered yard, trimmed shrubs and was fine.  That night she went to put night gown on over her head and severe snapping and pain in her right shoulder and couldnt' move it down from 180 degrees flexion.  She called orthopedist for appointment, saw PA 2 days later. She couldn't move her arm in front of her trunk so ordered an MRI and had it on Friday, Aug 2.  \"It showed all tendons good, rot cuff good, ligaments fine, but super inflammation and all kinds of fluid, bizarre inflammation.\"  She was sent for lab work for uric acid, gout etc. And was given a cortisone shot in right shoulder.   By 2pm the next day, had incredible pain in shoulder and whole body, started feeling dehydrated, went to ER. Her liver enzymnes were \"really elevated and  I was dehydrated\".  They gave her a bag of fluid.  ER doc taliked to ortho.  \"Started to feel better but Sat thru Mon couldn't eat or drink, couldn't move.\"  \"On Mon saw PCP, did more blood work, liver enzymes were off chart and started to have brown urine.\"   Had an abdominal ultrasound which was normal and all her blood work was back to normal.  She did tests for hepatitis, uric acid negative.  Went to rheumatologist, had more blood work done and again everything negative.  She lost 8lbs in 8 days.  Once everything back to normal she is starting to eat again.   She states her Rheumatologist thinks she's been exposed to some kind of toxin.     Through this she couldn't do anything about her shoulder and it tightened up ao started PT at [facility], 2 weeks ago, was not pleased so now at [facility]. For work on her right shoulder. She was afraid for 3 weeks so didn't use the right arm for 3 weeks and now really tight in the right shoulder.  " "Doing cane exercises and strengthening exercises.     Returns to rheumatologist next week for results of a few mor e blood cultures.     \"Through this my back didn't hurt except when on back in ER, hurt for a couple days after, but is taking Mobic antiinflammatory now and sleeping on couch secondary to shoulder pain. I am concerned the low back is going to flare up again so here today for assessment.         Current level of pain:      1/10  Right buttock/gr troch        Left lateral intercostal  1/10     Continued numbness and tingling right later thigh to foot  (been there 4 yrs worsens if back pain increased)    Lowest level of pain:                1/10        1/10       Doesn't have tingling when laying down.   Highest level of pain:     4/10          3-4/10               Functional Limitations:  Sitting, standing, walking, stairs, driving and sleeping  Patient goals for Physical Therapy:  Improve discomfort, review ways of movement        OBJECTIVE:   Still the samde...slight left lateral shift of pelvis in stance.     Muscle/Bone Irritation   10/04/16 10/27/16  12/01/16    12/15/16 01/03/17 01/19/17 02/02/17   06/06/17  01/09/18  04/11/19 05/21/19 06/27/19 09/12/19       Right  Left Right   Left Right Left Right  Left Right  Left Right  Left Right  Left Right Left Right Left  Right Left Right Left Right Left Right Left      Piriformis    +         +  slight  slight Slight slight  slight slight slight   slight   Slight   ++  sl        Sl       +           +  ++         ++   +         +      Gr. Trochanter    +         +    -            -        -           +   sl    +           ++  ++         +   ++        ++   +          +      IT Band    -           -         -       slight   +     Sl          Sl    +          s;   Sl         Sl    Sl       Sl       Quadr. Lumborum    -           -            -           -   Sl       sl   Sl          sl   -          sl      Ischial Tuberosity    -           -            -     "       -    +         -   -              -   -          -      Sacrococcygeal Ligs    -          -            -            -   -          -   -           -   -          -      Paraspinals    -           -   +                      +                 +     +     -           -   -           -   -           -   -            -      Spinous Processes                             T rotated to     -           -            T4-8  T3-8             L rotated to  L3-5  L1-5 L4-5             L4-5             L3-5   L2-5  L2-5 L3-5     -           -   -          - L2-5  L 5      Adductor Fabio    -             -              Sl        sl   -          -      Iliopsoas    -              -            +            +    -          -   -            -      Quad Origin    -             -            -           -    -          -   -           -       SI Joint   ++         +   ++       ++   ++        -     +          -   Slight    +    -            -    +          +  ++        +  ++    +        ++   ++       -   +          +   +            +      Pubic Symphysis  not tested                  +          -         +  Not tested      Lateral upper intercostal muscles               Sl         +    Sl        sl   +           sl                          Lats Dorsi                +         +   ++         +      Left thumb CMC                    +                 +                  +                   10/04/16 10/27/16  12/01/16 12/15/16 01/03/17 01/19/17 02/02/17 06/06/17   01/09/18  04/11/19 05/21/19 06/27/19 09/12/19      SI Joint Positioning Right   Left Right    Left Right Left   Right  Left Right  Left  Right  Left Right  Left Right Left Right Left  Right left Right Left Right Left Right Left          Innominate      Symmetrical!                     Anterior                x                 x              x                x                x                 x     x                x                x              x             x   x              Posterior     x     x      x     x     x      x                 x   x     x    x    x              x         Sacrum                              Unilateral rotation     x     x      x     x     x Neutral       x    X     x     x    x   x   x                             SI Joint Testing Right   Left Right    Left Right  Left Right  Left Right  Left Right  Left Right  Left Right Left Right left  Right Left Right Left Right Left Right Left              Distraction    +           +    +           +  slight slight    -         -  Slight  slight      +         +   +          +    +           +   +         +   +        +   +         +              Beatriz's    -            -    -            -    -            -          Sl          Sl   sl        sl    -            -              Compression    -            -    -            -    -            -          -           -     -           -              Prone Press Up    -            -    -            -   -             -          -             -     -            -                          Special Tests  Right   Left Right    Left  Right Left Right  Left Right  Left Right  Left Right  Left Right Left  Right Left  Right Left Right Left Right  Left Right Left         Straight Leg Raise                                            Active     -         -    -           -    -         -        -          -   -         -    -          -     -          -              Passive    -         -      -           -     -         -        -          -   -          -    -          -     -            -          Hip Scour Test    -          -     -           -     -          -          -          -     -           -                                                    Palpation:    Monthly Objective Measurement/Functional Activity  Date: 06/06/17 01/09/18 04/11/19 05/21/19 06/27/19 09/12/19          Oswestry Pain Score        46/100   46/100    48/100     28/100          LE Functional Scale                                   AROM Lumbar Spine: Degrees   Degrees      Degrees      Degrees      Degrees      Degrees Degrees Degrees Degrees Degrees Degrees Degrees      Degrees    Degrees      Flexion 103          88           102     105       113      96             Extension 17           29           15       22      18 pain right ql     31             Right Lat. Flexion 25           34            36      30     39 pain right QL      43             Left Lat. Flexion 20           30            34      32     33      37             Right Lat Shift Pelvis No change   No change  No change No pain No pain  No pain             Left Lat Shift Pelvis Pain/stiff  No change   No change  no pain  Slight pain  No pain                            AROM Hips:  (degrees) Right      Left Right Left Right  Left Right  Left Right  Left Right  Left Right Left Right Left Right Left Right left Right Left Right Left Right  Left Right  Left      Flexion 115     112  110    114     115      118   115      120  115      115 120      115             Abduction 45       45 45       45        40       45 40         45  43         45   45      45             Int. Rotation 40      38  38      37    18       20 20         20  25         28  30        30             Ext. Rotation  45       45  40      38        35       40  35        35   38         40  40        40                            Flexibility:   (degrees) Right    Left Right  Left Right  Left Right  Left  Right  Left Right  Left Right Left Right Left Right Left Right Left Right Left Right Left Right  Left Right  Left      Hamstrings -28       -26 -29       -30     -35     -30 -30        -30   -28       -25   -25   -23             Quadriceps 55       50  Not tested      45       48 50         50   55         52   Not tested             HipExt/Rot(piriformis) 45       39 38        37     35       40 40        40   38         37  40        40             Hip Int/Rotators 40       38 39          36    35       38  35         35   32         35   35       35             Hip Flexors (iliopsoas) Not tested       Not tested  Not tested Not tested Not tested Not tested             IT Band Not tested      Not tesetd  Not tested Not tested Not tested Not tested                           Reflexes: Right      Left Right  Left Right  Left Right  Left Right  Left Right  Left Right Left Right Left Right Left Right left Right Left Right Left Right  Left Right  Left      L4 (Quad) -       -   -         -                  S1 (Achilles) -       -   -          -                                Root Level  - Motor Right      Left Right  Left Right  Left Right  Left Right  Left Right  Left Right Left Right Left Right Left Right Left Right Left Right Left Right  Left Right  Left     T12             Rectus Abdominus 4+/5         4+/5      4+/5          4+/5         4+/5       4+/5             L1              Paraspinals 5/5      5/5 5/5         5/5  5/5      5/5   5/5      5/5             L2              Hip Flexion 5/5      5/5 5/5        5/5  5/5     5/5   5/5       5/5             L3             Quads 5/5      5/5 5/5        5/5  5/5     5/5   5/5       5/5             L4              Anterior Tibialis 5/5      5/5 5/5        5/5  5/5     5/5   5/5       5/5             L5             Gr. Toe Extension Not tested Not tested    5/5      5/5   5/5       5/5             S1            Gastroc/Sol/Peroneals Not tested Not tested   5/5      5/5   5/5      5/5                           Functional Strength:                   Single LegStanceTime (seconds) R:-  L:-   R:      L: R:      L: R:9     L:30 R:      L: R:      L: R:       L:   R:        L:   R:       L:   R:       L:   R:       L:   R:       L: R:      L: R:      L:     Pelvic Floor Isolation (seconds) 10 sec   10 sec       10 sec  > 10 sec   > 10 sec   > 10 sec             Inner Unit  Isolation (seconds) 10 sec 10 sec    10 sec  > 10 sec  > 10 sec   > 10 sec                             Other Functional Outcome Tests                     LE Functional Scale                     30 Second Chair Rise Test      x9 pain right SI jt                  Timed Up & Go, 9.8 feet                        (fall risk if > 11.3 seconds)                     Toe Tap Test                      (Young 47 taps, Older 34 taps)                                  Functional Activities:                    Sit w/o pain? Pain increases (minutes) Yes/ 30 min Yes/ 30-60  No/ 60 min Yes/ 20-30 Yes/ 60 min if changes positions  Yes/ 60 min             Stand w/o pain? Yes/ 15 min Yes 15-30  No/ 20 min Yes/ 2 min  Yes/ 60 min if moves  Yes/ rocks but 30 min              Walk w/o pain? Yes/ 10-15 min Yes 15-30  Yes/ 60 min Yes/ 15min  Yes/ fatigued, 30 min  Yes, 40 min              Steps w/o pain? Yes/ 1 fl Yes / 1 fl  No/ 1 fl Yes/ 1 fl  Yes/ all day long doesn't increase her pain  Yes/ no limits             Drive w/o pain? Yes/ pain getting in and out  Yes/ pain getting out  No/ 30 min Yes/ 30-60 min Yes/ 45 min  Hasn't been doing more than 60 min              Work w/o pain? n/a    n/a  n/a n/a               # times wakes w/ pain? 0x       0x   3x      2x     1-2x  0x back, right shld every hour                       ASSESSMENT:  Problem List:  1.  Flare-up SI joint dysfunction, secondary piriformis and gr trochanteric bursa irritation  2.  Onset of irritation of both lats dorsi from weed eating with 4 lb weed whacker.     Discussion:   Reviewed how to sit on rami and sternum up to minimize posterior lumbar disc pressures and to minimize protraction and internal rotation of shoulders  Reviewed sleeping positioning to minimize lumbar and SI joint strain and to minimize strain on right shoulder.   Her low back presentation is the best it has been in a long time.  This could be in part secondary to being on the anti inflammatory med, Mobic and she has been a little attentive to her positioning. She needed the reminder as noted  above.     No plans to see Barbara at this time as she will focus on her shoulder physical therapy at another clinic.      Maribel has been seen for 3 visit from 04/11/19 to 09/12/19.  6 of 7 short term goals have been met. 3 or 4 long term goals have been met.   Maribel is discharged from physical therapy at this time.     Short Term Goals: (4-6 weeks)                               Date Met:                1.   pt independent in postural correction     05/21/19  2.   pt independent in SI joint self-corrections    05/21/19  3.   pt independent in exer to decrease post. disc pressures  05/21/19  4.   pt able to sleep through night without low back pain   09/12/19  5.   pt able to sit 30 minutes without pain     06/27/19  6.   Reduce pt pain to no greater than 2/10  7.   Decrease Oswestry Pain Score to no greater than 36/100  09/12/19    Long Term Goals:(3-5 months)      Date Met:      1.  pt independent in self-management of symptoms   09/12/19      2.  pt independent in home program     09/12/19      3.  Decrease Oswestry Pain Score to no greater than 26/100      4.  pt able to sit 60  minutes without pain     06/27/19        Frequency & Duration:  No plans to see Barbara at this time as she will concentrate on her shoulder physical therapy at another clinic.  If she is in need of help in the future will be happy to assist as needed.       Karen Chatterjee, PT, MS  Physical Therapist  [place]# [id number]                TREATMENT TODAY:  Total Treatment Time:   (min in clinic):     60     min  Timed Code Treatment Minutes:   60    Re-Evaluation: 12661    Manual Therapy.  (Man)  RX min:   20  Manual posterior rotation of the right innominate  Mobs of righthip  PIC left iliopsoas  PIC multifidus  Quadratus lumborum stretching  Piriformis stretching  Myofascial work of LE and trunk  Rib mobs  Myofascial work /trunk    Therapeutic Activities:  (TA)  RX min:   15  Neuromuscular Reeducation:  (NMR)  Rx min:      Ultrasound:  RX min:      1.6  piper/cm2       Location:   Supplies given:  -     Procedures:  Code Procedure/Instruction 09/27/16 10/04/16 10/27/16 12/01/16 12/15/16 01/03/17 01/19/17 02/02/17  06/06/17  04/11/19 05/21/19 06/27/19 09/12/19       TA            Sleeping Positions                  reviewed             adapted       TA Sternum-Up Posture    review           corrected       TA SI jt. self-correction          reviewed  reviewed          TA Use of lumbar pillow reviewed              reviewed       TA Use of cervical roll reviewed                     TA Use of orthotics                      TA Use of ice/ heat               reviewed       TA Use of Nada chair             instructed in use         TA Use of wedge in car seat              instructed        TA Lumb Facet PIC          reviewed    reviewed thoracic PIC        TA Order of Self-Correct          reviewed            TA Decreasing Disc Pressures            reviewed          NMR Pelvic Floor Isolation                      NMR Transverse Abdominus                      NMR Multifidus Isolation                      NMR InnerUnit agns Gravity                      NMR Sit-stand w/ inner unit                      NMR Roll w/ inner unit                      NMR Walk w/ inner unit                       NMR Up/down steps w/ inner unit                      NMR Water Exer Instruction                      NMR Hip Abd w/o periformis                      NMR Hip Abd w/o iliop/ham                       NMR Lower abs in supine                      NMR Abd obliques in supine                      NMR Prone Knee Flex                      NMR Prone glut max                      NMR Retro Step                      NMR Retro Walk                      NMR Forward walk w/ inner unit                      NMR Balance Instruction                      NMR Ball sit A/P & Lateral                      NMR Ball Catch/Core/Supine                      NMR Ball Catch/Core/Stand                      NMR Ball All 4's  Arm Life                      NMR Ball Prone Walk Out                      NMR Ball Supine Obliques                      NMR Ball sit-supine-sit                      NMR Walking Prog Progress                      TA Home prg sequencing                      TA Hamstring stretch                      TA Hip Ext Rot Stretch                      TA Hip Int Rot Stretch                      TA Hip Flex/IT Stretch                      TA Hip Add Stretch                      TA Lats Dorsi Stretch              instructed adapted         TA Gastric/soleus stretch                      TA QuadLumbormStretch                      TA Quad Stretch                      NMR Lateral Bridge Drop                      NMR [place]                      NMR O'Feldt Lat Pull Down                      NMR O'Feldt Hip Ext                      NMR O'Feldt Trunk Ext                      TA CervDiscExtSup/stnd   instructed                   TA Cerv Stretches  instructed                    TA Neural Gliding                      NMR Ant/Mid Scalene Strch                      NMR Prone Arm Lifts       instructed reviewed              NMR Scapula Stabilization                      NMR Occulomotor Isometrics                      NMR Cervical Isometrics                      TA Ant. Chest Stretch                      TA Standing Wall Slides                      TA Rotator Cuff Stretch  instructed reviewed corrected reviewed                 TA Rotator Cuff Theraband     instructed reviewed reviewed               TA ShldAROM w/bar instructed reviewed reviewed  reviewed reviewed  reviewed              TA Biceps Stretch  instructed reviewed corrected  reviewed reviewed reviewed              TA SelfPICThorSpine                      TA   Ant Scalene stretch  instructed reviewed                   TA Icing shoulder  instructed reviewed reviewed                  TA [employer] for upper trap   instructed                   TA Sidelying rot cuff strengthen    instructed                                                                                                                                                                 Karen Chatterjee, PT, MS  Physical Therapist  [place]# [id number]

## 2019-11-25 ENCOUNTER — TRANSCRIBE ORDERS (OUTPATIENT)
Dept: PHYSICAL THERAPY | Facility: CLINIC | Age: 69
End: 2019-11-25

## 2019-11-25 DIAGNOSIS — M53.3 SACROILIAC JOINT DYSFUNCTION OF BOTH SIDES: Primary | ICD-10-CM

## 2019-11-25 DIAGNOSIS — M79.7 FIBROMYALGIA: ICD-10-CM

## 2019-12-05 ENCOUNTER — TREATMENT (OUTPATIENT)
Dept: PHYSICAL THERAPY | Facility: CLINIC | Age: 69
End: 2019-12-05

## 2019-12-05 DIAGNOSIS — M53.3 SACROILIAC JOINT DYSFUNCTION: Primary | ICD-10-CM

## 2019-12-05 PROCEDURE — 97140 MANUAL THERAPY 1/> REGIONS: CPT | Performed by: PHYSICAL THERAPIST

## 2019-12-05 PROCEDURE — 97530 THERAPEUTIC ACTIVITIES: CPT | Performed by: PHYSICAL THERAPIST

## 2019-12-05 PROCEDURE — 97164 PT RE-EVAL EST PLAN CARE: CPT | Performed by: PHYSICAL THERAPIST

## 2019-12-05 NOTE — PROGRESS NOTES
Physical Therapy Re-Evaluation and Plan of Care    Chief Complaint:  Sacro iliac joint pain, left         Background History:   Mrs. Hernandez is well known to me as I have seen previously for SI joint dysfunction.  Since she was last seen on 09/29/19 she has been focusing on her right shoulder pain.  She was being seen at [facility] physical therapy.  She feels like it was too aggressive for her flaring up her fibromyalgia symptoms.  She returned to orthopedist for follow up on her shoulder.  She felt she had reached a plateua, still limitied range and tingling down to wrist.,.  So ortho took xray of neck and has referred her on to Dr. Jackman on Monday for further cervical assessment.    She arrives today c/o flare up of SI joint symptoms which she thinks is from favoring her right shoulder and thus aggravating her right piriformis.  In addition to this she notes she took a trip to [place]; was on plane a long time and slept in another bed for 4 days.  Onset of increased right piriformis pain was 3 weeks ago, about the same time she returned from [place].  Additionally, she now has 2 puppies that she is bending over a lot.  This also seems to have aggravated her low back pain.     Past Medical History:  1.  Cervical DDD  2.  Hx of Bilateral THR, left 2012, right 2010  3.  Septoplasty, 2011  4.  Hx of SI joint dysfunction  5.  Fibromyalgia  6.  Hx of hypothyroidism  7.  Hx of gastritis  8.  Irritable bowel syndrome/spastic colon  9.  Osteoarthritis  10.  Hx of 4 pregnancies, 2 normal vaginal deliveries  11. Hx of Dequervains syndrome  12.  Hx of fx left sesmoid  13.   Hx of positional vertigo  14. Osteopenia, diagnosed May 2014  15  Hx of migraine headaches  16.  TMJ dysfunction, 2015  17.  Hx of Cardiomyopathy/ left catheterization 2002  18.  Dyslipidemia  19.  Hx of neuroma pain left foot  20.  Hx of anxiety/ depression        Prior Level of Function:  Independent in all ADLs  Prior Therapy for Same Condition: as noted  "above    Social/ADL:  Pt is  and lives here in [place].  She is retired and enjoys working in her yard and taking care of her puppies.       SUBJECTIVE: Pt reports pain at level of 4/10 in right piriformis.  This pain ranges form 6/10 to 3/10.  Pain decreases \"when does nothing\" and \"increases with everything\".  Five days ago she noticed onset of right gr trochanter pain.  She did try to do the previous self correction techniques for SI joint positioning but they didn't give her the pain relief they typically do..  She also complains of tingling in both feet, \"its there all the time now, increases when sitting on the floor and increases as the day goes on.  She also complains of both feet being cold.      Functional Limitations:  Standing, sleeping,   Patient Goals for Physical Therapy: decreased pain      OBJECTIVE:    Postural Assessment:   Yes No   Forward head x    Increased AO extension x          Presentation:    Cervical Spine     Thoracic spine Flattened     Lumbar Spine Flattened           Right Left   Shoulder elevated x    Scapula position               Winging               Protracted x x             Anterior Tilt x              Elevated     Rib Hump               Upper x              Lower     Lateral Shift of Pelvis sl    Gluteal Fold Elevated x    Atrophy of Gluteus Max  x   Standing Hip Position             External Rotation              Internal Rotation     Knee              Valgus             Varum              Hyperextended     Foot/Ankle            Supinated            Pronated            Pes Planus            Increased Long Arch     Calcaneous angulation:            Valgus            Varum     Hallux Valgus     Hammertoe                 Monthly Objective Measurement/Functional Activity  Date: 12/05/2019                  Oswestry Pain Score 52/100                  LE Functional Scale                                      AROM Lumbar Spine: Degrees   Degrees      Degrees      Degrees      " Degrees    Degrees Degrees Degrees Degrees Degrees Degrees Degrees Degrees Degrees Degrees    Degrees      Flexion 114                               Extension 23 pain on right                                Right Lat. Flexion 33                               Left Lat. Flexion 25                               Right Lat Shift Pelvis No change                               Left Lat Shift Pelvis no change pulls on right                                               AROM Hips:  (degrees) Right      Left Right Left Right  Left Right  Left Right  Left Right  Left Right Left Right Left Right Left Right Left Right  Left Right Left Right Left Right Left Right Left Right  Left      Flexion 95       98                               Abduction 42       45                               Int. Rotation 25       15                               Ext. Rotation  30        40                                               Flexibility:   (degrees) Right    Left Right  Left Right  Left Right  Left Right  Left Right  Left Right Left Right Left Right Left Right Left Right  Left Right Left Right Left Right Left Right Left Right  Left      Hamstrings -47       -49                              Quadriceps 45       40                              HipExt/Rot(piriformis) 42       43                     Hip Int/Rotators 38       37                     Hip Flexors (iliopsoas) Not tested                             IT Band Not tested                                                                Root Level  - Motor Right      Left Right  Left Right  Left Right  Left Right  Left Right  Left Right Left Right Left Right Left Right Left Right  Left Right Left Right Left Right Left Right Left Right  Left     T12             Rectus Abdominus 4+/5                     L1              Paraspinals 5/5      5/5                     L2              Hip Flexion 4+/5      4+/5                     L3             Quads 5/5      5/5                     L4               Anterior Tibialis 5/5      5/5                     L5             Gr. Toe Extension 5/5      5/5                     S1            Gastroc/Sol/Peroneals 4+/5    4+/5                                      Functional Strength:                     Single LegStanceTime (seconds) R:24   L:25   R:      L: R:      L: R:     L: R:      L: R:      L:  R:       L:   R:      L:   R:      L:   R:      L: R:      L:   R:      L:   R:      L:    R:      L:    R:      L: R:      L:     Pelvic Floor Isolation (seconds) 10 sec                     Inner Unit  Isolation (seconds) 10 sec                                                        Functional Activities:                      Sit w/o pain? Pain increases (minutes) yes                     Stand w/o pain? 20 min                     Walk w/o pain? 30 min                      Steps w/o pain? Yes, ignores pain                      Drive w/o pain? Uncomfortable, 60 min                      Work w/o pain? n/a                     # times wakes w/ pain? Sleeping on couch b/c or right shld pain, since July 12/05/2019                    SI Joint Positioning  Right  Left Right  Left Right  Left Right  Left Right  Left Right  Left Right  Left Right  Left Right  Left Right  Left Right  Left Right  Left Right  Left Right Left Right Left Right Left Right  Left Right  Left       Innominate                            Anterior               x                            Posterior    x                            Sacrum                               Unilateral rotation    x                                            SI Joint Testing  Right  Left Right  Left Right  Left Right  Left Right  Left Right  Left Right  Left Right  Left Right  Left Right  Left Right  Left Right  Left Right  Left Right Left Right Left Right Left Right  Left Right  Left           Distraction   +         +                            Beatriz's    -        -                            Compression   -          -                            Prone Press Up   -            -                                         Special Tests  Right   Left Right  Left Right  Left Right  Left Right  Left Right  Left Right Left  Right  Left Right  Left Right  Left Right  Left Right  Left Right  Left Right Left Right Left Right Left Right Left  Right  Left      Straight Leg Raise                                     Active   -          -                              Passive    -           -                             Hip Scour Test                                               De Oliveira's Cysts                         Palpation:         Muscle/Bone Irritation  Date 12/05/2019                     Right  Left Right  Left Right  Left Right  Left Right  Left Right  Left Right  Left Right  Left Right  Left Right  Left Right  Left Right  Left Right  Left Right Left Right Left Right Left Right  Left Right  Left   SI joint  +        ++                    Piriformis   Sl        +                    Gr. Trochanter  ++        +                    IT Band  sl        sl                    Quadratus Lumborum  ++        +                    Ischial Tuberosity  -        -                    Sacrococcygeal Ligs  -        -                    Paraspinals  +        -                    Spinous Processes                                        T-spine rotated to   T3-5                                    L-spine rotated to  L5                             Adductor Fabio  -        -                    Iliopsoas  +        +                    Quad Origin  -        -                    Pubic Symphysis         +                                                                         Rectus Diastasis   2 finger                                           All 4s Positioning: Pt not able to assume full lumbar extension in this position    PLAN OF CARE:    ASSESSMENT:  Problem List:   1. Flare up of SI joint dysfunction  2. Decreased spinal stabilization  3. Postural  dysfunction     Discussion:    Maribel arrives with a flare up of her SI joint symptoms.  This is consistent with her history of recent travel and new recurrent bending over to take care of her new puppies.  By the end of today's visit, her pain was decreased to 2-3/10.  She needed a review of the self correction techniques for the SI joints and reminders for sitting.  Anticipate she will do well with a few more visit of manual work and reminders for self management.  Will await assessment from Dr. Jackman for her cervical spine.  Will be happy to assist as needed following this assessment.     Short Term Goals: (4-6 weeks)                               Date Met:                1.   pt independent in postural correction  2.   pt independent in SI joint self-corrections  3.   pt independent in exer to decrease post. disc pressures  4.   pt able to sleep through night without pain  5.   pt able to stand 10  minutes without pain  6.   Reduce pt pain to no greater than 4/10   7.   Decrease Oswestry Pain Score to no greater than 4/10    Long Term Goals:(3-5 months)      Date Met:      1.  pt independent in self-management of symptoms       2.  pt independent in home program      3. Reduce pain to no greater than 2/10      4.  Pt able to  puppies without pain.      Treatment Plan:   1.  Ultrasound to increase extensibility, decrease pain, if needed  2.  Electrical stimulation for muscle relaxation, decrease pain, if needed, iontophoresis  3.  Manual therapy to increase function, decrease pain  4.  Therapeutic exercise to increase function, decrease pain  5.  Home programming and d/c planning to increase function and decrease pain    Frequency & Duration:  Pt will be seen on a once/week basis for 12 more visits    Patient Participated in and Agrees With This Plan of Care and Goals:  YES  Rehab Potential:  Fair to good      Karen Chatterjee, PT, MS  Physical Therapist  [place]# [id number]    Medicare Certification:  Initial  Certification  Certification Period: 3/4/2020  I certify that the therapy services are furnished while this patient is under my care.  The services outlined above are required by this patient, and will be reviewed every 90  days.     PHYSICIAN:     DATE:     Please sign and return via fax to [facility] [address] Fax # [phone]. Thank you, [facility].        Below is the Physical Therapy Re-evaluation for your patient,                        .  If this meets with your approval please sign the Medicare Certification at the end of the evaluation and return to our office.  If you have any questions, please contact me.  Best Regards,  Karen Chatterjee, PT, MS        TREATMENT TODAY:    Total Treatment Time: (time in clinic)            60  Timed Code Treatment Minutes:   60    Re-evaluation:  86069    Manual Therapy:  (MA)  RX min:  30  Manual posterior rotation of left innominate    Positional Isometric contraction (PIC) of right iliopsoas    Positional Isometric contraction of (PIC) right gluteus minimus    Distraction of both SI jts in open pack hip position  Piriformis stretching, bilaterally    Quadratus lumborum stretching, bilaterally    Anterior/Inferior Mobilization of  left hip    Positional Isometric Contraction of multifidus    Therapeutic Activities:  (TA)  RX min:  10    Neuromuscular Reeducation: (NMR)  RX min:     Ultrasound:  RX min:          1.6 piper/cm2       Location:     Iontophoresis:  6 hr patch                                Location:                               Procedures:      Key:  i=instructed        r=review        c=corrected        a=adapted   Code Procedure/Instruction 12/05/2019                  TA         Sleeping Positions                                 TA Sternum-Up Posture                   TA SI jt. self-correction reviewed                  TA Lumbar Facet PIC reviewed                  TA   Order of Self-Corrections                    TA Use of lumbar pillow                   TA Use of cervical roll                   TA Use of orthotics                   TA Use of SI belt                   TA Use of Nada chair                   TA Use of Ice                   TA Use of [employer]/ heat                   TA Use of TENS unit                   TA Use of iontophoresis                   TA Decreasing Disc Pressures                                                           NMR Diaphragmatic Breathing                   NMR Pelvic Floor Isolation                   NMR Transverse Abdominus                   NMR Multifidus Isolation                   NMR InnerUnit against Gravity                   NMR Sit-stand w/ inner unit                   NMR Roll w/ inner unit                   NMR Walk w/ inner unit                    NMR Up/down steps w/ inner unit                   NMR Water Exer Instruction                   NMR Hip Abd w/o piriformis                   NMR Hip Add w/o iliop/ham                    NMR Lower abs in supine                   NMR Abd obliques in supine                   NMR Prone Knee Flexion                   NMR Prone glut valentino                   NMR Retro Step                   NMR Retro Walk                   NMR Retro walk on treadmill                   NMR Forward walk w/ inner unit                   NMR Balance Instruction                   NMR Stability Ball A/P & Lateral                   NMR Ball Catch/Throw /Supine                   NMR Ball Catch/Throw /Stand                   NMR StabilityBall All 4's Arm Lift                   NMR StabilityBall Prone Walk Out                   NMR StabilityBall Supine Oblique                   NMR Stability Ball sit-supine-sit                   NMR Walking Prog Progression                   MNR Home program sequencing                                       TA Hamstring stretch                   TA Hip Ext Rot Stretch                   TA Hip Int Rot Stretch                   TA Hip  Flex/IT Stretch                   TA Hip Add Stretch                   TA Lats Dorsi Stretch                   TA Gastroc/soleus stretch                   TA QuadratusLumborm Stretch                      Supine                      Stance                   TA Quad Stretch                                       NMR Wall Push Ups                   NMR Planking                   NMR BOSU Ball Exercises                   NMR Lateral Bridge Drop                   NMR [place]                   NMR O'Feldt Lat Pull Down                   NMR O'Feldt Hip Ext                   NMR O'Feldt Trunk Ext                                       TA CervDiscExtSup/stnd                   TA Cerv Stretches                   TA Self PIC Cervical Spine                   TA Neural Gliding                   TA Ant/Mid Scalene Strch                   TA Biceps stretch                   TA Rotator Cuff Stretch                   TA Anterior Chest Stretch                   TA Wrist Ext Stretch                                       NMR Prone Arm Lifts                   NMR Scapula Stabilization                   NMR Occulomotor Isometrics                   NMR Cervical Isometrics                                       TA Shld AROM w/bar                   TA Shld Capsular Stretching                   TA Self PIC Thor Spine                   TA Rot Cuff Therabd, beginner                   TA Rot Cuff Therabd, intermed                                                                                                                                                                                                                                                 Karen Chatterjee, PT, MS  Physical Therapist  [place]# [id number]

## 2019-12-11 ENCOUNTER — TRANSCRIBE ORDERS (OUTPATIENT)
Dept: PHYSICAL THERAPY | Facility: CLINIC | Age: 69
End: 2019-12-11

## 2019-12-11 DIAGNOSIS — G89.29 CHRONIC RIGHT SHOULDER PAIN: Primary | ICD-10-CM

## 2019-12-11 DIAGNOSIS — M25.511 CHRONIC RIGHT SHOULDER PAIN: Primary | ICD-10-CM

## 2019-12-17 ENCOUNTER — TREATMENT (OUTPATIENT)
Dept: PHYSICAL THERAPY | Facility: CLINIC | Age: 69
End: 2019-12-17

## 2019-12-17 DIAGNOSIS — M53.3 SACROILIAC JOINT DYSFUNCTION: Primary | ICD-10-CM

## 2019-12-17 DIAGNOSIS — M25.511 RIGHT SHOULDER PAIN, UNSPECIFIED CHRONICITY: Primary | ICD-10-CM

## 2019-12-17 PROCEDURE — 97140 MANUAL THERAPY 1/> REGIONS: CPT | Performed by: PHYSICAL THERAPIST

## 2019-12-17 PROCEDURE — 97530 THERAPEUTIC ACTIVITIES: CPT | Performed by: PHYSICAL THERAPIST

## 2019-12-17 PROCEDURE — 97162 PT EVAL MOD COMPLEX 30 MIN: CPT | Performed by: PHYSICAL THERAPIST

## 2019-12-17 NOTE — PROGRESS NOTES
" Physical Therapy Note  SUBJECTIVE:    Changes since last seen:   Maribel reports she got foot stool to sit on when working with her dogs.  \"Its a game changer, so much better for my back.\"  She also got the [employer] ice packs which have really helped.  Vacuuming still hurts her low back even when she tries to adapt positioning with her vacuum.   She had a few bad days last week when the right piriformis tweaked and she reports she had nerve pain in right piriformis. She tried all self corrections and finally the pain subsided after 2 days.   She indicates that she arrives today reporting that she had more issues the last few days with her neck pain because of the cold weather.   She states she saw Dr. Felix for her neck and was told she needs a shoulder replacement.  She has decided to get a 2nd opinion from Dr. Valle which is scheduled for January.  This has been extremely upsetting to her.      Current level of pain:     0/10!  Lowest level of pain since last seen:   0/10  Highest level of pain since last seen:    5/10 the 2 day period last week.       Past Medical History:  1.  Cervical DDD  2.  Hx of Bilateral THR, left 2012, right 2010  3.  Septoplasty, 2011  4.  Hx of SI joint dysfunction  5.  Fibromyalgia  6.  Hx of hypothyroidism  7.  Hx of gastritis  8.  Irritable bowel syndrome/spastic colon  9.  Osteoarthritis  10.  Hx of 4 pregnancies, 2 normal vaginal deliveries  11. Hx of Dequervains syndrome  12.  Hx of fx left sesmoid  13.   Hx of positional vertigo  14. Osteopenia, diagnosed May 2014  15  Hx of migraine headaches  16.  TMJ dysfunction, 2015  17.  Hx of Cardiomyopathy/ left catheterization 2002  18.  Dyslipidemia  19.  Hx of neuroma pain left foot  20.  Hx of anxiety/ depression    Functional Limitations:  Standing, sleeping,   Patient Goals for Physical Therapy: decreased pain      OBJECTIVE:     12/05/2019 12/17/19                   SI Joint Positioning  Right  Left Right  Left Right  Left " Right  Left Right  Left Right  Left Right  Left Right  Left Right  Left Right  Left Right  Left Right  Left Right  Left Right Left Right Left Right Left Right  Left Right  Left       Innominate                            Anterior               x              x                           Posterior    x   x                           Sacrum                               Unilateral rotation    x   x                                           SI Joint Testing  Right  Left Right  Left Right  Left Right  Left Right  Left Right  Left Right  Left Right  Left Right  Left Right  Left Right  Left Right  Left Right  Left Right Left Right Left Right Left Right  Left Right  Left           Distraction   +         +   +        +                           Beatriz's    -        -                            Compression   -         -                            Prone Press Up   -            -                                         Special Tests  Right   Left Right  Left Right  Left Right  Left Right  Left Right  Left Right Left  Right  Left Right  Left Right  Left Right  Left Right  Left Right  Left Right Left Right Left Right Left Right Left  Right  Left      Straight Leg Raise                                     Active   -          -  -       -                             Passive    -           -   -          -                            Hip Scour Test                                               De Oliveira's Cysts                         Palpation:         Muscle/Bone Irritation  Date 12/05/2019 12/17/19                    Right  Left Right  Left Right  Left Right  Left Right  Left Right  Left Right  Left Right  Left Right  Left Right  Left Right  Left Right  Left Right  Left Right Left Right Left Right Left Right  Left Right  Left   SI joint  +        ++   +          +                   Piriformis   Sl        +    ++        +                   Gr. Trochanter  ++        +   +          +                   IT Band  sl        sl   +         sl                    Quadratus Lumborum  ++        +    +         sl                   Ischial Tuberosity  -        -   -          -                   Sacrococcygeal Ligs  -        -   -           -                   Paraspinals  +        -   Sl           -                   Spinous Processes                                        T-spine rotated to   T3-5                     T4-6                          L-spine rotated to  L5           L5                   Adductor Fabio  -        -   -           -                   Iliopsoas  +        +   Sl          sl                   Quad Origin  -        -    -          -                   Pubic Symphysis         +                   Sl                                                               Rectus Diastasis   2 finger                                           All 4s Positioning: Pt not able to assume full lumbar extension in this position      Monthly Objective Measurement/Functional Activity  Date: 12/05/2019                  Oswestry Pain Score 52/100                  LE Functional Scale                                      AROM Lumbar Spine: Degrees   Degrees      Degrees      Degrees      Degrees    Degrees Degrees Degrees Degrees Degrees Degrees Degrees Degrees Degrees Degrees    Degrees      Flexion 114                               Extension 23 pain on right                                Right Lat. Flexion 33                               Left Lat. Flexion 25                               Right Lat Shift Pelvis No change                               Left Lat Shift Pelvis no change pulls on right                                               AROM Hips:  (degrees) Right      Left Right Left Right  Left Right  Left Right  Left Right  Left Right Left Right Left Right Left Right Left Right  Left Right Left Right Left Right Left Right Left Right  Left      Flexion 95       98                               Abduction 42       45                               Int.  Rotation 25       15                               Ext. Rotation  30        40                                               Flexibility:   (degrees) Right    Left Right  Left Right  Left Right  Left Right  Left Right  Left Right Left Right Left Right Left Right Left Right  Left Right Left Right Left Right Left Right Left Right  Left      Hamstrings -47       -49                              Quadriceps 45       40                              HipExt/Rot(piriformis) 42       43                     Hip Int/Rotators 38       37                     Hip Flexors (iliopsoas) Not tested                             IT Band Not tested                                                                Root Level  - Motor Right      Left Right  Left Right  Left Right  Left Right  Left Right  Left Right Left Right Left Right Left Right Left Right  Left Right Left Right Left Right Left Right Left Right  Left     T12             Rectus Abdominus 4+/5                     L1              Paraspinals 5/5      5/5                     L2              Hip Flexion 4+/5      4+/5                     L3             Quads 5/5      5/5                     L4              Anterior Tibialis 5/5      5/5                     L5             Gr. Toe Extension 5/5      5/5                     S1            Gastroc/Sol/Peroneals 4+/5    4+/5                                      Functional Strength:                     Single LegStanceTime (seconds) R:24   L:25   R:      L: R:      L: R:     L: R:      L: R:      L:  R:       L:   R:      L:   R:      L:   R:      L: R:      L:   R:      L:   R:      L:    R:      L:    R:      L: R:      L:     Pelvic Floor Isolation (seconds) 10 sec                     Inner Unit  Isolation (seconds) 10 sec                                                        Functional Activities:                      Sit w/o pain? Pain increases (minutes) yes                     Stand w/o pain? 20 min                     Walk w/o  pain? 30 min                      Steps w/o pain? Yes, ignores pain                      Drive w/o pain? Uncomfortable, 60 min                      Work w/o pain? n/a                     # times wakes w/ pain? Sleeping on couch b/c or right shld pain, since July                      PLAN OF CARE:    ASSESSMENT:  Problem List:   1. Flare up of SI joint dysfunction  2. Decreased spinal stabilization  3. Postural dysfunction     Discussion:    Recommended she use a thermacare on her neck under her coat whenever she goes outside when it is cold.  She will continue with the ice packs and will add there Thermacares on her neck when icing   Maribel was in need of review of how to self correct the SI joints, how to sit, how to stand, how to  items from the floor and reviewed need for lumbar pillow and pillows for arms when traveling next week.   She will continue with her home exercises and will progress spinal stabilization as she is able to tolerate.      Short Term Goals: (4-6 weeks)                               Date Met:                1.   pt independent in postural correction  2.   pt independent in SI joint self-corrections    12/17/19  3.   pt independent in exer to decrease post. disc pressures  12/17/19  4.   pt able to sleep through night without pain  5.   pt able to stand 10  minutes without pain  6.   Reduce pt pain to no greater than 4/10   7.   Decrease Oswestry Pain Score to no greater than 4/10    Long Term Goals:(3-5 months)      Date Met:      1.  pt independent in self-management of symptoms       2.  pt independent in home program      3. Reduce pain to no greater than 2/10      4.  Pt able to  puppies without pain.      Treatment Plan:   1.  Ultrasound to increase extensibility, decrease pain, if needed  2.  Electrical stimulation for muscle relaxation, decrease pain, if needed, iontophoresis  3.  Manual therapy to increase function, decrease pain  4.  Therapeutic exercise to increase  function, decrease pain  5.  Home programming and d/c planning to increase function and decrease pain    Frequency & Duration:  Pt will be seen on a once/week basis for 11 more visits    Patient Participated in and Agrees With This Plan of Care and Goals:  YES  Rehab Potential:  Fair to good      Karen Chatterjee, PT, MS  Physical Therapist  [place]# [id number]    Medicare Certification:  Initial Certification  Certification Period: 12/05/19  through 03/05/20            TREATMENT TODAY:    Total Treatment Time: (time in clinic)            60  Timed Code Treatment Minutes:   60      Manual Therapy:  (MA)  RX min:  40  Manual posterior rotation of left innominate    Positional Isometric contraction (PIC) of right iliopsoas    Positional Isometric contraction of (PIC) right gluteus minimus    Distraction of both SI jts in open pack hip position  Piriformis stretching, bilaterally    Quadratus lumborum stretching, bilaterally    Anterior/Inferior Mobilization of  left hip    Positional Isometric Contraction of multifidus, thoracic paraspinals     Therapeutic Activities:  (TA)  RX min:  20    Neuromuscular Reeducation: (NMR)  RX min:     Ultrasound:  RX min:          1.6 piper/cm2       Location:     Iontophoresis:  6 hr patch                                Location:                               Procedures:      Key:  i=instructed        r=review        c=corrected        a=adapted   Code Procedure/Instruction 12/05/2019 12/17/19                 TA         Sleeping Positions                reviewed                 TA Sternum-Up Posture  reviewed                 TA SI jt. self-correction reviewed                  TA Lumbar Facet PIC reviewed                  TA   Order of Self-Corrections                   TA Use of lumbar pillow  reviewed                 TA Use of cervical roll                   TA Use of orthotics                   TA Use of SI belt                   TA Use of Nada chair                   TA Use of Ice  reviewed                  TA Use of Thermacare/ heat  reviewed                 TA Use of TENS unit  In car, while driving                 TA Use of iontophoresis                   TA Decreasing Disc Pressures                                                           NMR Diaphragmatic Breathing                   NMR Pelvic Floor Isolation                   NMR Transverse Abdominus                   NMR Multifidus Isolation                   NMR InnerUnit against Gravity                   NMR Sit-stand w/ inner unit                   NMR Roll w/ inner unit                   NMR Walk w/ inner unit                    NMR Up/down steps w/ inner unit                   NMR Water Exer Instruction                   NMR Hip Abd w/o piriformis                   NMR Hip Add w/o iliop/ham                    NMR Lower abs in supine                   NMR Abd obliques in supine                   NMR Prone Knee Flexion                   NMR Prone glut valentino                   NMR Retro Step                   NMR Retro Walk                   NMR Retro walk on treadmill                   NMR Forward walk w/ inner unit                   NMR Balance Instruction                   NMR Stability Ball A/P & Lateral                   NMR Ball Catch/Throw /Supine                   NMR Ball Catch/Throw /Stand                   NMR StabilityBall All 4's Arm Lift                   NMR StabilityBall Prone Walk Out                   NMR StabilityBall Supine Oblique                   NMR Stability Ball sit-supine-sit                   NMR Walking Prog Progression                   MNR Home program sequencing                                       TA Hamstring stretch                   TA Hip Ext Rot Stretch                   TA Hip Int Rot Stretch                   TA Hip Flex/IT Stretch                   TA Hip Add Stretch                   TA Lats Dorsi Stretch                   TA Gastroc/soleus stretch                   TA QuadratusLumborm Stretch                       Supine                      Stance                   TA Quad Stretch                                       NMR Wall Push Ups                   NMR Planking                   NMR BOSU Ball Exercises                   NMR Lateral Bridge Drop                   NMR [place]                   NMR O'Feldt Lat Pull Down                   NMR O'Feldt Hip Ext                   NMR O'Feldt Trunk Ext                                       TA CervDiscExtSup/stnd                   TA Cerv Stretches                   TA Self PIC Cervical Spine                   TA Neural Gliding                   TA Ant/Mid Scalene Strch                   TA Biceps stretch                   TA Rotator Cuff Stretch                   TA Anterior Chest Stretch                   TA Wrist Ext Stretch                                       NMR Prone Arm Lifts                   NMR Scapula Stabilization                   NMR Occulomotor Isometrics                   NMR Cervical Isometrics                                       TA Shld AROM w/bar                   TA Shld Capsular Stretching                   TA Self PIC Thor Spine                   TA Rot Cuff Therabd, beginner                   TA Rot Cuff Therabd, intermed                                                                                                                                                                                                                                                 Karen Chatterjee, PT, MS  Physical Therapist  [place]# [id number]

## 2019-12-17 NOTE — PROGRESS NOTES
Physical Therapy Initial Evaluation and Plan of Care        Patient: Barbara Hernandez   : 1950  Diagnosis/ICD-10 Code:  Right shoulder pain, unspecified chronicity [M25.511]  Referring practitioner: Eb Ghosh MD  Date of Initial Visit: 2019  Today's Date: 2019  Patient seen for 1 sessions           Subjective Evaluation    History of Present Illness  Mechanism of injury: 2019 worked in yard, that evening putting on closes, could not get arm down due to a catch/pain. MRI showed a severe inflammation. Came under the care of Dr Ruff. Received an injection, not helpful.  Went to PT at [facility]. Went to [facility], to aggressive flared up Fibromylagia.  Stopped PT. Went to Dr Felix for neck. Recommended shoulder arthroplasty.  Using Voltaren gel and ice and NSAIDS.     CURRENT COMPLAINTS  Feels the pain and inflammation is the biggest issue and needs modalities to reduce inflammation.      Patient Occupation: N/A Quality of life: good    Pain  Current pain ratin  At best pain ratin  At worst pain ratin  Location: Right Anterior GH joint pain  Quality: dull ache, tight and sharp  Relieving factors: rest, support and ice  Aggravating factors: lifting, movement, outstretched reach and sleeping  Progression: no change    Hand dominance: right    Diagnostic Tests  X-ray: abnormal    Treatments  Previous treatment: injection treatment and physical therapy           Objective       Static Posture     Head  Forward.    Shoulders  Rounded.    Thoracic Spine  Hyperkyphosis.    Observations     Right Shoulder  Positive for deformity (A.C. joint large and elevated).     Tenderness     Right Shoulder  Tenderness in the AC joint, biceps tendon (proximal), bicipital groove and subacromial bursa.     Cervical/Thoracic Screen   Cervical range of motion within normal limits    Neurological Testing     Reflexes   Left   Biceps (C5/C6): normal (2+)  Brachioradialis (C6): normal (2+)  Triceps  (C7): normal (2+)    Right   Biceps (C5/C6): normal (2+)  Brachioradialis (C6): normal (2+)  Triceps (C7): normal (2+)    Active Range of Motion     Right Shoulder   Flexion: 150 degrees with pain  Extension: 40 degrees with pain  Abduction: 165 degrees   External rotation 90°: 75 degreeswith pain  Internal rotation 90°: 52 degrees with pain    Strength/Myotome Testing     Left Shoulder     Isolated Muscles   Anterior deltoid: 5   Biceps: 5   Infraspinatus: 5   Lower trapezius: 4+   Middle trapezius: 4+   Rhomboids: 5   Subscapularis: 5   Supraspinatus: 5   Triceps: 5     Right Shoulder     Isolated Muscles   Anterior deltoid: 4+   Biceps: 4-   Infraspinatus: 4   Lower trapezius: 4-   Middle trapezius: 4   Rhomboids: 5   Subscapularis: 5   Supraspinatus: 4+   Triceps: 5     Tests   Cervical     Right   Negative active compression ([place]).     Right Shoulder   Positive AC shear, Hawkin's, Neer's, painful arc, Speed's and Yergason's.   Negative drop arm and empty can.          Assessment & Plan     Assessment  Impairments: abnormal or restricted ROM and pain with function  Other impairment: QUICK DASH: 41  Assessment details: 69 year old right hand female with chronic right shoulder pain.  She signs and symptoms bicipital tendinitis and subacromial impingement. Possible RTC tendon irritation.  Postural dysfunction may be contributing to the impingement issue. Cannot rule out DJD. Neurological exam is normal.  Prognosis: fair  Prognosis details: History of Fibromyalgia, makes progress very slow at best.   Functional Limitations: carrying objects, lifting, sleeping, pulling, pushing, uncomfortable because of pain and reaching overhead  Goals  Plan Goals: STG to be met in 4 weeks  1.  Pt is able sleep through the night without being awaken by shoulder pain.  2.  Pt is able to obtain 165 degrees of shoulder flexion.  3.  Pt has improved thoracic spine extension to improve shoulder girdle posture.  4.  Pt function  improves so that Quick Dash score improves to 35.  LTG to be met in 12 weeks  1.  Pt is independent with HEP.  2.  Pt has resolution of right anterior shoulder pain so that she can lift overhead without pain.  3.  Pt right UE function improves so that Quick Dash improves 25.    Plan  Therapy options: will be seen for skilled physical therapy services  Planned modality interventions: ultrasound, high voltage pulsed current (pain management), cryotherapy and thermotherapy (hydrocollator packs)  Planned therapy interventions: abdominal trunk stabilization, body mechanics training, functional ROM exercises, joint mobilization, therapeutic activities, stretching, strengthening, spinal/joint mobilization, soft tissue mobilization, postural training, neuromuscular re-education and manual therapy  Frequency: 2x week  Duration in weeks: 12         Total Treatment:     50   mins    PT SIGNATURE: Declan Diaz PT   DATE TREATMENT INITIATED: 12/17/2019    Medicare Initial Certification  Certification Period: 3/16/2020  I certify that the therapy services are furnished while this patient is under my care.  The services outlined above are required by this patient, and will be reviewed every 90 days.     PHYSICIAN:  ____________________________________________________________       Eb Ghosh MD        DATE: __________________________________________________________________    Please sign and return via fax to [phone].. Thank you, [facility].

## 2019-12-27 ENCOUNTER — TREATMENT (OUTPATIENT)
Dept: PHYSICAL THERAPY | Facility: CLINIC | Age: 69
End: 2019-12-27

## 2019-12-27 DIAGNOSIS — M25.511 RIGHT SHOULDER PAIN, UNSPECIFIED CHRONICITY: Primary | ICD-10-CM

## 2019-12-27 PROCEDURE — G0283 ELEC STIM OTHER THAN WOUND: HCPCS | Performed by: PHYSICAL THERAPIST

## 2019-12-27 PROCEDURE — 97110 THERAPEUTIC EXERCISES: CPT | Performed by: PHYSICAL THERAPIST

## 2019-12-27 PROCEDURE — 97035 APP MDLTY 1+ULTRASOUND EA 15: CPT | Performed by: PHYSICAL THERAPIST

## 2019-12-30 ENCOUNTER — TREATMENT (OUTPATIENT)
Dept: PHYSICAL THERAPY | Facility: CLINIC | Age: 69
End: 2019-12-30

## 2019-12-30 DIAGNOSIS — M25.511 RIGHT SHOULDER PAIN, UNSPECIFIED CHRONICITY: Primary | ICD-10-CM

## 2019-12-30 PROCEDURE — 97140 MANUAL THERAPY 1/> REGIONS: CPT | Performed by: PHYSICAL THERAPIST

## 2019-12-30 PROCEDURE — 97035 APP MDLTY 1+ULTRASOUND EA 15: CPT | Performed by: PHYSICAL THERAPIST

## 2019-12-30 PROCEDURE — G0283 ELEC STIM OTHER THAN WOUND: HCPCS | Performed by: PHYSICAL THERAPIST

## 2019-12-31 NOTE — PROGRESS NOTES
Physical Therapy Daily Progress Note        Patient: Barbara Hernandez   : 1950  Diagnosis/ICD-10 Code:  Right shoulder pain, unspecified chronicity [M25.511]  Referring practitioner: Eb Ghosh MD  Date of Initial Visit: Type: THERAPY  Noted: 2019  Today's Date: 2019  Patient seen for 2 sessions           Subjective   Barbara Hernandez reports: last treatment helped the shoulder pain for 2 days, but then pain returned to previous level.    Objective   PALPATION: bicep tendon proximal portion on the right tender.  TEST: Speed's test.  See Exercise, Manual, and Modality Logs for complete treatment.       Assessment/Plan  Severe Bicipital tendinitis on the right.  Progress per Plan of Care and Progress strengthening /stabilization /functional activity           Manual Therapy:    9     mins  50816;  Therapeutic Exercise:    11     mins  69024;     Neuromuscular Antolin:        mins  96963;    Therapeutic Activity:          mins  31937;     Gait Training:           mins  22347;     Ultrasound:     15     mins  43692;    Electrical Stimulation:    30     mins  39409 ( );  Iontophoresis          mins 42382   Traction          mins  74690  Fluidotherapy          mins  32218  Dry Needling          mins self-pay  Paraffin          mins  42435    Timed Treatment:   35   mins   Total Treatment:     65   mins    Declan Diaz PT  Physical Therapist

## 2020-01-03 ENCOUNTER — TREATMENT (OUTPATIENT)
Dept: PHYSICAL THERAPY | Facility: CLINIC | Age: 70
End: 2020-01-03

## 2020-01-03 DIAGNOSIS — M25.511 RIGHT SHOULDER PAIN, UNSPECIFIED CHRONICITY: Primary | ICD-10-CM

## 2020-01-03 PROCEDURE — G0283 ELEC STIM OTHER THAN WOUND: HCPCS | Performed by: PHYSICAL THERAPIST

## 2020-01-03 PROCEDURE — 97140 MANUAL THERAPY 1/> REGIONS: CPT | Performed by: PHYSICAL THERAPIST

## 2020-01-03 PROCEDURE — 97110 THERAPEUTIC EXERCISES: CPT | Performed by: PHYSICAL THERAPIST

## 2020-01-06 ENCOUNTER — TREATMENT (OUTPATIENT)
Dept: PHYSICAL THERAPY | Facility: CLINIC | Age: 70
End: 2020-01-06

## 2020-01-06 DIAGNOSIS — M25.511 RIGHT SHOULDER PAIN, UNSPECIFIED CHRONICITY: Primary | ICD-10-CM

## 2020-01-06 PROCEDURE — 97035 APP MDLTY 1+ULTRASOUND EA 15: CPT | Performed by: PHYSICAL THERAPIST

## 2020-01-06 PROCEDURE — 97140 MANUAL THERAPY 1/> REGIONS: CPT | Performed by: PHYSICAL THERAPIST

## 2020-01-06 PROCEDURE — G0283 ELEC STIM OTHER THAN WOUND: HCPCS | Performed by: PHYSICAL THERAPIST

## 2020-01-07 ENCOUNTER — TREATMENT (OUTPATIENT)
Dept: PHYSICAL THERAPY | Facility: CLINIC | Age: 70
End: 2020-01-07

## 2020-01-07 DIAGNOSIS — M53.3 SACROILIAC JOINT DYSFUNCTION: Primary | ICD-10-CM

## 2020-01-07 PROCEDURE — 97530 THERAPEUTIC ACTIVITIES: CPT | Performed by: PHYSICAL THERAPIST

## 2020-01-07 PROCEDURE — 97140 MANUAL THERAPY 1/> REGIONS: CPT | Performed by: PHYSICAL THERAPIST

## 2020-01-07 NOTE — PROGRESS NOTES
" Physical Therapy Note  SUBJECTIVE:    Changes since last seen:   \"I've been having good and bad days.\"  She has been seeing Declan Diaz, PT, CHT, here in our clinic for her right shoulder pain.  She indicates that she had some pain in the area of the right middle/upper trap last week and after the ultrasound on it she had pain down to the right elbow and right wrist.  Today she c/o having more discomfort in both upper/middle traps and wonders if this is related to her low back/ neck.   She notes her dogs are getting heavier, 9-11 lbs and she is lifting them more, questioning if this has contributed to her upper back/ neck pain.   She went to [facility] over Christmas and did ok because she slept on the firm couch instead of the soft mattress.   She was on hardwood floors and stairs the whole time she was gone.   One day while gone she was sitting on couch and she bent her left leg \"pulling a muscle in the back of my left knee\".  Tthen I walked around at [employer] which tweaked it more; but its better today, just . \"   My low back hasn't been as bad as my neck or shoulder.     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    Current level of pain:    Low back 2/10      Neck 3/10  Lowest level of pain since last seen:    0/10       1/10   Highest level of pain since last seen:     4/10 the 2 day period last week.    4/10      Past Medical History:  1.  Cervical DDD  2.  Hx of Bilateral THR, left 2012, right 2010  3.  Septoplasty, 2011  4.  Hx of SI joint dysfunction  5.  Fibromyalgia  6.  Hx of hypothyroidism  7.  Hx of gastritis  8.  Irritable bowel syndrome/spastic colon  9.  Osteoarthritis  10.  Hx of 4 pregnancies, 2 normal vaginal deliveries  11. Hx of Dequervains syndrome  12.  Hx of fx left sesmoid  13.   Hx of positional vertigo  14. Osteopenia, diagnosed May 2014  15  Hx of migraine headaches  16.  TMJ dysfunction, 2015  17.  Hx of Cardiomyopathy/ left " catheterization 2002  18.  Dyslipidemia  19.  Hx of neuroma pain left foot  20.  Hx of anxiety/ depression    Functional Limitations:  Standing, sleeping,   Patient Goals for Physical Therapy: decreased pain      OBJECTIVE:     12/05/2019 12/17/19 01/07/20                  SI Joint Positioning  Right  Left Right  Left Right  Left Right  Left Right  Left Right  Left Right  Left Right  Left Right  Left Right  Left Right  Left Right  Left Right  Left Right Left Right Left Right Left Right  Left Right  Left       Innominate                            Anterior               x              x              x                          Posterior    x   x    x                          Sacrum                               Unilateral rotation    x   x   x                                          SI Joint Testing  Right  Left Right  Left Right  Left Right  Left Right  Left Right  Left Right  Left Right  Left Right  Left Right  Left Right  Left Right  Left Right  Left Right Left Right Left Right Left Right  Left Right  Left           Distraction   +         +   +        +   +         +                          Beatriz's    -        -                            Compression   -         -                            Prone Press Up   -            -                                         Special Tests  Right   Left Right  Left Right  Left Right  Left Right  Left Right  Left Right Left  Right  Left Right  Left Right  Left Right  Left Right  Left Right  Left Right Left Right Left Right Left Right Left  Right  Left      Straight Leg Raise                                     Active   -          -  -       -                             Passive    -           -   -          -                            Hip Scour Test                                               De Oliveira's Cysts                         Palpation:         Muscle/Bone Irritation  Date 12/05/2019 12/17/19 01/07/20                   Right  Left Right  Left Right  Left Right  Left  Right  Left Right  Left Right  Left Right  Left Right  Left Right  Left Right  Left Right  Left Right  Left Right Left Right Left Right Left Right  Left Right  Left   SI joint  +        ++   +          +    +         sl                  Piriformis   Sl        +    ++        +     +        sl                  Gr. Trochanter  ++        +   +          +     Sl      Sl                   IT Band  sl        sl   +         sl   Sl          -                  Quadratus Lumborum  ++        +    +         sl   Sl         sl                  Ischial Tuberosity  -        -   -          -   -          -                  Sacrococcygeal Ligs  -        -   -           -   -             -                  Paraspinals  +        -   Sl           -   -          sl                  Spinous Processes                                       C-spine rotated to               C2-6                        T-spine rotated to   T3-5                     T4-6  T6-8                         L-spine rotated to  L5           L5           L4-5                  Adductor Fabio  -        -   -           -   -           sl                  Iliopsoas  +        +   Sl          sl   Sl           sl                  Quad Origin  -        -    -          -                   Pubic Symphysis         +                   Sl       sl                                                             Rectus Diastasis   2 finger                                           All 4s Positioning: Pt not able to assume full lumbar extension in this position      Monthly Objective Measurement/Functional Activity  Date: 12/05/2019 01/07/20                 Oswestry Pain Score 52/100    45/100                 LE Functional Scale                                      AROM Lumbar Spine: Degrees   Degrees      Degrees      Degrees      Degrees    Degrees Degrees Degrees Degrees Degrees Degrees Degrees Degrees Degrees Degrees    Degrees      Flexion 114           97                     Extension 23 pain on right            27                    Right Lat. Flexion 33           34                    Left Lat. Flexion 25           27                    Right Lat Shift Pelvis No change No change                     Left Lat Shift Pelvis No change pulls on right  Tightness                                    AROM Cervical Spine  Degrees Degrees Degrees Degrees Degrees Degrees Degrees Degrees Degrees Degrees Degrees Derees Degrees Degrees Degrees     flexion    45 pain                   extension    51 dizzy                   Right lateral flex    56 pain r upper trap                   Left lateral flex  57 pain right elbow                   Right rotation          44                   Left rotation           40                                     AROM Hips:  (degrees) Right      Left Right Left Right  Left Right  Left Right  Left Right  Left Right Left Right Left Right Left Right Left Right  Left Right Left Right Left Right Left Right Left Right  Left      Flexion 95       98  98        100                    Abduction 42       45  45         45                     Int. Rotation 25       15  25         28                    Ext. Rotation  30        40                                               Flexibility:   (degrees) Right    Left Right  Left Right  Left Right  Left Right  Left Right  Left Right Left Right Left Right Left Right Left Right  Left Right Left Right Left Right Left Right Left Right  Left      Hamstrings -47       -49   -45   -50                      Quadriceps 45       40  48       45                      HipExt/Rot(piriformis) 42       43    45       45                    Hip Int/Rotators 38       37   37        35                    Hip Flexors (iliopsoas) Not tested         Not tested                    IT Band Not tested          not tested                                                       Root Level  - Motor Right      Left Right  Left Right  Left Right  Left Right  Left Right   Left Right Left Right Left Right Left Right Left Right  Left Right Left Right Left Right Left Right Left Right  Left     T12             Rectus Abdominus 4+/5        4+/5                    L1              Paraspinals 5/5      5/5   5/5       5/5                    L2              Hip Flexion 4+/5      4+/5  4+/5    4+/5                    L3             Quads 5/5      5/5  5/5       5/5                    L4              Anterior Tibialis 5/5      5/5  5/5       5/5                    L5             Gr. Toe Extension 5/5      5/5  5/5       5/5                     S1            Gastroc/Sol/Peroneals 4+/5    4+/5   4+/5     4+/5                                    Functional Strength:                     Single LegStanceTime (seconds) R:24   L:25   R:30  L:27 R:      L: R:     L: R:      L: R:      L:  R:       L:   R:      L:   R:      L:   R:      L: R:      L:   R:      L:   R:      L:    R:      L:    R:      L: R:      L:     Pelvic Floor Isolation (seconds) 10 sec  10 sec                    Inner Unit  Isolation (seconds) 10 sec  10 sec                                                       Functional Activities:                      Sit w/o pain? Pain increases (minutes) yes   Yes/ 30-60 min                    Stand w/o pain? 20 min Yes/ 30 min                    Walk w/o pain? 30 min   yes/ 1/4 mile                    Steps w/o pain? Yes, ignores pain  Yes/ ignores pain                    Drive w/o pain? Uncomfortable, 60 min  Yes/ w/ supports and TENS 2 hrs                    Work w/o pain? n/a   n/a                    # times wakes w/ pain? Sleeping on couch b/c or right shld pain, since July Continues to sleep on couch                      PLAN OF CARE:    ASSESSMENT:  Problem List:   1. Flare up of SI joint dysfunction  2. Decreased spinal stabilization  3. Postural dysfunction     Discussion:    Reviewed correct sternum up posturing and positioning of pelvis to minimize lumbar posterior disc pressures and  to minimize cervical disc pressures.  Reviewed the need for cervical and lumbar extension principles to be incorporated into all ADLs.     Si joints and low back looked much improved today consistent with the decreased Oswestry Pain score.  The upper thoracic pain was decreased using positional isometric contraction techniques and reviewed with pt how to do this.  Following this and manual work on cervical and thoracic spine, her pain was reduced to no greater than 1-2/10.    Will attempt to progress spinal stabilization on next visit.       Short Term Goals: (4-6 weeks)                               Date Met:                1.   pt independent in postural correction  2.   pt independent in SI joint self-corrections    12/17/19  3.   pt independent in exer to decrease post. disc pressures  12/17/19  4.   pt able to sleep through night without pain  5.   pt able to stand 10  minutes without pain    01/07/20  6.   Reduce pt pain to no greater than 4/10     01/07/20  7.   Decrease Oswestry Pain Score to no greater than 45/100  01/07/20  8.  Decrease Oswestry Pain Score to no greater than  40/100    Long Term Goals:(3-5 months)      Date Met:      1.  pt independent in self-management of symptoms       2.  pt independent in home program      3. Reduce pain to no greater than 2/10      4.  Pt able to  puppies without pain.      Treatment Plan:   1.  Ultrasound to increase extensibility, decrease pain, if needed  2.  Electrical stimulation for muscle relaxation, decrease pain, if needed, iontophoresis  3.  Manual therapy to increase function, decrease pain  4.  Therapeutic exercise to increase function, decrease pain  5.  Home programming and d/c planning to increase function and decrease pain    Frequency & Duration:  Pt will be seen on a once/week basis for 10 more visits    Patient Participated in and Agrees With This Plan of Care and Goals:  YES  Rehab Potential:  Fair to good      Karen Chatterjee, PT,  MS  Physical Therapist  [place]# [id number]    Medicare Certification:  Initial Certification  Certification Period: 12/05/19  through 03/05/20            TREATMENT TODAY:    Total Treatment Time: (time in clinic)            60  Timed Code Treatment Minutes:   60      Manual Therapy:  (MA)  RX min:   45  Manual posterior rotation of left innominate    Positional Isometric contraction (PIC) of right iliopsoas    Positional Isometric contraction of (PIC) right gluteus minimus    Distraction of both SI jts in open pack hip position  Piriformis stretching, bilaterally    Quadratus lumborum stretching, bilaterally    Anterior/Inferior Mobilization of  left hip    Positional Isometric Contraction of multifidus, thoracic paraspinals   Manual work cervical spine  PIC C2-6  Myofascial work upper quarter and trunk      Therapeutic Activities:  (TA)  RX min:  15    Neuromuscular Reeducation: (NMR)  RX min:     Ultrasound:  RX min:          1.6 piper/cm2       Location:     Iontophoresis:  6 hr patch                                Location:                               Procedures:      Key:  i=instructed        r=review        c=corrected        a=adapted   Code Procedure/Instruction 12/05/2019 12/17/19 01/07/20                TA         Sleeping Positions                reviewed reviewed                TA Sternum-Up Posture  reviewed corrected                TA SI jt. self-correction reviewed                  TA Lumbar Facet PIC reviewed                  TA   Order of Self-Corrections                   TA Use of lumbar pillow  reviewed                 TA Use of cervical roll                   TA Use of orthotics                   TA Use of SI belt                   TA Use of Nada chair                   TA Use of Ice  reviewed                 TA Use of [employer]/ heat  reviewed                 TA Use of TENS unit  In car, while driving                 TA Use of iontophoresis                   TA Decreasing Disc Pressures   reviewed                                                         NMR Diaphragmatic Breathing                   NMR Pelvic Floor Isolation                   NMR Transverse Abdominus                   NMR Multifidus Isolation                   NMR InnerUnit against Gravity                   NMR Sit-stand w/ inner unit                   NMR Roll w/ inner unit                   NMR Walk w/ inner unit                    NMR Up/down steps w/ inner unit                   NMR Water Exer Instruction                   NMR Hip Abd w/o piriformis                   NMR Hip Add w/o iliop/ham                    NMR Lower abs in supine                   NMR Abd obliques in supine                   NMR Prone Knee Flexion                   NMR Prone glut valentino                   NMR Retro Step                   NMR Retro Walk                   NMR Retro walk on treadmill                   NMR Forward walk w/ inner unit                   NMR Balance Instruction                   NMR Stability Ball A/P & Lateral                   NMR Ball Catch/Throw /Supine                   NMR Ball Catch/Throw /Stand                   NMR StabilityBall All 4's Arm Lift                   NMR StabilityBall Prone Walk Out                   NMR StabilityBall Supine Oblique                   NMR Stability Ball sit-supine-sit                   NMR Walking Prog Progression                   MNR Home program sequencing                                       TA Hamstring stretch                   TA Hip Ext Rot Stretch                   TA Hip Int Rot Stretch                   TA Hip Flex/IT Stretch                   TA Hip Add Stretch                   TA Lats Dorsi Stretch                   TA Gastroc/soleus stretch                   TA QuadratusLumborm Stretch                      Supine                      Stance                   TA Quad Stretch                                       NMR Wall Push Ups                   NMR Planking                   NMR BOSU Ball  Exercises                   NMR Lateral Bridge Drop                   NMR Waiters [place]                   NMR O'Feldt Lat Pull Down                   NMR O'Feldt Hip Ext                   NMR O'Feldt Trunk Ext                                       TA CervDiscExtSup/stnd   reviewed                TA Cerv Stretches                   TA Self PIC Cervical Spine                   TA Neural Gliding                   TA Ant/Mid Scalene Strch                   TA Biceps stretch                   TA Rotator Cuff Stretch                   TA Anterior Chest Stretch                   TA Wrist Ext Stretch                                       NMR Prone Arm Lifts                   NMR Scapula Stabilization                   NMR Occulomotor Isometrics                   NMR Cervical Isometrics                                       TA Shld AROM w/bar                   TA Shld Capsular Stretching                   TA Self PIC Thor Spine   reviewed                TA Rot Cuff Therabd, beginner                   TA Rot Cuff Therabd, intermed                                                                                                                                                                                                                                                 Karen Chatterjee, PT, MS  Physical Therapist  [place]# [id number]

## 2020-01-07 NOTE — PROGRESS NOTES
Physical Therapy Daily Progress Note        Patient: Barbara Hernandez   : 1950  Diagnosis/ICD-10 Code:  Right shoulder pain, unspecified chronicity [M25.511]  Referring practitioner: Eb Ghosh MD  Date of Initial Visit: Type: THERAPY  Noted: 2019  Today's Date: 1/3/2020  Patient seen for 4 sessions           Subjective   Barbara Hernandez reports: front of the shoulder hurts some but noticing pain in the back of the right shoulder.    Objective   PALPATION: right proximal bicep tendon and right scapula RTC muscles tender.  TEST: Speed's test. Drop arm test is negative.    STRENGTH: shoulder flexion 4-/5, ER 4-/5.    See Exercise, Manual, and Modality Logs for complete treatment.       Assessment/Plan  Bicipital Tendinitis still present with RTC muscle pain present as well.  Progress per Plan of Care           Manual Therapy:    10     mins  05952;  Therapeutic Exercise:    10     mins  46996;     Neuromuscular Antolin:        mins  88248;    Therapeutic Activity:          mins  41649;     Gait Training:           mins  21008;     Ultrasound:     10     mins  27387;    Electrical Stimulation:    20     mins  63546 ( );  Iontophoresis          mins 71516   Traction          mins  08083  Fluidotherapy          mins  85736  Dry Needling          mins self-pay  Paraffin          mins  31304    Timed Treatment:   30   mins   Total Treatment:     50   mins    Declan Diaz PT  Physical Therapist

## 2020-01-10 ENCOUNTER — TREATMENT (OUTPATIENT)
Dept: PHYSICAL THERAPY | Facility: CLINIC | Age: 70
End: 2020-01-10

## 2020-01-10 DIAGNOSIS — M25.511 RIGHT SHOULDER PAIN, UNSPECIFIED CHRONICITY: Primary | ICD-10-CM

## 2020-01-10 PROCEDURE — G0283 ELEC STIM OTHER THAN WOUND: HCPCS | Performed by: PHYSICAL THERAPIST

## 2020-01-10 PROCEDURE — 97035 APP MDLTY 1+ULTRASOUND EA 15: CPT | Performed by: PHYSICAL THERAPIST

## 2020-01-13 ENCOUNTER — TREATMENT (OUTPATIENT)
Dept: PHYSICAL THERAPY | Facility: CLINIC | Age: 70
End: 2020-01-13

## 2020-01-13 DIAGNOSIS — M25.511 RIGHT SHOULDER PAIN, UNSPECIFIED CHRONICITY: Primary | ICD-10-CM

## 2020-01-13 PROCEDURE — G0283 ELEC STIM OTHER THAN WOUND: HCPCS | Performed by: PHYSICAL THERAPIST

## 2020-01-13 PROCEDURE — 97035 APP MDLTY 1+ULTRASOUND EA 15: CPT | Performed by: PHYSICAL THERAPIST

## 2020-01-13 NOTE — PROGRESS NOTES
Physical Therapy Daily Progress Note        Patient: Barbara Hernandez   : 1950  Diagnosis/ICD-10 Code:  Right shoulder pain, unspecified chronicity [M25.511]  Referring practitioner: Eb Ghosh MD  Date of Initial Visit: Type: THERAPY  Noted: 2019  Today's Date: 1/10/2020  Patient seen for 6 sessions           Subjective   Barbara Hernandez reports: does better with modalities only.  Refuses MARY or manual treatment.    Objective   PALPATION: proximal right bicep tendon tender.  TEST:Speed's +  See Exercise, Manual, and Modality Logs for complete treatment.       Assessment/Plan  She reports she is improving. Cannot force treatment on her. Chronic bicipital Tendinitis.  Progress per Plan of Care           Manual Therapy:         mins  65628;  Therapeutic Exercise:         mins  41957;     Neuromuscular Antolin:        mins  45072;    Therapeutic Activity:          mins  12723;     Gait Training:           mins  97471;     Ultrasound:     15     mins  65939;    Electrical Stimulation:    30     mins  03996 ( );  Iontophoresis          mins 19623   Traction          mins  82980  Fluidotherapy          mins  79538  Dry Needling          mins self-pay  Paraffin          mins  68447    Timed Treatment:   15   mins   Total Treatment:     45   mins    Declan Diaz, PT,CHT  Physical Therapist

## 2020-01-17 ENCOUNTER — TREATMENT (OUTPATIENT)
Dept: PHYSICAL THERAPY | Facility: CLINIC | Age: 70
End: 2020-01-17

## 2020-01-17 DIAGNOSIS — M18.9 OSTEOARTHRITIS OF CARPOMETACARPAL JOINT OF THUMB, UNSPECIFIED LATERALITY, UNSPECIFIED OSTEOARTHRITIS TYPE: Primary | ICD-10-CM

## 2020-01-17 DIAGNOSIS — M25.511 RIGHT SHOULDER PAIN, UNSPECIFIED CHRONICITY: Primary | ICD-10-CM

## 2020-01-17 PROCEDURE — 97035 APP MDLTY 1+ULTRASOUND EA 15: CPT | Performed by: PHYSICAL THERAPIST

## 2020-01-17 PROCEDURE — G0283 ELEC STIM OTHER THAN WOUND: HCPCS | Performed by: PHYSICAL THERAPIST

## 2020-01-17 PROCEDURE — A9999 DME SUPPLY OR ACCESSORY, NOS: HCPCS | Performed by: PHYSICAL THERAPIST

## 2020-01-17 NOTE — PROGRESS NOTES
Physical Therapy Daily Progress Note        Patient: Barbara Hernandez   : 1950  Diagnosis/ICD-10 Code:  Right shoulder pain, unspecified chronicity [M25.511]  Referring practitioner: Eb Ghosh MD  Date of Initial Visit: Type: THERAPY  Noted: 2019  Today's Date: 1/10/2020  Patient seen for 7 sessions           Subjective   Barbara Hernandez reports: does better with modalities only, so she only wants modalities.    Objective   PALPATION: proximal right bicep tendon tender but less severe. And is tender right posterior scapula  ROM: Full ROM of right shoulder with impingement at end range flexion  See Exercise, Manual, and Modality Logs for complete treatment.       Assessment/Plan  Pt has history fibromyalgia, so she is sensitive to anything that stresses her.  Modalities do not stress her and appears to be helping to her to improve.  Progress per Plan of Care           Manual Therapy:         mins  57279;  Therapeutic Exercise:         mins  16373;     Neuromuscular Antolin:        mins  61046;    Therapeutic Activity:          mins  68761;     Gait Training:           mins  83645;     Ultrasound:     18     mins  18838;    Electrical Stimulation:    30     mins  88400 ( );  Iontophoresis          mins 09618   Traction          mins  33116  Fluidotherapy          mins  44075  Dry Needling          mins self-pay  Paraffin          mins  27285    Timed Treatment:   18   mins   Total Treatment:     48   mins    Declan Diaz PT  Physical Therapist

## 2020-01-21 NOTE — PROGRESS NOTES
Physical Therapy Daily Progress Note        Patient: Barbara Hernandez   : 1950  Diagnosis/ICD-10 Code:  Right shoulder pain, unspecified chronicity [M25.511]  Referring practitioner: Eb Ghosh MD  Date of Initial Visit: Type: THERAPY  Noted: 2019  Today's Date: 2020  Patient seen for 8 sessions           Subjective   Barbara Hernandez reports: doing treatment to front and back of the right shoulder has helped a lot.    Objective   PALPATION: proximal right bicep tendon and right posterior scapula tender but less severe.  ROM: Full ROM of right shoulder with impingement at end range flexion  See Exercise, Manual, and Modality Logs for complete treatment.       Assessment/Plan  Right shoulder impingement and bicipital tendinitis improving.  Progress per Plan of Care           Manual Therapy:         mins  37506;  Therapeutic Exercise:         mins  94610;     Neuromuscular Antolin:        mins  68434;    Therapeutic Activity:          mins  94229;     Gait Training:           mins  47414;     Ultrasound:     18     mins  86836;    Electrical Stimulation:    30     mins  41851 ( );  Iontophoresis          mins 74005   Traction          mins  91200  Fluidotherapy          mins  03028  Dry Needling          mins self-pay  Paraffin          mins  30811    Timed Treatment:   18   mins   Total Treatment:     48   mins    Declan Diaz PT  Physical Therapist

## 2020-01-23 ENCOUNTER — TREATMENT (OUTPATIENT)
Dept: PHYSICAL THERAPY | Facility: CLINIC | Age: 70
End: 2020-01-23

## 2020-01-23 DIAGNOSIS — M25.511 RIGHT SHOULDER PAIN, UNSPECIFIED CHRONICITY: Primary | ICD-10-CM

## 2020-01-23 PROCEDURE — G0283 ELEC STIM OTHER THAN WOUND: HCPCS | Performed by: PHYSICAL THERAPIST

## 2020-01-23 PROCEDURE — 97110 THERAPEUTIC EXERCISES: CPT | Performed by: PHYSICAL THERAPIST

## 2020-01-23 PROCEDURE — 97035 APP MDLTY 1+ULTRASOUND EA 15: CPT | Performed by: PHYSICAL THERAPIST

## 2020-01-24 NOTE — PROGRESS NOTES
Physical Therapy Daily Progress Note        Patient: Barbara Hernandez   : 1950  Diagnosis/ICD-10 Code:  Right shoulder pain, unspecified chronicity [M25.511]  Referring practitioner: Eb Ghosh MD  Date of Initial Visit: Type: THERAPY  Noted: 2019  Today's Date: 2020  Patient seen for 9 sessions           Subjective   Barbara Hernandez reports: Saw Dr Valle, did not think surgery was necessary.  She needs to work on MARY and continue modalities.  Pt is encouraged that she does not need surgery    Objective   PALPATION:  Bicep tendon and supraspinatus muscle tender.  ROM: right shoulder flexion 155 degrees  TEST: Speed's +, Neer's impingement +    See Exercise, Manual, and Modality Logs for complete treatment.       Assessment/Plan  Pt is more willing to participate in MARY now with MD recommendation.  Progress per Plan of Care and Progress strengthening /stabilization /functional activity           Manual Therapy:         mins  12867;  Therapeutic Exercise:    18     mins  78166;     Neuromuscular Antolin:        mins  17114;    Therapeutic Activity:          mins  69464;     Gait Training:           mins  23209;     Ultrasound:     23     mins  62193;    Electrical Stimulation:    20     mins  75160 ( );  Iontophoresis          mins 43717   Traction          mins  80427  Fluidotherapy          mins  01882  Dry Needling          mins self-pay  Paraffin          mins  63154    Timed Treatment:   41   mins   Total Treatment:     61   mins    Declan Diaz, PT  Physical Therapist

## 2020-02-03 ENCOUNTER — TREATMENT (OUTPATIENT)
Dept: PHYSICAL THERAPY | Facility: CLINIC | Age: 70
End: 2020-02-03

## 2020-02-03 DIAGNOSIS — M25.511 RIGHT SHOULDER PAIN, UNSPECIFIED CHRONICITY: Primary | ICD-10-CM

## 2020-02-03 PROCEDURE — 97110 THERAPEUTIC EXERCISES: CPT | Performed by: PHYSICAL THERAPIST

## 2020-02-03 PROCEDURE — G0283 ELEC STIM OTHER THAN WOUND: HCPCS | Performed by: PHYSICAL THERAPIST

## 2020-02-03 PROCEDURE — 97035 APP MDLTY 1+ULTRASOUND EA 15: CPT | Performed by: PHYSICAL THERAPIST

## 2020-02-04 NOTE — PROGRESS NOTES
Physical Therapy Daily Progress Note         Patient: Barbara Hernandez   : 1950  Diagnosis/ICD-10 Code:  Right shoulder pain, unspecified chronicity [M25.511]  Referring practitioner: Eb Ghosh MD  Date of Initial Visit: Type: THERAPY  Noted: 2019  Today's Date: 2/3/2020  Patient seen for 10 sessions           Subjective   Barbara Hernandez reports: was a little sore the night of the last treatment, but by next day shoulder was no worse. Seems to be improving some.    Objective   ROM:WNL for Shoulder  PALPATION: right anterior GH joint tender.  TEST: speed's +  See Exercise, Manual, and Modality Logs for complete treatment.       Assessment/Plan  Signs and symptoms of bicipital tendinitis, not as severe. Pt more willing to work MARY now.  Progress per Plan of Care and Progress strengthening /stabilization /functional activity           Manual Therapy:         mins  28559;  Therapeutic Exercise:    23     mins  80527;     Neuromuscular Antolin:        mins  89640;    Therapeutic Activity:          mins  48037;     Gait Training:           mins  81095;     Ultrasound:     15     mins  27810;    Electrical Stimulation:         mins  41690 ( );  Iontophoresis          mins 96940   Traction          mins  56001  Fluidotherapy          mins  60575  Dry Needling          mins self-pay  Paraffin          mins  12301    Timed Treatment:   38   mins   Total Treatment:     68   mins    Declan Diaz PT  Physical Therapist

## 2020-02-07 ENCOUNTER — TREATMENT (OUTPATIENT)
Dept: PHYSICAL THERAPY | Facility: CLINIC | Age: 70
End: 2020-02-07

## 2020-02-07 DIAGNOSIS — M25.511 RIGHT SHOULDER PAIN, UNSPECIFIED CHRONICITY: Primary | ICD-10-CM

## 2020-02-07 PROCEDURE — 97035 APP MDLTY 1+ULTRASOUND EA 15: CPT | Performed by: PHYSICAL THERAPIST

## 2020-02-07 PROCEDURE — 97110 THERAPEUTIC EXERCISES: CPT | Performed by: PHYSICAL THERAPIST

## 2020-02-07 PROCEDURE — G0283 ELEC STIM OTHER THAN WOUND: HCPCS | Performed by: PHYSICAL THERAPIST

## 2020-02-10 ENCOUNTER — TREATMENT (OUTPATIENT)
Dept: PHYSICAL THERAPY | Facility: CLINIC | Age: 70
End: 2020-02-10

## 2020-02-10 DIAGNOSIS — M25.511 RIGHT SHOULDER PAIN, UNSPECIFIED CHRONICITY: Primary | ICD-10-CM

## 2020-02-10 PROCEDURE — G0283 ELEC STIM OTHER THAN WOUND: HCPCS | Performed by: PHYSICAL THERAPIST

## 2020-02-10 PROCEDURE — 97035 APP MDLTY 1+ULTRASOUND EA 15: CPT | Performed by: PHYSICAL THERAPIST

## 2020-02-10 PROCEDURE — 97110 THERAPEUTIC EXERCISES: CPT | Performed by: PHYSICAL THERAPIST

## 2020-02-10 NOTE — PROGRESS NOTES
Physical Therapy Daily Progress Note         Patient: Barbara Hernandez   : 1950  Diagnosis/ICD-10 Code:  Right shoulder pain, unspecified chronicity [M25.511]  Referring practitioner: Eb Ghosh MD  Date of Initial Visit: Type: THERAPY  Noted: 2019  Today's Date: 2020  Patient seen for 11 sessions           Subjective   Barbara Hernandez reports: had to back off of HEP a little it was making the neck pain return.    Objective   ROM:WNL for Shoulder  PALPATION: right anterior GH joint tender.  TEST: speed's +  See Exercise, Manual, and Modality Logs for complete treatment.       Assessment/Plan  Signs and symptoms of bicipital tendinitis, still looks better than 6 weeks ago.  ROM is functional, pain is limiting factor.  Progress per Plan of Care and Progress strengthening /stabilization /functional activity           Manual Therapy:         mins  41822;  Therapeutic Exercise:    23     mins  76447;     Neuromuscular Antolin:        mins  06224;    Therapeutic Activity:          mins  02384;     Gait Training:           mins  39677;     Ultrasound:     15     mins  86788;    Electrical Stimulation:   30      mins  83583 ( );  Iontophoresis          mins 28899   Traction          mins  67514  Fluidotherapy          mins  59283  Dry Needling          mins self-pay  Paraffin          mins  78384    Timed Treatment:   38   mins   Total Treatment:     68   mins    Declan Diaz PT  Physical Therapist

## 2020-02-11 NOTE — PROGRESS NOTES
Physical Therapy Daily Progress Note        Patient: Barbara Hernandez   : 1950  Diagnosis/ICD-10 Code:  Right shoulder pain, unspecified chronicity [M25.511]  Referring practitioner: Eb Ghosh MD  Date of Initial Visit: Type: THERAPY  Noted: 2019  Today's Date: 2/10/2020  Patient seen for 12 sessions           Subjective   Barbara Hernandez reports: less pain in the front of shoulder more in the back of shoulder.    Objective   PALPATION: right UT and supraspinatus muscle tender, right proximal bicep tendon tender.  TEST: Speed's test +, impingement test +  POSTURE: protracted shoulder girdle.  See Exercise, Manual, and Modality Logs for complete treatment.       Assessment/Plan  Right shoulder impingement with RTC tendinitis and bicipital tendinitis slowly improving. MARY appears to be helping.  Progress per Plan of Care and Progress strengthening /stabilization /functional activity           Manual Therapy:         mins  19227;  Therapeutic Exercise:    24     mins  61053;     Neuromuscular Antolin:        mins  28075;    Therapeutic Activity:          mins  63759;     Gait Training:           mins  49657;     Ultrasound:     19     mins  20193;    Electrical Stimulation:    30     mins  33120 ( );  Iontophoresis          mins 81860   Traction          mins  20477  Fluidotherapy          mins  77509  Dry Needling          mins self-pay  Paraffin          mins  16136    Timed Treatment:   43   mins   Total Treatment:     73.   mins    Declan Diaz, PT  Physical Therapist

## 2020-02-18 ENCOUNTER — TREATMENT (OUTPATIENT)
Dept: PHYSICAL THERAPY | Facility: CLINIC | Age: 70
End: 2020-02-18

## 2020-02-18 DIAGNOSIS — M53.3 SACROILIAC JOINT DYSFUNCTION: Primary | ICD-10-CM

## 2020-02-18 PROCEDURE — 97140 MANUAL THERAPY 1/> REGIONS: CPT | Performed by: PHYSICAL THERAPIST

## 2020-02-18 PROCEDURE — 97112 NEUROMUSCULAR REEDUCATION: CPT | Performed by: PHYSICAL THERAPIST

## 2020-02-18 PROCEDURE — 97530 THERAPEUTIC ACTIVITIES: CPT | Performed by: PHYSICAL THERAPIST

## 2020-02-18 NOTE — PROGRESS NOTES
" Physical Therapy Note  SUBJECTIVE:    Changes since last seen:   Maribel apologized for missing last appointment, \"I just got mixed up.\"  She c/o that if she does anything physical she gets less right QL pain and more right lats dorsi.   She is having some issues with the right piriformis \"I think its remnants of pain prior to my right THR\"   \"I feel like I have constant bursitis in my right hip (gr trochanter).\"  She notes she has been doing the cervical extension exercises with the towel and \"it has really helped!\"   \"My neck is pretty good.\"  Lifted heavy bag of dog food yesterday and thus felt ache in low back this am but improved as moved around   She is c/o a new annoying pain in right inferior/medial knee after going up and down stairs over [place].      Current level of pain:    Right buttock/SI joint 3/10      Neck 1/10     Right Gr trochanter 5/10   Lowest level of pain since last seen:      0/10        1/10   Highest level of pain since last seen:       5/10         2/10      Past Medical History:  1.  Cervical DDD  2.  Hx of Bilateral THR, left 2012, right 2010  3.  Septoplasty, 2011  4.  Hx of SI joint dysfunction  5.  Fibromyalgia  6.  Hx of hypothyroidism  7.  Hx of gastritis  8.  Irritable bowel syndrome/spastic colon  9.  Osteoarthritis  10.  Hx of 4 pregnancies, 2 normal vaginal deliveries  11. Hx of Dequervains syndrome  12.  Hx of fx left sesmoid  13.   Hx of positional vertigo  14. Osteopenia, diagnosed May 2014  15  Hx of migraine headaches  16.  TMJ dysfunction, 2015  17.  Hx of Cardiomyopathy/ left catheterization 2002  18.  Dyslipidemia  19.  Hx of neuroma pain left foot  20.  Hx of anxiety/ depression    Functional Limitations:  Standing, sleeping,   Patient Goals for Physical Therapy: decreased pain      OBJECTIVE:     12/05/2019 12/17/19 01/07/20 02/18/20                 SI Joint Positioning  Right  Left Right  Left Right  Left Right  Left Right  Left Right  Left Right  Left Right  Left " Right  Left Right  Left Right  Left Right  Left Right  Left Right Left Right Left Right Left Right  Left Right  Left       Innominate                            Anterior               x              x              x                x                        Posterior    x   x    x   x                    Sacrum                               Unilateral rotation    x   x   x   x                                         SI Joint Testing  Right  Left Right  Left Right  Left Right  Left Right  Left Right  Left Right  Left Right  Left Right  Left Right  Left Right  Left Right  Left Right  Left Right Left Right Left Right Left Right  Left Right  Left           Distraction   +         +   +        +   +         +   +          +                         Beatriz's    -        -                            Compression   -         -                            Prone Press Up   -            -                                         Special Tests  Right   Left Right  Left Right  Left Right  Left Right  Left Right  Left Right Left  Right  Left Right  Left Right  Left Right  Left Right  Left Right  Left Right Left Right Left Right Left Right Left  Right  Left      Straight Leg Raise                                     Active   -          -  -       -                             Passive    -           -   -          -                            Hip Scour Test                                               De Oliveira's Cysts                         Palpation:         Muscle/Bone Irritation  Date 12/05/2019 12/17/19 01/07/20 02/18/20                  Right  Left Right  Left Right  Left Right  Left Right  Left Right  Left Right  Left Right  Left Right  Left Right  Left Right  Left Right  Left Right  Left Right Left Right Left Right Left Right  Left Right  Left   SI joint  +        ++   +          +    +         sl   +         Sl                  Piriformis   Sl        +    ++        +     +        sl     +       sl                 Gr. Trochanter  ++         +   +          +     Sl      Sl     ++       sl                 IT Band  sl        sl   +         sl   Sl          -   Sl        -                 Quadratus Lumborum  ++        +    +         sl   Sl         sl   -            -                 Ischial Tuberosity  -        -   -          -   -          -   -            -                 Sacrococcygeal Ligs  -        -   -           -   -             -   -            -                 Paraspinals  +        -   Sl           -   -          sl   -            -                 Spinous Processes                                       C-spine rotated to               C2-6           C2-5                       T-spine rotated to   T3-5                     T4-6  T6-8 T3-4                        L-spine rotated to  L5           L5           L4-5   -          -                 Adductor Fabio  -        -   -           -   -           sl   -           -                 Iliopsoas  +        +   Sl          sl   Sl           sl   Sl         -                 Quad Origin  -        -    -          -    -            -                 Pubic Symphysis         +                   Sl       sl        -                 Bakers cyst        -         sl                 Pes Anserine Bursa      +          -                 Rectus Diastasis   2 finger                                           All 4s Positioning: Pt not able to assume full lumbar extension in this position      Monthly Objective Measurement/Functional Activity  Date: 12/05/2019 01/07/20 02/18/20                Oswestry Pain Score 52/100    45/100    44/100                LE Functional Scale                                      AROM Lumbar Spine: Degrees   Degrees      Degrees      Degrees      Degrees    Degrees Degrees Degrees Degrees Degrees Degrees Degrees Degrees Degrees Degrees    Degrees      Flexion 114           97      112                   Extension 23 pain on right            27      36                   Right Lat.  Flexion 33           34      27 pain right SI                    Left Lat. Flexion 25           27      28                   Right Lat Shift Pelvis No change No change  No change                   Left Lat Shift Pelvis No change pulls on right  Tightness No pain                                    AROM Cervical Spine  Degrees Degrees Degrees Degrees Degrees Degrees Degrees Degrees Degrees Degrees Degrees Derees Degrees Degrees Degrees     flexion    45 pain        45                  extension    51 dizzy        50                  Right lateral flex    56 pain r upper trap         55                  Left lateral flex  57 pain right elbow        58                  Right rotation          44         45                  Left rotation           40          40                                   AROM Hips:  (degrees) Right      Left Right Left Right  Left Right  Left Right  Left Right  Left Right Left Right Left Right Left Right Left Right  Left Right Left Right Left Right Left Right Left Right  Left      Flexion 95       98  98        100  100      95                   Abduction 42       45  45         45   45         45                   Int. Rotation 25       15  25         28  25          25                   Ext. Rotation  30        40              32        40                                   Flexibility:   (degrees) Right    Left Right  Left Right  Left Right  Left Right  Left Right  Left Right Left Right Left Right Left Right Left Right  Left Right Left Right Left Right Left Right Left Right  Left      Hamstrings -47       -49   -45   -50    -45      -48                   Quadriceps 45       40  48       45     55      54                   HipExt/Rot(piriformis) 42       43    45       45   45    48                   Hip Int/Rotators 38       37   37        35   35      35                   Hip Flexors (iliopsoas) Not tested         Not tested Not tested                   IT Band Not tested          not tested   not tested                                                      Root Level  - Motor Right      Left Right  Left Right  Left Right  Left Right  Left Right  Left Right Left Right Left Right Left Right Left Right  Left Right Left Right Left Right Left Right Left Right  Left     T12             Rectus Abdominus 4+/5        4+/5         4+/5                   L1              Paraspinals 5/5      5/5   5/5       5/5  5/5        5/5                   L2              Hip Flexion 4+/5      4+/5  4+/5    4+/5  5/5        5/5                   L3             Quads 5/5      5/5  5/5       5/5   5/5        5/5                   L4              Anterior Tibialis 5/5      5/5  5/5       5/5  5/5        5/5                   L5             Gr. Toe Extension 5/5      5/5  5/5       5/5   5/5        5/5                   S1            Gastroc/Sol/Peroneals 4+/5    4+/5   4+/5    4+/5  4+/5    4+/5                                   Functional Strength:                     Single LegStanceTime (seconds) R:24   L:25   R:30  L:27 R:30   L:30 R:     L: R:      L: R:      L:  R:       L:   R:      L:   R:      L:   R:      L: R:      L:   R:      L:   R:      L:    R:      L:    R:      L: R:      L:     Pelvic Floor Isolation (seconds) 10 sec  10 sec                    Inner Unit  Isolation (seconds) 10 sec  10 sec                                                       Functional Activities:                      Sit w/o pain? Pain increases (minutes) yes   Yes/ 30-60 min  yes/ 30-60                   Stand w/o pain? 20 min Yes/ 30 min Yes/ 30 min                   Walk w/o pain? 30 min   yes/ 1/4 mile Yes/ 30 min                   Steps w/o pain? Yes, ignores pain  Yes/ ignores pain Yes/ no limit                   Drive w/o pain? Uncomfortable, 60 min  Yes/ w/ supports and TENS 2 hrs Yes/ 60 min                    Work w/o pain? n/a   n/a                    # times wakes w/ pain? Sleeping on couch b/c or right shld pain, since July  Continues to sleep on couch  Continues to sleep on the couch                     PLAN OF CARE:    ASSESSMENT:  Problem List:   1. Flare up of SI joint dysfunction  2. Decreased spinal stabilization  3. Postural dysfunction     Discussion:    Maribel's symptoms are consistent with irritation of the right pes anserine bursa.  She was instructed in use to ice and she indicates she will continue with the use of Voltaren gel.     The c/o of pain in the right upper posterior trunk is irritation of the right lasts dorsi.  Gave her instruction in how to stretch this in stance.  She demonstrated a good understanding of this.     She was in need of re-instruction in hip abduction neuro motor retraining exercise to minimize the inappropriate engagement of the right piriformis in gait and in turn decrease irritation of the right gr trochanteric bursa.   She had some difficulty not engaging the piriformis but with cuing she was able to execute correctly if she focuses on the inner unit and only lifts the knee 1-2 inches.     Continue progression of  spinal stabilization on next visit with outer unit isolation exercises.        Short Term Goals: (4-6 weeks)                               Date Met:                1.   pt independent in postural correction     02/18/20  2.   pt independent in SI joint self-corrections    12/17/19  3.   pt independent in exer to decrease post. disc pressures  12/17/19  4.   pt able to sleep through night without pain  5.   pt able to stand 10  minutes without pain    01/07/20  6.   Reduce pt pain to no greater than 4/10     01/07/20  7.   Decrease Oswestry Pain Score to no greater than 45/100  01/07/20  8.  Decrease Oswestry Pain Score to no greater than  40/100  9.  Pt able to perform hip abduction x6 w/o pain  10. Decrease pain right gr trochanter to no greater than 4/10    Long Term Goals:(3-5 months)      Date Met:      1.  pt independent in self-management of symptoms       2.  pt independent in  home program      3. Reduce pain to no greater than 2/10      4.  Pt able to  puppies without pain.      Treatment Plan:   1.  Ultrasound to increase extensibility, decrease pain, if needed  2.  Electrical stimulation for muscle relaxation, decrease pain, if needed, iontophoresis  3.  Manual therapy to increase function, decrease pain  4.  Therapeutic exercise to increase function, decrease pain  5.  Home programming and d/c planning to increase function and decrease pain    Frequency & Duration:  Pt will be seen on a once/week basis for 9 more visits    Patient Participated in and Agrees With This Plan of Care and Goals:  YES  Rehab Potential:  Fair to good      Karen Chatterjee, PT, MS  Physical Therapist  [place]# [id number]    Medicare Certification:  Initial Certification  Certification Period: 12/05/19  through 03/05/20        TREATMENT TODAY:    Total Treatment Time: (time in clinic)            60  Timed Code Treatment Minutes:   60      Manual Therapy:  (MA)  RX min:   30  Manual posterior rotation of left innominate    Positional Isometric contraction (PIC) of right iliopsoas    Positional Isometric contraction of (PIC) right gluteus minimus    Distraction of both SI jts in open pack hip position  Piriformis stretching, bilaterally    Quadratus lumborum stretching, bilaterally    Anterior/Inferior Mobilization of  left hip    Positional Isometric Contraction of multifidus, thoracic paraspinals   Manual work cervical spine  PIC C2-6  Myofascial work upper quarter and trunk      Therapeutic Activities:  (TA)  RX min:  15  Neuromuscular Reeducation: (NMR)  RX min: 15    Ultrasound:  RX min:          1.6 piper/cm2       Location:     Iontophoresis:  6 hr patch                                Location:                               Procedures:      Key:  i=instructed        r=review        c=corrected        a=adapted   Code Procedure/Instruction 12/05/2019 12/17/19 01/07/20 02/18/20               TA         Sleeping  Positions                reviewed reviewed                TA Sternum-Up Posture  reviewed corrected                TA SI jt. self-correction reviewed   reviewed               TA Lumbar Facet PIC reviewed   reviewed               TA   Order of Self-Corrections                   TA Use of lumbar pillow  reviewed                 TA Use of cervical roll                   TA Use of orthotics                   TA Use of SI belt                   TA Use of Nada chair                   TA Use of Ice  reviewed                 TA Use of [employer]/ heat  reviewed                 TA Use of TENS unit  In car, while driving                 TA Use of iontophoresis                   TA Decreasing Disc Pressures   reviewed                                                        NMR Diaphragmatic Breathing                   NMR Pelvic Floor Isolation                   NMR Transverse Abdominus                   NMR Multifidus Isolation                   NMR InnerUnit against Gravity                   NMR Sit-stand w/ inner unit                   NMR Roll w/ inner unit                   NMR Walk w/ inner unit                    NMR Up/down steps w/ inner unit                   NMR Water Exer Instruction                   NMR Hip Abd w/o piriformis    instructed               NMR Hip Add w/o iliop/ham                    NMR Lower abs in supine                   NMR Abd obliques in supine                   NMR Prone Knee Flexion                   NMR Prone glut valentino                   NMR Retro Step                   NMR Retro Walk                   NMR Retro walk on treadmill                   NMR Forward walk w/ inner unit                   NMR Balance Instruction                   NMR Stability Ball A/P & Lateral                   NMR Ball Catch/Throw /Supine                   NMR Ball Catch/Throw /Stand                   NMR StabilityBall All 4's Arm Lift                   NMR StabilityBall Prone Walk Out                   NMR  StabilityBall Supine Oblique                   NMR Stability Ball sit-supine-sit                   NMR Walking Prog Progression                   MNR Home program sequencing                                       TA Hamstring stretch                   TA Hip Ext Rot Stretch                   TA Hip Int Rot Stretch                   TA Hip Flex/IT Stretch                   TA Hip Add Stretch                   TA Lats Dorsi Stretch                   TA Gastroc/soleus stretch                   TA QuadratusLumborm Stretch                      Supine                      Stance                   TA Quad Stretch                                       NMR Wall Push Ups                   NMR Planking                   NMR BOSU Ball Exercises                   NMR Lateral Bridge Drop                   NMR [place]                   NMR O'Feldt Lat Pull Down                   NMR O'Feldt Hip Ext                   NMR O'Feldt Trunk Ext                                       TA CervDiscExtSup/stnd   reviewed                TA Cerv Stretches                   TA Self PIC Cervical Spine                   TA Neural Gliding                   TA Ant/Mid Scalene Strch                   TA Biceps stretch                   TA Rotator Cuff Stretch                   TA Anterior Chest Stretch                   TA Wrist Ext Stretch                   TA Lats Dorsi stretch    instructed               NMR Prone Arm Lifts                   NMR Scapula Stabilization                   NMR Occulomotor Isometrics                   NMR Cervical Isometrics                                       TA Shld AROM w/bar                   TA Shld Capsular Stretching                   TA Self PIC Thor Spine   reviewed                TA Rot Cuff Therabd, beginner                   TA Rot Cuff Therabd, intermed                                                                                                                                                                                                                                                  Karen Chatterjee, PT, MS  Physical Therapist  [place]# [id number]

## 2020-02-20 ENCOUNTER — TREATMENT (OUTPATIENT)
Dept: PHYSICAL THERAPY | Facility: CLINIC | Age: 70
End: 2020-02-20

## 2020-02-20 DIAGNOSIS — M25.511 RIGHT SHOULDER PAIN, UNSPECIFIED CHRONICITY: Primary | ICD-10-CM

## 2020-02-20 PROCEDURE — 97110 THERAPEUTIC EXERCISES: CPT | Performed by: PHYSICAL THERAPIST

## 2020-02-20 PROCEDURE — 97035 APP MDLTY 1+ULTRASOUND EA 15: CPT | Performed by: PHYSICAL THERAPIST

## 2020-02-20 PROCEDURE — G0283 ELEC STIM OTHER THAN WOUND: HCPCS | Performed by: PHYSICAL THERAPIST

## 2020-02-24 ENCOUNTER — TREATMENT (OUTPATIENT)
Dept: PHYSICAL THERAPY | Facility: CLINIC | Age: 70
End: 2020-02-24

## 2020-02-24 DIAGNOSIS — M25.511 RIGHT SHOULDER PAIN, UNSPECIFIED CHRONICITY: Primary | ICD-10-CM

## 2020-02-24 PROCEDURE — 97110 THERAPEUTIC EXERCISES: CPT | Performed by: PHYSICAL THERAPIST

## 2020-02-24 PROCEDURE — G0283 ELEC STIM OTHER THAN WOUND: HCPCS | Performed by: PHYSICAL THERAPIST

## 2020-02-24 PROCEDURE — 97035 APP MDLTY 1+ULTRASOUND EA 15: CPT | Performed by: PHYSICAL THERAPIST

## 2020-02-24 NOTE — PROGRESS NOTES
Physical Therapy Daily Progress Note        Patient: Barabra Hernandez   : 1950  Diagnosis/ICD-10 Code:  Right shoulder pain, unspecified chronicity [M25.511]  Referring practitioner: Eb Ghosh MD  Date of Initial Visit: Type: THERAPY  Noted: 2019  Today's Date: 2020  Patient seen for 13 sessions           Subjective   Barbara Hernandez reports: the front of the shoulder hurts a lot less, still pain on the shoulder blade area.    Objective   PALPATION: right UT and supraspinatus muscle tender, right proximal bicep tendon  to palpation.  TEST: Speed's test mildly +  See Exercise, Manual, and Modality Logs for complete treatment.       Assessment/Plan  Some of the scapula pain may be a chronic cervical issue present.  The tendinitis is improving but not resolved.  Progress per Plan of Care and Progress strengthening /stabilization /functional activity           Manual Therapy:         mins  11491;  Therapeutic Exercise:    24     mins  07566;     Neuromuscular Antolin:        mins  98064;    Therapeutic Activity:          mins  15475;     Gait Training:           mins  50808;     Ultrasound:     19     mins  15274;    Electrical Stimulation:    30     mins  51251 ( );  Iontophoresis          mins 75742   Traction          mins  32231  Fluidotherapy          mins  65852  Dry Needling          mins self-pay  Paraffin          mins  84740    Timed Treatment:   43   mins   Total Treatment:     73.   mins    Declan Diaz, PT  Physical Therapist

## 2020-02-27 NOTE — PROGRESS NOTES
Physical Therapy Daily Progress Note        Patient: Barbara Hernandez   : 1950  Diagnosis/ICD-10 Code:  Right shoulder pain, unspecified chronicity [M25.511]  Referring practitioner: Eb Ghosh MD  Date of Initial Visit: Type: THERAPY  Noted: 2019  Today's Date: 2020  Patient seen for 14 sessions           Subjective   Barbara Hernandez reports: the arm feels a little stronger, and does not hurt as bad.    Objective   PALPATION: right UT and supraspinatus muscle tender, right proximal bicep tendon tender, at least 50% less severe.  TEST: Speed's test mildly +  STRENGTH: ER at side 3/5, Shoulder flexion 4/5, IR at side 5/5  See Exercise, Manual, and Modality Logs for complete treatment.       Assessment/Plan  Less cervical symptoms, mostly RTC and bicep tendon symptoms. Suspect a tear in RTC.  Progress per Plan of Care and Progress strengthening /stabilization /functional activity           Manual Therapy:         mins  23311;  Therapeutic Exercise:    27     mins  48176;     Neuromuscular Antolin:        mins  47507;    Therapeutic Activity:          mins  53881;     Gait Training:           mins  94491;     Ultrasound:     19     mins  57716;    Electrical Stimulation:    30     mins  83239 ( );  Iontophoresis          mins 01245   Traction          mins  28620  Fluidotherapy          mins  36721  Dry Needling          mins self-pay  Paraffin          mins  67215    Timed Treatment:   46   mins   Total Treatment:     76   mins    Declan Diaz PT  Physical Therapist

## 2020-03-04 ENCOUNTER — TREATMENT (OUTPATIENT)
Dept: PHYSICAL THERAPY | Facility: CLINIC | Age: 70
End: 2020-03-04

## 2020-03-04 DIAGNOSIS — M25.511 RIGHT SHOULDER PAIN, UNSPECIFIED CHRONICITY: Primary | ICD-10-CM

## 2020-03-04 PROCEDURE — G0283 ELEC STIM OTHER THAN WOUND: HCPCS | Performed by: PHYSICAL THERAPIST

## 2020-03-04 PROCEDURE — 97035 APP MDLTY 1+ULTRASOUND EA 15: CPT | Performed by: PHYSICAL THERAPIST

## 2020-03-06 NOTE — PROGRESS NOTES
Physical Therapy Daily Progress Note        Patient: Barbara Hernandez   : 1950  Diagnosis/ICD-10 Code:  Right shoulder pain, unspecified chronicity [M25.511]  Referring practitioner: Eb Ghosh MD  Date of Initial Visit: Type: THERAPY  Noted: 2019  Today's Date: 3/4/2020  Patient seen for 15 sessions           Subjective   Barbara Hernandez reports: worked a lot around her house and yard so afraid to try MARY.  No pain in front of shoulder, pain is on the top of the shoulder.    Objective   PALPATION: right upper trap tender.  ROM: Full shoulder ROM.    See Exercise, Manual, and Modality Logs for complete treatment.       Assessment/Plan  Bicipital tendinitis is improving. The upper trap pain may be related to her chronic neck issues.  Progress per Plan of Care and Progress strengthening /stabilization /functional activity           Manual Therapy:         mins  33988;  Therapeutic Exercise:         mins  52383;     Neuromuscular Antolin:        mins  20976;    Therapeutic Activity:          mins  66477;     Gait Training:           mins  86081;     Ultrasound:     15     mins  75841;    Electrical Stimulation:    30     mins  23670 ( );  Iontophoresis          mins 50925   Traction          mins  76685  Fluidotherapy          mins  51644  Dry Needling          mins self-pay  Paraffin          mins  09103    Timed Treatment:   15   mins   Total Treatment:     45   mins    Declan Diaz PT  Physical Therapist

## 2020-03-11 ENCOUNTER — TREATMENT (OUTPATIENT)
Dept: PHYSICAL THERAPY | Facility: CLINIC | Age: 70
End: 2020-03-11

## 2020-03-11 DIAGNOSIS — M25.511 RIGHT SHOULDER PAIN, UNSPECIFIED CHRONICITY: Primary | ICD-10-CM

## 2020-03-11 PROCEDURE — G0283 ELEC STIM OTHER THAN WOUND: HCPCS | Performed by: PHYSICAL THERAPIST

## 2020-03-11 PROCEDURE — 97035 APP MDLTY 1+ULTRASOUND EA 15: CPT | Performed by: PHYSICAL THERAPIST

## 2020-03-11 PROCEDURE — 97110 THERAPEUTIC EXERCISES: CPT | Performed by: PHYSICAL THERAPIST

## 2020-03-13 NOTE — PROGRESS NOTES
Physical Therapy Daily Progress Note        Patient: Barbara Hernandez   : 1950  Diagnosis/ICD-10 Code:  Right shoulder pain, unspecified chronicity [M25.511]  Referring practitioner: Eb Ghosh MD  Date of Initial Visit: Type: THERAPY  Noted: 2019  Today's Date: 3/11/2020  Patient seen for 16 sessions           Subjective   Barbara Hernandez reports: pain closer to scapula area now. Front of the shoulder doing a lot better.    Objective   PALPATION: right rhomboid tender. Anterior shoulder joint is OK today  ROM: WNL for shoulder now.  TEST: Speed's test -    See Exercise, Manual, and Modality Logs for complete treatment.       Assessment/Plan  Patient has made great improvements. Posture and neck may be contributing to rhomboid pain. Has HEP for strengthening now, have been hesitant to give this to her because of how easily her symptoms exacerbate.  If not flare ups, maybe able to D/C next visit.   Anticipate DC next Visit           Manual Therapy:         mins  87719;  Therapeutic Exercise:     16    mins  34555;     Neuromuscular Antolin:        mins  64725;    Therapeutic Activity:          mins  61245;     Gait Training:           mins  16564;     Ultrasound:     15     mins  17175;    Electrical Stimulation:    20     mins  62624 ( );  Iontophoresis          mins 10593   Traction          mins  10636  Fluidotherapy          mins  38508  Dry Needling          mins self-pay  Paraffin          mins  44439    Timed Treatment:   31   mins   Total Treatment:     51   mins    Declan Diaz, JOELLEN  Physical Therapist

## 2020-03-17 ENCOUNTER — TREATMENT (OUTPATIENT)
Dept: PHYSICAL THERAPY | Facility: CLINIC | Age: 70
End: 2020-03-17

## 2020-03-17 DIAGNOSIS — M53.3 SACROILIAC JOINT DYSFUNCTION: Primary | ICD-10-CM

## 2020-03-17 PROCEDURE — 97112 NEUROMUSCULAR REEDUCATION: CPT | Performed by: PHYSICAL THERAPIST

## 2020-03-17 PROCEDURE — 97140 MANUAL THERAPY 1/> REGIONS: CPT | Performed by: PHYSICAL THERAPIST

## 2020-03-17 PROCEDURE — 97530 THERAPEUTIC ACTIVITIES: CPT | Performed by: PHYSICAL THERAPIST

## 2020-03-17 NOTE — PROGRESS NOTES
" Physical Therapy Note and Updated Plan of Care  SUBJECTIVE:  Changes since last seen:   She shampooed her carpet but broke it up into 30 min increments, \"It took all day; I stopped every 30 min.\" \"There was no shoulder or back pain but I was left with pinch in the shoulder blades almost a week later.\"    She has been trying to do the neuro motor retraining of the right piriformis, 'It's hard to do and if I don\"t do it correctly I have more pain in the right gr trochanter.\"  The left pes anserine bursa is still irritated, \"It's really sore after sitting and when I'm on my left side.\"  \"Sometimes the left knee pain is 0/10; its only when I sit or lay down when I have pain, when I'm  inactive its the worst.\"      Current level of pain:    Right buttock/SI joint 0/10     Neck 1/10   Right Gr trochanter 4/10 all time, annoying  Between scapula 4/10  Lowest level of pain since last seen:      0/10      1/10               1/10  Highest level of pain since last seen:       2-3/10  Comes and goes   2-3/10              5/10      Past Medical History:  1.  Cervical DDD  2.  Hx of Bilateral THR, left 2012, right 2010  3.  Septoplasty, 2011  4.  Hx of SI joint dysfunction  5.  Fibromyalgia  6.  Hx of hypothyroidism  7.  Hx of gastritis  8.  Irritable bowel syndrome/spastic colon  9.  Osteoarthritis  10.  Hx of 4 pregnancies, 2 normal vaginal deliveries  11. Hx of Dequervains syndrome  12.  Hx of fx left sesmoid  13.   Hx of positional vertigo  14. Osteopenia, diagnosed May 2014  15  Hx of migraine headaches  16.  TMJ dysfunction, 2015  17.  Hx of Cardiomyopathy/ left catheterization 2002  18.  Dyslipidemia  19.  Hx of neuroma pain left foot  20.  Hx of anxiety/ depression    Functional Limitations:  Standing, sleeping,   Patient Goals for Physical Therapy: decreased pain      OBJECTIVE:     12/05/2019 12/17/19 01/07/20 02/18/20 03/17/20                SI Joint Positioning  Right  Left Right  Left Right  Left Right  Left Right  " Left Right  Left Right  Left Right  Left Right  Left Right  Left Right  Left Right  Left Right  Left Right Left Right Left Right Left Right  Left Right  Left       Innominate                            Anterior               x              x              x                x               x                       Posterior    x   x    x   x    x                   Sacrum                               Unilateral rotation    x   x   x   x   x                                        SI Joint Testing  Right  Left Right  Left Right  Left Right  Left Right  Left Right  Left Right  Left Right  Left Right  Left Right  Left Right  Left Right  Left Right  Left Right Left Right Left Right Left Right  Left Right  Left           Distraction   +         +   +        +   +         +   +          +   +            +                        Beatriz's    -        -                            Compression   -         -                            Prone Press Up   -            -                                         Special Tests  Right   Left Right  Left Right  Left Right  Left Right  Left Right  Left Right Left  Right  Left Right  Left Right  Left Right  Left Right  Left Right  Left Right Left Right Left Right Left Right Left  Right  Left      Straight Leg Raise                                     Active   -          -  -       -                             Passive    -           -   -          -                            Hip Scour Test                                                                      Palpation:      Muscle/Bone Irritation  Date 12/05/2019 12/17/19 01/07/20 02/18/20 03/17/20                 Right  Left Right  Left Right  Left Right  Left Right  Left Right  Left Right  Left Right  Left Right  Left Right  Left Right  Left Right  Left Right  Left Right Left Right Left Right Left Right  Left Right  Left   SI joint  +        ++   +          +    +         sl   +         Sl     +          +                Piriformis   Sl         +    ++        +     +        sl     +       sl     +          +                Gr. Trochanter  ++        +   +          +     Sl      Sl     ++       sl   ++         +                IT Band  sl        sl   +         sl   Sl          -   Sl        -   Sl        Sl                 Quadratus Lumborum  ++        +    +         sl   Sl         sl   -            -   -           -                Ischial Tuberosity  -        -   -          -   -          -   -            -   -          -                Sacrococcygeal Ligs  -        -   -           -   -             -   -            -   -           -                Paraspinals  +        -   Sl           -   -          sl   -            -   +         -                Spinous Processes                                        C-spine rotated to               C2-6           C2-5                       T-spine rotated to   T3-5                     T4-6  T6-8 T3-4 T3-4    T4-5                       L-spine rotated to  L5           L5           L4-5   -          - L3-5                Adductor Fabio  -        -   -           -   -           sl   -           -   -        -                Iliopsoas  +        +   Sl          sl   Sl           sl   Sl         -   Sl       -                Quad Origin  -        -    -          -    -            -   -           -                Pubic Symphysis         +                   Sl       sl        -                 Bakers cyst        -         sl    -         +                Pes Anserine Bursa      +          -   +         ++                Rectus Diastasis   2 finger                                           All 4s Positioning: Pt not able to assume full lumbar extension in this position      Monthly Objective Measurement/Functional Activity  Date: 12/05/2019 01/07/20 02/18/20 03/17/20               Oswestry Pain Score 52/100    45/100    44/100 44/100               LE Functional Scale                   Neck Disability Index      32/100                                   AROM Lumbar Spine: Degrees   Degrees      Degrees      Degrees      Degrees    Degrees Degrees Degrees Degrees Degrees Degrees Degrees Degrees Degrees Degrees    Degrees      Flexion 114           97      112      112                  Extension 23 pain on right            27      36      29                  Right Lat. Flexion 33           34      27 pain right SI        31                  Left Lat. Flexion 25           27      28       24                  Right Lat Shift Pelvis No change No change  No change No change                  Left Lat Shift Pelvis No change pulls on right  Tightness No pain  No change                                  AROM Cervical Spine  Degrees Degrees Degrees Degrees Degrees Degrees Degrees Degrees Degrees Degrees Degrees Derees Degrees Degrees Degrees     flexion    45 pain        45        59                 extension    51 dizzy        50        62                 Right lateral flex    56 pain r upper trap         55        47                 Left lateral flex  57 pain right elbow        58        40                 Right rotation          44         45       46                 Left rotation           40          40       41                                  AROM Hips:  (degrees) Right      Left Right Left Right  Left Right  Left Right  Left Right  Left Right Left Right Left Right Left Right Left Right  Left Right Left Right Left Right Left Right Left Right  Left      Flexion 95       98  98        100  100      95  98        105                  Abduction 42       45  45         45   45         45  42        46                  Int. Rotation 25       15  25         28  25          25   26       28                  Ext. Rotation  30        40              32        40   31       46                                  Flexibility:   (degrees) Right    Left Right  Left Right  Left Right  Left Right  Left Right  Left Right Left Right Left Right Left Right Left Right   Left Right Left Right Left Right Left Right Left Right  Left      Hamstrings -47       -49   -45   -50    -45      -48   -43     -42                  Quadriceps 45       40  48       45     55      54   58       60                  HipExt/Rot(piriformis) 42       43    45       45   45    48   43       46                  Hip Int/Rotators 38       37   37        35   35      35   32        36                  Hip Flexors (iliopsoas) Not tested         Not tested Not tested Not tested                  IT Band Not tested          not tested  not tested  not tested                                                     Root Level  - Motor Right      Left Right  Left Right  Left Right  Left Right  Left Right  Left Right Left Right Left Right Left Right Left Right  Left Right Left Right Left Right Left Right Left Right  Left     T12             Rectus Abdominus 4+/5        4+/5         4+/5      4+/5                  L1              Paraspinals 5/5      5/5   5/5       5/5  5/5        5/5  5/5        5/5                  L2              Hip Flexion 4+/5      4+/5  4+/5    4+/5  5/5        5/5  55        5/5                  L3             Quads 5/5      5/5  5/5       5/5   5/5        5/5  5/5        5/5                  L4              Anterior Tibialis 5/5      5/5  5/5       5/5  5/5        5/5  5/5        5/5                  L5             Gr. Toe Extension 5/5      5/5  5/5       5/5   5/5        5/5 5/5         5/5                  S1            Gastroc/Sol/Peroneals 4+/5    4+/5   4+/5    4+/5  4+/5    4+/5  4+/5     4+/5                                  Functional Strength:                     Single LegStanceTime (seconds) R:24   L:25   R:30  L:27 R:30   L:30 R:     L: R:      L: R:      L:  R:       L:   R:      L:   R:      L:   R:      L: R:      L:   R:      L:   R:      L:    R:      L:    R:      L: R:      L:     Pelvic Floor Isolation (seconds) 10 sec  10 sec                    Inner Unit  Isolation  "(seconds) 10 sec  10 sec                                                       Functional Activities:                      Sit w/o pain? Pain increases (minutes) yes   Yes/ 30-60 min  yes/ 30-60 Yes/ 20 min                   Stand w/o pain? 20 min Yes/ 30 min Yes/ 30 min Yes/ 30 min                  Walk w/o pain? 30 min   yes/ 1/4 mile Yes/ 30 min Yes/ 45 min                   Steps w/o pain? Yes, ignores pain  Yes/ ignores pain Yes/ no limit Yes/ no limit                  Drive w/o pain? Uncomfortable, 60 min  Yes/ w/ supports and TENS 2 hrs Yes/ 60 min  Yes/ 60 min                   Work w/o pain? n/a   n/a     n/a                  # times wakes w/ pain? Sleeping on couch b/c or right shld pain, since July Continues to sleep on couch  Continues to sleep on the couch  2x from right shoulder and neck, she is still sleeping on couch                    PLAN OF CARE:    ASSESSMENT:  Problem List:   1. Flare up of SI joint dysfunction  2. Decreased spinal stabilization  3. Postural dysfunction     Discussion:    The right lats dorsi is not as irritated as last visit.    Pfam presented with facet restriction at the level of T3-5,  She was instructed in how to do positional isometric contraction techniques to T3-5, in sitting to self correct this.  She was surprised at how much she was able to decrease her pain with this technique.  She had originally tried this against a wall but she wasn't able to do it as effectively as the adapted technique taught today.  Took photos of this technique on her phone so she will be able to refer back to her phone for proper positioning to do as needed throughout her day..     Reviewed and corrected the hip abduction \"clamshell\" exercise for neuro motor retraining of the piriformis/ glut medius.   She still incorrectly tries to lift the top foot off the bottom foot instead of letting it relax.    Instructed in supine inner unit with feet up on chair and moving hip in internal and " external rotation.  It was very hard for her to engage the inner unit without inappropriately engaging hip musculature.  She worked on this here in the clinic and will be working on this on her own to facilitate neuro motor patterning of proximal spinal stability with distal mobility at the hips. This is an adapted positioning for neuro motor retraining of piriformis.     Reviewed the need for ice on the pes anserine bursa irritation.  She was reminded that she needs to make sure she has good arch support to minimize excessive tibia internal rotation further aggravating the pes anserine bursa.     Maribel has a thorough home program and needs to work on the new neuro motor re training exercise taught today.  She notes she can manage her pain with all of the instruction that she has received here in physical therapy for about 3-4 weeks then she needs manual work to augment what she is doing at home to minimize her pain and keep it from exacerbating.  Thus she will be seen once every 3-4 weeks for progression of her home exercises, manual work and to meet the remaining goals.         Short Term Goals: (4-6 weeks)                                   Date Met:                1.   pt independent in postural correction         02/18/20  2.   pt independent in SI joint self-corrections        12/17/19  3.   pt independent in exer to decrease post. disc pressures      12/17/19  4.   pt able to sleep through night without pain  5.   pt able to stand 10  minutes without pain        01/07/20  6.   Reduce pt pain to no greater than 4/10         01/07/20  7.   Decrease Oswestry Pain Score to no greater than 45/100      01/07/20  8.  Decrease Oswestry Pain Score to no greater than  40/100  9.  Pt able to perform hip abduction x6 w/o pain  10. Decrease pain right gr trochanter to no greater than 4/10      03/17/20  11. Pt able to engage inner unit in supine w/ legs on chair, moving in int/ext rotation w/o pain x6    Long Term Goals:(3-5  months)      Date Met:      1.  pt independent in self-management of symptoms       2.  pt independent in home program      3. Reduce pain to no greater than 2/10      4.  Pt able to  puppies without pain.      Treatment Plan:   1.  Ultrasound to increase extensibility, decrease pain, if needed  2.  Electrical stimulation for muscle relaxation, decrease pain, if needed, iontophoresis  3.  Manual therapy to increase function, decrease pain  4.  Therapeutic exercise to increase function, decrease pain  5.  Home programming and d/c planning to increase function and decrease pain    Frequency & Duration:  Pt will be seen on a once/every 2-3 week basis for 8 more visits to meet remaining goals.     Patient Participated in and Agrees With This Plan of Care and Goals:  YES  Rehab Potential:  Fair to good      Karen Chatterjee PT, MS  Physical Therapist  [place]# [id number]      Medicare Certification:  90 Day Recertification  Certification Period: 03/17/20   through 06/17/20  I certify that the therapy services are furnished while this patient is under my care.  The services outlined above are required by this patient, and will be reviewed every 90  days.     PHYSICIAN:     DATE:     Please sign and return via fax to [facility] [address] Fax # [phone]. Thank you, [facility].        Below is the Physical Therapy Re-evaluation for your patient,                        .  If this meets with your approval please sign the Medicare Certification at the end of the evaluation and return to our office.  If you have any questions, please contact me.  Best Regards,  Karen Chatterjee PT, MS          TREATMENT TODAY:    Total Treatment Time: (time in clinic)            60  Timed Code Treatment Minutes:   60      Manual Therapy:  (MA)  RX min:    20  Manual posterior rotation of left innominate    Positional Isometric contraction (PIC) of right iliopsoas    Positional Isometric contraction of  (PIC) right gluteus minimus    Distraction of both SI jts in open pack hip position  Piriformis stretching, bilaterally    Quadratus lumborum stretching, bilaterally    Anterior/Inferior Mobilization of  left hip    Positional Isometric Contraction of multifidus, thoracic paraspinals   Manual work cervical spine  PIC C2-6  Myofascial work upper quarter and trunk      Therapeutic Activities:  (TA)  RX min:  15  Neuromuscular Reeducation: (NMR)  RX min:  25    Ultrasound:  RX min:          1.6 piper/cm2       Location:     Iontophoresis:  6 hr patch                                Location:                               Procedures:      Key:  i=instructed        r=review        c=corrected        a=adapted   Code Procedure/Instruction 12/05/2019 12/17/19 01/07/20 02/18/20 03/17/20              TA         Sleeping Positions                reviewed reviewed                TA Sternum-Up Posture  reviewed corrected                TA SI jt. self-correction reviewed   reviewed reviewed              TA Lumbar Facet PIC reviewed   reviewed reviewed              TA   Order of Self-Corrections                   TA Use of lumbar pillow  reviewed                 TA Use of cervical roll                   TA Use of orthotics                   TA Use of SI belt                   TA Use of Nada chair                   TA Use of Ice  reviewed                 TA Use of [employer]/ heat  reviewed   Pain cakes              TA Use of TENS unit  In car, while driving                 TA Use of iontophoresis                   TA Decreasing Disc Pressures   reviewed                                                        NMR Diaphragmatic Breathing                   NMR Pelvic Floor Isolation                   NMR Transverse Abdominus                   NMR Multifidus Isolation                   NMR InnerUnit against Gravity                   NMR Sit-stand w/ inner unit                   NMR Roll w/ inner unit                   NMR Walk w/ inner  unit                    NMR Up/down steps w/ inner unit                   NMR Water Exer Instruction                   NMR Hip Abd w/o piriformis    instructed corrected              NMR Hip int/ext rotation supine w/ feet up on chair     instructed              NMR Hip Add w/o iliop/ham                    NMR Lower abs in supine                   NMR Abd obliques in supine                   NMR Prone Knee Flexion                   NMR Prone glut valentino                   NMR Retro Step                   NMR Retro Walk                   NMR Retro walk on treadmill                   NMR Forward walk w/ inner unit                   NMR Balance Instruction                   NMR Stability Ball A/P & Lateral                   NMR Ball Catch/Throw /Supine                   NMR Ball Catch/Throw /Stand                   NMR StabilityBall All 4's Arm Lift                   NMR StabilityBall Prone Walk Out                   NMR StabilityBall Supine Oblique                   NMR Stability Ball sit-supine-sit                   NMR Walking Prog Progression                   MNR Home program sequencing                                       TA Hamstring stretch                   TA Hip Ext Rot Stretch                   TA Hip Int Rot Stretch                   TA Hip Flex/IT Stretch                   TA Hip Add Stretch                   TA Lats Dorsi Stretch                   TA Gastroc/soleus stretch                   TA QuadratusLumborm Stretch                      Supine                      Stance                   TA Quad Stretch                                       NMR Wall Push Ups                   NMR Planking                   NMR BOSU Ball Exercises                   NMR Lateral Bridge Drop                   NMR [place]                   NMR O'Feldt Lat Pull Down                   NMR O'Feldt Hip Ext                   NMR O'Feldt Trunk Ext                                       TA CervDiscExtSup/stnd   reviewed                 TA Cerv Stretches                   TA Self PIC Cervical Spine                   TA Neural Gliding                   TA Ant/Mid Scalene Strch                   TA Biceps stretch                   TA Rotator Cuff Stretch                   TA Anterior Chest Stretch                   TA Wrist Ext Stretch                   TA Lats Dorsi stretch    instructed               NMR Prone Arm Lifts                   NMR Scapula Stabilization                   NMR Occulomotor Isometrics                   NMR Cervical Isometrics                                       TA Shld AROM w/bar                   TA Shld Capsular Stretching                   TA Self PIC Thor Spine   reviewed  Adapt and corrected              TA Rot Cuff Therabd, beginner                   TA Rot Cuff Therabd, intermed                                                                                                                                                                                                                                                 Karen Chatterjee, PT, MS  Physical Therapist  [place]# [id number]

## 2020-03-24 ENCOUNTER — TELEPHONE (OUTPATIENT)
Dept: PHYSICAL THERAPY | Facility: CLINIC | Age: 70
End: 2020-03-24

## 2020-03-24 NOTE — TELEPHONE ENCOUNTER
Talked with Maribel.  Her low back & neck are doing well however the pinch pain between scapula persists.  She's doing the self correction technique for the thoracic facets which does relieve this pain but it returns if she moves again and its present when she wakes in am.  Reviewed  sleeping positions to minimize this.  She notes she weed whacked & did yard work the last couple of days which may have aggravated this.  She is using heat which decreases the pain and will continue with the self correction techniques.  She's pleased the yard work has not flared up her low back or neck. She will limit the amount of time she's working in yard.  She will call to schedule an appointment once the virus restrictions are lifted for follow up and re-assessment of thoracic pain if still present.

## 2020-05-22 ENCOUNTER — TRANSCRIBE ORDERS (OUTPATIENT)
Dept: PHYSICAL THERAPY | Facility: CLINIC | Age: 70
End: 2020-05-22

## 2020-05-22 DIAGNOSIS — M79.7 FIBROMYALGIA: Primary | ICD-10-CM

## 2020-06-03 DIAGNOSIS — N95.2 VAGINAL ATROPHY: ICD-10-CM

## 2020-06-04 RX ORDER — ESTRADIOL 10 UG/1
INSERT VAGINAL
Qty: 36 TABLET | Refills: 0 | Status: SHIPPED | OUTPATIENT
Start: 2020-06-04 | End: 2020-06-30 | Stop reason: SDUPTHER

## 2020-06-11 ENCOUNTER — TREATMENT (OUTPATIENT)
Dept: PHYSICAL THERAPY | Facility: CLINIC | Age: 70
End: 2020-06-11

## 2020-06-11 DIAGNOSIS — M53.3 SACROILIAC JOINT DYSFUNCTION: Primary | ICD-10-CM

## 2020-06-11 PROCEDURE — 97112 NEUROMUSCULAR REEDUCATION: CPT | Performed by: PHYSICAL THERAPIST

## 2020-06-11 PROCEDURE — 97140 MANUAL THERAPY 1/> REGIONS: CPT | Performed by: PHYSICAL THERAPIST

## 2020-06-11 NOTE — PROGRESS NOTES
" Physical Therapy Re-Evaluation  SUBJECTIVE:  Changes since last seen:   Maribel states she's been doing a lot of yard work/gardening and bending over with her dogs.  This has flared up her low back pain.   She had been having pain in the left SI joint but recently it has moved to the right SI joint.   She states she hasn't been taking as much pain meds as she is trying to keep her stomach from getting \"messed up\".   She feels like she has been able to self manage her shoulder fairly well; she saw Dr. Valle yesterday; he said she is to have no more than 2 injections a year, but she is not a surgical candidate.  She's still having trouble doing the clamshell exercise correctly; she is so afraid she is going to make it worse.  She states she can't hold the inner unit well when doing this.   She did do the internal /external rotation of her hips up on chair and \"it helped some\".     Current level of pain:    Right buttock/SI joint 3/10      Left SIjoint  3/10    Neck 1/10  Right Gr trochanter 4/10 all time   annoying Between scapula 2/10  Lowest level of pain since last seen:      1/10      0/10   1/10               1/10  Highest level of pain since last seen:       4/10  Comes and goes   4/10    2-3/10              4/10      Past Medical History:  1.  Cervical DDD  2.  Hx of Bilateral THR, left 2012, right 2010  3.  Septoplasty, 2011  4.  Hx of SI joint dysfunction  5.  Fibromyalgia  6.  Hx of hypothyroidism  7.  Hx of gastritis  8.  Irritable bowel syndrome/spastic colon  9.  Osteoarthritis  10.  Hx of 4 pregnancies, 2 normal vaginal deliveries  11. Hx of Dequervains syndrome  12.  Hx of fx left sesmoid  13.   Hx of positional vertigo  14. Osteopenia, diagnosed May 2014  15  Hx of migraine headaches  16.  TMJ dysfunction, 2015  17.  Hx of Cardiomyopathy/ left catheterization 2002  18.  Dyslipidemia  19.  Hx of neuroma pain left foot  20.  Hx of anxiety/ depression    Functional Limitations:  Standing, sleeping, "   Patient Goals for Physical Therapy: decreased pain      OBJECTIVE:     12/05/2019 12/17/19 01/07/20 02/18/20 03/17/20 06/11/20               SI Joint Positioning  Right  Left Right  Left Right  Left Right  Left Right  Left Right  Left Right  Left Right  Left Right  Left Right  Left Right  Left Right  Left Right  Left Right Left Right Left Right Left Right  Left Right  Left       Innominate                            Anterior               x              x              x                x               x              x                      Posterior    x   x    x   x    x    x                  Sacrum                               Unilateral rotation    x   x   x   x   x    x                                       SI Joint Testing  Right  Left Right  Left Right  Left Right  Left Right  Left Right  Left Right  Left Right  Left Right  Left Right  Left Right  Left Right  Left Right  Left Right Left Right Left Right Left Right  Left Right  Left           Distraction   +         +   +        +   +         +   +          +   +            +   +          +                       Beatriz's    -        -                            Compression   -         -                            Prone Press Up   -            -                                         Special Tests  Right   Left Right  Left Right  Left Right  Left Right  Left Right  Left Right Left  Right  Left Right  Left Right  Left Right  Left Right  Left Right  Left Right Left Right Left Right Left Right Left  Right  Left      Straight Leg Raise                                     Active   -          -  -       -        -         -                       Passive    -           -   -          -           -           -                   Hip Scour Test                                                                      Palpation:      Muscle/Bone Irritation  Date 12/05/2019 12/17/19 01/07/20 02/18/20 03/17/20 06/11/20                Right  Left Right  Left Right  Left Right   Left Right  Left Right  Left Right  Left Right  Left Right  Left Right  Left Right  Left Right  Left Right  Left Right Left Right Left Right Left Right  Left Right  Left   SI joint  +        ++   +          +    +         sl   +         Sl     +          +   +         +               Piriformis   Sl        +    ++        +     +        sl     +       sl     +          +   +        sl               Gr. Trochanter  ++        +   +          +     Sl      Sl     ++       sl   ++         +   +       +               IT Band  sl        sl   +         sl   Sl          -   Sl        -   Sl        Sl    -           -               Quadratus Lumborum  ++        +    +         sl   Sl         sl   -            -   -           -   -            -               Ischial Tuberosity  -        -   -          -   -          -   -            -   -          -    -            -               Sacrococcygeal Ligs  -        -   -           -   -             -   -            -   -           -   -           -               Paraspinals  +        -   Sl           -   -          sl   -            -   +         -   +          sl               Spinous Processes                                        C-spine rotated to               C2-6           C2-5                        T-spine rotated to   T3-5                     T4-6  T6-8 T3-4 T3-4    T4-5 T3-6                      L-spine rotated to  L5           L5           L4-5   -          - L3-5 L4-5               Adductor Fabio  -        -   -           -   -           sl   -           -   -        -   -          -               Iliopsoas  +        +   Sl          sl   Sl           sl   Sl         -   Sl       -   +          +               Quad Origin  -        -    -          -    -            -   -           -   -           -               Pubic Symphysis         +                   Sl       sl        -                 Bakers cyst        -         sl    -         +   -          sl                Pes Anserine Bursa      +          -   +         ++   Sl         +               Rectus Diastasis   2 finger                                           All 4s Positioning: Pt not able to assume full lumbar extension in this position      Monthly Objective Measurement/Functional Activity  Date: 12/05/2019 01/07/20 02/18/20 03/17/20 06/11/20              Oswestry Pain Score 52/100    45/100    44/100 44/100     44/100              LE Functional Scale                   Neck Disability Index      32/100      42/100                                 AROM Lumbar Spine: Degrees   Degrees      Degrees      Degrees      Degrees    Degrees Degrees Degrees Degrees Degrees Degrees Degrees Degrees Degrees Degrees    Degrees      Flexion 114           97      112      112    110                 Extension 23 pain on right            27      36      29      20                 Right Lat. Flexion 33           34      27 pain right SI        31       31                 Left Lat. Flexion 25           27      28       24        22                 Right Lat Shift Pelvis No change No change  No change No change  no change                 Left Lat Shift Pelvis No change pulls on right  Tightness No pain  No change  no change                                 AROM Cervical Spine  Degrees Degrees Degrees Degrees Degrees Degrees Degrees Degrees Degrees Degrees Degrees Derees Degrees Degrees Degrees     flexion    45 pain        45        59       45 pain                 extension    51 dizzy        50        62       58                Right lateral flex    56 pain r upper trap         55        47       47 pain                 Left lateral flex  57 pain right elbow        58        40      39                Right rotation          44         45       46     42 pain                 Left rotation           40          40       41     40                                 AROM Hips:  (degrees) Right      Left Right Left Right  Left Right  Left Right   Left Right  Left Right Left Right Left Right Left Right Left Right  Left Right Left Right Left Right Left Right Left Right  Left      Flexion 95       98  98        100  100      95  98        105   95       100                 Abduction 42       45  45         45   45         45  42        46  40          45                 Int. Rotation 25       15  25         28  25          25   26       28    25        30                 Ext. Rotation  30        40              32        40   31       46     45       46                                 Flexibility:   (degrees) Right    Left Right  Left Right  Left Right  Left Right  Left Right  Left Right Left Right Left Right Left Right Left Right  Left Right Left Right Left Right Left Right Left Right  Left      Hamstrings -47       -49   -45   -50    -45      -48   -43     -42   -42      -41                 Quadriceps 45       40  48       45     55      54   58       60    65        67                 HipExt/Rot(piriformis) 42       43    45       45   45    48   43       46    45        45                 Hip Int/Rotators 38       37   37        35   35      35   32        36    25         30                 Hip Flexors (iliopsoas) Not tested         Not tested Not tested Not tested Not tested                 IT Band Not tested          not tested  not tested  not tested  not tested                                                    Root Level  - Motor Right      Left Right  Left Right  Left Right  Left Right  Left Right  Left Right Left Right Left Right Left Right Left Right  Left Right Left Right Left Right Left Right Left Right  Left     T12             Rectus Abdominus 4+/5        4+/5         4+/5      4+/5       4+/5                 L1              Paraspinals 5/5      5/5   5/5       5/5  5/5        5/5  5/5        5/5   5/5       5/5                 L2              Hip Flexion 4+/5      4+/5  4+/5    4+/5  5/5        5/5  55        5/5   5/5       5/5                  L3             Quads 5/5      5/5  5/5       5/5   5/5        5/5  5/5        5/5   5/5       5/5                 L4              Anterior Tibialis 5/5      5/5  5/5       5/5  5/5        5/5  5/5        5/5   5/5       5/5                 L5             Gr. Toe Extension 5/5      5/5  5/5       5/5   5/5        5/5 5/5         5/5   5/5       5/5                 S1            Gastroc/Sol/Peroneals 4+/5    4+/5   4+/5    4+/5  4+/5    4+/5  4+/5     4+/5   4+/5   4+/5                                 Functional Strength:                     Single LegStanceTime (seconds) R:24   L:25   R:30  L:27 R:30   L:30 R:     L: R:      L: R:      L:  R:       L:   R:      L:   R:      L:   R:      L: R:      L:   R:      L:   R:      L:    R:      L:    R:      L: R:      L:     Pelvic Floor Isolation (seconds) 10 sec  10 sec                    Inner Unit  Isolation (seconds) 10 sec  10 sec                                                       Functional Activities:                      Sit w/o pain? Pain increases (minutes) yes   Yes/ 30-60 min  yes/ 30-60 Yes/ 20 min  Yes/ 30-60 min                 Stand w/o pain? 20 min Yes/ 30 min Yes/ 30 min Yes/ 30 min Yes/ 30 min                 Walk w/o pain? 30 min   yes/ 1/4 mile Yes/ 30 min Yes/ 45 min  Yes/ 1 mile                 Steps w/o pain? Yes, ignores pain  Yes/ ignores pain Yes/ no limit Yes/ no limit Yes/ no limit                 Drive w/o pain? Uncomfortable, 60 min  Yes/ w/ supports and TENS 2 hrs Yes/ 60 min  Yes/ 60 min  Yes/ 1-2 hrs                 Work w/o pain? n/a   n/a     n/a    n/a                 # times wakes w/ pain? Sleeping on couch b/c or right shld pain, since July Continues to sleep on couch  Continues to sleep on the couch  2x from right shoulder and neck, she is still sleeping on couch  1-2x                   PLAN OF CARE:    ASSESSMENT:  Problem List:   1. Flare up of SI joint dysfunction  2. Decreased spinal stabilization  3. Postural dysfunction      Discussion:    Maribel has been using the PIC on the thoracic spine to manage her upper thoracic pain, effectively    Maribel is having trouble with the clamshell exercise as she is no longer engaging the inner unit correctly.  When she recruits the pelvic floor she over engages the rectus abdominus and the gluts.  So to retrain her in this, broke down the pelvic floor isolation in sitting on a rolled up towel to increase proprioception to be aware of when over engaging.  This did help her.  Then instructed in diaphragmatic breathing in supine to be able to sense the recoil of the pelvic floor on exhale.  Interestingly, she had a great deal of difficulty performing the diaphragmatic breath, and would hold her breath when engaging the pelvic floor.  With cuing, she was able to do it.  She will work on this over the next couple of weeks and hopefully will be able to translate this to doing the clamshell correctly.   She will also try to do this with the supine hip ext/int rotation with her feet up on a chair to again emphasize proximal stabilization with distal mobility.     Maribel has a thorough home program but needs to work on the new neuro motor re training exercises taught today.  She notes she can manage her pain with all of the instruction that she has received here in physical therapy for about 3-4 weeks then she needs manual work to augment what she is doing at home to minimize her pain and keep it from exacerbating.  Thus she will be seen once every 3-4 weeks for progression of her home exercises, manual work and to meet the remaining goals.         Short Term Goals: (4-6 weeks)                                   Date Met:                1.   pt independent in postural correction         02/18/20  2.   pt independent in SI joint self-corrections        12/17/19  3.   pt independent in exer to decrease post. disc pressures      12/17/19  4.   pt able to sleep through night without pain  5.   pt able to stand 10   minutes without pain        01/07/20  6.   Reduce pt pain to no greater than 4/10         01/07/20  7.   Decrease Oswestry Pain Score to no greater than 45/100      01/07/20  8.  Decrease Oswestry Pain Score to no greater than  40/100  9.  Pt able to perform hip abduction x6 w/o pain  10. Decrease pain right gr trochanter to no greater than 4/10      03/17/20  11. Pt able to engage inner unit in supine w/ legs on chair, moving in int/ext rotation w/o pain x6  12.  Pt able to contract pelvic floor on exhaling a diaphragmatic breath x6    Long Term Goals:(3-5 months)      Date Met:      1.  pt independent in self-management of symptoms       2.  pt independent in home program      3. Reduce pain to no greater than 2/10      4.  Pt able to  puppies without pain.      Treatment Plan:   1.  Ultrasound to increase extensibility, decrease pain, if needed  2.  Electrical stimulation for muscle relaxation, decrease pain, if needed, iontophoresis  3.  Manual therapy to increase function, decrease pain  4.  Therapeutic exercise to increase function, decrease pain  5.  Home programming and d/c planning to increase function and decrease pain    Frequency & Duration:  Pt will be seen on a once/every 2-3 week basis for 7 more visits to meet remaining goals.     Patient Participated in and Agrees With This Plan of Care and Goals:  YES  Rehab Potential:  Fair to good      Karen Chatterjee, PT, MS  Physical Therapist  [place]# [id number]      Medicare Certification:  90 Day Recertification  Certification Period: 03/17/20   through 06/17/20  I certify that the therapy services are furnished while this patient is under my care.  The services outlined above are required by this patient, and will be reviewed every 90  days.        TREATMENT TODAY:    Total Treatment Time: (time in clinic)            60  Timed Code Treatment Minutes:   60      Manual Therapy:  (MA)  RX min:    25  Manual posterior rotation of left innominate    Positional  Isometric contraction (PIC) of right iliopsoas    Positional Isometric contraction of (PIC) right gluteus minimus    Distraction of both SI jts in open pack hip position  Piriformis stretching, bilaterally    Quadratus lumborum stretching, bilaterally    Anterior/Inferior Mobilization of  left hip    Positional Isometric Contraction of multifidus, thoracic paraspinals   Manual work cervical spine  PIC T3-6, L4-5  Myofascial work upper quarter and trunk      Therapeutic Activities:  (TA)  RX min:    Neuromuscular Reeducation: (NMR)  RX min:  30    Ultrasound:  RX min:          1.6 piper/cm2       Location:     Iontophoresis:  6 hr patch                                Location:                               Procedures:      Key:  i=instructed        r=review        c=corrected        a=adapted   Code Procedure/Instruction 12/05/2019 12/17/19 01/07/20 02/18/20 03/17/20 06/11/20             TA         Sleeping Positions                reviewed reviewed                TA Sternum-Up Posture  reviewed corrected                TA SI jt. self-correction reviewed   reviewed reviewed              TA Lumbar Facet PIC reviewed   reviewed reviewed              TA   Order of Self-Corrections                   TA Use of lumbar pillow  reviewed                 TA Use of cervical roll                   TA Use of orthotics                   TA Use of SI belt                   TA Use of Nada chair                   TA Use of Ice  reviewed                 TA Use of [employer]/ heat  reviewed   Pain cakes              TA Use of TENS unit  In car, while driving                 TA Use of iontophoresis                   TA Decreasing Disc Pressures   reviewed                                                        NMR Diaphragmatic Breathing      instructed             NMR Pelvic Floor Isolation                   NMR Transverse Abdominus                   NMR Multifidus Isolation                   NMR InnerUnit against Gravity                    NMR Sit-stand w/ inner unit                   NMR Roll w/ inner unit                   NMR Walk w/ inner unit                    NMR Up/down steps w/ inner unit                   NMR Water Exer Instruction                   NMR Hip Abd w/o piriformis    instructed corrected corrected             NMR Hip int/ext rotation supine w/ feet up on chair     instructed corrected             NMR Diaphragmatic Breathing w/ pelvic floor      instructed             NMR Pelvic floor in sitting on towel foll      instructed             NMR Hip Add w/o iliop/ham                    NMR Lower abs in supine                   NMR Abd obliques in supine                   NMR Prone Knee Flexion                   NMR Prone glut valentino                   NMR Retro Step                   NMR Retro Walk                   NMR Retro walk on treadmill                   NMR Forward walk w/ inner unit                   NMR Balance Instruction                   NMR Stability Ball A/P & Lateral                   NMR Ball Catch/Throw /Supine                   NMR Ball Catch/Throw /Stand                   NMR StabilityBall All 4's Arm Lift                   NMR StabilityBall Prone Walk Out                   NMR StabilityBall Supine Oblique                   NMR Stability Ball sit-supine-sit                   NMR Walking Prog Progression                   MNR Home program sequencing                                       TA Hamstring stretch                   TA Hip Ext Rot Stretch                   TA Hip Int Rot Stretch                   TA Hip Flex/IT Stretch                   TA Hip Add Stretch                   TA Lats Dorsi Stretch                   TA Gastroc/soleus stretch                   TA QuadratusLumborm Stretch                      Supine                      Stance                   TA Quad Stretch                                       NMR Wall Push Ups                   NMR Planking                   NMR BOSU Ball Exercises                    NMR Lateral Bridge Drop                   NMR [place]                   NMR O'Feldt Lat Pull Down                   NMR O'Feldt Hip Ext                   NMR O'Feldt Trunk Ext                                       TA CervDiscExtSup/stnd   reviewed                TA Cerv Stretches                   TA Self PIC Cervical Spine                   TA Neural Gliding                   TA Ant/Mid Scalene Strch                   TA Biceps stretch                   TA Rotator Cuff Stretch                   TA Anterior Chest Stretch                   TA Wrist Ext Stretch                   TA Lats Dorsi stretch    instructed               NMR Prone Arm Lifts                   NMR Scapula Stabilization                   NMR Occulomotor Isometrics                   NMR Cervical Isometrics                                       TA Shld AROM w/bar                   TA Shld Capsular Stretching                   TA Self PIC Thor Spine   reviewed  Adapt and corrected reviewed             TA Rot Cuff Therabd, beginner                   TA Rot Cuff Therabd, intermed                                                                                                                                                                                                                                                 Karen Chatterjee, PT, MS  Physical Therapist  [place]# [id number]

## 2020-06-30 ENCOUNTER — OFFICE VISIT (OUTPATIENT)
Dept: OBSTETRICS AND GYNECOLOGY | Facility: CLINIC | Age: 70
End: 2020-06-30

## 2020-06-30 VITALS
SYSTOLIC BLOOD PRESSURE: 110 MMHG | HEIGHT: 62 IN | WEIGHT: 118.2 LBS | DIASTOLIC BLOOD PRESSURE: 68 MMHG | BODY MASS INDEX: 21.75 KG/M2

## 2020-06-30 DIAGNOSIS — Z01.419 ENCOUNTER FOR GYNECOLOGICAL EXAMINATION WITHOUT ABNORMAL FINDING: ICD-10-CM

## 2020-06-30 DIAGNOSIS — Z78.0 MENOPAUSE: Primary | ICD-10-CM

## 2020-06-30 DIAGNOSIS — N95.2 VAGINAL ATROPHY: ICD-10-CM

## 2020-06-30 PROCEDURE — G0101 CA SCREEN;PELVIC/BREAST EXAM: HCPCS | Performed by: OBSTETRICS & GYNECOLOGY

## 2020-06-30 RX ORDER — ESTRADIOL 10 UG/1
1 INSERT VAGINAL 3 TIMES WEEKLY
Qty: 36 TABLET | Refills: 3 | Status: SHIPPED | OUTPATIENT
Start: 2020-07-01 | End: 2021-07-01

## 2020-06-30 NOTE — PROGRESS NOTES
Chief Complaint   Patient presents with   • Gynecologic Exam   • Med Refill       Barbara Hernandez is a 69 y.o. year old  presenting to be seen for her annual exam.  This patient is menopausal and does not use systemic estrogen/progestin therapy.  She has a history of fibromyalgia and multiple allergies.  She is unable to use estrogen vaginal creams either vaginally or topically.  Yuvafem is a product that relieves her symptoms.  She has no side effects on this medication.  She denies bowel or urinary symptoms.  In 2019 she was diagnosed with parvovirus infection.  She still has fatigue and lethargy.  She has a history of osteopenia but cannot take bisphosphonates and does not desire further screening.    SCREENING TESTS    Year 2012   Age                         PAP       Neg.                  HPV high risk                         Mammogram       benign benign                 KOREY score                         Breast MRI                         Lipids                         Vitamin D                         Colonoscopy                         DEXA  Frax (hip/any)      osteopenia                   Ovarian Screen                             She exercises regularly: yes.  She wears her seat belt: yes.  She has concerns about domestic violence: no.  She has noticed changes in height: no    GYN screening history:  · Last mammogram: was done on approximately 2018 and the result was: Birads I (Normal).  · Last DEXA: was done on approximately 2017 and the results were: spine normal, osteopenia of the radius.    No Additional Complaints Reported    The following portions of the patient's history were reviewed and updated as appropriate:vital signs and   She  has a past medical history of Allergic, Anxiety, Chronic diarrhea, Depression, ETD (eustachian tube dysfunction), Fibromyalgia, Fibromyositis, GERD  (gastroesophageal reflux disease), H/O cardiomyopathy, Headache, HLD (hyperlipidemia), Hypothyroidism, IBS (irritable bowel syndrome), Lichen sclerosus et atrophicus of the vulva, Menopause, Osteoarthritis, Osteopenia, Parvovirus B19 infection, Preventative health care, and Urinary tract infection.  She does not have any pertinent problems on file.  She  has a past surgical history that includes Total hip arthroplasty; Cryotherapy; Septoplasty; and Eye surgery (Right).  Her family history includes Alzheimer's disease in her father; Heart attack in her maternal grandmother; Hyperlipidemia in her brother; Hypertension in her brother; Hypothyroidism in her mother; Osteoarthritis in her mother; Stroke in her paternal grandmother.  She  reports that she quit smoking about 30 years ago. Her smoking use included cigarettes. She started smoking about 38 years ago. She has a 16.00 pack-year smoking history. She has never used smokeless tobacco. She reports that she does not drink alcohol or use drugs.  Current Outpatient Medications   Medication Sig Dispense Refill   • acetaminophen (TYLENOL) 325 MG tablet Take 650 mg by mouth every night at bedtime.     • B Complex Vitamins (VITAMIN B COMPLEX PO) Take  by mouth Daily.     • Cholecalciferol (VITAMIN D3) 3000 UNITS tablet Take  by mouth Daily.     • Coenzyme Q10 (CO Q-10) 100 MG capsule Take 400 mg by mouth.     • diclofenac (VOLTAREN) 1 % gel gel Apply 1 application topically As Needed.     • dicyclomine (BENTYL) 10 MG capsule Take 1 capsule by mouth every 8 hours as needed for bowel spasm or diarrhea 30 capsule 0   • [START ON 7/1/2020] estradiol (VAGIFEM) 10 MCG tablet vaginal tablet Insert 1 tablet into the vagina 3 (Three) Times a Week. 36 tablet 3   • gabapentin (NEURONTIN) 100 MG capsule Take 1 capsule by mouth every night at bedtime. 30 capsule 5   • Lutein 20 MG tablet Take 1 tablet by mouth Daily.     • meloxicam (MOBIC) 7.5 MG tablet TAKE 1 TABLET EVERY DAY AS  "NEEDED FOR PAIN AND INFLAMMATION 30 tablet 3   • Multiple Vitamins-Minerals (ICAPS AREDS 2) capsule Take 1 capsule by mouth Daily.     • Omega-3 Fatty Acids (FISH OIL) 1200 MG capsule capsule Take 1,200 mg by mouth Daily.     • SYNTHROID 88 MCG tablet Take 1 tab po qam fasting and wait 1hr for food or other meds. BRAND ONLY. 90 tablet 3   • vitamin E 400 UNIT capsule Take 400 Units by mouth Daily.       No current facility-administered medications for this visit.      She is allergic to celecoxib; ciprofloxacin; clarithromycin; doxycycline; erythromycin; levofloxacin; lortab [hydrocodone-acetaminophen]; lyrica [pregabalin]; nexium [esomeprazole]; omeprazole; phenergan [promethazine hcl]; tramadol; bacitracin-polymyxin b; and sulfa antibiotics..    Review of Systems  A review of systems was taken.  She denies cough, fever, and shortness of breath.  She does have fatigue and lethargy.  Constitutional: negative for fever, chills, activity change, appetite change, and unexpected weight change.  Respiratory: negative  Cardiovascular: negative  Gastrointestinal: negative  Genitourinary:negative  Musculoskeletal:negative  Behavioral/Psych: negative       /68   Ht 157.5 cm (62\")   Wt 53.6 kg (118 lb 3.2 oz)   LMP  (LMP Unknown) Comment: postmenopausal  Breastfeeding No   BMI 21.62 kg/m²     Physical Exam    General:  alert; cooperative; well developed; well nourished   Skin:  No suspicious lesions seen   Thyroid: normal to inspection and palpation   Lungs:  clear to auscultation bilaterally   Heart:  regular rate and rhythm, S1, S2 normal, no murmur, click, rub or gallop   Breasts:  Examined in supine position  Symmetric without masses or skin dimpling  Nipples normal without inversion, lesions or discharge  There are no palpable axillary nodes  Fibrocystic changes are present both breasts without a discrete mass   Abdomen: soft, non-tender; no masses  no umbilical or inguinal hernias are present  no " hepato-splenomegaly   Pelvis: Clinical staff was present for exam  External genitalia:  normal appearance of the external genitalia including Bartholin's and Quanah's glands.  Vaginal:  normal pink mucosa without prolapse or lesions.  Cervix:  normal appearance.  Uterus:  normal size, shape and consistency. anteverted;  Adnexa:  non palpable bilaterally.  Rectal:  anus visually normal appearing. recto-vaginal exam unremarkable and confirms findings;     Lab Review   No data reviewed    Imaging  Mammogram results and DEXA         ASSESSMENT  Problems Addressed this Visit        Genitourinary    Menopause - Primary    Vaginal atrophy    Relevant Medications    estradiol (VAGIFEM) 10 MCG tablet vaginal tablet (Start on 7/1/2020)      Other Visit Diagnoses     Encounter for gynecological examination without abnormal finding                  Substance History:   reports that she quit smoking about 30 years ago. Her smoking use included cigarettes. She started smoking about 38 years ago. She has a 16.00 pack-year smoking history. She has never used smokeless tobacco.   reports that she does not drink alcohol.   reports that she does not use drugs.    Substance use counseling is not indicated based on patient history.      PLAN    Medications prescribed this encounter:    New Medications Ordered This Visit   Medications   • estradiol (VAGIFEM) 10 MCG tablet vaginal tablet     Sig: Insert 1 tablet into the vagina 3 (Three) Times a Week.     Dispense:  36 tablet     Refill:  3   · Monthly self breast assessment and annual breast imaging  · Calcium, 600 mg/ Vit. D, 400 IU daily; regular weight-bearing exercise  · Follow up: 12 month(s)  *Please note that portions of this documentation may have been completed with a voice recognition program.  Efforts were made to edit this dictation, but occasional words may have been mistranscribed.       This note was electronically signed.    ANNE MARIE France MD  June 30, 2020  15:50

## 2020-07-02 ENCOUNTER — TREATMENT (OUTPATIENT)
Dept: PHYSICAL THERAPY | Facility: CLINIC | Age: 70
End: 2020-07-02

## 2020-07-02 DIAGNOSIS — M53.3 SACROILIAC JOINT DYSFUNCTION: Primary | ICD-10-CM

## 2020-07-02 PROCEDURE — 97140 MANUAL THERAPY 1/> REGIONS: CPT | Performed by: PHYSICAL THERAPIST

## 2020-07-02 PROCEDURE — 97112 NEUROMUSCULAR REEDUCATION: CPT | Performed by: PHYSICAL THERAPIST

## 2020-07-02 NOTE — PROGRESS NOTES
" Physical Therapy Re-Evaluation and Updated Plan of Care  SUBJECTIVE:  Changes since last seen:   She just returned from [place] visiting her daughter.  She slept on the couch so not as bad as last visit sleeping on a soft mattress.   Despite this, the trip was \"rough\", becaue her back was bothering her the whole time; she thinks it was arthritis, getting worse, achey most of the time, hard to get comfortable when I sit of lay    She had a couple of episodes of vertigo while resting in supine in [place].  She thinks she's \"not been the same since got ParVo19, losing hair, fatigue and fibromyalgia flared up.   \"I'm not as healthy as I was;  getting so tired when I walk now etc..\" \"I just feel worn out all the time\".  \"Not letting it stop me.\"      She returns to [place] this [place]; to see rheumatologist as needed.   She is using the Voltaren gel, Mobic and Tylenol for pain control.     Had severe pain in right upper trap and nerve pain in right elbow, 2 hrs after last here in our clinic.  She notes she was at home, reached for a pillow and the right side of neck locked up.  \"It was the same feeling I got when lifting weights at another PT clinic.  It took 4 days for it to go away.  This pain was not correlated with movement, it was constant     Current level of pain:   Right buttock/SI jt 3/10  Left SIjoint  2/10  Lumbar spine 0/10   Right Gr troch 3/10 all time  Neck 0/10   Right shoulder/ scap 5/10  Lowest level of pain since last seen:    1/10   0/10    0/10         0/10            2/10  Highest level of pain since last seen:     4/10    3/10     4-5/10 in am         3/10        5/10      Past Medical History:  1.  Cervical DDD  2.  Hx of Bilateral THR, left 2012, right 2010  3.  Septoplasty, 2011  4.  Hx of SI joint dysfunction  5.  Fibromyalgia  6.  Hx of hypothyroidism  7.  Hx of gastritis  8.  Irritable bowel syndrome/spastic colon  9.  Osteoarthritis  10.  Hx of 4 pregnancies, 2 normal vaginal " deliveries  11. Hx of Dequervains syndrome  12.  Hx of fx left sesmoid  13.   Hx of positional vertigo  14. Osteopenia, diagnosed May 2014  15  Hx of migraine headaches  16.  TMJ dysfunction, 2015  17.  Hx of Cardiomyopathy/ left catheterization 2002  18.  Dyslipidemia  19.  Hx of neuroma pain left foot  20.  Hx of anxiety/ depression    Functional Limitations:  Standing, sleeping,   Patient Goals for Physical Therapy: decreased pain      OBJECTIVE:     12/05/2019 12/17/19 01/07/20 02/18/20 03/17/20 06/11/20 07/02/20              SI Joint Positioning  Right  Left Right  Left Right  Left Right  Left Right  Left Right  Left Right  Left Right  Left Right  Left Right  Left Right  Left Right  Left Right  Left Right Left Right Left Right Left Right  Left Right  Left       Innominate                            Anterior               x              x              x                x               x              x              sl                     Posterior    x   x    x   x    x    x  sl                 Sacrum                               Unilateral rotation    x   x   x   x   x    x   sl                                      SI Joint Testing  Right  Left Right  Left Right  Left Right  Left Right  Left Right  Left Right  Left Right  Left Right  Left Right  Left Right  Left Right  Left Right  Left Right Left Right Left Right Left Right  Left Right  Left           Distraction   +         +   +        +   +         +   +          +   +            +   +          +   +          +                      Beatriz's    -        -                            Compression   -         -                            Prone Press Up   -            -                                         Special Tests  Right   Left Right  Left Right  Left Right  Left Right  Left Right  Left Right Left  Right  Left Right  Left Right  Left Right  Left Right  Left Right  Left Right Left Right Left Right Left Right Left  Right  Left      Straight Leg Raise                                      Active   -          -  -       -        -         -   -           -                      Passive    -           -   -          -           -           -   -        -                  Hip Scour Test                                                                      Palpation:      Muscle/Bone Irritation  Date 12/05/2019 12/17/19 01/07/20 02/18/20 03/17/20 06/11/20 07/02/20               Right  Left Right  Left Right  Left Right  Left Right  Left Right  Left Right  Left Right  Left Right  Left Right  Left Right  Left Right  Left Right  Left Right Left Right Left Right Left Right  Left Right  Left   SI joint  +        ++   +          +    +         sl   +         Sl     +          +   +         +    +           +              Piriformis   Sl        +    ++        +     +        sl     +       sl     +          +   +        sl   +        +              Gr. Trochanter  ++        +   +          +     Sl      Sl     ++       sl   ++         +   +       +   +          +              IT Band  sl        sl   +         sl   Sl          -   Sl        -   Sl        Sl    -           -   -           -              Quadratus Lumborum  ++        +    +         sl   Sl         sl   -            -   -           -   -            -   -         -              Ischial Tuberosity  -        -   -          -   -          -   -            -   -          -    -            -   -           -              Sacrococcygeal Ligs  -        -   -           -   -             -   -            -   -           -   -           -   -            -              Paraspinals  +        -   Sl           -   -          sl   -            -   +         -   +          sl   Sl          +              Spinous Processes                                        C-spine rotated to               C2-6           C2-5                        T-spine rotated to   T3-5                     T4-6  T6-8 T3-4 T3-4    T4-5 T3-6                      L-spine  rotated to  L5           L5           L4-5   -          - L3-5 L4-5            L4-5              Adductor Fabio  -        -   -           -   -           sl   -           -   -        -   -          -   Sl         Sl               Iliopsoas  +        +   Sl          sl   Sl           sl   Sl         -   Sl       -   +          +   +         +              Quad Origin  -        -    -          -    -            -   -           -   -           -   -            -              Pubic Symphysis         +                   Sl       sl        -           sl              Bakers cyst        -         sl    -         +   -          sl   -            -              Pes Anserine Bursa      +          -   +         ++   Sl         +   -sl        -              Rectus Diastasis   2 finger                                           All 4s Positioning: Pt not able to assume full lumbar extension in this position      Monthly Objective Measurement/Functional Activity  Date: 12/05/2019 01/07/20 02/18/20 03/17/20 06/11/20 07/02/20             Oswestry Pain Score 52/100    45/100    44/100 44/100     44/100   44/100             LE Functional Scale                   Neck Disability Index      32/100      42/100                                 AROM Lumbar Spine: Degrees   Degrees      Degrees      Degrees      Degrees    Degrees Degrees Degrees Degrees Degrees Degrees Degrees Degrees Degrees Degrees    Degrees      Flexion 114           97      112      112    110     89                Extension 23 pain on right            27      36      29      20     30 pain right scapula                Right Lat. Flexion 33           34      27 pain right SI        31       31    28                Left Lat. Flexion 25           27      28       24        22     25                Right Lat Shift Pelvis No change No change  No change No change  no change No cahnge                Left Lat Shift Pelvis No change pulls on right  Tightness No pain  No  change  no change  pain right buttock                                 AROM Cervical Spine  Degrees Degrees Degrees Degrees Degrees Degrees Degrees Degrees Degrees Degrees Degrees Derees Degrees Degrees Degrees     flexion    45 pain        45        59       45 pain      45                extension    51 dizzy        50        62       58       60               Right lateral flex    56 pain r upper trap         55        47       47 pain       49               Left lateral flex  57 pain right elbow        58        40      39      43               Right rotation          44         45       46     42 pain       40               Left rotation           40          40       41     40      37                                AROM Hips:  (degrees) Right      Left Right Left Right  Left Right  Left Right  Left Right  Left Right Left Right Left Right Left Right Left Right  Left Right Left Right Left Right Left Right Left Right  Left      Flexion 95       98  98        100  100      95  98        105   95       100 96       98                Abduction 42       45  45         45   45         45  42        46  40          45 40       40                Int. Rotation 25       15  25         28  25          25   26       28    25        30   25      32                Ext. Rotation  30        40              32        40   31       46     45       46  45       47                                Flexibility:   (degrees) Right    Left Right  Left Right  Left Right  Left Right  Left Right  Left Right Left Right Left Right Left Right Left Right  Left Right Left Right Left Right Left Right Left Right  Left      Hamstrings -47       -49   -45   -50    -45      -48   -43     -42   -42      -41  -45      -48                Quadriceps 45       40  48       45     55      54   58       60    65        67  63        65                HipExt/Rot(piriformis) 42       43    45       45   45    48   43       46    45        45  45        40                 Hip Int/Rotators 38       37   37        35   35      35   32        36    25         30  30        35                Hip Flexors (iliopsoas) Not tested         Not tested Not tested Not tested Not tested Not tested                IT Band Not tested          not tested  not tested  not tested  not tested  not tested                                                   Root Level  - Motor Right      Left Right  Left Right  Left Right  Left Right  Left Right  Left Right Left Right Left Right Left Right Left Right  Left Right Left Right Left Right Left Right Left Right  Left     T12             Rectus Abdominus 4+/5        4+/5         4+/5      4+/5       4+/5    4 to 4+/5                L1              Paraspinals 5/5      5/5   5/5       5/5  5/5        5/5  5/5        5/5   5/5       5/5  5/5        5/5                L2              Hip Flexion 4+/5      4+/5  4+/5    4+/5  5/5        5/5  55        5/5   5/5       5/5   5/5        5/5                L3             Quads 5/5      5/5  5/5       5/5   5/5        5/5  5/5        5/5   5/5       5/5   5/5        5/5                L4              Anterior Tibialis 5/5      5/5  5/5       5/5  5/5        5/5  5/5        5/5   5/5       5/5   5/5        5/5                L5             Gr. Toe Extension 5/5      5/5  5/5       5/5   5/5        5/5 5/5         5/5   5/5       5/5   5/5        5/5                S1            Gastroc/Sol/Peroneals 4+/5    4+/5   4+/5    4+/5  4+/5    4+/5  4+/5     4+/5   4+/5   4+/5 4+/5      4+/5                                Functional Strength:                     Single LegStanceTime (seconds) R:24   L:25   R:30  L:27 R:30   L:30 R:     L: R:      L: R:22    L:15  R:       L:   R:      L:   R:      L:   R:      L: R:      L:   R:      L:   R:      L:    R:      L:    R:      L: R:      L:     Pelvic Floor Isolation (seconds) 10 sec  10 sec                    Inner Unit  Isolation (seconds) 10 sec  10 sec                                                        Functional Activities:                      Sit w/o pain? Pain increases (minutes) yes   Yes/ 30-60 min  yes/ 30-60 Yes/ 20 min  Yes/ 30-60 min Yes/ 30-60                Stand w/o pain? 20 min Yes/ 30 min Yes/ 30 min Yes/ 30 min Yes/ 30 min Yes/ 30 min                Walk w/o pain? 30 min   yes/ 1/4 mile Yes/ 30 min Yes/ 45 min  Yes/ 1 mile Yes/ 30 min                 Steps w/o pain? Yes, ignores pain  Yes/ ignores pain Yes/ no limit Yes/ no limit Yes/ no limit Yes/ no limit                Drive w/o pain? Uncomfortable, 60 min  Yes/ w/ supports and TENS 2 hrs Yes/ 60 min  Yes/ 60 min  Yes/ 1-2 hrs Yes/ 3 hrs                Work w/o pain? n/a   n/a     n/a    n/a                 # times wakes w/ pain? Sleeping on couch b/c or right shld pain, since July Continues to sleep on couch  Continues to sleep on the couch  2x from right shoulder and neck, she is still sleeping on couch  1-2x   1-2x                  PLAN OF CARE:    ASSESSMENT:  Problem List:   1. Flare up of SI joint dysfunction  2. Decreased spinal stabilization  3. Postural dysfunction     Discussion:    The pain in Maribel's right shoulder 2 hours after last visit when reaching for a pillow appears to have be related to the right rotator cuff being tweaked and then severe guarding and spasming of the right upper trap/ supraspinatus/ ant mid scalene, levator scapula and rhomboids.  Reviewed the need to support the right shoulder with a pillow/ arm rest etc. secondary to the tear in the right rotator cuff, when driving, when sitting etc.  Maribel is very apprehensive of any manual work on the cervical spine or shoulder. She states she will not do any exercises. She feels she gets the most relief for the right shoulder with ultrasound on a consistent basis.  Will refer her back to Declan Diaz, PT, CHT, for focus on the right shoulder as he worked with her previously for this. She has not done any of the hip int/ext rotation exercises  in supine to facilitate proximal stability and distal mobility.     Maribel hs continued to use positional isometric contraction technique to the thoracic spine when she gets a pinch pain and it continues to reduce the pain.     Reviewed isolation of the pelvic floor.  She complained that she cannot feel if she is doing this correctly.  She is. It is much better than last visit as she is no longer posteriorly tilting her pelvis and is isolating the floor without holding her breath.   Progressed instruction to isolating the transverse abdominus. This is usually taught in an all 4s position but Maribel was not able to get into this position secondary to her knees.  Adapted this to be done at her counter at home, both knees slightly bend with lumbar spine in slight extension as she rests on her forearms on the counter.  She was able to isolate the transverse abdominus in this position.  Added bilateral stretching to both hip adductors in this position.    Reminded her of need to be attentive to lifting sternum to take stress off spine and the right rotator cuff.     Maribel is in need of review and correction of the inner unit for spinal stabilization, facilitating proximal stability in order to improve distal mobility.  She has been taught this before but she forgets when she gets home and is in need of correction and reminders.     Maribel can self manage her pain with all of the instruction that she has received here in physical therapy for about 3-4 weeks then she needs manual work to augment what she is doing at home to minimize her pain and keep it from exacerbating.  Thus she will be seen once every 3-4 weeks for progression of her home exercises, manual work and to meet the remaining goals.         Short Term Goals: (4-6 weeks)                                   Date Met:                1.   pt independent in postural correction         02/18/20  2.   pt independent in SI joint self-corrections        12/17/19  3.   pt  independent in exer to decrease post. disc pressures      12/17/19  4.   pt able to sleep through night without pain  5.   pt able to stand 10  minutes without pain        01/07/20  6.   Reduce pt pain to no greater than 4/10         01/07/20  7.   Decrease Oswestry Pain Score to no greater than 45/100      01/07/20  8.  Decrease Oswestry Pain Score to no greater than  40/100  9.  Pt able to perform hip abduction x6 w/o pain  10. Decrease pain right gr trochanter to no greater than 4/10      03/17/20  11. Pt able to engage inner unit in supine w/ legs on chair, moving in int/ext rotation w/o pain x6  12.  Pt able to contract pelvic floor on exhaling a diaphragmatic breath x6    07/02/20  13.  Pt able to isolate the transverse abdominus with forearms on counter and in slight lumbar ext x6    Long Term Goals:(3-5 months)      Date Met:      1.  pt independent in self-management of symptoms       2.  pt independent in home program      3. Reduce pain to no greater than 2/10      4.  Pt able to  puppies without pain.      Treatment Plan:   1.  Ultrasound to increase extensibility, decrease pain, if needed  2.  Electrical stimulation for muscle relaxation, decrease pain, if needed, iontophoresis  3.  Manual therapy to increase function, decrease pain  4.  Therapeutic exercise to increase function, decrease pain  5.  Home programming and d/c planning to increase function and decrease pain    Frequency & Duration:  Pt will be seen on a once/every 2-3 week basis for 6 more visits to meet remaining goals.     Patient Participated in and Agrees With This Plan of Care and Goals:  YES  Rehab Potential:  Fair to good      Karen Chatterjee, PT, MS  Physical Therapist  [place]# [id number]        Medicare Certification:  90 Day Recertification  Certification Period: 07/02/20 through 10/02/20  I certify that the therapy services are furnished while this patient is under my care.  The services outlined above are required by this patient,  and will be reviewed every 90  days.     PHYSICIAN:     DATE:     Please sign and return via fax to [facility] [address] Fax # [phone]. Thank you, [facility].        Below is the Physical Therapy Re-evaluation for your patient,                        .  If this meets with your approval please sign the Medicare Certification at the end of the evaluation and return to our office.  If you have any questions, please contact me.  Best Regards,  Karen Chatterjee, PT, MS        TREATMENT TODAY:    Total Treatment Time: (time in clinic)            60  Timed Code Treatment Minutes:   60      Manual Therapy:  (MA)  RX min:    35  Manual posterior rotation of left innominate    Positional Isometric contraction (PIC) of right iliopsoas    Positional Isometric contraction of (PIC) right gluteus minimus    Distraction of both SI jts in open pack hip position  Piriformis stretching, bilaterally    Quadratus lumborum stretching, bilaterally    Anterior/Inferior Mobilization of  left hip    Myofascial work trunk      Therapeutic Activities:  (TA)  RX min:    Neuromuscular Reeducation: (NMR)  RX min:  25    Ultrasound:  RX min:          1.6 piper/cm2       Location:     Iontophoresis:  6 hr patch                                Location:                               Procedures:      Key:  i=instructed        r=review        c=corrected        a=adapted   Code Procedure/Instruction 12/05/2019 12/17/19 01/07/20 02/18/20 03/17/20 06/11/20 07/02/20            TA         Sleeping Positions                reviewed reviewed                TA Sternum-Up Posture  reviewed corrected                TA SI jt. self-correction reviewed   reviewed reviewed              TA Lumbar Facet PIC reviewed   reviewed reviewed              TA   Order of Self-Corrections                   TA Use of lumbar pillow  reviewed                 TA Use of cervical roll                   TA Use of orthotics                    TA Use of SI belt                   TA Use of Nada chair                   TA Use of Ice  reviewed                 TA Use of [employer]/ heat  reviewed   Pain cakes              TA Use of TENS unit  In car, while driving                 TA Use of iontophoresis                   TA Decreasing Disc Pressures   reviewed                                                        NMR Diaphragmatic Breathing      instructed             NMR Pelvic Floor Isolation       review            NMR Transverse Abdominus       Instruct/ adapt            NMR Multifidus Isolation                   NMR InnerUnit against Gravity                   NMR Sit-stand w/ inner unit                   NMR Roll w/ inner unit                   NMR Walk w/ inner unit                    NMR Up/down steps w/ inner unit                   NMR Water Exer Instruction                   NMR Hip Abd w/o piriformis    instructed corrected corrected             NMR Hip int/ext rotation supine w/ feet up on chair     instructed corrected             NMR Diaphragmatic Breathing w/ pelvic floor      instructed review            NMR Pelvic floor in sitting on towel foll      instructed stopped            NMR Hip Add w/o iliop/ham                    NMR Lower abs in supine                   NMR Abd obliques in supine                   NMR Prone Knee Flexion                   NMR Prone glut valentino                   NMR Retro Step                   NMR Retro Walk                   NMR Retro walk on treadmill                   NMR Forward walk w/ inner unit                   NMR Balance Instruction                   NMR Stability Ball A/P & Lateral                   NMR Ball Catch/Throw /Supine                   NMR Ball Catch/Throw /Stand                   NMR StabilityBall All 4's Arm Lift                   NMR StabilityBall Prone Walk Out                   NMR StabilityBall Supine Oblique                   NMR Stability Ball sit-supine-sit                   NMR  Walking Prog Progression                   MNR Home program sequencing                                       TA Hamstring stretch                   TA Hip Ext Rot Stretch                   TA Hip Int Rot Stretch                   TA Hip Flex/IT Stretch                   TA Hip Add Stretch                   TA Lats Dorsi Stretch                   TA Gastroc/soleus stretch                   TA QuadratusLumborm Stretch                      Supine                      Stance                   TA Quad Stretch                                       NMR Wall Push Ups                   NMR Planking                   NMR BOSU Ball Exercises                   NMR Lateral Bridge Drop                   NMR Waiters [place]                   NMR O'Feldt Lat Pull Down                   NMR O'Feldt Hip Ext                   NMR O'Feldt Trunk Ext                                       TA CervDiscExtSup/stnd   reviewed                TA Cerv Stretches                   TA Self PIC Cervical Spine                   TA Neural Gliding                   TA Ant/Mid Scalene Strch                   TA Biceps stretch                   TA Rotator Cuff Stretch                   TA Anterior Chest Stretch                   TA Wrist Ext Stretch                   TA Lats Dorsi stretch    instructed               NMR Prone Arm Lifts                   NMR Scapula Stabilization                   NMR Occulomotor Isometrics                   NMR Cervical Isometrics                                       TA Shld AROM w/bar                   TA Shld Capsular Stretching                   TA Self PIC Thor Spine   reviewed  Adapt and corrected reviewed             TA Rot Cuff Therabd, beginner                   TA Rot Cuff Therabd, intermed                                                                                                                                                                                                                                                  Karen Chatterjee, PT, MS  Physical Therapist  [place]# [id number]

## 2020-07-13 ENCOUNTER — TRANSCRIBE ORDERS (OUTPATIENT)
Dept: PHYSICAL THERAPY | Facility: CLINIC | Age: 70
End: 2020-07-13

## 2020-07-13 DIAGNOSIS — M43.02 CERVICAL SPONDYLOLYSIS: ICD-10-CM

## 2020-07-13 DIAGNOSIS — M75.101 ROTATOR CUFF SYNDROME OF RIGHT SHOULDER: Primary | ICD-10-CM

## 2020-07-29 NOTE — PROGRESS NOTES
[facility] at [facility]  Office Progress Note  Barbara Hernandez  1950  [address] [facility] [address]       Visit Date: 07/30/20    PCP: Fiordaliza Suh MD  [address]  [facility] [address]    IDENTIFICATION: A 69 y.o. 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 Postprandial dizziness/lightheadedness.  2. History of cardiomyopathy:  a. Left heart catheterization, 06/06/2002:  i. Normal coronary artery anatomy.  b. LVEF 55% to 60%.   3. Palpitations:  Holter monitor, May 2002:  a. Sinus rhythm with rare PAC's, PVC's.  b. Short NM interval.  4. Dyslipidemia.  10/18 241/58/71/175  5. VHD  a. 9027-8151 multiple echo, muga   b. 5/16 SE per CAK EF 55% mild AI, mild diastolic dysfxn  6. 2019 viral illness NOS ? Parvovirus (multiorgan system- not hospitalized.)  7. Hypothyroidism.  8. Osteoarthritis.  9. Gastritis.  10. Irritable bowel syndrome/spastic colon.  11. Hay fever.  12. Perimenopause.  13. Herpes zoster, October 2008, (affecting left ear).  14. Lichen sclerosis (affecting vaginal tissue).  15. Fibromyalgia.  16. Surgeries:  a. Right hip replacement, 11/15/2010.  b. Septoplasty, March 2011.  c. Left hip replacement, November 2012.      CC:   Chief Complaint   Patient presents with   • Dizziness   • Shortness of Breath   • chronic fatigue   • Cardiomyopathy       Allergies  Allergies   Allergen Reactions   • Celecoxib    • Ciprofloxacin GI Intolerance   • Clarithromycin GI Intolerance   • Doxycycline GI Intolerance   • Erythromycin Nausea And Vomiting   • Levofloxacin GI Intolerance   • Lortab [Hydrocodone-Acetaminophen] GI Intolerance   • Lyrica [Pregabalin]    • Nexium [Esomeprazole] Myalgia   • Omeprazole Myalgia   • Phenergan [Promethazine Hcl] Delirium   • Tramadol    • Bacitracin-Polymyxin B Rash   • Sulfa Antibiotics Rash       Current Medications    Current Outpatient Medications:   •  acetaminophen (TYLENOL) 325 MG  tablet, Take 650 mg by mouth every night at bedtime., Disp: , Rfl:   •  B Complex Vitamins (VITAMIN B COMPLEX PO), Take 1 tablet by mouth Daily., Disp: , Rfl:   •  Cholecalciferol (VITAMIN D3) 3000 UNITS tablet, Take 1 tablet by mouth Daily., Disp: , Rfl:   •  Coenzyme Q10 (CO Q-10) 100 MG capsule, Take 400 mg by mouth Daily., Disp: , Rfl:   •  diclofenac (VOLTAREN) 1 % gel gel, Apply 1 application topically As Needed., Disp: , Rfl:   •  dicyclomine (BENTYL) 10 MG capsule, Take 1 capsule by mouth every 8 hours as needed for bowel spasm or diarrhea, Disp: 30 capsule, Rfl: 0  •  estradiol (VAGIFEM) 10 MCG tablet vaginal tablet, Insert 1 tablet into the vagina 3 (Three) Times a Week., Disp: 36 tablet, Rfl: 3  •  gabapentin (NEURONTIN) 100 MG capsule, Take 1 capsule by mouth every night at bedtime., Disp: 30 capsule, Rfl: 5  •  Lutein 20 MG tablet, Take 1 tablet by mouth Daily., Disp: , Rfl:   •  meloxicam (MOBIC) 7.5 MG tablet, TAKE 1 TABLET EVERY DAY AS NEEDED FOR PAIN AND INFLAMMATION, Disp: 30 tablet, Rfl: 3  •  Multiple Vitamins-Minerals (ICAPS AREDS 2) capsule, Take 1 capsule by mouth Daily., Disp: , Rfl:   •  Omega-3 Fatty Acids (FISH OIL) 1200 MG capsule capsule, Take 1,200 mg by mouth Daily., Disp: , Rfl:   •  SYNTHROID 88 MCG tablet, Take 1 tab po qam fasting and wait 1hr for food or other meds. BRAND ONLY., Disp: 90 tablet, Rfl: 3  •  vitamin E 400 UNIT capsule, Take 400 Units by mouth Daily., Disp: , Rfl:       History of Present Illness   Barbara Hernandez is a 69 y.o. year old female here for follow up.  She describes an illness this past summer where she had fever myalgias dehydration liver involvement and was treated with conservative management of hydration at home.  Ultimately her rheumatologist did a litany of serologies that there would raise concern about potential parvovirus sequelae.  She states she lost a total of 10 pounds and has been fatigued since that time.  She notes exercise intolerance she  "is able to garden when it is not inclement heat and has her typical baseline palpitations potentially more shortness of breath than usual          OBJECTIVE:  Vitals:    07/30/20 1523   BP: 124/64   BP Location: Left arm   Patient Position: Sitting   Pulse: 94   SpO2: 97%   Weight: 53.5 kg (118 lb)   Height: 157.5 cm (62\")     Physical Exam   Constitutional: She appears well-developed and well-nourished.   Neck: Normal range of motion. Neck supple. No hepatojugular reflux and no JVD present. Carotid bruit is not present. No tracheal deviation present. No thyromegaly present.   Cardiovascular: Normal rate, regular rhythm, S1 normal, S2 normal, intact distal pulses and normal pulses. PMI is not displaced. Exam reveals no gallop, no distant heart sounds, no friction rub, no midsystolic click and no opening snap.   No murmur heard.  Pulses:       Radial pulses are 2+ on the right side, and 2+ on the left side.        Dorsalis pedis pulses are 2+ on the right side, and 2+ on the left side.        Posterior tibial pulses are 2+ on the right side, and 2+ on the left side.   Pulmonary/Chest: Effort normal and breath sounds normal. She has no wheezes. She has no rales.   Abdominal: Soft. Bowel sounds are normal. She exhibits no mass. There is no tenderness. There is no guarding.       Diagnostic Data:  Procedures      ASSESSMENT:   Diagnosis Plan   1. Chest pain on breathing     2. Palpitations     3. Mixed hyperlipidemia         PLAN:  Atypical chest pain with historical normal coronaries we will document echocardiogram to assess for LV dysfunction potentially viral cardiomyopathy      Palpitations continued observation    Dyslipidemia historically intolerant to statin therapy patient for upcoming serologies would consider calcium scoring coronary scan and discuss PCSK9 inhibitor    Fiordaliza Suh MD, thank you for referring Ms. Hernandez for evaluation.  I have forwarded my electronically generated recommendations to you " for review.  Please do not hesitate to call with any questions.      Jonathon Bermudez MD, FACC

## 2020-07-30 ENCOUNTER — OFFICE VISIT (OUTPATIENT)
Dept: CARDIOLOGY | Facility: CLINIC | Age: 70
End: 2020-07-30

## 2020-07-30 VITALS
SYSTOLIC BLOOD PRESSURE: 124 MMHG | OXYGEN SATURATION: 97 % | DIASTOLIC BLOOD PRESSURE: 64 MMHG | BODY MASS INDEX: 21.71 KG/M2 | WEIGHT: 118 LBS | HEIGHT: 62 IN | HEART RATE: 94 BPM

## 2020-07-30 DIAGNOSIS — R07.1 CHEST PAIN ON BREATHING: Primary | ICD-10-CM

## 2020-07-30 DIAGNOSIS — R06.09 DOE (DYSPNEA ON EXERTION): ICD-10-CM

## 2020-07-30 DIAGNOSIS — E78.2 MIXED HYPERLIPIDEMIA: ICD-10-CM

## 2020-07-30 DIAGNOSIS — R00.2 PALPITATIONS: ICD-10-CM

## 2020-07-30 PROCEDURE — 99214 OFFICE O/P EST MOD 30 MIN: CPT | Performed by: INTERNAL MEDICINE

## 2020-08-11 ENCOUNTER — TREATMENT (OUTPATIENT)
Dept: PHYSICAL THERAPY | Facility: CLINIC | Age: 70
End: 2020-08-11

## 2020-08-11 DIAGNOSIS — M54.2 CERVICAL PAIN: ICD-10-CM

## 2020-08-11 DIAGNOSIS — M25.511 RIGHT SHOULDER PAIN, UNSPECIFIED CHRONICITY: Primary | ICD-10-CM

## 2020-08-11 PROCEDURE — 97162 PT EVAL MOD COMPLEX 30 MIN: CPT | Performed by: PHYSICAL THERAPIST

## 2020-08-11 PROCEDURE — 97035 APP MDLTY 1+ULTRASOUND EA 15: CPT | Performed by: PHYSICAL THERAPIST

## 2020-08-11 NOTE — PROGRESS NOTES
Physical Therapy Initial Evaluation and Plan of Care        Patient: Barbara Hernandez   : 1950  Diagnosis/ICD-10 Code:  Right shoulder pain, unspecified chronicity [M25.511]  Referring practitioner: Willam Valle MD  Date of Initial Visit: 2020  Today's Date: 2020  Patient seen for 1 sessions           Subjective Evaluation    History of Present Illness  Mechanism of injury: 2019 worked in yard, that evening putting on closes, could not get arm down due to a catch/pain. MRI showed a severe inflammation. Came under the care of Dr Ruff. Received an injection, not helpful.  Went to PT at [facility]. Went to [facility], to aggressive flared up Fibromylagia.  Stopped PT. Went to Dr Felix for neck. Recommended shoulder arthroplasty.  Using Voltaren gel and ice and NSAIDS. Eventually came care of Dr. Valle, received an injections, which were helpful.    Pain  Location: Intermittent right posterior scapula pain, right anterior GH joint pain.  Pain in the scapula is an achy/throbbing pain that radiates up to the neck.  The right arnterior GH joint sondra radiate to lateral elbow.  Aggravating factors: outstretched reach, overhead activity and lifting (Reaching behind her, right side lying for prolong perior of time.)  Progression: worsening    Hand dominance: right             Objective          Static Posture     Head  Forward.    Shoulders  Rounded.    Thoracic Spine  Hyperkyphosis.    Palpation     Right   No palpable tenderness to the rhomboids.   Hypertonic in the infraspinatus. Tenderness of the infraspinatus and upper trapezius.   Trigger point to upper trapezius.     Tenderness     Right Shoulder  Tenderness in the biceps tendon (proximal) and bicipital groove. No tenderness in the AC joint, subacromial bursa and supraspinatus tendon.     Neurological Testing     Reflexes   Left   Biceps (C5/C6): normal (2+)  Brachioradialis (C6): normal (2+)  Triceps (C7): normal (2+)    Right   Biceps  (C5/C6): normal (2+)  Brachioradialis (C6): normal (2+)  Triceps (C7): normal (2+)    Active Range of Motion   Cervical/Thoracic Spine   Cervical    Flexion: 65 degrees   Extension: 48 degrees   Left lateral flexion: 10 degrees   Right lateral flexion: 20 degrees   Left rotation: 56 degrees   Right rotation: 42 degrees     Right Shoulder   Flexion: 152 degrees with pain  Extension: 47 degrees with pain  Abduction: 160 degrees with pain  External rotation 90°: 75 degreeswith pain  Internal rotation 90°: 45 degrees with pain    Strength/Myotome Testing     Left Shoulder     Isolated Muscles   Lower trapezius: 4-   Middle trapezius: 4   Rhomboids: 4     Right Shoulder     Planes of Motion   Flexion: 4+   Extension: 5   Abduction: 4+   External rotation at 0°: 4   Internal rotation at 0°: 5     Isolated Muscles   Biceps: 5   Lower trapezius: 4-   Middle deltoid: 4   Rhomboids: 4   Triceps: 5     Tests     Right Shoulder   Positive empty can, Hawkin's and Neer's.   Negative anterior load and shift, clunk, drop arm and Speed's.           Assessment & Plan     Assessment  Impairments: abnormal or restricted ROM, activity intolerance, impaired physical strength and pain with function  Other impairment: QUICK DASH 40  Assessment details: 69 year old right hand female with chronic right shoulder pain.  She signs and symptoms bicipital tendinitis and subacromial impingement. Possible RTC tendon irritation.  Weak scapular stabilizers. Postural dysfunction may be contributing to the impingement issue. History of cervical derangement, may be contributing to her discomfort as well. Neurological exam is normal.  Prognosis: good  Functional Limitations: lifting, uncomfortable because of pain, reaching behind back and reaching overhead  Goals  Plan Goals: STG to be met in 4 weeks  1.  Pt is able sleep through the night without being awaken by shoulder pain.  2.  Pt is able to obtain 165 degrees of shoulder flexion.  3.  Pt has  improved thoracic spine extension to improve shoulder girdle posture.  4.  Pt function improves so that Quick Dash score improves to 30.  LTG to be met in 12 weeks  1.  Pt is independent with HEP.  2.  Pt has resolution of right anterior shoulder pain so that she can lift overhead without pain.  3.  Pt right UE function improves so that Quick Dash improves 20.    Plan  Therapy options: will be seen for skilled physical therapy services  Planned modality interventions: ultrasound, high voltage pulsed current (pain management) and cryotherapy  Planned therapy interventions: body mechanics training, abdominal trunk stabilization, home exercise program, functional ROM exercises, postural training, manual therapy, neuromuscular re-education, soft tissue mobilization, spinal/joint mobilization, strengthening, stretching and therapeutic activities  Frequency: 2x week  Duration in weeks: 12          Ultrasound:     15     mins  [id number];      Timed Treatment:   15   mins   Total Treatment:     50   mins    PT SIGNATURE: [facility]   DATE TREATMENT INITIATED: 8/11/2020    Initial Certification  Certification Period: 11/9/2020  I certify that the therapy services are furnished while this patient is under my care.  The services outlined above are required by this patient, and will be reviewed every 90 days.     PHYSICIAN: Willam Valle MD      DATE:     Please sign and return via fax to [phone].. Thank you, [facility].

## 2020-08-13 ENCOUNTER — TREATMENT (OUTPATIENT)
Dept: PHYSICAL THERAPY | Facility: CLINIC | Age: 70
End: 2020-08-13

## 2020-08-13 DIAGNOSIS — M53.3 SACROILIAC JOINT DYSFUNCTION: Primary | ICD-10-CM

## 2020-08-13 PROCEDURE — 97140 MANUAL THERAPY 1/> REGIONS: CPT | Performed by: PHYSICAL THERAPIST

## 2020-08-13 PROCEDURE — A9999 DME SUPPLY OR ACCESSORY, NOS: HCPCS | Performed by: PHYSICAL THERAPIST

## 2020-08-13 PROCEDURE — 97530 THERAPEUTIC ACTIVITIES: CPT | Performed by: PHYSICAL THERAPIST

## 2020-08-13 PROCEDURE — 97035 APP MDLTY 1+ULTRASOUND EA 15: CPT | Performed by: PHYSICAL THERAPIST

## 2020-08-13 NOTE — PROGRESS NOTES
" Physical Therapy Note  SUBJECTIVE:  Changes since last seen:   Maribel has been dealing with cardiology issues since last seen.  She has bee fatigued for a year.  Cardiologist told her may be cardio myopathy.  She is scheduled for an echocardiogram next week.   She notes she's had chronic fatigue from last August to Jan 2020 Started to feel a little better but hasn't bounced back to where she was and now gets out of breath easily.    Her back \"is about the same, good and bad days.\"   She had found she can't sleep on her side with the neck pillow she has been using, she realizes now it needs it to be higher.   Still has days when sore across her neck and both shoulders; more shoulder pain now than neck pain, she is seeing Declan Diaz, PT, CHT for this.      She did get Theraworx Relief foam for muscle cramping and \"it helps!\"  She expresses frustration as \"I can't walk like I used to around the blok[place] because I get winded; I do better walking with a grocery cart.       Current level of pain:   Right buttock/SI jt 0/10  Left SI joint  0/10  Lumbar spine 0/10   Right Gr troch 3/10 all time  Neck 0/10     Lowest level of pain since last seen:    0/10   0/10    0/10         0/10              Highest level of pain since last seen:     4/10    0/10     4/10 in am and pm        3/10              Past Medical History:  1.  Cervical DDD  2.  Hx of Bilateral THR, left 2012, right 2010  3.  Septoplasty, 2011  4.  Hx of SI joint dysfunction  5.  Fibromyalgia  6.  Hx of hypothyroidism  7.  Hx of gastritis  8.  Irritable bowel syndrome/spastic colon  9.  Osteoarthritis  10.  Hx of 4 pregnancies, 2 normal vaginal deliveries  11. Hx of Dequervains syndrome  12.  Hx of fx left sesmoid  13.   Hx of positional vertigo  14. Osteopenia, diagnosed May 2014  15  Hx of migraine headaches  16.  TMJ dysfunction, 2015  17.  Hx of Cardiomyopathy/ left catheterization 2002  18.  Dyslipidemia  19.  Hx of neuroma pain left foot  20.  Hx of anxiety/ " depression    Functional Limitations:  Standing, sleeping,   Patient Goals for Physical Therapy: decreased pain      OBJECTIVE:     12/05/2019 12/17/19 01/07/20 02/18/20 03/17/20 06/11/20 07/02/20 08/13/20             SI Joint Positioning  Right  Left Right  Left Right  Left Right  Left Right  Left Right  Left Right  Left Right  Left Right  Left Right  Left Right  Left Right  Left Right  Left Right Left Right Left Right Left Right  Left Right  Left       Innominate                            Anterior               x              x              x                x               x              x              sl   Sl                     Posterior    x   x    x   x    x    x  sl               Sll                Sacrum                               Unilateral rotation    x   x   x   x   x    x   sl   x                                     SI Joint Testing  Right  Left Right  Left Right  Left Right  Left Right  Left Right  Left Right  Left Right  Left Right  Left Right  Left Right  Left Right  Left Right  Left Right Left Right Left Right Left Right  Left Right  Left           Distraction   +         +   +        +   +         +   +          +   +            +   +          +   +          +   +         +                     Beatriz's    -        -                            Compression   -         -                            Prone Press Up   -            -                                         Special Tests  Right   Left Right  Left Right  Left Right  Left Right  Left Right  Left Right Left  Right  Left Right  Left Right  Left Right  Left Right  Left Right  Left Right Left Right Left Right Left Right Left  Right  Left      Straight Leg Raise                                     Active   -          -  -       -        -         -   -           -   -           -                     Passive    -           -   -          -           -           -   -        -   -        -                 Hip Scour Test                                                                       Palpation:      Muscle/Bone Irritation  Date 12/05/2019 12/17/19 01/07/20 02/18/20 03/17/20 06/11/20 07/02/20 08/13/20              Right  Left Right  Left Right  Left Right  Left Right  Left Right  Left Right  Left Right  Left Right  Left Right  Left Right  Left Right  Left Right  Left Right Left Right Left Right Left Right  Left Right  Left   SI joint  +        ++   +          +    +         sl   +         Sl     +          +   +         +    +           +   +         Sl              Piriformis   Sl        +    ++        +     +        sl     +       sl     +          +   +        sl   +        +              Gr. Trochanter  ++        +   +          +     Sl      Sl     ++       sl   ++         +   +       +   +          +              IT Band  sl        sl   +         sl   Sl          -   Sl        -   Sl        Sl    -           -   -           -              Quadratus Lumborum  ++        +    +         sl   Sl         sl   -            -   -           -   -            -   -         -              Ischial Tuberosity  -        -   -          -   -          -   -            -   -          -    -            -   -           -              Sacrococcygeal Ligs  -        -   -           -   -             -   -            -   -           -   -           -   -            -              Paraspinals  +        -   Sl           -   -          sl   -            -   +         -   +          sl   Sl          +              Spinous Processes                                        C-spine rotated to               C2-6           C2-5                        T-spine rotated to   T3-5                     T4-6  T6-8 T3-4 T3-4    T4-5 T3-6                      L-spine rotated to  L5           L5           L4-5   -          - L3-5 L4-5            L4-5              Adductor Fabio  -        -   -           -   -           sl   -           -   -        -   -          -   Sl         Sl                Iliopsoas  +        +   Sl          sl   Sl           sl   Sl         -   Sl       -   +          +   +         +              Quad Origin  -        -    -          -    -            -   -           -   -           -   -            -              Pubic Symphysis         +                   Sl       sl        -           sl              Bakers cyst        -         sl    -         +   -          sl   -            -              Pes Anserine Bursa      +          -   +         ++   Sl         +   -sl        -              Rectus Diastasis   2 finger                                           All 4s Positioning: Pt not able to assume full lumbar extension in this position      Monthly Objective Measurement/Functional Activity  Date: 12/05/2019 01/07/20 02/18/20 03/17/20 06/11/20 07/02/20 08/13/20            Oswestry Pain Score 52/100    45/100    44/100 44/100     44/100   44/100    44/100            LE Functional Scale                   Neck Disability Index      32/100      42/100                                 AROM Lumbar Spine: Degrees   Degrees      Degrees      Degrees      Degrees    Degrees Degrees Degrees Degrees Degrees Degrees Degrees Degrees Degrees Degrees    Degrees      Flexion 114           97      112      112    110     89    95               Extension 23 pain on right            27      36      29      20     30 pain right scapula    27 pain right SI and Scap               Right Lat. Flexion 33           34      27 pain right SI        31       31    28  25               Left Lat. Flexion 25           27      28       24        22     25    26               Right Lat Shift Pelvis No change No change  No change No change  no change No cahnge No change                Left Lat Shift Pelvis No change pulls on right  Tightness No pain  No change  no change  pain right buttock  tight                               AROM Cervical Spine  Degrees Degrees Degrees Degrees Degrees Degrees Degrees Degrees Degrees  Degrees Degrees Derees Degrees Degrees Degrees     flexion    45 pain        45        59       45 pain      45         58              extension    51 dizzy        50        62       58       60         72              Right lateral flex    56 pain r upper trap         55        47       47 pain       49        54              Left lateral flex  57 pain right elbow        58        40      39      43       48              Right rotation          44         45       46     42 pain       40     45              Left rotation           40          40       41     40      37       40                               AROM Hips:  (degrees) Right      Left Right Left Right  Left Right  Left Right  Left Right  Left Right Left Right Left Right Left Right Left Right  Left Right Left Right Left Right Left Right Left Right  Left      Flexion 95       98  98        100  100      95  98        105   95       100 96       98  98       95               Abduction 42       45  45         45   45         45  42        46  40          45 40       40 38         39               Int. Rotation 25       15  25         28  25          25   26       28    25        30   25      32   22        30               Ext. Rotation  30        40              32        40   31       46     45       46  45       47  42        45                               Flexibility:   (degrees) Right    Left Right  Left Right  Left Right  Left Right  Left Right  Left Right Left Right Left Right Left Right Left Right  Left Right Left Right Left Right Left Right Left Right  Left      Hamstrings -47       -49   -45   -50    -45      -48   -43     -42   -42      -41  -45      -48  _46      -48               Quadriceps 45       40  48       45     55      54   58       60    65        67  63        65  60        63               HipExt/Rot(piriformis) 42       43    45       45   45    48   43       46    45        45  45        40  42        40               Hip  Int/Rotators 38       37   37        35   35      35   32        36    25         30  30        35  32        37               Hip Flexors (iliopsoas) Not tested         Not tested Not tested Not tested Not tested Not tested Not tested               IT Band Not tested          not tested  not tested  not tested  not tested  not tested  not tested                                                  Root Level  - Motor Right      Left Right  Left Right  Left Right  Left Right  Left Right  Left Right Left Right Left Right Left Right Left Right  Left Right Left Right Left Right Left Right Left Right  Left     T12             Rectus Abdominus 4+/5        4+/5         4+/5      4+/5       4+/5    4 to 4+/5    4 to 4+/5               L1              Paraspinals 5/5      5/5   5/5       5/5  5/5        5/5  5/5        5/5   5/5       5/5  5/5        5/5 5/5        5/5               L2              Hip Flexion 4+/5      4+/5  4+/5    4+/5  5/5        5/5  55        5/5   5/5       5/5   5/5        5/5 5/5        5/5               L3             Quads 5/5      5/5  5/5       5/5   5/5        5/5  5/5        5/5   5/5       5/5   5/5        5/5 5/5        5/5               L4              Anterior Tibialis 5/5      5/5  5/5       5/5  5/5        5/5  5/5        5/5   5/5       5/5   5/5        5/5 5/5        5/5               L5             Gr. Toe Extension 5/5      5/5  5/5       5/5   5/5        5/5 5/5         5/5   5/5       5/5   5/5        5/5 5/5        5/5               S1            Gastroc/Sol/Peroneals 4+/5    4+/5   4+/5    4+/5  4+/5    4+/5  4+/5     4+/5   4+/5   4+/5 4+/5      4+/5 5/5        5/5                               Functional Strength:                     Single LegStanceTime (seconds) R:24   L:25   R:30  L:27 R:30   L:30 R:     L: R:      L: R:22    L:15  R: 30 L:30   R:      L:   R:      L:   R:      L: R:      L:   R:      L:   R:      L:    R:      L:    R:      L: R:      L:     Pelvic Floor  Isolation (seconds) 10 sec  10 sec                    Inner Unit  Isolation (seconds) 10 sec  10 sec                                                       Functional Activities:                      Sit w/o pain? Pain increases (minutes) yes   Yes/ 30-60 min  yes/ 30-60 Yes/ 20 min  Yes/ 30-60 min Yes/ 30-60 Yes/ 30 min                Stand w/o pain? 20 min Yes/ 30 min Yes/ 30 min Yes/ 30 min Yes/ 30 min Yes/ 30 min Yes/ 15 min                Walk w/o pain? 30 min   yes/ 1/4 mile Yes/ 30 min Yes/ 45 min  Yes/ 1 mile Yes/ 30 min  Yes/ 30 min                Steps w/o pain? Yes, ignores pain  Yes/ ignores pain Yes/ no limit Yes/ no limit Yes/ no limit Yes/ no limit No/ worst getting winded                Drive w/o pain? Uncomfortable, 60 min  Yes/ w/ supports and TENS 2 hrs Yes/ 60 min  Yes/ 60 min  Yes/ 1-2 hrs Yes/ 3 hrs Yes/ 1 hr                Work w/o pain? n/a   n/a     n/a    n/a   n/a               # times wakes w/ pain? Sleeping on couch b/c or right shld pain, since July Continues to sleep on couch  Continues to sleep on the couch  2x from right shoulder and neck, she is still sleeping on couch  1-2x   1-2x   3-4x , some pain and some not sure                 PLAN OF CARE:    ASSESSMENT:  Problem List:   1. Flare up of SI joint dysfunction  2. Decreased spinal stabilization  3. Postural dysfunction     Discussion:    Recommended Maribel try Theraworx Relief foam for Joints as it is over the counter and tolerated well.        Re-Instructed how to sleep in side lying with adapted pillow height and a cervical roll using a MY Pillow.  She was issued a cervical roll. Maribel was surprised at how much better this felt on her neck.  Also instructed in the use of a body pillow and the adapted height and need to keep her feet on the pillow and hugging the top of the body pillow.  She will purchase the My Pillow and a body pillow and work on positioning as instructed her today.     She continues to have trigger point pain in  the right rhomboids and upper trap, thus used ultrasound on this following manual work on the trunk and low back.     Maribel is about the same as last visit with her Oswestry Pain Score the same at 44/100.  Her single leg stance time improved, bilaterally. There is slight increase in lumbar flexion and cervical range.  She has a thorough home program to maintain this.     Maribel feels fairly confident self managing her low back pain with all of the instruction that she has received here in physical therapy for 4-6 weeks then she senses she needs manual work to augment what she is doing at home to minimize her pain and keep it from exacerbating.  She requests to be seen once every 4-6 weeks for progression of her home exercises, manual work and to meet the remaining goals.         Short Term Goals: (4-6 weeks)                                   Date Met:                1.   pt independent in postural correction         02/18/20  2.   pt independent in SI joint self-corrections        12/17/19  3.   pt independent in exer to decrease post. disc pressures      12/17/19  4.   pt able to sleep through night without pain  5.   pt able to stand 10  minutes without pain        01/07/20  6.   Reduce pt pain to no greater than 4/10         01/07/20  7.   Decrease Oswestry Pain Score to no greater than 45/100      01/07/20  8.  Decrease Oswestry Pain Score to no greater than  40/100  9.  Pt able to perform hip abduction x6 w/o pain  10. Decrease pain right gr trochanter to no greater than 4/10      03/17/20  11. Pt able to engage inner unit in supine w/ legs on chair, moving in int/ext rotation w/o pain x6  12.  Pt able to contract pelvic floor on exhaling a diaphragmatic breath x6    07/02/20  13.  Pt able to isolate the transverse abdominus with forearms on counter and in slight lumbar ext x6 08/1`3/20    Long Term Goals:(3-5 months)      Date Met:      1.  pt independent in self-management of symptoms       2.  pt independent in  home program      3. Reduce pain to no greater than 2/10      4.  Pt able to  puppies without pain.      Treatment Plan:   1.  Ultrasound to increase extensibility, decrease pain, if needed  2.  Electrical stimulation for muscle relaxation, decrease pain, if needed, iontophoresis  3.  Manual therapy to increase function, decrease pain  4.  Therapeutic exercise to increase function, decrease pain  5.  Home programming and d/c planning to increase function and decrease pain    Frequency & Duration:  Pt will be seen on a once/every 2-3 week basis for 5 more visits to meet remaining goals.     Patient Participated in and Agrees With This Plan of Care and Goals:  YES  Rehab Potential:  Fair to good      Karen Chatterjee, PT, MS  Physical Therapist  [place]# [id number]        Medicare Certification:  90 Day Recertification  Certification Period: 07/02/20 through 10/02/20      TREATMENT TODAY:    Total Treatment Time: (time in clinic)            60  Timed Code Treatment Minutes:   60      Supplies:  Cervical roll    Manual Therapy:  (MA)  RX min:    25  Manual posterior rotation of left innominate    Positional Isometric contraction (PIC) of right iliopsoas    Positional Isometric contraction of (PIC) right gluteus minimus    Distraction of both SI jts in open pack hip position  Piriformis stretching, bilaterally    Quadratus lumborum stretching, bilaterally    Anterior/Inferior Mobilization of  left hip    Myofascial work trunk      Therapeutic Activities:  (TA)  RX min:  15  Neuromuscular Reeducation: (NMR)  RX min:      Ultrasound:  RX min:          1.6 piper/cm2       Location: 20    Iontophoresis:  6 hr patch                                Location:                               Procedures:      Key:  i=instructed        r=review        c=corrected        a=adapted   Code Procedure/Instruction 12/05/2019 12/17/19 01/07/20 02/18/20 03/17/20 06/11/20 07/02/20 08/13/20           TA         Sleeping Positions                 reviewed reviewed     corrected           TA Sternum-Up Posture  reviewed corrected                TA SI jt. self-correction reviewed   reviewed reviewed              TA Lumbar Facet PIC reviewed   reviewed reviewed              TA   Order of Self-Corrections                   TA Use of lumbar pillow  reviewed                 TA Use of cervical roll        instructed           TA Use of orthotics                   TA Use of SI belt                   TA Use of Nada chair                   TA Use of Ice  reviewed                 TA Use of [employer]/ heat  reviewed   Pain cakes              TA Use of TENS unit  In car, while driving                 TA Use of iontophoresis                   TA Decreasing Disc Pressures   reviewed                                                        NMR Diaphragmatic Breathing      instructed             NMR Pelvic Floor Isolation       review            NMR Transverse Abdominus       Instruct/ adapt            NMR Multifidus Isolation                   NMR InnerUnit against Gravity                   NMR Sit-stand w/ inner unit                   NMR Roll w/ inner unit                   NMR Walk w/ inner unit                    NMR Up/down steps w/ inner unit                   NMR Water Exer Instruction                   NMR Hip Abd w/o piriformis    instructed corrected corrected             NMR Hip int/ext rotation supine w/ feet up on chair     instructed corrected             NMR Diaphragmatic Breathing w/ pelvic floor      instructed review            NMR Pelvic floor in sitting on towel foll      instructed stopped            NMR Hip Add w/o iliop/ham                    NMR Lower abs in supine                   NMR Abd obliques in supine                   NMR Prone Knee Flexion                   NMR Prone glut valentino                   NMR Retro Step                   NMR Retro Walk                   NMR Retro walk on treadmill                   NMR Forward walk w/ inner unit                    NMR Balance Instruction                   NMR Stability Ball A/P & Lateral                   NMR Ball Catch/Throw /Supine                   NMR Ball Catch/Throw /Stand                   NMR StabilityBall All 4's Arm Lift                   NMR StabilityBall Prone Walk Out                   NMR StabilityBall Supine Oblique                   NMR Stability Ball sit-supine-sit                   NMR Walking Prog Progression                   MNR Home program sequencing                                       TA Hamstring stretch                   TA Hip Ext Rot Stretch                   TA Hip Int Rot Stretch                   TA Hip Flex/IT Stretch                   TA Hip Add Stretch                   TA Lats Dorsi Stretch                   TA Gastroc/soleus stretch                   TA QuadratusLumborm Stretch                      Supine                      Stance                   TA Quad Stretch                                       NMR Wall Push Ups                   NMR Planking                   NMR BOSU Ball Exercises                   NMR Lateral Bridge Drop                   NMR Waiters Lawler                   NMR O'Feldt Lat Pull Down                   NMR O'Feldt Hip Ext                   NMR O'Feldt Trunk Ext                                       TA CervDiscExtSup/stnd   reviewed                TA Cerv Stretches                   TA Self PIC Cervical Spine                   TA Neural Gliding                   TA Ant/Mid Scalene Strch                   TA Biceps stretch                   TA Rotator Cuff Stretch                   TA Anterior Chest Stretch                   TA Wrist Ext Stretch                   TA Lats Dorsi stretch    instructed               NMR Prone Arm Lifts                   NMR Scapula Stabilization                   NMR Occulomotor Isometrics                   NMR Cervical Isometrics                                       TA Shld AROM w/bar                   TA Shld Capsular  Stretching                   TA Self PIC Thor Spine   reviewed  Adapt and corrected reviewed             TA Rot Cuff Therabd, beginner                   TA Rot Cuff Therabd, intermed                                                                                                                                                                                                                                                 Karen Chatterjee, PT, MS  Physical Therapist  [place]# [id number]

## 2020-08-21 ENCOUNTER — HOSPITAL ENCOUNTER (OUTPATIENT)
Dept: CARDIOLOGY | Facility: HOSPITAL | Age: 70
Discharge: HOME | End: 2020-08-21
Admitting: INTERNAL MEDICINE

## 2020-08-21 DIAGNOSIS — R06.09 DOE (DYSPNEA ON EXERTION): ICD-10-CM

## 2020-08-21 LAB
BH CV ECHO MEAS - AO MAX PG (FULL): 3.3 MMHG
BH CV ECHO MEAS - AO MAX PG: 5 MMHG
BH CV ECHO MEAS - AO MEAN PG (FULL): 3 MMHG
BH CV ECHO MEAS - AO MEAN PG: 4 MMHG
BH CV ECHO MEAS - AO ROOT AREA (BSA CORRECTED): 1.8
BH CV ECHO MEAS - AO ROOT AREA: 5.7 CM^2
BH CV ECHO MEAS - AO ROOT DIAM: 2.7 CM
BH CV ECHO MEAS - AO V2 MAX: 117 CM/SEC
BH CV ECHO MEAS - AO V2 MEAN: 90.5 CM/SEC
BH CV ECHO MEAS - AO V2 VTI: 31.2 CM
BH CV ECHO MEAS - ASC AORTA: 2.8 CM
BH CV ECHO MEAS - AVA(I,A): 1.6 CM^2
BH CV ECHO MEAS - AVA(I,D): 1.6 CM^2
BH CV ECHO MEAS - AVA(V,A): 1.6 CM^2
BH CV ECHO MEAS - AVA(V,D): 1.6 CM^2
BH CV ECHO MEAS - BSA(HAYCOCK): 1.5 M^2
BH CV ECHO MEAS - BSA: 1.5 M^2
BH CV ECHO MEAS - BZI_BMI: 21.6 KILOGRAMS/M^2
BH CV ECHO MEAS - BZI_METRIC_HEIGHT: 157.5 CM
BH CV ECHO MEAS - BZI_METRIC_WEIGHT: 53.5 KG
BH CV ECHO MEAS - EDV(CUBED): 88.7 ML
BH CV ECHO MEAS - EDV(MOD-SP2): 67 ML
BH CV ECHO MEAS - EDV(MOD-SP4): 80 ML
BH CV ECHO MEAS - EDV(TEICH): 90.5 ML
BH CV ECHO MEAS - EF(CUBED): 57 %
BH CV ECHO MEAS - EF(MOD-BP): 54 %
BH CV ECHO MEAS - EF(MOD-SP2): 52.2 %
BH CV ECHO MEAS - EF(MOD-SP4): 57.5 %
BH CV ECHO MEAS - EF(TEICH): 48.8 %
BH CV ECHO MEAS - ESV(CUBED): 38.2 ML
BH CV ECHO MEAS - ESV(MOD-SP2): 32 ML
BH CV ECHO MEAS - ESV(MOD-SP4): 34 ML
BH CV ECHO MEAS - ESV(TEICH): 46.3 ML
BH CV ECHO MEAS - FS: 24.5 %
BH CV ECHO MEAS - IVS/LVPW: 0.92
BH CV ECHO MEAS - IVSD: 0.88 CM
BH CV ECHO MEAS - LA DIMENSION: 3 CM
BH CV ECHO MEAS - LA/AO: 1.1
BH CV ECHO MEAS - LAD MAJOR: 4 CM
BH CV ECHO MEAS - LAT PEAK E' VEL: 8.6 CM/SEC
BH CV ECHO MEAS - LATERAL E/E' RATIO: 10.5
BH CV ECHO MEAS - LV DIASTOLIC VOL/BSA (35-75): 52.4 ML/M^2
BH CV ECHO MEAS - LV IVRT: 0.07 SEC
BH CV ECHO MEAS - LV MASS(C)D: 135.4 GRAMS
BH CV ECHO MEAS - LV MASS(C)DI: 88.6 GRAMS/M^2
BH CV ECHO MEAS - LV MAX PG: 1.7 MMHG
BH CV ECHO MEAS - LV MEAN PG: 1 MMHG
BH CV ECHO MEAS - LV SYSTOLIC VOL/BSA (12-30): 22.3 ML/M^2
BH CV ECHO MEAS - LV V1 MAX: 65.4 CM/SEC
BH CV ECHO MEAS - LV V1 MEAN: 46.4 CM/SEC
BH CV ECHO MEAS - LV V1 VTI: 18 CM
BH CV ECHO MEAS - LVIDD: 4.5 CM
BH CV ECHO MEAS - LVIDS: 3.4 CM
BH CV ECHO MEAS - LVLD AP2: 6.6 CM
BH CV ECHO MEAS - LVLD AP4: 6.9 CM
BH CV ECHO MEAS - LVLS AP2: 5.3 CM
BH CV ECHO MEAS - LVLS AP4: 5.8 CM
BH CV ECHO MEAS - LVOT AREA (M): 2.8 CM^2
BH CV ECHO MEAS - LVOT AREA: 2.8 CM^2
BH CV ECHO MEAS - LVOT DIAM: 1.9 CM
BH CV ECHO MEAS - LVPWD: 0.96 CM
BH CV ECHO MEAS - MED PEAK E' VEL: 6.1 CM/SEC
BH CV ECHO MEAS - MEDIAL E/E' RATIO: 14.6
BH CV ECHO MEAS - MV A MAX VEL: 83.9 CM/SEC
BH CV ECHO MEAS - MV DEC TIME: 0.14 SEC
BH CV ECHO MEAS - MV E MAX VEL: 89.8 CM/SEC
BH CV ECHO MEAS - MV E/A: 1.1
BH CV ECHO MEAS - PA ACC SLOPE: 370 CM/SEC^2
BH CV ECHO MEAS - PA ACC TIME: 0.17 SEC
BH CV ECHO MEAS - PA MAX PG: 3.2 MMHG
BH CV ECHO MEAS - PA PR(ACCEL): 4.8 MMHG
BH CV ECHO MEAS - PA V2 MAX: 88.5 CM/SEC
BH CV ECHO MEAS - RAP SYSTOLE: 3 MMHG
BH CV ECHO MEAS - RVDD: 1.6 CM
BH CV ECHO MEAS - RVSP: 23 MMHG
BH CV ECHO MEAS - SI(AO): 116.9 ML/M^2
BH CV ECHO MEAS - SI(CUBED): 33.1 ML/M^2
BH CV ECHO MEAS - SI(LVOT): 33.4 ML/M^2
BH CV ECHO MEAS - SI(MOD-SP2): 22.9 ML/M^2
BH CV ECHO MEAS - SI(MOD-SP4): 30.1 ML/M^2
BH CV ECHO MEAS - SI(TEICH): 28.9 ML/M^2
BH CV ECHO MEAS - SV(AO): 178.6 ML
BH CV ECHO MEAS - SV(CUBED): 50.6 ML
BH CV ECHO MEAS - SV(LVOT): 51 ML
BH CV ECHO MEAS - SV(MOD-SP2): 35 ML
BH CV ECHO MEAS - SV(MOD-SP4): 46 ML
BH CV ECHO MEAS - SV(TEICH): 44.2 ML
BH CV ECHO MEAS - TAPSE (>1.6): 1.9 CM2
BH CV ECHO MEAS - TR MAX PG: 20 MMHG
BH CV ECHO MEAS - TR MAX VEL: 225.8 CM/SEC
BH CV ECHO MEAS - TV MAX PG: 1.4 MMHG
BH CV ECHO MEAS - TV V2 MAX: 60.2 CM/SEC
BH CV ECHO MEASUREMENTS AVERAGE E/E' RATIO: 12.22
BH CV XLRA - RV BASE: 2.6 CM
BH CV XLRA - RV LENGTH: 5.6 CM
BH CV XLRA - RV MID: 2.3 CM
BH CV XLRA - TDI S': 10.6 CM/SEC
LEFT ATRIUM VOLUME INDEX: 18.3 ML/M^2
LEFT ATRIUM VOLUME: 28 ML
LV EF 2D ECHO EST: 55 %
MAXIMAL PREDICTED HEART RATE: 152 BPM
STRESS TARGET HR: 129 BPM

## 2020-08-21 PROCEDURE — 93306 TTE W/DOPPLER COMPLETE: CPT | Performed by: INTERNAL MEDICINE

## 2020-08-21 PROCEDURE — 93306 TTE W/DOPPLER COMPLETE: CPT

## 2020-08-24 ENCOUNTER — TELEPHONE (OUTPATIENT)
Dept: CARDIOLOGY | Facility: CLINIC | Age: 70
End: 2020-08-24

## 2020-09-01 ENCOUNTER — TREATMENT (OUTPATIENT)
Dept: PHYSICAL THERAPY | Facility: CLINIC | Age: 70
End: 2020-09-01

## 2020-09-01 DIAGNOSIS — M25.511 RIGHT SHOULDER PAIN, UNSPECIFIED CHRONICITY: Primary | ICD-10-CM

## 2020-09-01 PROCEDURE — 97035 APP MDLTY 1+ULTRASOUND EA 15: CPT | Performed by: PHYSICAL THERAPIST

## 2020-09-01 PROCEDURE — G0283 ELEC STIM OTHER THAN WOUND: HCPCS | Performed by: PHYSICAL THERAPIST

## 2020-09-03 NOTE — PROGRESS NOTES
Physical Therapy Daily Progress Note        Patient: Barbara Hernandez   : 1950  Diagnosis/ICD-10 Code:  Right shoulder pain, unspecified chronicity [M25.511]  Referring practitioner: Willam Valle MD  Date of Initial Visit: Type: THERAPY  Noted: 2020  Today's Date: 2020  Patient seen for 2 sessions           Subjective   Barbara Hernandez reports: back of shoulder blade and neck not as bad today. Pain in front of right shoulder today. Does not want to do exercise.    Objective   PALPATION: tender right anterior GH joint.  TEST: impingement test +  See Exercise, Manual, and Modality Logs for complete treatment.       Assessment/Plan  Suspect RTC tendinitis and subacromial bursitis.  Progress per Plan of Care           Manual Therapy:         mins  13842;  Therapeutic Exercise:         mins  05194;     Neuromuscular Antolin:        mins  01885;    Therapeutic Activity:          mins  79353;     Gait Training:           mins  95875;     Ultrasound:     15     mins  66876;    Electrical Stimulation:    20     mins  48795 ( );  Iontophoresis          mins 91368   Traction          mins  99161  Fluidotherapy          mins  25764  Dry Needling          mins self-pay  Paraffin          mins  43340    Timed Treatment:   15   mins   Total Treatment:     35   mins    Declan Diaz PT  Physical Therapist

## 2020-09-08 ENCOUNTER — TREATMENT (OUTPATIENT)
Dept: PHYSICAL THERAPY | Facility: CLINIC | Age: 70
End: 2020-09-08

## 2020-09-08 DIAGNOSIS — M25.511 RIGHT SHOULDER PAIN, UNSPECIFIED CHRONICITY: Primary | ICD-10-CM

## 2020-09-08 PROCEDURE — 97110 THERAPEUTIC EXERCISES: CPT | Performed by: PHYSICAL THERAPIST

## 2020-09-08 PROCEDURE — 97035 APP MDLTY 1+ULTRASOUND EA 15: CPT | Performed by: PHYSICAL THERAPIST

## 2020-09-08 PROCEDURE — 97140 MANUAL THERAPY 1/> REGIONS: CPT | Performed by: PHYSICAL THERAPIST

## 2020-09-09 NOTE — PROGRESS NOTES
Physical Therapy Daily Progress Note        Patient: Barbara Hernandez   : 1950  Diagnosis/ICD-10 Code:  Right shoulder pain, unspecified chronicity [M25.511]  Referring practitioner: Willam Valle MD  Date of Initial Visit: Type: THERAPY  Noted: 2020  Today's Date: 2020  Patient seen for 3 sessions           Subjective   Barbara Hernandez reports: Right posterior shoulder pain.  Has been using manual hedge clippers    Objective   PALPATION:Right infraspinatus/teres minor tenderness.   AROM: right shoulder flexion 135 degrees.  See Exercise, Manual, and Modality Logs for complete treatment.       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  Progress per Plan of Care and Progress strengthening /stabilization /functional activity           Manual Therapy:    20     mins  96977;  Therapeutic Exercise:    18     mins  77538;     Neuromuscular Antolin:        mins  87241;    Therapeutic Activity:          mins  02592;     Gait Training:           mins  48963;     Ultrasound:     13     mins  66262;    Electrical Stimulation:         mins  15735 ( );  Iontophoresis          mins 13539   Traction          mins  93041  Fluidotherapy          mins  86226  Dry Needling          mins self-pay  Paraffin          mins  65060    Timed Treatment:   51   mins   Total Treatment:     51   mins    Declan Diaz PT  Physical Therapist

## 2020-09-15 ENCOUNTER — TREATMENT (OUTPATIENT)
Dept: PHYSICAL THERAPY | Facility: CLINIC | Age: 70
End: 2020-09-15

## 2020-09-15 DIAGNOSIS — M25.511 RIGHT SHOULDER PAIN, UNSPECIFIED CHRONICITY: Primary | ICD-10-CM

## 2020-09-15 PROCEDURE — 97035 APP MDLTY 1+ULTRASOUND EA 15: CPT | Performed by: PHYSICAL THERAPIST

## 2020-09-16 NOTE — PROGRESS NOTES
Physical Therapy Daily Progress Note        Patient: Barbara Hernandez   : 1950  Diagnosis/ICD-10 Code:  Right shoulder pain, unspecified chronicity [M25.511]  Referring practitioner: Willam Valle MD  Date of Initial Visit: Type: THERAPY  Noted: 2020  Today's Date: 9/15/2020  Patient seen for 4 sessions           Subjective   Barbara Hernandez reports: is improving. Pain anterior GH and sharp along scapula.  Thinks she may need to see MD about shoulders again.    Objective   PALPATION:  Tender anterior GH joint, and rhomboid.    See Exercise, Manual, and Modality Logs for complete treatment.       Assessment/Plan  Flare up of shoulder pain anterior and posterior shoulder girdle.   Progress per Plan of Care and Anticipate DC next Visit           Manual Therapy:         mins  75492;  Therapeutic Exercise:         mins  53365;     Neuromuscular Antolin:        mins  58077;    Therapeutic Activity:          mins  36639;     Gait Training:          mins  73716;     Ultrasound:     23     mins  02391;    Electrical Stimulation:         mins  71921 ( );  Iontophoresis          mins 94266   Traction          mins  46587  Fluidotherapy          mins  23971  Dry Needling         mins self-pay  Paraffin          mins  89712    Timed Treatment:   23   mins   Total Treatment:     23   mins    Declan Diaz PT  Physical Therapist

## 2020-09-29 ENCOUNTER — TREATMENT (OUTPATIENT)
Dept: PHYSICAL THERAPY | Facility: CLINIC | Age: 70
End: 2020-09-29

## 2020-09-29 DIAGNOSIS — M53.3 SACROILIAC JOINT DYSFUNCTION: Primary | ICD-10-CM

## 2020-09-29 DIAGNOSIS — M25.511 RIGHT SHOULDER PAIN, UNSPECIFIED CHRONICITY: Primary | ICD-10-CM

## 2020-09-29 PROCEDURE — 97035 APP MDLTY 1+ULTRASOUND EA 15: CPT | Performed by: PHYSICAL THERAPIST

## 2020-09-29 PROCEDURE — 97140 MANUAL THERAPY 1/> REGIONS: CPT | Performed by: PHYSICAL THERAPIST

## 2020-09-29 PROCEDURE — G0283 ELEC STIM OTHER THAN WOUND: HCPCS | Performed by: PHYSICAL THERAPIST

## 2020-09-29 NOTE — PROGRESS NOTES
" Physical Therapy Note  SUBJECTIVE:  Changes since last seen:   Maribel arrives today c/o onset of numbness and tingling in right foot for last 2 days.  \"It is better today.\"  She notes this could be related to having done heavy yard work with her , lifting heavy rocks.  She also notes she has done a lot of vacuuming recently.   \"I have never had numbness and tingling like this.\" \"It was annoying feeling, not pain.\"   She states she tried the SI joint self corrections and mini back bends, but nothing touched it.  She states she did take pain meds even though she didn't have pain but it didn't didn't decrease the numbness and tingling.  She states she  even tried voltaren gel which did not help.  She did not stretch the piriformis.   She notes she was having pain in right IT band insertion when she walked a lot yesterday.   \"I got my pillows working for sleeping now, sleeping on couch is the best with all my pillows.\"      Current level of pain:   Right buttock/SI jt 0/10  Left SI joint  0/10  Lumbar spine 1/10   Right Gr troch 3/10 all time  Neck 0/10     Lowest level of pain since last seen:    0/10   0/10    1/10     0/10              Highest level of pain since last seen:     4/10    0/10     4/10       3/10              Past Medical History:  1.  Cervical DDD  2.  Hx of Bilateral THR, left 2012, right 2010  3.  Septoplasty, 2011  4.  Hx of SI joint dysfunction  5.  Fibromyalgia  6.  Hx of hypothyroidism  7.  Hx of gastritis  8.  Irritable bowel syndrome/spastic colon  9.  Osteoarthritis  10.  Hx of 4 pregnancies, 2 normal vaginal deliveries  11. Hx of Dequervains syndrome  12.  Hx of fx left sesmoid  13.   Hx of positional vertigo  14. Osteopenia, diagnosed May 2014  15  Hx of migraine headaches  16.  TMJ dysfunction, 2015  17.  Hx of Cardiomyopathy/ left catheterization 2002  18.  Dyslipidemia  19.  Hx of neuroma pain left foot  20.  Hx of anxiety/ depression    Functional Limitations:  Standing, sleeping, "   Patient Goals for Physical Therapy: decreased pain      OBJECTIVE:  No shift of pelvis      12/05/2019 12/17/19 01/07/20 02/18/20 03/17/20 06/11/20 07/02/20 08/13/20 09/29/20            SI Joint Positioning  Right  Left Right  Left Right  Left Right  Left Right  Left Right  Left Right  Left Right  Left Right  Left Right  Left Right  Left Right  Left Right  Left Right Left Right Left Right Left Right  Left Right  Left       Innominate                            Anterior               x              x              x                x               x              x              sl   Sl    x                   Posterior    x   x    x   x    x    x  sl               Sll               x               Sacrum                               Unilateral rotation    x   x   x   x   x    x   sl   x   x                                    SI Joint Testing  Right  Left Right  Left Right  Left Right  Left Right  Left Right  Left Right  Left Right  Left Right  Left Right  Left Right  Left Right  Left Right  Left Right Left Right Left Right Left Right  Left Right  Left           Distraction   +         +   +        +   +         +   +          +   +            +   +          +   +          +   +         +   +         +                    Beatriz's    -        -                            Compression   -         -                            Prone Press Up   -            -                                         Special Tests  Right   Left Right  Left Right  Left Right  Left Right  Left Right  Left Right Left  Right  Left Right  Left Right  Left Right  Left Right  Left Right  Left Right Left Right Left Right Left Right Left  Right  Left      Straight Leg Raise                                     Active   -          -  -       -        -         -   -           -   -           -   -           -                    Passive    -           -   -          -           -           -   -        -   -        -   -          -                Hip Scour  Test                                                                      Palpation:      Muscle/Bone Irritation  Date 12/05/2019 12/17/19 01/07/20 02/18/20 03/17/20 06/11/20 07/02/20 08/13/20 09/29/20             Right  Left Right  Left Right  Left Right  Left Right  Left Right  Left Right  Left Right  Left Right  Left Right  Left Right  Left Right  Left Right  Left Right Left Right Left Right Left Right  Left Right  Left   SI joint  +        ++   +          +    +         sl   +         Sl     +          +   +         +    +           +   +         Sl    Sl        -            Piriformis   Sl        +    ++        +     +        sl     +       sl     +          +   +        sl   +        +    +         sl            Gr. Trochanter  ++        +   +          +     Sl      Sl     ++       sl   ++         +   +       +   +          +    +         -            IT Band  sl        sl   +         sl   Sl          -   Sl        -   Sl        Sl    -           -   -           -    +         -            Quadratus Lumborum  ++        +    +         sl   Sl         sl   -            -   -           -   -            -   -         -    Sl       sl            Ischial Tuberosity  -        -   -          -   -          -   -            -   -          -    -            -   -           -    -         -            Sacrococcygeal Ligs  -        -   -           -   -             -   -            -   -           -   -           -   -            -    -         -            Paraspinals  +        -   Sl           -   -          sl   -            -   +         -   +          sl   Sl          +    +         sl            Spinous Processes                                        C-spine rotated to               C2-6           C2-5                        T-spine rotated to   T3-5                     T4-6  T6-8 T3-4 T3-4    T4-5 T3-6   T4-6                   L-spine rotated to  L5           L5           L4-5   -          - L3-5 L4-5             L4-5 L4-5 L4-5            Adductor Fabio  -        -   -           -   -           sl   -           -   -        -   -          -   Sl         Sl   Sl       sl            Iliopsoas  +        +   Sl          sl   Sl           sl   Sl         -   Sl       -   +          +   +         +   sl         sl            Quad Origin  -        -    -          -    -            -   -           -   -           -   -            -    -          -            Pubic Symphysis         +                   Sl       sl        -           sl         sl            Bakers cyst        -         sl    -         +   -          sl   -            -    -          -            Pes Anserine Bursa      +          -   +         ++   Sl         +   -sl        -    -           -            Rectus Diastasis   2 finger                    IT Band insertion           +         -              All 4s Positioning: Pt not able to assume full lumbar extension in this position      Monthly Objective Measurement/Functional Activity  Date: 12/05/2019 01/07/20 02/18/20 03/17/20 06/11/20 07/02/20 08/13/20 09/29/20           Oswestry Pain Score 52/100    45/100    44/100 44/100     44/100   44/100    44/100   44/100           LE Functional Scale                   Neck Disability Index      32/100      42/100     44/100                              AROM Lumbar Spine: Degrees   Degrees      Degrees      Degrees      Degrees    Degrees Degrees Degrees Degrees Degrees Degrees Degrees Degrees Degrees Degrees    Degrees      Flexion 114           97      112      112    110     89    95    78              Extension 23 pain on right            27      36      29      20     30 pain right scapula    27 pain right SI and Scap     37              Right Lat. Flexion 33           34      27 pain right SI        31       31    28  25 32              Left Lat. Flexion 25           27      28       24        22     25    26  24              Right Lat Shift Pelvis No change No  change  No change No change  no change No cahnge No change  No change              Left Lat Shift Pelvis No change pulls on right  Tightness No pain  No change  no change  pain right buttock  tight  no change                              AROM Cervical Spine  Degrees Degrees Degrees Degrees Degrees Degrees Degrees Degrees Degrees Degrees Degrees Derees Degrees Degrees Degrees     flexion    45 pain        45        59       45 pain      45         58              extension    51 dizzy        50        62       58       60         72              Right lateral flex    56 pain r upper trap         55        47       47 pain       49        54              Left lateral flex  57 pain right elbow        58        40      39      43       48              Right rotation          44         45       46     42 pain       40     45              Left rotation           40          40       41     40      37       40                               AROM Hips:  (degrees) Right      Left Right Left Right  Left Right  Left Right  Left Right  Left Right Left Right Left Right Left Right Left Right  Left Right Left Right Left Right Left Right Left Right  Left      Flexion 95       98  98        100  100      95  98        105   95       100 96       98  98       95  100     95              Abduction 42       45  45         45   45         45  42        46  40          45 40       40 38         39  36      37              Int. Rotation 25       15  25         28  25          25   26       28    25        30   25      32   22        30  25       30              Ext. Rotation  30        40              32        40   31       46     45       46  45       47  42        45  40      45                              Flexibility:   (degrees) Right    Left Right  Left Right  Left Right  Left Right  Left Right  Left Right Left Right Left Right Left Right Left Right  Left Right Left Right Left Right Left Right Left Right  Left      Hamstrings -47        -49   -45   -50    -45      -48   -43     -42   -42      -41  -45      -48  _46      -48 - 45     -46              Quadriceps 45       40  48       45     55      54   58       60    65        67  63        65  60        63   58      60              HipExt/Rot(piriformis) 42       43    45       45   45    48   43       46    45        45  45        40  42        40  38      41              Hip Int/Rotators 38       37   37        35   35      35   32        36    25         30  30        35  32        37  30       35              Hip Flexors (iliopsoas) Not tested         Not tested Not tested Not tested Not tested Not tested Not tested  not tested              IT Band Not tested          not tested  not tested  not tested  not tested  not tested  not tested  not tested                                                 Root Level  - Motor Right      Left Right  Left Right  Left Right  Left Right  Left Right  Left Right Left Right Left Right Left Right Left Right  Left Right Left Right Left Right Left Right Left Right  Left     T12             Rectus Abdominus 4+/5        4+/5         4+/5      4+/5       4+/5    4 to 4+/5    4 to 4+/5   4 to 4+/5              L1              Paraspinals 5/5      5/5   5/5       5/5  5/5        5/5  5/5        5/5   5/5       5/5  5/5        5/5 5/5        5/5 5/5        5/5              L2              Hip Flexion 4+/5      4+/5  4+/5    4+/5  5/5        5/5  55        5/5   5/5       5/5   5/5        5/5 5/5        5/5  5/5        5/5              L3             Quads 5/5      5/5  5/5       5/5   5/5        5/5  5/5        5/5   5/5       5/5   5/5        5/5 5/5        5/5  5/5        5/5              L4              Anterior Tibialis 5/5      5/5  5/5       5/5  5/5        5/5  5/5        5/5   5/5       5/5   5/5        5/5 5/5        5/5  5/5       5/5              L5             Gr. Toe Extension 5/5      5/5  5/5       5/5   5/5        5/5 5/5         5/5   5/5       5/5    5/5        5/5 5/5        5/5  5/5        5/5              S1            Gastroc/Sol/Peroneals 4+/5    4+/5   4+/5    4+/5  4+/5    4+/5  4+/5     4+/5   4+/5   4+/5 4+/5      4+/5 5/5        5/5  5/5        5/5                              Functional Strength:                     Single LegStanceTime (seconds) R:24   L:25   R:30  L:27 R:30   L:30 R:     L: R:      L: R:22    L:15  R: 30 L:30   R:30   L:15   R:      L:   R:      L: R:      L:   R:      L:   R:      L:    R:      L:    R:      L: R:      L:     Pelvic Floor Isolation (seconds) 10 sec  10 sec                    Inner Unit  Isolation (seconds) 10 sec  10 sec                                                       Functional Activities:                      Sit w/o pain? Pain increases (minutes) yes   Yes/ 30-60 min  yes/ 30-60 Yes/ 20 min  Yes/ 30-60 min Yes/ 30-60 Yes/ 30 min  Yes/ 30-60 min              Stand w/o pain? 20 min Yes/ 30 min Yes/ 30 min Yes/ 30 min Yes/ 30 min Yes/ 30 min Yes/ 15 min   yes/ 15-30 min              Walk w/o pain? 30 min   yes/ 1/4 mile Yes/ 30 min Yes/ 45 min  Yes/ 1 mile Yes/ 30 min  Yes/ 30 min   yes/ 1/4 mile              Steps w/o pain? Yes, ignores pain  Yes/ ignores pain Yes/ no limit Yes/ no limit Yes/ no limit Yes/ no limit No/ worst getting winded   no/ more winded than pain              Drive w/o pain? Uncomfortable, 60 min  Yes/ w/ supports and TENS 2 hrs Yes/ 60 min  Yes/ 60 min  Yes/ 1-2 hrs Yes/ 3 hrs Yes/ 1 hr   yes/ 1-2 hrs              Work w/o pain? n/a   n/a     n/a    n/a   n/a  n/a              # times wakes w/ pain? Sleeping on couch b/c or right shld pain, since July Continues to sleep on couch  Continues to sleep on the couch  2x from right shoulder and neck, she is still sleeping on couch  1-2x   1-2x   3-4x , some pain and some not sure   1-2x               PLAN OF CARE:    ASSESSMENT:  Problem List:   1. Flare up of SI joint dysfunction  2. Decreased spinal stabilization  3. Postural dysfunction      Discussion:    Pt requested to not assess/ treat the cervical spine today. She requests to focus on the low back today.      She feels like she now has her pillows placed appropriately to minimize pain when sleeping.  She states she still feels the best on her couch but recognizes she needs to get it firmer and will put a board under her cushions.     Maribel states she feels like she can self manage fairly well until she gets new symptoms like she has today.  She tried all she has been instructed to do in terms of self correction techniques but was not able to minimize the numbness and tingling in the right foot.  After manual work to bring symmetry to the innominates, sacrum and lumbar facets followed by piriformis stretching resolved the numbness and tingling in the right foot.  Despite this she continued to have irritation of the right IT Band insertion.  Ultrasound was used on this insertion.  By the end of today's visit, the numbness was resolved and she minimal to no pain.     Maribel did not try stretching the piriformis which might have helped her to reduce the numbness after she did the self correction techniques.      She still needs to do the supine hip internal/external hip rotation with calves up on a chair with the inner unit engaged.  This will facilitate reduction of piriformis engagement for inappropriate stabilization of the pelvis and thus minimize shortening of the piriformis putting pressure on the sciatic nerve.     Maribel requests to be seen on a once every 4-6 weeks basis for manual work to augment her home program.  She requests to be seen on a self pay basis if needed.       Short Term Goals: (4-6 weeks)                                   Date Met:                1.   pt independent in postural correction         02/18/20  2.   pt independent in SI joint self-corrections        12/17/19  3.   pt independent in exer to decrease post. disc pressures      12/17/19  4.   pt able to sleep through night  without pain        09/29/20  5.   pt able to stand 10  minutes without pain        01/07/20  6.   Reduce pt pain to no greater than 4/10         01/07/20  7.   Decrease Oswestry Pain Score to no greater than 45/100      01/07/20  8.  Decrease Oswestry Pain Score to no greater than  40/100  9.  Pt able to perform hip abduction x6 w/o pain        09/29/20  10. Decrease pain right gr trochanter to no greater than 4/10      03/17/20  11. Pt able to engage inner unit in supine w/ legs on chair, moving in int/ext rotation w/o pain x6  12.  Pt able to contract pelvic floor on exhaling a diaphragmatic breath x6    07/02/20  13.  Pt able to isolate the transverse abdominus with forearms on counter and in slight lumbar ext x6 08/1`3/20    Long Term Goals:(3-5 months)      Date Met:      1.  pt independent in self-management of symptoms       2.  pt independent in home program      3. Reduce pain to no greater than 2/10      4.  Pt able to  puppies without pain.      Treatment Plan:   1.  Ultrasound to increase extensibility, decrease pain, if needed  2.  Electrical stimulation for muscle relaxation, decrease pain, if needed, iontophoresis  3.  Manual therapy to increase function, decrease pain  4.  Therapeutic exercise to increase function, decrease pain  5.  Home programming and d/c planning to increase function and decrease pain    Frequency & Duration:  Pt will be seen on a once/every 2-3 week basis for 4 more visits to meet remaining goals.     Patient Participated in and Agrees With This Plan of Care and Goals:  YES  Rehab Potential:  Fair to good      Karen Chatterjee, PT, MS  Physical Therapist  [place]# [id number]        Medicare Certification:  90 Day Recertification  Certification Period: 07/02/20 through 10/02/20      TREATMENT TODAY:    Total Treatment Time: (time in clinic)            60  Timed Code Treatment Minutes:   60      Supplies:  Cervical roll    Manual Therapy:  (MA)  RX min:    40  Manual posterior  rotation of left innominate    Positional Isometric contraction (PIC) of right iliopsoas    Positional Isometric contraction of (PIC) right gluteus minimus    Distraction of both SI jts in open pack hip position  Piriformis stretching, bilaterally    Quadratus lumborum stretching, bilaterally    Anterior/Inferior Mobilization of  left hip    Myofascial work trunk      Therapeutic Activities:  (TA)  RX min:  5  Neuromuscular Reeducation: (NMR)  RX min:      Ultrasound:  RX min:          1.6 piper/cm2       Location:   15  Right IT band insertion     Iontophoresis:  6 hr patch                                Location:                               Procedures:      Key:  i=instructed        r=review        c=corrected        a=adapted   Code Procedure/Instruction 12/05/2019 12/17/19 01/07/20 02/18/20 03/17/20 06/11/20 07/02/20 08/13/20 09/29/20          TA         Sleeping Positions                reviewed reviewed     corrected           TA Sternum-Up Posture  reviewed corrected                TA SI jt. self-correction reviewed   reviewed reviewed              TA Lumbar Facet PIC reviewed   reviewed reviewed              TA   Order of Self-Corrections                   TA Use of lumbar pillow  reviewed                 TA Use of cervical roll        instructed           TA Use of orthotics                   TA Use of SI belt                   TA Use of Nada chair                   TA Use of Ice  reviewed                 TA Use of [employer]/ heat  reviewed   Pain cakes              TA Use of TENS unit  In car, while driving                 TA Use of iontophoresis                   TA Decreasing Disc Pressures   reviewed                                                        NMR Diaphragmatic Breathing      instructed             NMR Pelvic Floor Isolation       review            NMR Transverse Abdominus       Instruct/ adapt correct           NMR Multifidus Isolation                   NMR InnerUnit against Gravity                    NMR Sit-stand w/ inner unit                   NMR Roll w/ inner unit                   NMR Walk w/ inner unit                    NMR Up/down steps w/ inner unit                   NMR Water Exer Instruction                   NMR Hip Abd w/o piriformis    instructed corrected corrected             NMR Hip int/ext rotation supine w/ feet up on chair     instructed corrected             NMR Diaphragmatic Breathing w/ pelvic floor      instructed review            NMR Pelvic floor in sitting on towel foll      instructed stopped            NMR Hip Add w/o iliop/ham                    NMR Lower abs in supine                   NMR Abd obliques in supine                   NMR Prone Knee Flexion                   NMR Prone glut valentino                   NMR Retro Step                   NMR Retro Walk                   NMR Retro walk on treadmill                   NMR Forward walk w/ inner unit                   NMR Balance Instruction                   NMR Stability Ball A/P & Lateral                   NMR Ball Catch/Throw /Supine                   NMR Ball Catch/Throw /Stand                   NMR StabilityBall All 4's Arm Lift                   NMR StabilityBall Prone Walk Out                   NMR StabilityBall Supine Oblique                   NMR Stability Ball sit-supine-sit                   NMR Walking Prog Progression                   MNR Home program sequencing                                       TA Hamstring stretch                   TA Hip Ext Rot Stretch          review          TA Hip Int Rot Stretch                   TA Hip Flex/IT Stretch                   TA Hip Add Stretch                   TA Lats Dorsi Stretch                   TA Gastroc/soleus stretch                   TA QuadratusLumborm Stretch                      Supine                      Stance                   TA Quad Stretch                                       NMR Wall Push Ups                   NMR Planking                    NMR BOSU Ball Exercises                   NMR Lateral Bridge Drop                   NMR Waiters Carpio                   NMR O'Feldt Lat Pull Down                   NMR O'Feldt Hip Ext                   NMR O'Feldt Trunk Ext                                       TA CervDiscExtSup/stnd   reviewed                TA Cerv Stretches                   TA Self PIC Cervical Spine                   TA Neural Gliding                   TA Ant/Mid Scalene Strch                   TA Biceps stretch                   TA Rotator Cuff Stretch                   TA Anterior Chest Stretch                   TA Wrist Ext Stretch                   TA Lats Dorsi stretch    instructed               NMR Prone Arm Lifts                   NMR Scapula Stabilization                   NMR Occulomotor Isometrics                   NMR Cervical Isometrics                                       TA Shld AROM w/bar                   TA Shld Capsular Stretching                   TA Self PIC Thor Spine   reviewed  Adapt and corrected reviewed             TA Rot Cuff Therabd, beginner                   TA Rot Cuff Therabd, intermed                                                                                                                                                                                                                                                 Karen Chatterjee, PT, MS  Physical Therapist  [place]# [id number]

## 2020-11-03 ENCOUNTER — TREATMENT (OUTPATIENT)
Dept: PHYSICAL THERAPY | Facility: CLINIC | Age: 70
End: 2020-11-03

## 2020-11-03 DIAGNOSIS — M25.511 RIGHT SHOULDER PAIN, UNSPECIFIED CHRONICITY: Primary | ICD-10-CM

## 2020-11-03 PROCEDURE — 97110 THERAPEUTIC EXERCISES: CPT | Performed by: PHYSICAL THERAPIST

## 2020-11-03 PROCEDURE — 97530 THERAPEUTIC ACTIVITIES: CPT | Performed by: PHYSICAL THERAPIST

## 2020-11-04 NOTE — PROGRESS NOTES
Discharge Summary  Discharge Summary from Physical Therapy Report    Patient Information  Barbara Hernandez  1950    Dates  PT visit: 8/11/2020 to 11/3/2020  Number of Visits: 6     Discharge Status of Patient: See Progress Note dated 11/3/2020    Goals: All Met    Visit Diagnoses:    ICD-10-CM ICD-9-CM   1. Right shoulder pain, unspecified chronicity  M25.511 719.41       Discharge Plan: Continue with current home exercise program as instructed    Comments Suspect a RTC tear, not a surgical candidate.    Date of Discharge 11/3/2020        Declan Diaz, PT  Physical Therapist

## 2020-11-04 NOTE — PROGRESS NOTES
Physical Therapy Daily Progress Note        Patient: Barbara Hernandez   : 1950  Diagnosis/ICD-10 Code:  Right shoulder pain, unspecified chronicity [M25.511]  Referring practitioner: Willam Valle MD  Date of Initial Visit: Type: THERAPY  Noted: 2020  Today's Date: 11/3/2020  Patient seen for 6 sessions           Subjective   Barbara Hernandez reports: has good days and bad days. It is about as good as it has felt in a long time.  If she lifts a lot, pain is stimulated.  Exercises at  Home seems to help some.  Thinks that this is as good as she is going to get.    Objective   ROM: right shoulder WFL  STRENGTH: ER 3/5 with pain, Supraspinatus 3-/5  POSTERIOR: increased thoracic kyphosis, rounded shoulder.  NEUROLOGICAL: normal  See Exercise, Manual, and Modality Logs for complete treatment.       Assessment/Plan  This is as good as this shoulder is going to progress. She appears to be independent with HEP. She verbalizes understanding of postural issues.  Other  Discharge pt from PT           Manual Therapy:         mins  95555;  Therapeutic Exercise:    16     mins  72508;     Neuromuscular Antolin:        mins  52007;    Therapeutic Activity:     15     mins  79770;     Gait Training:           mins  97919;     Ultrasound:          mins  49159;    Electrical Stimulation:         mins  45252 ( );  Iontophoresis          mins [id number]   Traction          mins  60878  Fluidotherapy          mins  80736  Dry Needling          mins self-pay  Paraffin          mins  08848    Timed Treatment:   31   mins   Total Treatment:     31   mins    Declan Diaz, PT  Physical Therapist

## 2020-12-28 ENCOUNTER — TELEPHONE (OUTPATIENT)
Dept: OBSTETRICS AND GYNECOLOGY | Facility: CLINIC | Age: 70
End: 2020-12-28

## 2020-12-28 RX ORDER — FLUCONAZOLE 150 MG/1
150 TABLET ORAL DAILY
Qty: 1 TABLET | Refills: 0 | Status: SHIPPED | OUTPATIENT
Start: 2020-12-28 | End: 2021-07-22

## 2021-01-07 ENCOUNTER — TREATMENT (OUTPATIENT)
Dept: PHYSICAL THERAPY | Facility: CLINIC | Age: 71
End: 2021-01-07

## 2021-01-07 DIAGNOSIS — M53.3 SACROILIAC JOINT DYSFUNCTION: Primary | ICD-10-CM

## 2021-01-07 PROCEDURE — 97140 MANUAL THERAPY 1/> REGIONS: CPT | Performed by: PHYSICAL THERAPIST

## 2021-01-07 PROCEDURE — 97530 THERAPEUTIC ACTIVITIES: CPT | Performed by: PHYSICAL THERAPIST

## 2021-01-07 PROCEDURE — 97164 PT RE-EVAL EST PLAN CARE: CPT | Performed by: PHYSICAL THERAPIST

## 2021-01-07 NOTE — PROGRESS NOTES
" Physical Therapy Re-Evaluation and Medicare Re-Certification  SUBJECTIVE:  Changes since last seen:   Barbara reports she was in bad shape in November; I\" think I twisted myself picking up a pot then dumped stuff out of it.\"  Afterwards she had pain in area of both lats dorsi when she lifted arms in shld flexion which caused secondary guarding and pain in lower back.  \"This pain was different than I had had before, it lasted about 2 weeks.\"   She states she has had some piriformis issues since last here but she tried to work through it with self correction exercises.   After vacuumining she has piriformis pain and tingling down the lateral ascect of right thigh.  \"Seems like any activity bring on low back pain.\"  \"I took Mobic, and acetaminophen, but neither seemed to help.\"   She states she's been using Voltaren gel on right gr trochanter and it has helped significantly.  She notes she had sinus issues the last 3 weeks; went to Doctor and treated with an anti biotic but had bad rxn with yeast infection after 5 days. It helped the pain in area of right ear.   She notes she has been clenching her teeth at night is is scheduled to get a bite guard on Jan 20.      Current level of pain:   Right buttock/SI jt 3/10  Left SI joint  1/10  Lumbar spine 2/10   Right Gr troch 1/10 all time  Neck 0/10     Lowest level of pain since last seen:    0/10   0/10    1/10     0/10              Highest level of pain since last seen:     5-6/10    2/10     6/10       2/10              Past Medical History:  1.  Cervical DDD  2.  Hx of Bilateral THR, left 2012, right 2010  3.  Septoplasty, 2011  4.  Hx of SI joint dysfunction  5.  Fibromyalgia  6.  Hx of hypothyroidism  7.  Hx of gastritis  8.  Irritable bowel syndrome/spastic colon  9.  Osteoarthritis  10.  Hx of 4 pregnancies, 2 normal vaginal deliveries  11. Hx of Dequervains syndrome  12.  Hx of fx left sesmoid  13.   Hx of positional vertigo  14. Osteopenia, diagnosed May 2014  15  " Hx of migraine headaches  16.  TMJ dysfunction, 2015  17.  Hx of Cardiomyopathy/ left catheterization 2002  18.  Dyslipidemia  19.  Hx of neuroma pain left foot  20.  Hx of anxiety/ depression    Functional Limitations:  Standing, sleeping,   Patient Goals for Physical Therapy: decreased pain      OBJECTIVE:  No shift of pelvis      12/05/2019 12/17/19 01/07/20 02/18/20 03/17/20 06/11/20 07/02/20 08/13/20 09/29/20 01/07/21           SI Joint Positioning  Right  Left Right  Left Right  Left Right  Left Right  Left Right  Left Right  Left Right  Left Right  Left Right  Left Right  Left Right  Left Right  Left Right Left Right Left Right Left Right  Left Right  Left       Innominate                            Anterior               x              x              x                x               x              x              sl   Sl    x   x                  Posterior    x   x    x   x    x    x  sl               Sll               x                x              Sacrum                               Unilateral rotation    x   x   x   x   x    x   sl   x   x    x                                   SI Joint Testing  Right  Left Right  Left Right  Left Right  Left Right  Left Right  Left Right  Left Right  Left Right  Left Right  Left Right  Left Right  Left Right  Left Right Left Right Left Right Left Right  Left Right  Left           Distraction   +         +   +        +   +         +   +          +   +            +   +          +   +          +   +         +   +         +   +          +                   Beatriz's    -        -                            Compression   -         -                            Prone Press Up   -            -                                         Special Tests  Right   Left Right  Left Right  Left Right  Left Right  Left Right  Left Right Left  Right  Left Right  Left Right  Left Right  Left Right  Left Right  Left Right Left Right Left Right Left Right Left  Right  Left      Straight Leg  Raise                                     Active   -          -  -       -        -         -   -           -   -           -   -           -                    Passive    -           -   -          -           -           -   -        -   -        -   -          -                Hip Scour Test                                                                      Palpation:      Muscle/Bone Irritation  Date 12/05/2019 12/17/19 01/07/20 02/18/20 03/17/20 06/11/20 07/02/20 08/13/20 09/29/20 01/07/21            Right  Left Right  Left Right  Left Right  Left Right  Left Right  Left Right  Left Right  Left Right  Left Right  Left Right  Left Right  Left Right  Left Right Left Right Left Right Left Right  Left Right  Left   SI joint  +        ++   +          +    +         sl   +         Sl     +          +   +         +    +           +   +         Sl    Sl        -    +       sl           Piriformis   Sl        +    ++        +     +        sl     +       sl     +          +   +        sl   +        +    +         sl     +       sl           Gr. Trochanter  ++        +   +          +     Sl      Sl     ++       sl   ++         +   +       +   +          +    +         -     +        -           IT Band  sl        sl   +         sl   Sl          -   Sl        -   Sl        Sl    -           -   -           -    +         -    -         -           Quadratus Lumborum  ++        +    +         sl   Sl         sl   -            -   -           -   -            -   -         -    Sl       sl   +          +           Ischial Tuberosity  -        -   -          -   -          -   -            -   -          -    -            -   -           -    -         -   -          -           Sacrococcygeal Ligs  -        -   -           -   -             -   -            -   -           -   -           -   -            -    -         -   -           -           Paraspinals  +        -   Sl           -   -          sl   -            -    +         -   +          sl   Sl          +    +         sl   -           +           Spinous Processes                                        C-spine rotated to               C2-6           C2-5                        T-spine rotated to   T3-5                     T4-6  T6-8 T3-4 T3-4    T4-5 T3-6   T4-6  T4-6                  L-spine rotated to  L5           L5           L4-5   -          - L3-5 L4-5            L4-5 L4-5 L4-5            L4-5           Adductor Fabio  -        -   -           -   -           sl   -           -   -        -   -          -   Sl         Sl   Sl       sl Sl          Sl            Iliopsoas  +        +   Sl          sl   Sl           sl   Sl         -   Sl       -   +          +   +         +   sl         sl   +        sl           Quad Origin  -        -    -          -    -            -   -           -   -           -   -            -    -          -   -          -           Pubic Symphysis         +                   Sl       sl        -           sl         sl         sl           Bakers cyst        -         sl    -         +   -          sl   -            -    -          -    +         +           Pes Anserine Bursa      +          -   +         ++   Sl         +   -sl        -    -           -   -          -           Rectus Diastasis   2 finger                    IT Band insertion           +         -   -           -             All 4s Positioning: Pt not able to assume full lumbar extension in this position      Monthly Objective Measurement/Functional Activity  Date: 12/05/2019 01/07/20 02/18/20 03/17/20 06/11/20 07/02/20 08/13/20 09/29/20 01/07/21          Oswestry Pain Score 52/100    45/100    44/100 44/100     44/100   44/100    44/100   44/100     44/100          LE Functional Scale                   Neck Disability Index      32/100      42/100     44/100                              AROM Lumbar Spine: Degrees   Degrees      Degrees      Degrees      Degrees    Degrees  Degrees Degrees Degrees Degrees Degrees Degrees Degrees Degrees Degrees    Degrees      Flexion 114           97      112      112    110     89    95    78        93             Extension 23 pain on right            27      36      29      20     30 pain right scapula    27 pain right SI and Scap     37        16             Right Lat. Flexion 33           34      27 pain right SI        31       31    28  25 32        22             Left Lat. Flexion 25           27      28       24        22     25    26  24        25             Right Lat Shift Pelvis No change No change  No change No change  no change No cahnge No change  No change No cahnge             Left Lat Shift Pelvis No change pulls on right  Tightness No pain  No change  no change  pain right buttock  tight  no change  no change                             AROM Cervical Spine  Degrees Degrees Degrees Degrees Degrees Degrees Degrees Degrees Degrees Degrees Degrees Derees Degrees Degrees Degrees     flexion    45 pain        45        59       45 pain      45         58                      extension    51 dizzy        50        62       58       60         72              Right lateral flex    56 pain r upper trap         55        47       47 pain       49        54              Left lateral flex  57 pain right elbow        58        40      39      43       48              Right rotation          44         45       46     42 pain       40     45              Left rotation           40          40       41     40      37       40                               AROM Hips:  (degrees) Right      Left Right Left Right  Left Right  Left Right  Left Right  Left Right Left Right Left Right Left Right Left Right  Left Right Left Right Left Right Left Right Left Right  Left      Flexion 95       98  98        100  100      95  98        105   95       100 96       98  98       95  100     95  105       100             Abduction 42       45  45         45   45          45  42        46  40          45 40       40 38         39  36      37 38         40             Int. Rotation 25       15  25         28  25          25   26       28    25        30   25      32   22        30  25       30   28       22             Ext. Rotation  30        40              32        40   31       46     45       46  45       47  42        45  40      45   32       45                             Flexibility:   (degrees) Right    Left Right  Left Right  Left Right  Left Right  Left Right  Left Right Left Right Left Right Left Right Left Right  Left Right Left Right Left Right Left Right Left Right  Left      Hamstrings -47       -49   -45   -50    -45      -48   -43     -42   -42      -41  -45      -48  _46      -48 - 45     -46  -52    -47               Quadriceps 45       40  48       45     55      54   58       60    65        67  63        65  60        63   58      60   60     63             HipExt/Rot(piriformis) 42       43    45       45   45    48   43       46    45        45  45        40  42        40  38      41  47      45             Hip Int/Rotators 38       37   37        35   35      35   32        36    25         30  30        35  32        37  30       35   28      34             Hip Flexors (iliopsoas) Not tested         Not tested Not tested Not tested Not tested Not tested Not tested  not tested Not tested             IT Band Not tested          not tested  not tested  not tested  not tested  not tested  not tested  not tested  not tested                                                 Root Level  - Motor Right      Left Right  Left Right  Left Right  Left Right  Left Right  Left Right Left Right Left Right Left Right Left Right  Left Right Left Right Left Right Left Right Left Right  Left     T12             Rectus Abdominus 4+/5        4+/5         4+/5      4+/5       4+/5    4 to 4+/5    4 to 4+/5   4 to 4+/5       4+/5             L1              Paraspinals 5/5       5/5   5/5       5/5  5/5        5/5  5/5        5/5   5/5       5/5  5/5        5/5 5/5        5/5 5/5        5/5   5/5        5/5             L2              Hip Flexion 4+/5      4+/5  4+/5    4+/5  5/5        5/5  55        5/5   5/5       5/5   5/5        5/5 5/5        5/5  5/5        5/5  5/5        5/5             L3             Quads 5/5      5/5  5/5       5/5   5/5        5/5  5/5        5/5   5/5       5/5   5/5        5/5 5/5        5/5  5/5        5/5  5/5        5/5             L4              Anterior Tibialis 5/5      5/5  5/5       5/5  5/5        5/5  5/5        5/5   5/5       5/5   5/5        5/5 5/5        5/5  5/5       5/5  5/5        5/5             L5             Gr. Toe Extension 5/5      5/5  5/5       5/5   5/5        5/5 5/5         5/5   5/5       5/5   5/5        5/5 5/5        5/5  5/5        5/5  5/5        5/5             S1            Gastroc/Sol/Peroneals 4+/5    4+/5   4+/5    4+/5  4+/5    4+/5  4+/5     4+/5   4+/5   4+/5 4+/5      4+/5 5/5        5/5  5/5        5/5  5/5        5/5                             Functional Strength:                     Single LegStanceTime (seconds) R:24   L:25   R:30  L:27 R:30   L:30 R:     L: R:      L: R:22    L:15  R: 30 L:30   R:30   L:15   R:30   L:30   R:      L: R:      L:   R:      L:   R:      L:    R:      L:    R:      L: R:      L:     Pelvic Floor Isolation (seconds) 10 sec  10 sec                    Inner Unit  Isolation (seconds) 10 sec  10 sec                                                       Functional Activities:                      Sit w/o pain? Pain increases (minutes) yes   Yes/ 30-60 min  yes/ 30-60 Yes/ 20 min  Yes/ 30-60 min Yes/ 30-60 Yes/ 30 min  Yes/ 30-60 min yes30 min              Stand w/o pain? 20 min Yes/ 30 min Yes/ 30 min Yes/ 30 min Yes/ 30 min Yes/ 30 min Yes/ 15 min   yes/ 15-30 min Yes/ 15 min              Walk w/o pain? 30 min   yes/ 1/4 mile Yes/ 30 min Yes/ 45 min  Yes/ 1 mile Yes/ 30 min  Yes/ 30  min   yes/ 1/4 mile Yes/ 30 min              Steps w/o pain? Yes, ignores pain  Yes/ ignores pain Yes/ no limit Yes/ no limit Yes/ no limit Yes/ no limit No/ worst getting winded   no/ more winded than pain No problem              Drive w/o pain? Uncomfortable, 60 min  Yes/ w/ supports and TENS 2 hrs Yes/ 60 min  Yes/ 60 min  Yes/ 1-2 hrs Yes/ 3 hrs Yes/ 1 hr   yes/ 1-2 hrs Yes/ 90  min with TENS             Work w/o pain? n/a   n/a     n/a    n/a   n/a  n/a              # times wakes w/ pain? Sleeping on couch b/c or right shld pain, since July Continues to sleep on couch  Continues to sleep on the couch  2x from right shoulder and neck, she is still sleeping on couch  1-2x   1-2x   3-4x , some pain and some not sure   1-2x 2x  More comfortable on left               PLAN OF CARE:    ASSESSMENT:  Problem List:   1. Flare up of SI joint dysfunction  2. Decreased spinal stabilization  3. Postural dysfunction     Discussion:    Barbara has attempted self management over the last couple of months and has done fairly well. Her Oswestry Back Pain Score remains the same at 44/100 underscoring her somewhat effective self management.  She is still having difficulty with effective SI joint self correction and thus she gets secondary pain and guarding.  Once this happens she needs manual work here in the clinic to restore symmetry of the innominates and resolve the pain.      Reviewed sternum up posturing as she c/o feeling like she is slouching in stance.     By the end of today's visit her pain was essentially resolved in the low back and SI joints.      Progressed spinal stabilization today with instruction in engaging the inner unit and doing step up/downs.  This will facilitate carrying groceries in her house up a few steps and strengthen the core musculature.    Maribel has a thorough home program and is doing fairly well with self management.  Thus recommend she return for manual work once every 4-6 weeks for review of home  exercises and manual work as needed for pain relief to augment her home program.     Short Term Goals: (4-6 weeks)                                   Date Met:                1.   pt independent in postural correction         02/18/20  2.   pt independent in SI joint self-corrections        12/17/19  3.   pt independent in exer to decrease post. disc pressures      12/17/19  4.   pt able to sleep through night without pain        09/29/20  5.   pt able to stand 10  minutes without pain        01/07/20  6.   Reduce pt pain to no greater than 4/10         01/07/20  7.   Decrease Oswestry Pain Score to no greater than 45/100      01/07/20  8.  Decrease Oswestry Pain Score to no greater than  40/100  9.  Pt able to perform hip abduction x6 w/o pain        09/29/20  10. Decrease pain right gr trochanter to no greater than 4/10      03/17/20  11. Pt able to engage inner unit in supine w/ legs on chair, moving in int/ext rotation w/o pain x6  12.  Pt able to contract pelvic floor on exhaling a diaphragmatic breath x6    07/02/20  13.  Pt able to isolate the transverse abdominus with forearms on counter and in slight lumbar ext x6 08/13/20  14.  Pt able to perform step up/down with inner unit musculature engaged x6 without pain    Long Term Goals:(3-5 months)      Date Met:      1.  pt independent in self-management of symptoms       2.  pt independent in home program      3. Reduce pain to no greater than 2/10      4.  Pt able to  puppies without pain.      Treatment Plan:   1.  Ultrasound to increase extensibility, decrease pain, if needed  2.  Electrical stimulation for muscle relaxation, decrease pain, if needed, iontophoresis  3.  Manual therapy to increase function, decrease pain  4.  Therapeutic exercise to increase function, decrease pain  5.  Home programming and d/c planning to increase function and decrease pain    Frequency & Duration:  Pt will be seen on a once/every 4-6 week basis for 6 more visits to  meet remaining goals.     Patient Participated in and Agrees With This Plan of Care and Goals:  YES  Rehab Potential:  Fair to good      Karen Chatterjee PT, MS  Physical Therapist  [place]# [id number]      Medicare Certification:  90 Day Recertification  Certification Period: 01/07/21 to 04/07/21  I certify that the therapy services are furnished while this patient is under my care.  The services outlined above are required by this patient, and will be reviewed every 90  days.     PHYSICIAN:     DATE:     Please sign and return via fax to [facility] [address] Fax # [phone]. Thank you, [facility].        Below is the Physical Therapy Re-evaluation for your patient, Barbara Hernandez .  If this meets with your approval please sign the Medicare Certification at the end of the evaluation and return to our office.  If you have any questions, please contact me.  Best Regards,  Karen Chatterjee PT, MS      TREATMENT TODAY:    Total Treatment Time: (time in clinic)            60  Timed Code Treatment Minutes:   60      Re-Evaluation:   RX min:  20    Manual Therapy:  (MA)  RX min:    30  Manual posterior rotation of left innominate    Positional Isometric contraction (PIC) of right iliopsoas    Positional Isometric contraction of (PIC) right gluteus minimus    Distraction of both SI jts in open pack hip position  Piriformis stretching, bilaterally    Quadratus lumborum stretching, bilaterally    Anterior/Inferior Mobilization of  left hip    Myofascial work trunk      Therapeutic Activities:  (TA)  RX min:   10  Neuromuscular Reeducation: (NMR)  RX min:      Ultrasound:  RX min:          1.6 piper/cm2       Location:      Iontophoresis:  6 hr patch                                Location:                               Procedures:      Key:  i=instructed        r=review        c=corrected        a=adapted   Code Procedure/Instruction 12/05/2019 12/17/19 01/07/20 02/18/20 03/17/20 06/11/20  07/02/20 08/13/20 09/29/20 01/07/21         TA         Sleeping Positions                reviewed reviewed     corrected           TA Sternum-Up Posture  reviewed corrected       reviewed         TA SI jt. self-correction reviewed   reviewed reviewed              TA Lumbar Facet PIC reviewed   reviewed reviewed              TA   Order of Self-Corrections                   TA Use of lumbar pillow  reviewed                 TA Use of cervical roll        instructed           TA Use of orthotics                   TA Use of SI belt                   TA Use of Nada chair                   TA Use of Ice  reviewed                 TA Use of [employer]/ heat  reviewed   Pain cakes              TA Use of TENS unit  In car, while driving                 TA Use of iontophoresis                   TA Decreasing Disc Pressures   reviewed                TA Step up/down w/ inner unit engaged          instructed                             NMR Diaphragmatic Breathing      instructed             NMR Pelvic Floor Isolation       review            NMR Transverse Abdominus       Instruct/ adapt correct           NMR Multifidus Isolation                   NMR InnerUnit against Gravity                   NMR Sit-stand w/ inner unit                   NMR Roll w/ inner unit                   NMR Walk w/ inner unit                    NMR Up/down steps w/ inner unit                   NMR Water Exer Instruction                   NMR Hip Abd w/o piriformis    instructed corrected corrected             NMR Hip int/ext rotation supine w/ feet up on chair     instructed corrected             NMR Diaphragmatic Breathing w/ pelvic floor      instructed review            NMR Pelvic floor in sitting on towel foll      instructed stopped            NMR Hip Add w/o iliop/ham                    NMR Lower abs in supine                   NMR Abd obliques in supine                   NMR Prone Knee Flexion                   NMR Prone glut valentino                    NMR Retro Step                   NMR Retro Walk                   NMR Retro walk on treadmill                   NMR Forward walk w/ inner unit                   NMR Balance Instruction                   NMR Stability Ball A/P & Lateral                   NMR Ball Catch/Throw /Supine                   NMR Ball Catch/Throw /Stand                   NMR StabilityBall All 4's Arm Lift                   NMR StabilityBall Prone Walk Out                   NMR StabilityBall Supine Oblique                   NMR Stability Ball sit-supine-sit                   NMR Walking Prog Progression                   MNR Home program sequencing                                       TA Hamstring stretch                   TA Hip Ext Rot Stretch          review          TA Hip Int Rot Stretch                   TA Hip Flex/IT Stretch                   TA Hip Add Stretch                   TA Lats Dorsi Stretch                   TA Gastroc/soleus stretch                   TA QuadratusLumborm Stretch                      Supine                      Stance                   TA Quad Stretch                                       NMR Wall Push Ups                   NMR Planking                   NMR BOSU Ball Exercises                   NMR Lateral Bridge Drop                   NMR Waiters Holiday                   NMR O'Feldt Lat Pull Down                   NMR O'Feldt Hip Ext                   NMR O'Feldt Trunk Ext                                       TA CervDiscExtSup/stnd   reviewed                TA Cerv Stretches                   TA Self PIC Cervical Spine                   TA Neural Gliding                   TA Ant/Mid Scalene Strch                   TA Biceps stretch                   TA Rotator Cuff Stretch                   TA Anterior Chest Stretch                   TA Wrist Ext Stretch                   TA Lats Dorsi stretch    instructed               NMR Prone Arm Lifts                   NMR Scapula Stabilization                   NMR  Occulomotor Isometrics                   NMR Cervical Isometrics                                       TA Shld AROM w/bar                   TA Shld Capsular Stretching                   TA Self PIC Thor Spine   reviewed  Adapt and corrected reviewed             TA Rot Cuff Therabd, beginner                   ABIGAIL Rot Cuff Therabd, skinny Chatterjee, PT, MS  Physical Therapist  [place]# [id number]

## 2021-02-18 ENCOUNTER — TRANSCRIBE ORDERS (OUTPATIENT)
Dept: PHYSICAL THERAPY | Facility: CLINIC | Age: 71
End: 2021-02-18

## 2021-02-18 DIAGNOSIS — M47.818 SI JOINT ARTHRITIS: Primary | ICD-10-CM

## 2021-02-23 ENCOUNTER — TREATMENT (OUTPATIENT)
Dept: PHYSICAL THERAPY | Facility: CLINIC | Age: 71
End: 2021-02-23

## 2021-02-23 DIAGNOSIS — M53.3 SACROILIAC JOINT DYSFUNCTION: Primary | ICD-10-CM

## 2021-02-23 DIAGNOSIS — M54.2 CERVICAL PAIN: ICD-10-CM

## 2021-02-23 PROCEDURE — 97140 MANUAL THERAPY 1/> REGIONS: CPT | Performed by: PHYSICAL THERAPIST
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
## 2021-02-26 ENCOUNTER — TELEPHONE (OUTPATIENT)
Dept: OBSTETRICS AND GYNECOLOGY | Facility: CLINIC | Age: 71
End: 2021-02-26

## 2021-04-01 ENCOUNTER — TREATMENT (OUTPATIENT)
Dept: PHYSICAL THERAPY | Facility: CLINIC | Age: 71
End: 2021-04-01

## 2021-04-01 DIAGNOSIS — M53.3 SACROILIAC JOINT DYSFUNCTION: Primary | ICD-10-CM

## 2021-04-01 DIAGNOSIS — M54.2 CERVICAL PAIN: ICD-10-CM

## 2021-04-01 PROCEDURE — 97140 MANUAL THERAPY 1/> REGIONS: CPT | Performed by: PHYSICAL THERAPIST

## 2021-04-01 PROCEDURE — 97112 NEUROMUSCULAR REEDUCATION: CPT | Performed by: PHYSICAL THERAPIST

## 2021-04-01 NOTE — PROGRESS NOTES
" Physical Therapy Note  SUBJECTIVE:  Changes since last seen:   Barbara states she has been having a lot of pain in right buttock down to foot, \"its not going away; there are moments when its worse, like when firtst gets up in am, but it can occur in middle of night , always happens when move from from supine to sitting.\" \"This pain is like being struck by lightning, then it goes away.\"   She has been doing the self corrections for the ilium; \"its hard to know which side to do the correction on;  sidelying sacral correction on left side is the best to get rid of pain but the pain is the worst after laying on left side.\"    \"I seem to be starting to have trouble with left shoulder, its just like my right shoulder used to be.\"    She is scheduled to se Dr Valle in April to get cortosone in left shoulder.     \"he cold weather today, just gets to me!\"         Current level of pain:   Right buttock/SI jt 2/10  Left SI joint  4/10  Lumbar spine 0/10   Right Gr troch 2/10 all time  Neck 4/10 Left upper trap when sleeps     Lowest level of pain since last seen:    0/10   0/10    1/10     0/10              Highest level of pain since last seen:     7/10    6/10     7/10       2/10              Past Medical History:  1.  Cervical DDD  2.  Hx of Bilateral THR, left 2012, right 2010  3.  Septoplasty, 2011  4.  Hx of SI joint dysfunction  5.  Fibromyalgia  6.  Hx of hypothyroidism  7.  Hx of gastritis  8.  Irritable bowel syndrome/spastic colon  9.  Osteoarthritis  10.  Hx of 4 pregnancies, 2 normal vaginal deliveries  11. Hx of Dequervains syndrome  12.  Hx of fx left sesmoid  13.   Hx of positional vertigo  14. Osteopenia, diagnosed May 2014  15  Hx of migraine headaches  16.  TMJ dysfunction, 2015  17.  Hx of Cardiomyopathy/ left catheterization 2002  18.  Dyslipidemia  19.  Hx of neuroma pain left foot  20.  Hx of anxiety/ depression    Functional Limitations:  Standing, sleeping,   Patient Goals for Physical Therapy: " decreased pain      OBJECTIVE:  No shift of pelvis      12/05/2019 12/17/19 01/07/20 02/18/20 03/17/20 06/11/20 07/02/20 08/13/20 09/29/20 01/07/21 02/23/21 04/01/21         SI Joint Positioning  Right  Left Right  Left Right  Left Right  Left Right  Left Right  Left Right  Left Right  Left Right  Left Right  Left Right  Left Right  Left Right  Left Right Left Right Left Right Left Right  Left Right  Left       Innominate                            Anterior               x              x              x                x               x              x              sl   Sl    x   x    x    x                Posterior    x   x    x   x    x    x  sl               Sll               x                x              x              x            Sacrum                               Unilateral rotation    x   x   x   x   x    x   sl   x   x    x   x   x                                 SI Joint Testing  Right  Left Right  Left Right  Left Right  Left Right  Left Right  Left Right  Left Right  Left Right  Left Right  Left Right  Left Right  Left Right  Left Right Left Right Left Right Left Right  Left Right  Left           Distraction   +         +   +        +   +         +   +          +   +            +   +          +   +          +   +         +   +         +   +          +   +          +   +         +                 Beatriz's    -        -            -         -   -          -                 Compression   -         -            -          -   -         -                 Prone Press Up   -            -            Sl       Sl    -           -                              Special Tests  Right   Left Right  Left Right  Left Right  Left Right  Left Right  Left Right Left  Right  Left Right  Left Right  Left Right  Left Right  Left Right  Left Right Left Right Left Right Left Right Left  Right  Left      Straight Leg Raise                                     Active   -          -  -       -        -         -   -           -   -            -   -           -    -         -   -          -                 Passive    -           -   -          -           -           -   -        -   -        -   -          -    -          -   -         -             Hip Scour Test                                                                      Palpation:      Muscle/Bone Irritation  Date 12/05/2019 12/17/19 01/07/20 02/18/20 03/17/20 06/11/20 07/02/20 08/13/20 09/29/20 01/07/21 02/23/21 04/01/21          Right  Left Right  Left Right  Left Right  Left Right  Left Right  Left Right  Left Right  Left Right  Left Right  Left Right  Left Right  Left Right  Left Right Left Right Left Right Left Right  Left Right  Left   SI joint  +        ++   +          +    +         sl   +         Sl     +          +   +         +    +           +   +         Sl    Sl        -    +       sl   +         -   +        -         Piriformis   Sl        +    ++        +     +        sl     +       sl     +          +   +        sl   +        +    +         sl     +       sl   ++      +   ++      sl         Gr. Trochanter  ++        +   +          +     Sl      Sl     ++       sl   ++         +   +       +   +          +    +         -     +        -   ++       -  ++       sl         IT Band  sl        sl   +         sl   Sl          -   Sl        -   Sl        Sl    -           -   -           -    +         -    -         -    -        -   -        -         Quadratus Lumborum  ++        +    +         sl   Sl         sl   -            -   -           -   -            -   -         -    Sl       sl   +          +    -         -   -        -         Ischial Tuberosity  -        -   -          -   -          -   -            -   -          -    -            -   -           -    -         -   -          -    -         -   Sl       Sl          Sacrococcygeal Ligs  -        -   -           -   -             -   -            -   -           -   -           -   -            -    -          -   -           -   -           -   Sl       Sl          Paraspinals  +        -   Sl           -   -          sl   -            -   +         -   +          sl   Sl          +    +         sl   -           +   +          Sl            Spinous Processes                                        C-spine rotated to               C2-6           C2-5          C2               T-spine rotated to   T3-5                     T4-6  T6-8 T3-4 T3-4    T4-5 T3-6   T4-6  T4-6   -          -  T4-7                L-spine rotated to  L5           L5           L4-5   -          - L3-5 L4-5            L4-5 L4-5 L4-5            L4-5  L5              L5         Adductor Fabio  -        -   -           -   -           sl   -           -   -        -   -          -   Sl         Sl   Sl       sl Sl          Sl   sl        Sl   sl            +         Iliopsoas  +        +   Sl          sl   Sl           sl   Sl         -   Sl       -   +          +   +         +   sl         sl   +        sl   +          +   -        -         Quad Origin  -        -    -          -    -            -   -           -   -           -   -            -    -          -   -          -   -          -          Pubic Symphysis         +                   Sl       sl        -           sl         sl         sl        +       -         Bakers cyst        -         sl    -         +   -          sl   -            -    -          -    +         +   +        Sl    -         -         Pes Anserine Bursa      +          -   +         ++   Sl         +   -sl        -    -           -   -          -   -         -   -        -         Rectus Diastasis   2 finger                    IT Band insertion           +         -   -           -   -          -   -        -           All 4s Positioning: Pt not able to assume full lumbar extension in this position      Monthly Objective Measurement/Functional Activity  Date: 12/05/2019 01/07/20 02/18/20 03/17/20 06/11/20 07/02/20  08/13/20 09/29/20 01/07/21 02/23/21 04/01/21        Oswestry Pain Score 52/100    45/100    44/100 44/100     44/100   44/100    44/100   44/100     44/100     60/100    50/100        LE Functional Scale                   Neck Disability Index      32/100      42/100     44/100       44/100     46/100                           AROM Lumbar Spine: Degrees   Degrees      Degrees      Degrees      Degrees    Degrees Degrees Degrees Degrees Degrees Degrees Degrees Degrees Degrees Degrees    Degrees      Flexion 114           97      112      112    110     89    95    78        93     98      86           Extension 23 pain on right            27      36      29      20     30 pain right scapula    27 pain right SI and Scap     37        16     24      18           Right Lat. Flexion 33           34      27 pain right SI        31       31    28  25 32        22      20     19           Left Lat. Flexion 25           27      28       24        22     25    26  24        25       25       24           Right Lat Shift Pelvis No change No change  No change No change  no change No cahnge No change  No change No cahnge  stiff stiff           Left Lat Shift Pelvis No change pulls on right  Tightness No pain  No change  no change  pain right buttock  tight  no change  no change  stiff  stiff                           AROM Cervical Spine  Degrees Degrees Degrees Degrees Degrees Degrees Degrees Degrees Degrees Degrees Degrees Derees Degrees Degrees Degrees     flexion    45 pain        45        59       45 pain      45         58               63      62          extension    51 dizzy        50        62       58       60         72        61      50          Right lateral flex    56 pain r upper trap         55        47       47 pain       49        54       45      48          Left lateral flex  57 pain right elbow        58        40      39      43       48       47       46          Right rotation          44         45        46     42 pain       40     45       48       40          Left rotation           40          40       41     40      37       40        39        37                           AROM Hips:  (degrees) Right      Left Right Left Right  Left Right  Left Right  Left Right  Left Right Left Right Left Right Left Right Left Right  Left Right Left Right Left Right Left Right Left Right  Left      Flexion 95       98  98        100  100      95  98        105   95       100 96       98  98       95  100     95  105       100    112    108 114       110           Abduction 42       45  45         45   45         45  42        46  40          45 40       40 38         39  36      37 38         40    39        37 38          37           Int. Rotation 25       15  25         28  25          25   26       28    25        30   25      32   22        30  25       30   28       22  25          24  23         24           Ext. Rotation  30        40              32        40   31       46     45       46  45       47  42        45  40      45   32       45   31        38  34         38                           Flexibility:   (degrees) Right    Left Right  Left Right  Left Right  Left Right  Left Right  Left Right Left Right Left Right Left Right Left Right  Left Right Left Right Left Right Left Right Left Right  Left      Hamstrings -47       -49   -45   -50    -45      -48   -43     -42   -42      -41  -45      -48  _46      -48 - 45     -46  -52    -47     -50     -48   -48     -47           Quadriceps 45       40  48       45     55      54   58       60    65        67  63        65  60        63   58      60   60     63    62     65    63      61           HipExt/Rot(piriformis) 42       43    45       45   45    48   43       46    45        45  45        40  42        40  38      41  47      45   46        45    47        45           Hip Int/Rotators 38       37   37        35   35      35   32        36    25         30   30        35  32        37  30       35   28      34    25       32   28         31           Hip Flexors (iliopsoas) Not tested         Not tested Not tested Not tested Not tested Not tested Not tested  not tested Not tested Not tested Not tested           IT Band Not tested          not tested  not tested  not tested  not tested  not tested  not tested  not tested  not tested   not tested  not tested                                              Root Level  - Motor Right      Left Right  Left Right  Left Right  Left Right  Left Right  Left Right Left Right Left Right Left Right Left Right  Left Right Left Right Left Right Left Right Left Right  Left     T12             Rectus Abdominus 4+/5        4+/5         4+/5      4+/5       4+/5    4 to 4+/5    4 to 4+/5   4 to 4+/5       4+/5        4+/5      4+/5           L1              Paraspinals 5/5      5/5   5/5       5/5  5/5        5/5  5/5        5/5   5/5       5/5  5/5        5/5 5/5        5/5 5/5        5/5   5/5        5/5  5/5       5/5           L2              Hip Flexion 4+/5      4+/5  4+/5    4+/5  5/5        5/5  55        5/5   5/5       5/5   5/5        5/5 5/5        5/5  5/5        5/5  5/5        5/5   5/5       5/5           L3             Quads 5/5      5/5  5/5       5/5   5/5        5/5  5/5        5/5   5/5       5/5   5/5        5/5 5/5        5/5  5/5        5/5  5/5        5/5   5/5       5/5           L4              Anterior Tibialis 5/5      5/5  5/5       5/5  5/5        5/5  5/5        5/5   5/5       5/5   5/5        5/5 5/5        5/5  5/5       5/5  5/5        5/5   5/5       5/5           L5             Gr. Toe Extension 5/5      5/5  5/5       5/5   5/5        5/5 5/5         5/5   5/5       5/5   5/5        5/5 5/5        5/5  5/5        5/5  5/5        5/5   5/5       5/5           S1            Gastroc/Sol/Peroneals 4+/5    4+/5   4+/5    4+/5  4+/5    4+/5  4+/5     4+/5   4+/5   4+/5 4+/5      4+/5 5/5        5/5  5/5         5/5  5/5        5/5   5/5       5/5                            Functional Strength:                     Single LegStanceTime (seconds) R:24   L:25   R:30  L:27 R:30   L:30 R:     L: R:      L: R:22    L:15  R: 30 L:30   R:30   L:15   R:30   L:30   R:      L: R:      L:   R:      L:   R:      L:    R:      L:    R:      L: R:      L:     Pelvic Floor Isolation (seconds) 10 sec  10 sec                    Inner Unit  Isolation (seconds) 10 sec  10 sec                                                       Functional Activities:                      Sit w/o pain? Pain increases (minutes) yes   Yes/ 30-60 min  yes/ 30-60 Yes/ 20 min  Yes/ 30-60 min Yes/ 30-60 Yes/ 30 min  Yes/ 30-60 min yes30 min  Yes/ 30  Yes/ 30-45 min           Stand w/o pain? 20 min Yes/ 30 min Yes/ 30 min Yes/ 30 min Yes/ 30 min Yes/ 30 min Yes/ 15 min   yes/ 15-30 min Yes/ 15 min  Yes/ 15 min  Yes/ 15-30           Walk w/o pain? 30 min   yes/ 1/4 mile Yes/ 30 min Yes/ 45 min  Yes/ 1 mile Yes/ 30 min  Yes/ 30 min   yes/ 1/4 mile Yes/ 30 min  Yes/ 30 Yes/ 60 min            Steps w/o pain? Yes, ignores pain  Yes/ ignores pain Yes/ no limit Yes/ no limit Yes/ no limit Yes/ no limit No/ worst getting winded   no/ more winded than pain No problem  No problem  No problem            Drive w/o pain? Uncomfortable, 60 min  Yes/ w/ supports and TENS 2 hrs Yes/ 60 min  Yes/ 60 min  Yes/ 1-2 hrs Yes/ 3 hrs Yes/ 1 hr   yes/ 1-2 hrs Yes/ 90  min with TENS Yes/ hasn't gone anyway  Yes/ 30-45 min            Work w/o pain? n/a   n/a     n/a    n/a   n/a  n/a  n/a    n/a           # times wakes w/ pain? Sleeping on couch b/c or right shld pain, since July Continues to sleep on couch  Continues to sleep on the couch  2x from right shoulder and neck, she is still sleeping on couch  1-2x   1-2x   3-4x , some pain and some not sure   1-2x 2x  More comfortable on left  Every 2 hrs, back neck and shoulder  Wakes evry 3 hrs, brain activity and some pain             PLAN OF  CARE:    ASSESSMENT:  Problem List:   1. Flare up of SI joint dysfunction  2. Decreased spinal stabilization  3. Postural dysfunction     Discussion:    Despite subjective c/o of increased pain in right buttock down right leg, her Oswestry Low Back Pain Score improved form 60/100 to 50/100 since last seen.  Her Neck pain Score worsened slightly from 44/100 to 46/100.  She has decreased range of motion in the lumbar spine and cervical spine however, the greatest loss of range was in lumbar flexion, cervical extension and right cervical rotation.  This was restored with gentle manual work.    The right buttock pain is consistent with over-engaging the piriformis. The pain on moving from supine to sitting is also consistent with laxity in the SI joint ligaments.  Reviewed self correction of the SI joints and progressed spinal stabilization instruction to engaging pelvic floor on exhale from diaphragmatic breath.  Then progressed to engaging the transverse abdominus on exhaling during diaphragmatic breath, holding the transverse abdominus while taking 3 lower costal breaths.  Maribel was surprised to sense the lack of endurance in the transverse abdominus.     Maribel continues to request to be seen on a once every 4-6 week basis to review and progress her home program and to have manual work done to augment her home program.     Short Term Goals: (4-6 weeks)                                   Date Met:                1.   pt independent in postural correction         02/18/20  2.   pt independent in SI joint self-corrections        12/17/19  3.   pt independent in exer to decrease post. disc pressures      12/17/19  4.   pt able to sleep through night without pain        09/29/20  5.   pt able to stand 10  minutes without pain        01/07/20  6.   Reduce pt pain to no greater than 4/10         01/07/20  7.   Decrease Oswestry Pain Score to no greater than 45/100      01/07/20  8.  Decrease Oswestry Pain Score to no greater  than  40/100  9.  Pt able to perform hip abduction x6 w/o pain        09/29/20  10. Decrease pain right gr trochanter to no greater than 4/10      03/17/20  11. Pt able to engage inner unit in supine w/ legs on chair, moving in int/ext rotation w/o pain x6  12.  Pt able to contract pelvic floor on exhaling a diaphragmatic breath x6    07/02/20  13.  Pt able to isolate the transverse abdominus with forearms on counter and in slight lumbar ext x6 08/13/20  14.  Pt able to perform step up/down with inner unit musculature engaged x6 without pain  15.   Pt able to engage inner unit musculature on sit to stand and stand to sit, x6 w/o low back pain  16.  Pt able to perform diaphragmatic breathing x6, in supine  17.  Pt able to do diaphrag breath engaging transv abdominus exhaling,  3 breaths w/ lower costals    Long Term Goals:(3-5 months)      Date Met:      1.  pt independent in self-management of symptoms       2.  pt independent in home program      3. Reduce pain to no greater than 2/10      4.  Pt able to  puppies without pain.      Treatment Plan:   1.  Ultrasound to increase extensibility, decrease pain, if needed  2.  Electrical stimulation for muscle relaxation, decrease pain, if needed, iontophoresis  3.  Manual therapy to increase function, decrease pain  4.  Therapeutic exercise to increase function, decrease pain  5.  Home programming and d/c planning to increase function and decrease pain    Frequency & Duration:  Pt will be seen on a once/every 4-6 week basis for 4 more visits to meet remaining goals.     Patient Participated in and Agrees With This Plan of Care and Goals:  YES  Rehab Potential:  Fair to good      Karen Chatterjee, PT, MS  Physical Therapist  [place]# [id number]      Medicare Certification:  90 Day Recertification  Certification Period: 01/07/21 to 04/07/21        ITREATMENT TODAY:    Total Treatment Time: (time in clinic)            60  Timed Code Treatment Minutes:   60      Re-Evaluation:    RX min:      Manual Therapy:  (MA)  RX min:   40  Manual posterior rotation of right innominate    Positional Isometric contraction (PIC) of left iliopsoas    Positional Isometric contraction of (PIC) right gluteus minimus    Distraction of both SI jts in open pack hip position  Piriformis stretching, bilaterally    Quadratus lumborum stretching, bilaterally    Anterior/Inferior Mobilization of right  hip    Myofascial work trunk  Manual spinal distraction        Therapeutic Activities:  (TA)  RX min:     Neuromuscular Reeducation: (NMR)  RX min:  20    Ultrasound:  RX min:          1.6 piper/cm2       Location:      Iontophoresis:  6 hr patch                                Location:                               Procedures:      Key:  i=instructed        r=review        c=corrected        a=adapted   Code Procedure/Instruction 12/05/2019 12/17/19 01/07/20 02/18/20 03/17/20 06/11/20 07/02/20 08/13/20 09/29/20 01/07/21 02/23/21 04/01/21       TA         Sleeping Positions                reviewed reviewed     corrected           TA Sternum-Up Posture  reviewed corrected       reviewed         TA SI jt. self-correction reviewed   reviewed reviewed              TA Lumbar Facet PIC reviewed   reviewed reviewed              TA   Order of Self-Corrections                   TA Use of lumbar pillow  reviewed                 TA Use of cervical roll        instructed           TA Use of orthotics                   TA Use of SI belt                   TA Use of Nada chair                   TA Use of Ice  reviewed                 TA Use of [employer]/ heat  reviewed   Pain cakes              TA Use of TENS unit  In car, while driving                 TA Use of iontophoresis                   TA Decreasing Disc Pressures   reviewed                TA Step up/down w/ inner unit engaged          instructed                             NMR Diaphragmatic Breathing      instructed      Review/correct       NMR Diaphragmatic breath w/  pelvic floor            instruct       NMR Diaphragmatic breath w/ trans abdom x3 lower costal breaths            instruct       NMR Pelvic Floor Isolation       review            NMR Transverse Abdominus       Instruct/ adapt correct           NMR Multifidus Isolation                   NMR InnerUnit against Gravity           reviewed        NMR Sit-stand w/ inner unit                   NMR Roll w/ inner unit                   NMR Walk w/ inner unit                    NMR Up/down steps w/ inner unit                   NMR Water Exer Instruction                   NMR Hip Abd w/o piriformis    instructed corrected corrected             NMR Hip int/ext rotation supine w/ feet up on chair     instructed corrected             NMR Diaphragmatic Breathing w/ pelvic floor      instructed review            NMR Pelvic floor in sitting on towel foll      instructed stopped            NMR Hip Add w/o iliop/ham                    NMR Lower abs in supine                   NMR Abd obliques in supine                   NMR Prone Knee Flexion                   NMR Prone glut valentino                   NMR Retro Step                   NMR Retro Walk                   NMR Retro walk on treadmill                   NMR Forward walk w/ inner unit                   NMR Balance Instruction                   NMR Stability Ball A/P & Lateral                   NMR Ball Catch/Throw /Supine                   NMR Ball Catch/Throw /Stand                   NMR StabilityBall All 4's Arm Lift                   NMR StabilityBall Prone Walk Out                   NMR StabilityBall Supine Oblique                   NMR Stability Ball sit-supine-sit                   NMR Walking Prog Progression                   MNR Home program sequencing                                       TA Hamstring stretch                   TA Hip Ext Rot Stretch          review          TA Hip Int Rot Stretch                   TA Hip Flex/IT Stretch                   TA Hip Add  Stretch                   TA Lats Dorsi Stretch                   TA Gastroc/soleus stretch                   TA QuadratusLumborm Stretch                      Supine                      Stance                   TA Quad Stretch                                       NMR Wall Push Ups                   NMR Planking                   NMR BOSU Ball Exercises                   NMR Lateral Bridge Drop                   NMR Waiters Bath                   NMR O'Feldt Lat Pull Down                   NMR O'Feldt Hip Ext                   NMR O'Feldt Trunk Ext                                       TA CervDiscExtSup/stnd   reviewed                TA Cerv Stretches                   TA Self PIC Cervical Spine                   TA Neural Gliding                   TA Ant/Mid Scalene Strch                   TA Biceps stretch                   TA Rotator Cuff Stretch                   TA Anterior Chest Stretch                   TA Wrist Ext Stretch                   TA Lats Dorsi stretch    instructed               NMR Prone Arm Lifts                   NMR Scapula Stabilization                   NMR Occulomotor Isometrics                   NMR Cervical Isometrics                                       TA Shld AROM w/bar                   TA Shld Capsular Stretching                   TA Self PIC Thor Spine   reviewed  Adapt and corrected reviewed             TA Rot Cuff Therabd, beginner                   TA Rot Cuff Therabd, intermed                                                                                                                                                                                                                                                 Karen Chatterjee, PT, MS  Physical Therapist  [place]# [id number]

## 2021-05-11 ENCOUNTER — TREATMENT (OUTPATIENT)
Dept: PHYSICAL THERAPY | Facility: CLINIC | Age: 71
End: 2021-05-11

## 2021-05-11 DIAGNOSIS — M54.2 CERVICAL PAIN: ICD-10-CM

## 2021-05-11 DIAGNOSIS — M53.3 SACROILIAC JOINT DYSFUNCTION: Primary | ICD-10-CM

## 2021-05-11 PROCEDURE — 97112 NEUROMUSCULAR REEDUCATION: CPT | Performed by: PHYSICAL THERAPIST

## 2021-05-11 PROCEDURE — 97140 MANUAL THERAPY 1/> REGIONS: CPT | Performed by: PHYSICAL THERAPIST

## 2021-05-11 NOTE — PROGRESS NOTES
" Physical Therapy Re-Evaluation and Updated Plan of Care  SUBJECTIVE:  Changes since last seen:   Maribel states she's \"about the same, nerve pain down right leg, occasionally, every 2 days\", however she can't correlate this with any activity other than possibly bending over forward.  \"Its worse if I sit for > 30 min.\"  She states, \"Its harder to live with this pain now as it is limiting my activities, need to lay down for a while during the day, then get up and ok.\"   She feels like the Mobic medication helps decrease pain but hard on her stomach.  \"I feels like I now have to medicate in order to function.\"   She has the Theraworx relief foam, for muscle spasm, she will switch to joint stiffness fomula and use ice on her low back.   She's been doing yard work.  She notes she had steroid injection in left shouder by Dr. Valle, 3 wks ago,  which has really helped.  The Voltaren gel has really helped her shoulders but doesn't seem to help her low back.    She's been using voltaren gel and ice on right gr trochanter.  Shes been attentive to how she sits and her neck has been pretty good  She is self correcting her SI joints and lumbar facets once/day.         Current level of pain:   Right buttock/SI jt 0/10  Left SI joint  0/10  Lumbar spine 0/10   Right Gr troch 2/10 all time  Neck 0/10 Left upper trap when sleeps     Lowest level of pain since last seen:    0/10   0/10    1/10     0/10              Highest level of pain since last seen:     2-3/10    2-3/10     7/10   After bending  2/10              Past Medical History:  1.  Cervical DDD  2.  Hx of Bilateral THR, left 2012, right 2010  3.  Septoplasty, 2011  4.  Hx of SI joint dysfunction  5.  Fibromyalgia  6.  Hx of hypothyroidism  7.  Hx of gastritis  8.  Irritable bowel syndrome/spastic colon  9.  Osteoarthritis  10.  Hx of 4 pregnancies, 2 normal vaginal deliveries  11. Hx of Dequervains syndrome  12.  Hx of fx left sesmoid  13.   Hx of positional vertigo  14. " Osteopenia, diagnosed May 2014  15  Hx of migraine headaches  16.  TMJ dysfunction, 2015  17.  Hx of Cardiomyopathy/ left catheterization 2002  18.  Dyslipidemia  19.  Hx of neuroma pain left foot  20.  Hx of anxiety/ depression    Functional Limitations:  Standing, sleeping,   Patient Goals for Physical Therapy: decreased pain      OBJECTIVE:  No shift of pelvis      12/05/2019 12/17/19 01/07/20 02/18/20 03/17/20 06/11/20 07/02/20 08/13/20 09/29/20 01/07/21 02/23/21 04/01/21 05/11/21        SI Joint Positioning  Right  Left Right  Left Right  Left Right  Left Right  Left Right  Left Right  Left Right  Left Right  Left Right  Left Right  Left Right  Left Right  Left Right Left Right Left Right Left Right  Left Right  Left       Innominate                            Anterior               x              x              x                x               x              x              sl   Sl    x   x    x    x Very sl               Posterior    x   x    x   x    x    x  sl               Sll               x                x              x              x       Very sl           Sacrum                               Unilateral rotation    x   x   x   x   x    x   sl   x   x    x   x   x Very sl                                SI Joint Testing  Right  Left Right  Left Right  Left Right  Left Right  Left Right  Left Right  Left Right  Left Right  Left Right  Left Right  Left Right  Left Right  Left Right Left Right Left Right Left Right  Left Right  Left           Distraction   +         +   +        +   +         +   +          +   +            +   +          +   +          +   +         +   +         +   +          +   +          +   +         +   +       +                Beatriz's    -        -            -         -   -          -   -         -                Compression   -         -            -          -   -         -   -          -                Prone Press Up   -            -            Sl       Sl    -           -  sl        Sl                              Special Tests  Right   Left Right  Left Right  Left Right  Left Right  Left Right  Left Right Left  Right  Left Right  Left Right  Left Right  Left Right  Left Right  Left Right Left Right Left Right Left Right Left  Right  Left      Straight Leg Raise                                     Active   -          -  -       -        -         -   -           -   -           -   -           -    -         -   -          -   -         -                Passive    -           -   -          -           -           -   -        -   -        -   -          -    -          -   -         -   -        -            Hip Scour Test                                                                      Palpation:      Muscle/Bone Irritation  Date 12/05/2019 12/17/19 01/07/20 02/18/20 03/17/20 06/11/20 07/02/20 08/13/20 09/29/20 01/07/21 02/23/21 04/01/21 05/11/21         Right  Left Right  Left Right  Left Right  Left Right  Left Right  Left Right  Left Right  Left Right  Left Right  Left Right  Left Right  Left Right  Left Right Left Right Left Right Left Right  Left Right  Left   SI joint  +        ++   +          +    +         sl   +         Sl     +          +   +         +    +           +   +         Sl    Sl        -    +       sl   +         -   +        -   -           -        Piriformis   Sl        +    ++        +     +        sl     +       sl     +          +   +        sl   +        +    +         sl     +       sl   ++      +   ++      sl   +          sl        Gr. Trochanter  ++        +   +          +     Sl      Sl     ++       sl   ++         +   +       +   +          +    +         -     +        -   ++       -  ++       sl   ++        Sl         IT Band  sl        sl   +         sl   Sl          -   Sl        -   Sl        Sl    -           -   -           -    +         -    -         -    -        -   -        -    Sl          -        Quadratus Lumborum  ++        +    +          sl   Sl         sl   -            -   -           -   -            -   -         -    Sl       sl   +          +    -         -   -        -    -           -        Ischial Tuberosity  -        -   -          -   -          -   -            -   -          -    -            -   -           -    -         -   -          -    -         -   Sl       Sl    -          -        Sacrococcygeal Ligs  -        -   -           -   -             -   -            -   -           -   -           -   -            -    -         -   -           -   -           -   Sl       Sl     -           -        Paraspinals  +        -   Sl           -   -          sl   -            -   +         -   +          sl   Sl          +    +         sl   -           +   +          Sl       -            -        Spinous Processes                                        C-spine rotated to               C2-6           C2-5          C2               T-spine rotated to   T3-5                     T4-6  T6-8 T3-4 T3-4    T4-5 T3-6   T4-6  T4-6   -          -  T4-7 T4-6               L-spine rotated to  L5           L5           L4-5   -          - L3-5 L4-5            L4-5 L4-5 L4-5            L4-5  L5              L5   -          -        Adductor Fabio  -        -   -           -   -           sl   -           -   -        -   -          -   Sl         Sl   Sl       sl Sl          Sl   sl        Sl   sl            +   +         Sl         Iliopsoas  +        +   Sl          sl   Sl           sl   Sl         -   Sl       -   +          +   +         +   sl         sl   +        sl   +          +   -        -   -          -        Quad Origin  -        -    -          -    -            -   -           -   -           -   -            -    -          -   -          -   -          -    -          -        Pubic Symphysis         +                   Sl       sl        -           sl         sl         sl        +       -         -        Bakers cyst         -         sl    -         +   -          sl   -            -    -          -    +         +   +        Sl    -         -   -          -        Pes Anserine Bursa      +          -   +         ++   Sl         +   -sl        -    -           -   -          -   -         -   -        -   -          -           Rectus Diastasis   2 finger                    IT Band insertion           +         -   -           -   -          -   -        -   -         -          All 4s Positioning: Pt not able to assume full lumbar extension in this position      Monthly Objective Measurement/Functional Activity  Date: 12/05/2019 01/07/20 02/18/20 03/17/20 06/11/20 07/02/20 08/13/20 09/29/20 01/07/21 02/23/21 04/01/21 05/11/21       Oswestry Pain Score 52/100    45/100    44/100 44/100     44/100   44/100    44/100   44/100     44/100     60/100    50/100    48/100       LE Functional Scale                   Neck Disability Index      32/100      42/100     44/100       44/100     46/100     36/100                          AROM Lumbar Spine: Degrees   Degrees      Degrees      Degrees      Degrees    Degrees Degrees Degrees Degrees Degrees Degrees Degrees Degrees Degrees Degrees    Degrees      Flexion 114           97      112      112    110     89    95    78        93     98      86        114          Extension 23 pain on right            27      36      29      20     30 pain right scapula    27 pain right SI and Scap     37        16     24      18        18          Right Lat. Flexion 33           34      27 pain right SI        31       31    28  25 32        22      20     19        20          Left Lat. Flexion 25           27      28       24        22     25    26  24        25       25       24        22          Right Lat Shift Pelvis No change No change  No change No change  no change No cahnge No change  No change No cahnge  stiff stiff      stiff          Left Lat Shift Pelvis No change pulls on right  Tightness No  pain  No change  no change  pain right buttock  tight  no change  no change  stiff  stiff      stiff                          AROM Cervical Spine  Degrees Degrees Degrees Degrees Degrees Degrees Degrees Degrees Degrees Degrees Degrees Derees Degrees Degrees Degrees     flexion    45 pain        45        59       45 pain      45         58               63      62     48         extension    51 dizzy        50        62       58       60         72        61      50      54         Right lateral flex    56 pain r upper trap         55        47       47 pain       49        54       45      48      48         Left lateral flex  57 pain right elbow        58        40      39      43       48       47       46      49         Right rotation          44         45       46     42 pain       40     45       48       40      47          Left rotation           40          40       41     40      37       40        39        37      43                          AROM Hips:  (degrees) Right      Left Right Left Right  Left Right  Left Right  Left Right  Left Right Left Right Left Right Left Right Left Right  Left Right Left Right Left Right Left Right Left Right  Left      Flexion 95       98  98        100  100      95  98        105   95       100 96       98  98       95  100     95  105       100    112    108 114       110 115     110          Abduction 42       45  45         45   45         45  42        46  40          45 40       40 38         39  36      37 38         40    39        37 38          37  35       35          Int. Rotation 25       15  25         28  25          25   26       28    25        30   25      32   22        30  25       30   28       22  25          24  23         24  25       30          Ext. Rotation  30        40              32        40   31       46     45       46  45       47  42        45  40      45   32       45   31        38  34         38  35       40                           Flexibility:   (degrees) Right    Left Right  Left Right  Left Right  Left Right  Left Right  Left Right Left Right Left Right Left Right Left Right  Left Right Left Right Left Right Left Right Left Right  Left      Hamstrings -47       -49   -45   -50    -45      -48   -43     -42   -42      -41  -45      -48  _46      -48 - 45     -46  -52    -47     -50     -48   -48     -47  -45    -45          Quadriceps 45       40  48       45     55      54   58       60    65        67  63        65  60        63   58      60   60     63    62     65    63      61  60      60          HipExt/Rot(piriformis) 42       43    45       45   45    48   43       46    45        45  45        40  42        40  38      41  47      45   46        45    47        45  45       45          Hip Int/Rotators 38       37   37        35   35      35   32        36    25         30  30        35  32        37  30       35   28      34    25       32   28         31  25       30          Hip Flexors (iliopsoas) Not tested         Not tested Not tested Not tested Not tested Not tested Not tested  not tested Not tested Not tested Not tested Not tested          IT Band Not tested          not tested  not tested  not tested  not tested  not tested  not tested  not tested  not tested   not tested  not tested  not tested                                             Root Level  - Motor Right      Left Right  Left Right  Left Right  Left Right  Left Right  Left Right Left Right Left Right Left Right Left Right  Left Right Left Right Left Right Left Right Left Right  Left     T12             Rectus Abdominus 4+/5        4+/5         4+/5      4+/5       4+/5    4 to 4+/5    4 to 4+/5   4 to 4+/5       4+/5        4+/5      4+/5      4+/5          L1              Paraspinals 5/5      5/5   5/5       5/5  5/5        5/5  5/5        5/5   5/5       5/5  5/5        5/5 5/5        5/5 5/5        5/5   5/5        5/5  5/5       5/5            L2               Hip Flexion 4+/5      4+/5  4+/5    4+/5  5/5        5/5  55        5/5   5/5       5/5   5/5        5/5 5/5        5/5  5/5        5/5  5/5        5/5   5/5       5/5           L3             Quads 5/5      5/5  5/5       5/5   5/5        5/5  5/5        5/5   5/5       5/5   5/5        5/5 5/5        5/5  5/5        5/5  5/5        5/5   5/5       5/5           L4              Anterior Tibialis 5/5      5/5  5/5       5/5  5/5        5/5  5/5        5/5   5/5       5/5   5/5        5/5 5/5        5/5  5/5       5/5  5/5        5/5   5/5       5/5           L5             Gr. Toe Extension 5/5      5/5  5/5       5/5   5/5        5/5 5/5         5/5   5/5       5/5   5/5        5/5 5/5        5/5  5/5        5/5  5/5        5/5   5/5       5/5           S1            Gastroc/Sol/Peroneals 4+/5    4+/5   4+/5    4+/5  4+/5    4+/5  4+/5     4+/5   4+/5   4+/5 4+/5      4+/5 5/5        5/5  5/5        5/5  5/5        5/5   5/5       5/5                            Functional Strength:                     Single LegStanceTime (seconds) R:24   L:25   R:30  L:27 R:30   L:30 R:     L: R:      L: R:22    L:15  R: 30 L:30   R:30   L:15   R:30   L:30   R:      L: R:      L:   R:      L:   R:      L:    R:      L:    R:      L: R:      L:     Pelvic Floor Isolation (seconds) 10 sec  10 sec                    Inner Unit  Isolation (seconds) 10 sec  10 sec                                                       Functional Activities:                      Sit w/o pain? Pain increases (minutes) yes   Yes/ 30-60 min  yes/ 30-60 Yes/ 20 min  Yes/ 30-60 min Yes/ 30-60 Yes/ 30 min  Yes/ 30-60 min yes30 min  Yes/ 30  Yes/ 30-45 min Yes/ 30  min          Stand w/o pain? 20 min Yes/ 30 min Yes/ 30 min Yes/ 30 min Yes/ 30 min Yes/ 30 min Yes/ 15 min   yes/ 15-30 min Yes/ 15 min  Yes/ 15 min  Yes/ 15-30 Yes/ 15-30 min          Walk w/o pain? 30 min   yes/ 1/4 mile Yes/ 30 min Yes/ 45 min  Yes/ 1 mile Yes/ 30 min  Yes/ 30 min   yes/ 1/4  "mile Yes/ 30 min  Yes/ 30 Yes/ 60 min   yes/ 1/4 mile          Steps w/o pain? Yes, ignores pain  Yes/ ignores pain Yes/ no limit Yes/ no limit Yes/ no limit Yes/ no limit No/ worst getting winded   no/ more winded than pain No problem  No problem  No problem  Yes/  1 fl          Drive w/o pain? Uncomfortable, 60 min  Yes/ w/ supports and TENS 2 hrs Yes/ 60 min  Yes/ 60 min  Yes/ 1-2 hrs Yes/ 3 hrs Yes/ 1 hr   yes/ 1-2 hrs Yes/ 90  min with TENS Yes/ hasn't gone anyway  Yes/ 30-45 min  Yes/ 60 min          Work w/o pain? n/a   n/a     n/a    n/a   n/a  n/a  n/a    n/a   n/a          # times wakes w/ pain? Sleeping on couch b/c or right shld pain, since July Continues to sleep on couch  Continues to sleep on the couch  2x from right shoulder and neck, she is still sleeping on couch  1-2x   1-2x   3-4x , some pain and some not sure   1-2x 2x  More comfortable on left  Every 2 hrs, back neck and shoulder  Wakes evry 3 hrs, brain activity and some pain  Wakes several times, not necessarily  from pain           PLAN OF CARE:    ASSESSMENT:  Problem List:   1. Flare up of SI joint dysfunction  2. Decreased spinal stabilization  3. Postural dysfunction     Discussion:  Maribel is concerned that she has lost a significant amount of height in stance.  She states she used to measure 5' 2 1/4\" in height, however measuring her here in the clinic today she's  5\" 1 1/4\".    Progressed spinal stabilization with diaphragmatic breathing adding capital nodding.  When she moved her head back in extension in supine she had vertigo symptoms.    Changed direction of today's instruction to fundamental abdominal exercise, bridging.   This was very hard for her at first, but with cueing she was able to perform correctly.   She is only able to bridge 1-2\" w/o pain.  If she raised her pelvis more than 2 \" she has pain.  She will progress bridging without pain.     Her Oswestry Pain score has improved slightly from 50/100 to 48/100.  Her Neck " "Disability Index score improved from 46/100 to 36/100!    Maribel is self managing her symptoms fairly well as noted above.      Maribel continues to request to be seen on a once every 4-6 week basis to review and progress her home program and to have manual work done to augment her home program.     Short Term Goals: (4-6 weeks)                                   Date Met:                1.   pt independent in postural correction         02/18/20  2.   pt independent in SI joint self-corrections        12/17/19  3.   pt independent in exer to decrease post. disc pressures      12/17/19  4.   pt able to sleep through night without pain        09/29/20  5.   pt able to stand 10  minutes without pain        01/07/20  6.   Reduce pt pain to no greater than 4/10         01/07/20  7.   Decrease Oswestry Pain Score to no greater than 45/100      01/07/20  8.  Decrease Oswestry Pain Score to no greater than  40/100  9.  Pt able to perform hip abduction x6 w/o pain        09/29/20  10. Decrease pain right gr trochanter to no greater than 4/10      03/17/20  11. Pt able to engage inner unit in supine w/ legs on chair, moving in int/ext rotation w/o pain x6  12.  Pt able to contract pelvic floor on exhaling a diaphragmatic breath x6    07/02/20  13.  Pt able to isolate the transverse abdominus with forearms on counter and in slight lumbar ext x6 08/13/20  14.  Pt able to perform step up/down with inner unit musculature engaged x6 without pain  15.   Pt able to engage inner unit musculature on sit to stand and stand to sit, x6 w/o low back pain  16.  Pt able to perform diaphragmatic breathing x6, in supine      05/11/21  17.  Pt able to do diaphrag breath engaging transv abdominus exhaling,  3 breaths w/ lower costals 05/11/21  18.  Decrease Neck Disability score to no greater than 35/100  19.  Pt able to bridge with inner unit engaged, 2\" w/o pain, x6    Long Term Goals:(3-5 months)      Date Met:      1.  pt independent in " self-management of symptoms       2.  pt independent in home program      3. Reduce pain to no greater than 2/10      4.  Pt able to  puppies without pain.      Treatment Plan:   1.  Ultrasound to increase extensibility, decrease pain, if needed  2.  Electrical stimulation for muscle relaxation, decrease pain, if needed, iontophoresis  3.  Manual therapy to increase function, decrease pain  4.  Therapeutic exercise to increase function, decrease pain  5.  Home programming and d/c planning to increase function and decrease pain    Frequency & Duration:  Pt will be seen on a once/every 4-6 week basis for 3 more visits to meet remaining goals.     Patient Participated in and Agrees With This Plan of Care and Goals:  YES  Rehab Potential:  Fair to good      Karen Chatterjee PT, MS  Physical Therapist  [place]# [id number]      Medicare Certification:  90 Day Recertification  Certification Period: 05/11/21 to 08/11/21  I certify that the therapy services are furnished while this patient is under my care.  The services outlined above are required by this patient, and will be reviewed every 90  days.     PHYSICIAN:     DATE:     Please sign and return via fax to [facility] [address] Fax # [phone]. Thank you, [facility].        Below is the Physical Therapy Re-evaluation for your patient, Barbara Hernandez .  If this meets with your approval please sign the Medicare Certification at the end of the evaluation and return to our office.  If you have any questions, please contact me.  Best Regards,  Karen Chatterjee PT, MS          ITREATMENT TODAY:    Total Treatment Time: (time in clinic)            60  Timed Code Treatment Minutes:   60      Re-Evaluation:   RX min:      Manual Therapy:  (MA)  RX min:   40  Manual posterior rotation of right innominate    Positional Isometric contraction (PIC) of left iliopsoas    Positional Isometric contraction of (PIC) right gluteus minimus     Distraction of both SI jts in open pack hip position  Piriformis stretching, bilaterally    Quadratus lumborum stretching, bilaterally    Anterior/Inferior Mobilization of right  hip    Myofascial work trunk  Manual spinal distraction        Therapeutic Activities:  (TA)  RX min:     Neuromuscular Reeducation: (NMR)  RX min:  20    Ultrasound:  RX min:          1.6 piper/cm2       Location:      Iontophoresis:  6 hr patch                                Location:                               Procedures:      Key:  i=instructed        r=review        c=corrected        a=adapted   Code Procedure/Instruction 12/05/2019 12/17/19 01/07/20 02/18/20 03/17/20 06/11/20 07/02/20 08/13/20 09/29/20 01/07/21 02/23/21 04/01/21 05/11/21      TA         Sleeping Positions                reviewed reviewed     corrected           TA Sternum-Up Posture  reviewed corrected       reviewed         TA SI jt. self-correction reviewed   reviewed reviewed              TA Lumbar Facet PIC reviewed   reviewed reviewed              TA   Order of Self-Corrections                   TA Use of lumbar pillow  reviewed                 TA Use of cervical roll        instructed           TA Use of orthotics                   TA Use of SI belt                   TA Use of Nada chair                   TA Use of Ice  reviewed                 TA Use of [employer]/ heat  reviewed   Pain cakes              TA Use of TENS unit  In car, while driving                 TA Use of iontophoresis                   TA Decreasing Disc Pressures   reviewed                TA Step up/down w/ inner unit engaged          instructed                             NMR Diaphragmatic Breathing      instructed      Review/correct reviewed      NMR Diaphragmatic breath w/ pelvic floor            instruct reviewed      NMR Diaphragmatic breath w/ trans abdom x3 lower costal breaths            instruct reviewed      NMR diaphragmatic breath w/ transverse abdom bridging              instructed                          NMR Pelvic Floor Isolation       review      reviewed      NMR Transverse Abdominus       Instruct/ adapt correct           NMR Multifidus Isolation                   NMR InnerUnit against Gravity           reviewed        NMR Sit-stand w/ inner unit                   NMR Roll w/ inner unit                   NMR Walk w/ inner unit                    NMR Up/down steps w/ inner unit                   NMR Water Exer Instruction                   NMR Hip Abd w/o piriformis    instructed corrected corrected             NMR Hip int/ext rotation supine w/ feet up on chair     instructed corrected             NMR Diaphragmatic Breathing w/ pelvic floor      instructed review            NMR Pelvic floor in sitting on towel foll      instructed stopped            NMR Hip Add w/o iliop/ham                    NMR Lower abs in supine                   NMR Abd obliques in supine                   NMR Prone Knee Flexion                   NMR Prone glut valentino                   NMR Retro Step                   NMR Retro Walk                   NMR Retro walk on treadmill                   NMR Forward walk w/ inner unit                   NMR Balance Instruction                   NMR Stability Ball A/P & Lateral                   NMR Ball Catch/Throw /Supine                   NMR Ball Catch/Throw /Stand                   NMR StabilityBall All 4's Arm Lift                   NMR StabilityBall Prone Walk Out                   NMR StabilityBall Supine Oblique                   NMR Stability Ball sit-supine-sit                   NMR Walking Prog Progression                   MNR Home program sequencing                                       TA Hamstring stretch                   TA Hip Ext Rot Stretch          review          TA Hip Int Rot Stretch                   TA Hip Flex/IT Stretch                   TA Hip Add Stretch                   TA Lats Dorsi Stretch                   TA Gastroc/soleus  stretch                   TA QuadratusLumborm Stretch                      Supine                      Stance                   TA Quad Stretch                                       NMR Wall Push Ups                   NMR Planking                   NMR BOSU Ball Exercises                   NMR Lateral Bridge Drop                   NMR [place]                   NMR O'Feldt Lat Pull Down                   NMR O'Feldt Hip Ext                   NMR O'Feldt Trunk Ext                                       TA CervDiscExtSup/stnd   reviewed                TA Cerv Stretches                   TA Self PIC Cervical Spine                   TA Neural Gliding                   TA Ant/Mid Scalene Strch                   TA Biceps stretch                   TA Rotator Cuff Stretch                   TA Anterior Chest Stretch                   TA Wrist Ext Stretch                   TA Lats Dorsi stretch    instructed               NMR Prone Arm Lifts                   NMR Scapula Stabilization                   NMR Occulomotor Isometrics                   NMR Cervical Isometrics                                       TA Shld AROM w/bar                   TA Shld Capsular Stretching                   TA Self PIC Thor Spine   reviewed  Adapt and corrected reviewed             TA Rot Cuff Therabd, beginner                   TA Rot Cuff Therabd, intermed                                                                                                                                                                                                                                                 Karen Chatterjee, PT, MS  Physical Therapist  [place]# [id number]

## 2021-06-22 ENCOUNTER — TREATMENT (OUTPATIENT)
Dept: PHYSICAL THERAPY | Facility: CLINIC | Age: 71
End: 2021-06-22

## 2021-06-22 DIAGNOSIS — M53.3 SACROILIAC JOINT DYSFUNCTION: Primary | ICD-10-CM

## 2021-06-22 DIAGNOSIS — M54.2 CERVICAL PAIN: ICD-10-CM

## 2021-06-22 PROCEDURE — 97112 NEUROMUSCULAR REEDUCATION: CPT | Performed by: PHYSICAL THERAPIST

## 2021-06-22 PROCEDURE — 97140 MANUAL THERAPY 1/> REGIONS: CPT | Performed by: PHYSICAL THERAPIST

## 2021-06-22 NOTE — PROGRESS NOTES
" Physical Therapy Note  SUBJECTIVE:  Changes since last seen:   \"Been ok, but feeling it today as I was out cutting tree limbs; it hurt my neck more than shoulders or low back.\"'[   \"My back pain has been more central rather than on either side.\"   Maribel notes she injured her left medial knee several years ago, \"Its an old injury which acts up occasionally and it is bothering me now.\"  She states she uses Voltaren gel on it but hasn't been icing it.  \"For 10 days, I had a 4 month old puppy at her house, bent over a lot because it wasn't house trained, which flared up my back a little.\"  She notes it kept her up at night, contributing to the back flare up that week.   She notes she has trouble discerning when she is stiff and in need of doing the SI joint self corrections.  She has worked some on the diaphragmatic breathing engaging the transverse abdominus, \"it is hard to do.\"       Current level of pain:   Right buttock/SI jt 0/10  Left SI joint  0/10  Lumbar spine 0/10   Right Gr troch 2/10 all time  Neck 2/10 Left upper trap when sleeps     Lowest level of pain since last seen:    0/10   0/10    1/10     0/10              Highest level of pain since last seen:     3/10    3/10     3/10   After bending  2/10              Past Medical History:  1.  Cervical DDD  2.  Hx of Bilateral THR, left 2012, right 2010  3.  Septoplasty, 2011  4.  Hx of SI joint dysfunction  5.  Fibromyalgia  6.  Hx of hypothyroidism  7.  Hx of gastritis  8.  Irritable bowel syndrome/spastic colon  9.  Osteoarthritis  10.  Hx of 4 pregnancies, 2 normal vaginal deliveries  11. Hx of Dequervains syndrome  12.  Hx of fx left sesmoid  13.   Hx of positional vertigo  14. Osteopenia, diagnosed May 2014  15  Hx of migraine headaches  16.  TMJ dysfunction, 2015  17.  Hx of Cardiomyopathy/ left catheterization 2002  18.  Dyslipidemia  19.  Hx of neuroma pain left foot  20.  Hx of anxiety/ depression    Functional Limitations:  Standing, sleeping, "   Patient Goals for Physical Therapy: decreased pain      OBJECTIVE:  No shift of pelvis      12/05/2019 12/17/19 01/07/20 02/18/20 03/17/20 06/11/20 07/02/20 08/13/20 09/29/20 01/07/21 02/23/21 04/01/21 05/11/21 06/22/21       SI Joint Positioning  Right  Left Right  Left Right  Left Right  Left Right  Left Right  Left Right  Left Right  Left Right  Left Right  Left Right  Left Right  Left Right  Left Right Left Right Left Right Left Right  Left Right  Left       Innominate                            Anterior               x              x              x                x               x              x              sl   Sl    x   x    x    x Very sl   x              Posterior    x   x    x   x    x    x  sl               Sll               x                x              x              x       Very sl               x          Sacrum                               Unilateral rotation    x   x   x   x   x    x   sl   x   x    x   x   x Very sl   x                               SI Joint Testing  Right  Left Right  Left Right  Left Right  Left Right  Left Right  Left Right  Left Right  Left Right  Left Right  Left Right  Left Right  Left Right  Left Right Left Right Left Right Left Right  Left Right  Left           Distraction   +         +   +        +   +         +   +          +   +            +   +          +   +          +   +         +   +         +   +          +   +          +   +         +   +       +   +         +               Beatriz's    -        -            -         -   -          -   -         -   -          -               Compression   -         -            -          -   -         -   -          -   -         -               Prone Press Up   -            -            Sl       Sl    -           -  sl       Sl    Sl        Sl                             Special Tests  Right   Left Right  Left Right  Left Right  Left Right  Left Right  Left Right Left  Right  Left Right  Left Right  Left Right  Left Right   Left Right  Left Right Left Right Left Right Left Right Left  Right  Left      Straight Leg Raise                                     Active   -          -  -       -        -         -   -           -   -           -   -           -    -         -   -          -   -         -   -        -               Passive    -           -   -          -           -           -   -        -   -        -   -          -    -          -   -         -   -        -   -        -           Hip Scour Test                                                                      Palpation:      Muscle/Bone Irritation  Date 12/05/2019 12/17/19 01/07/20 02/18/20 03/17/20 06/11/20 07/02/20 08/13/20 09/29/20 01/07/21 02/23/21 04/01/21 05/11/21 06/22/21        Right  Left Right  Left Right  Left Right  Left Right  Left Right  Left Right  Left Right  Left Right  Left Right  Left Right  Left Right  Left Right  Left Right Left Right Left Right Left Right  Left Right  Left   SI joint  +        ++   +          +    +         sl   +         Sl     +          +   +         +    +           +   +         Sl    Sl        -    +       sl   +         -   +        -   -           -   +          +       Piriformis   Sl        +    ++        +     +        sl     +       sl     +          +   +        sl   +        +    +         sl     +       sl   ++      +   ++      sl   +          sl   +           +       Gr. Trochanter  ++        +   +          +     Sl      Sl     ++       sl   ++         +   +       +   +          +    +         -     +        -   ++       -  ++       sl   ++        Sl    +          ++       IT Band  sl        sl   +         sl   Sl          -   Sl        -   Sl        Sl    -           -   -           -    +         -    -         -    -        -   -        -    Sl          -   -          -       Quadratus Lumborum  ++        +    +         sl   Sl         sl   -            -   -           -   -            -   -         -    Sl        sl   +          +    -         -   -        -    -           -        Ischial Tuberosity  -        -   -          -   -          -   -            -   -          -    -            -   -           -    -         -   -          -    -         -   Sl       Sl    -          -    -         -       Sacrococcygeal Ligs  -        -   -           -   -             -   -            -   -           -   -           -   -            -    -         -   -           -   -           -   Sl       Sl     -           -     -        -       Paraspinals  +        -   Sl           -   -          sl   -            -   +         -   +          sl   Sl          +    +         sl   -           +   +          Sl       -            -     +          -       Spinous Processes                                        C-spine rotated to               C2-6           C2-5          C2  C2-3             T-spine rotated to   T3-5                     T4-6  T6-8 T3-4 T3-4    T4-5 T3-6   T4-6  T4-6   -          -  T4-7 T4-6  T4-7              L-spine rotated to  L5           L5           L4-5   -          - L3-5 L4-5            L4-5 L4-5 L4-5            L4-5  L5              L5   -          - L3-5       Adductor Fabio  -        -   -           -   -           sl   -           -   -        -   -          -   Sl         Sl   Sl       sl Sl          Sl   sl        Sl   sl            +   +         Sl   sl         Sl        Iliopsoas  +        +   Sl          sl   Sl           sl   Sl         -   Sl       -   +          +   +         +   sl         sl   +        sl   +          +   -        -   -          -   +          +       Quad Origin  -        -    -          -    -            -   -           -   -           -   -            -    -          -   -          -   -          -    -          -   -          -       Pubic Symphysis         +                   Sl       sl        -           sl         sl         sl        +       -         -         +       Bakers  cyst        -         sl    -         +   -          sl   -            -    -          -    +         +   +        Sl    -         -   -          -   -         -       Pes Anserine Bursa      +          -   +         ++   Sl         +   -sl        -    -           -   -          -   -         -   -        -   -          -      -          -       Rectus Diastasis   2 finger                    IT Band insertion           +         -   -           -   -          -   -        -   -         -   -            -         All 4s Positioning: Pt not able to assume full lumbar extension in this position      Monthly Objective Measurement/Functional Activity  Date: 12/05/2019 01/07/20 02/18/20 03/17/20 06/11/20 07/02/20 08/13/20 09/29/20 01/07/21 02/23/21 04/01/21 05/11/21 06/22/21      Oswestry Pain Score 52/100    45/100    44/100 44/100     44/100   44/100    44/100   44/100     44/100     60/100    50/100    48/100    50/100      LE Functional Scale                   Neck Disability Index      32/100      42/100     44/100       44/100     46/100     36/100    42/100                         AROM Lumbar Spine: Degrees   Degrees      Degrees      Degrees      Degrees    Degrees Degrees Degrees Degrees Degrees Degrees Degrees Degrees Degrees Degrees    Degrees      Flexion 114           97      112      112    110     89    95    78        93     98      86        114     94         Extension 23 pain on right            27      36      29      20     30 pain right scapula    27 pain right SI and Scap     37        16     24      18        18      18         Right Lat. Flexion 33           34      27 pain right SI        31       31    28  25 32        22      20     19        20      32         Left Lat. Flexion 25           27      28       24        22     25    26  24        25       25       24        22      13         Right Lat Shift Pelvis No change No change  No change No change  no change No cahnge No change  No change  No cahnge  stiff stiff      stiff   stiff         Left Lat Shift Pelvis No change pulls on right  Tightness No pain  No change  no change  pain right buttock  tight  no change  no change  stiff  stiff      stiff   stiff                         AROM Cervical Spine  Degrees Degrees Degrees Degrees Degrees Degrees Degrees Degrees Degrees Degrees Degrees Degrees Degrees Degrees Degrees     flexion    45 pain        45        59       45 pain      45         58               63      62     48       3        extension    51 dizzy        50        62       58       60         72        61      50      54      50        Right lateral flex    56 pain r upper trap         55        47       47 pain       49        54       45      48      48      46        Left lateral flex  57 pain right elbow        58        40      39      43       48       47       46      49      45        Right rotation          44         45       46     42 pain       40     45       48       40      47       45        Left rotation           40          40       41     40      37       40        39        37      43      41                         AROM Hips:  (degrees) Right      Left Right Left Right  Left Right  Left Right  Left Right  Left Right Left Right Left Right Left Right Left Right  Left Right Left Right Left Right Left Right Left Right  Left      Flexion 95       98  98        100  100      95  98        105   95       100 96       98  98       95  100     95  105       100    112    108 114       110 115     110  110    107         Abduction 42       45  45         45   45         45  42        46  40          45 40       40 38         39  36      37 38         40    39        37 38          37  35       35   32      35         Int. Rotation 25       15  25         28  25          25   26       28    25        30   25      32   22        30  25       30   28       22  25          24  23         24  25       30   24      32         Ext.  Rotation  30        40              32        40   31       46     45       46  45       47  42        45  40      45   32       45   31        38  34         38  35       40   36      40                         Flexibility:   (degrees) Right    Left Right  Left Right  Left Right  Left Right  Left Right  Left Right Left Right Left Right Left Right Left Right  Left Right Left Right Left Right Left Right Left Right  Left      Hamstrings -47       -49   -45   -50    -45      -48   -43     -42   -42      -41  -45      -48  _46      -48 - 45     -46  -52    -47     -50     -48   -48     -47  -45    -45   -42    -45         Quadriceps 45       40  48       45     55      54   58       60    65        67  63        65  60        63   58      60   60     63    62     65    63      61  60      60 Not tested         HipExt/Rot(piriformis) 42       43    45       45   45    48   43       46    45        45  45        40  42        40  38      41  47      45   46        45    47        45  45       45    39        45         Hip Int/Rotators 38       37   37        35   35      35   32        36    25         30  30        35  32        37  30       35   28      34    25       32   28         31  25       30     28     35         Hip Flexors (iliopsoas) Not tested         Not tested Not tested Not tested Not tested Not tested Not tested  not tested Not tested Not tested Not tested Not tested Not tested         IT Band Not tested          not tested  not tested  not tested  not tested  not tested  not tested  not tested  not tested   not tested  not tested  not tested  not tested                                            Root Level  - Motor Right      Left Right  Left Right  Left Right  Left Right  Left Right  Left Right Left Right Left Right Left Right Left Right  Left Right Left Right Left Right Left Right Left Right  Left     T12             Rectus Abdominus 4+/5        4+/5         4+/5      4+/5       4+/5    4 to 4+/5     4 to 4+/5   4 to 4+/5       4+/5        4+/5      4+/5      4+/5        4+/5         L1              Paraspinals 5/5      5/5   5/5       5/5  5/5        5/5  5/5        5/5   5/5       5/5  5/5        5/5 5/5        5/5 5/5        5/5   5/5        5/5  5/5       5/5            L2              Hip Flexion 4+/5      4+/5  4+/5    4+/5  5/5        5/5  55        5/5   5/5       5/5   5/5        5/5 5/5        5/5  5/5        5/5  5/5        5/5   5/5       5/5           L3             Quads 5/5      5/5  5/5       5/5   5/5        5/5  5/5        5/5   5/5       5/5   5/5        5/5 5/5        5/5  5/5        5/5  5/5        5/5   5/5       5/5           L4              Anterior Tibialis 5/5      5/5  5/5       5/5  5/5        5/5  5/5        5/5   5/5       5/5   5/5        5/5 5/5        5/5  5/5       5/5  5/5        5/5   5/5       5/5           L5             Gr. Toe Extension 5/5      5/5  5/5       5/5   5/5        5/5 5/5         5/5   5/5       5/5   5/5        5/5 5/5        5/5  5/5        5/5  5/5        5/5   5/5       5/5           S1            Gastroc/Sol/Peroneals 4+/5    4+/5   4+/5    4+/5  4+/5    4+/5  4+/5     4+/5   4+/5   4+/5 4+/5      4+/5 5/5        5/5  5/5        5/5  5/5        5/5   5/5       5/5                            Functional Strength:                     Single LegStanceTime (seconds) R:24   L:25   R:30  L:27 R:30   L:30 R:     L: R:      L: R:22    L:15  R: 30 L:30   R:30   L:15   R:30   L:30   R:      L: R:      L:   R:      L:   R:      L:    R:      L:    R:      L: R:      L:     Pelvic Floor Isolation (seconds) 10 sec  10 sec                    Inner Unit  Isolation (seconds) 10 sec  10 sec                                                       Functional Activities:                      Sit w/o pain? Pain increases (minutes) yes   Yes/ 30-60 min  yes/ 30-60 Yes/ 20 min  Yes/ 30-60 min Yes/ 30-60 Yes/ 30 min  Yes/ 30-60 min yes30 min  Yes/ 30  Yes/ 30-45 min Yes/ 30  min  Yes/ 30-60 min         Stand w/o pain? 20 min Yes/ 30 min Yes/ 30 min Yes/ 30 min Yes/ 30 min Yes/ 30 min Yes/ 15 min   yes/ 15-30 min Yes/ 15 min  Yes/ 15 min  Yes/ 15-30 Yes/ 15-30 min Yes/ 30 min         Walk w/o pain? 30 min   yes/ 1/4 mile Yes/ 30 min Yes/ 45 min  Yes/ 1 mile Yes/ 30 min  Yes/ 30 min   yes/ 1/4 mile Yes/ 30 min  Yes/ 30 Yes/ 60 min   yes/ 1/4 mile  yes/ 1/4 mile         Steps w/o pain? Yes, ignores pain  Yes/ ignores pain Yes/ no limit Yes/ no limit Yes/ no limit Yes/ no limit No/ worst getting winded   no/ more winded than pain No problem  No problem  No problem  Yes/  1 fl  yes/ 1 fl         Drive w/o pain? Uncomfortable, 60 min  Yes/ w/ supports and TENS 2 hrs Yes/ 60 min  Yes/ 60 min  Yes/ 1-2 hrs Yes/ 3 hrs Yes/ 1 hr   yes/ 1-2 hrs Yes/ 90  min with TENS Yes/ hasn't gone anyway  Yes/ 30-45 min  Yes/ 60 min  yes/ 60 min         Work w/o pain? n/a   n/a     n/a    n/a   n/a  n/a  n/a    n/a   n/a  n/a         # times wakes w/ pain? Sleeping on couch b/c or right shld pain, since July Continues to sleep on couch  Continues to sleep on the couch  2x from right shoulder and neck, she is still sleeping on couch  1-2x   1-2x   3-4x , some pain and some not sure   1-2x 2x  More comfortable on left  Every 2 hrs, back neck and shoulder  Wakes evry 3 hrs, brain activity and some pain  Wakes several times, not necessarily  from pain con't to wake many times, not necessarily from pain          PLAN OF CARE:    ASSESSMENT:  Problem List:   1. Flare up of SI joint dysfunction  2. Decreased spinal stabilization  3. Postural dysfunction     Discussion:  Maribel has been holding her own fairly well.  She notes she has to put more pillows under her upper back when sleeping to provide comfort and can do this best on the couch.     Her Oswestry Low Back Pain Score increased slightly from 48/100 to 50/100 which she attributes to cutting the limbs in her yard today.  Her Neck Disability Score significantly increased  "from 36/100 to 42/100 which she also attributes to working in her yard today.      She attempted to work on the diaphragmatic breathing to engage the \"Core\" musculature and breath with lower costals.  This is still hard for her to do.  She did better with a 2 lb weight on her chest to keep her upper chest from moving facilitating the diaphragmatic breath.  Reviewed the bridge with the Core engaged.  She was having trouble with this too.  Reviewed and corrected.      Progressed instruction to setting Core and adding heel slides in supine.  She did well with this and could feel how this stabilized her trunk.     Maribel continues to request to be seen on a once every 4-6 week basis to review and progress her home program and to have manual work done to augment her home program.     Short Term Goals: (4-6 weeks)                                   Date Met:                1.   pt independent in postural correction         02/18/20  2.   pt independent in SI joint self-corrections        12/17/19  3.   pt independent in exer to decrease post. disc pressures      12/17/19  4.   pt able to sleep through night without pain        09/29/20  5.   pt able to stand 10  minutes without pain        01/07/20  6.   Reduce pt pain to no greater than 4/10         01/07/20  7.   Decrease Oswestry Pain Score to no greater than 45/100      01/07/20  8.  Decrease Oswestry Pain Score to no greater than  40/100  9.  Pt able to perform hip abduction x6 w/o pain        09/29/20  10. Decrease pain right gr trochanter to no greater than 4/10      03/17/20  11. Pt able to engage inner unit in supine w/ legs on chair, moving in int/ext rotation w/o pain x6  12.  Pt able to contract pelvic floor on exhaling a diaphragmatic breath x6    07/02/20  13.  Pt able to isolate the transverse abdominus with forearms on counter and in slight lumbar ext x6 08/13/20  14.  Pt able to perform step up/down with inner unit musculature engaged x6 without pain  15.   " "Pt able to engage inner unit musculature on sit to stand and stand to sit, x6 w/o low back pain  16.  Pt able to perform diaphragmatic breathing x6, in supine      05/11/21  17.  Pt able to do diaphrag breath engaging transv abdominus exhaling,  3 breaths w/ lower costals 05/11/21  18.  Decrease Neck Disability score to no greater than 35/100  19.  Pt able to bridge with inner unit engaged, 2\" w/o pain, x6      06/22/21    Long Term Goals:(3-5 months)      Date Met:      1.  pt independent in self-management of symptoms       2.  pt independent in home program      3. Reduce pain to no greater than 2/10      4.  Pt able to  puppies without pain.      Treatment Plan:   1.  Ultrasound to increase extensibility, decrease pain, if needed  2.  Electrical stimulation for muscle relaxation, decrease pain, if needed, iontophoresis  3.  Manual therapy to increase function, decrease pain  4.  Therapeutic exercise to increase function, decrease pain  5.  Home programming and d/c planning to increase function and decrease pain    Frequency & Duration:  Pt will be seen on a once/every 4-6 week basis for 2 more visits to meet remaining goals.     Patient Participated in and Agrees With This Plan of Care and Goals:  YES  Rehab Potential:  Fair to good      Karen Chatterjee, PT, MS  Physical Therapist  [place]# [id number]      Medicare Certification:  90 Day Recertification  Certification Period: 05/11/21 to 08/11/21    ITREATMENT TODAY:    Total Treatment Time: (time in clinic)            60  Timed Code Treatment Minutes:   60      Re-Evaluation:   RX min:      Manual Therapy:  (MA)  RX min:   40  Manual posterior rotation of right innominate    Positional Isometric contraction (PIC) of left iliopsoas    Positional Isometric contraction of (PIC) right gluteus minimus    Distraction of both SI jts in open pack hip position  Piriformis stretching, bilaterally    Quadratus lumborum stretching, bilaterally    Anterior/Inferior " Mobilization of right  hip    Myofascial work trunk  Manual spinal distraction        Therapeutic Activities:  (TA)  RX min:     Neuromuscular Reeducation: (NMR)  RX min:  20    Ultrasound:  RX min:          1.6 piper/cm2       Location:      Iontophoresis:  6 hr patch                                Location:                               Procedures:      Key:  i=instructed        r=review        c=corrected        a=adapted   Code Procedure/Instruction 12/05/2019 12/17/19 01/07/20 02/18/20 03/17/20 06/11/20 07/02/20 08/13/20 09/29/20 01/07/21 02/23/21 04/01/21 05/11/21 06/22/21     TA         Sleeping Positions                reviewed reviewed     corrected           TA Sternum-Up Posture  reviewed corrected       reviewed         TA SI jt. self-correction reviewed   reviewed reviewed              TA Lumbar Facet PIC reviewed   reviewed reviewed              TA   Order of Self-Corrections                   TA Use of lumbar pillow  reviewed                 TA Use of cervical roll        instructed           TA Use of orthotics                   TA Use of SI belt                   TA Use of Nada chair                   TA Use of Ice  reviewed                 TA Use of [employer]/ heat  reviewed   Pain cakes              TA Use of TENS unit  In car, while driving                 TA Use of iontophoresis                   TA Decreasing Disc Pressures   reviewed                TA Step up/down w/ inner unit engaged          instructed                             NMR Diaphragmatic Breathing      instructed      Review/correct reviewed correct     NMR Diaphragmatic breath w/ pelvic floor            instruct reviewed correct     NMR Diaphragmatic breath w/ trans abdom x3 lower costal breaths            instruct reviewed correct     NMR diaphragmatic breath w/ transverse abdom bridging             instructed correct     NMR Diaphragmatic breath w/ TA engaged, heel slides              instructed     NMR Pelvic Floor Isolation        review      reviewed      NMR Transverse Abdominus       Instruct/ adapt correct           NMR Multifidus Isolation                   NMR InnerUnit against Gravity           reviewed        NMR Sit-stand w/ inner unit                   NMR Roll w/ inner unit                   NMR Walk w/ inner unit                    NMR Up/down steps w/ inner unit                   NMR Water Exer Instruction                   NMR Hip Abd w/o piriformis    instructed corrected corrected             NMR Hip int/ext rotation supine w/ feet up on chair     instructed corrected             NMR Diaphragmatic Breathing w/ pelvic floor      instructed review            NMR Pelvic floor in sitting on towel foll      instructed stopped            NMR Hip Add w/o iliop/ham                    NMR Lower abs in supine                   NMR Abd obliques in supine                   NMR Prone Knee Flexion                   NMR Prone glut valentino                   NMR Retro Step                   NMR Retro Walk                   NMR Retro walk on treadmill                   NMR Forward walk w/ inner unit                   NMR Balance Instruction                   NMR Stability Ball A/P & Lateral                   NMR Ball Catch/Throw /Supine                   NMR Ball Catch/Throw /Stand                   NMR StabilityBall All 4's Arm Lift                   NMR StabilityBall Prone Walk Out                   NMR StabilityBall Supine Oblique                   NMR Stability Ball sit-supine-sit                   NMR Walking Prog Progression                   MNR Home program sequencing                                       TA Hamstring stretch                   TA Hip Ext Rot Stretch          review          TA Hip Int Rot Stretch                   TA Hip Flex/IT Stretch                   TA Hip Add Stretch                   TA Lats Dorsi Stretch                   TA Gastroc/soleus stretch                   TA QuadratusLumborm Stretch                       Supine                      Stance                   TA Quad Stretch                                       NMR Wall Push Ups                   NMR Planking                   NMR BOSU Ball Exercises                   NMR Lateral Bridge Drop                   NMR [place]                   NMR O'Feldt Lat Pull Down                   NMR O'Feldt Hip Ext                   NMR O'Feldt Trunk Ext                                       TA CervDiscExtSup/stnd   reviewed                TA Cerv Stretches                   TA Self PIC Cervical Spine                   TA Neural Gliding                   TA Ant/Mid Scalene Strch                   TA Biceps stretch                   TA Rotator Cuff Stretch                   TA Anterior Chest Stretch                   TA Wrist Ext Stretch                   TA Lats Dorsi stretch    instructed               NMR Prone Arm Lifts                   NMR Scapula Stabilization                   NMR Occulomotor Isometrics                   NMR Cervical Isometrics                                       TA Shld AROM w/bar                   TA Shld Capsular Stretching                   TA Self PIC Thor Spine   reviewed  Adapt and corrected reviewed             TA Rot Cuff Therabd, beginner                   TA Rot Cuff Therabd, intermed                                                                                                                                                                                                                                                 Karen Chatterjee, PT, MS  Physical Therapist  [place]# [id number]

## 2021-07-22 ENCOUNTER — OFFICE VISIT (OUTPATIENT)
Dept: OBSTETRICS AND GYNECOLOGY | Facility: CLINIC | Age: 71
End: 2021-07-22

## 2021-07-22 VITALS
WEIGHT: 119.8 LBS | DIASTOLIC BLOOD PRESSURE: 74 MMHG | BODY MASS INDEX: 22.05 KG/M2 | SYSTOLIC BLOOD PRESSURE: 120 MMHG | HEIGHT: 62 IN

## 2021-07-22 DIAGNOSIS — N60.12 FIBROCYSTIC BREAST CHANGES, BILATERAL: ICD-10-CM

## 2021-07-22 DIAGNOSIS — N60.11 FIBROCYSTIC BREAST CHANGES, BILATERAL: ICD-10-CM

## 2021-07-22 DIAGNOSIS — B37.31 RECURRENT CANDIDIASIS OF VAGINA: ICD-10-CM

## 2021-07-22 DIAGNOSIS — N95.2 VAGINAL ATROPHY: ICD-10-CM

## 2021-07-22 DIAGNOSIS — Z78.0 MENOPAUSE: Primary | ICD-10-CM

## 2021-07-22 PROCEDURE — 99214 OFFICE O/P EST MOD 30 MIN: CPT | Performed by: OBSTETRICS & GYNECOLOGY

## 2021-07-22 RX ORDER — ESTRADIOL 10 UG/1
1 INSERT VAGINAL 3 TIMES WEEKLY
COMMUNITY
End: 2021-07-22 | Stop reason: SDUPTHER

## 2021-07-22 RX ORDER — FLUCONAZOLE 150 MG/1
150 TABLET ORAL EVERY OTHER DAY
Qty: 3 TABLET | Refills: 3 | Status: SHIPPED | OUTPATIENT
Start: 2021-07-22 | End: 2021-08-14

## 2021-07-22 RX ORDER — ESTRADIOL 10 UG/1
1 INSERT VAGINAL 3 TIMES WEEKLY
Qty: 36 TABLET | Refills: 3 | Status: SHIPPED | OUTPATIENT
Start: 2021-07-23 | End: 2022-07-11 | Stop reason: SDUPTHER

## 2021-07-22 NOTE — PROGRESS NOTES
Chief Complaint   Patient presents with   • Gynecologic Exam   • Med Refill     patient would like to have an ongoing prescription for fluconazole.       Barbara Hernandez is a 70 y.o. year old  presenting to be seen for her gynecologic exam with Chronic problems of vaginal atrophy secondary to menopause; recurrent vaginal candidiasis; and fibrocystic changes of the breast.  The vaginal atrophy has been successfully managed with Yuvafem vaginal tablets, 10 mcg 3 times a week.  She has no side effects on this medication.  She desires a prescription for fluconazole to treat her intermittent recurrences of vaginal candidiasis.  She has no side effects on this medication.  She has a history of stable, fibrocystic changes of the breast confirmed by imaging.  She denies bowel or urinary symptoms.      Prob List  Visit Dx  Meds     Problem list reviewed    Medications reviewed  SCREENING TESTS    Year 2012   Age                         PAP       Neg.                  HPV high risk                         Mammogram        benign benign                KOREY score                         Breast MRI                         Lipids                         Vitamin D                         Colonoscopy                         DEXA  Frax (hip/any)      osteopenia                   Ovarian Screen                             GYN screening history:  · Last mammogram: was done on approximately 10/7/2020 and the result was: Birads II (Benign findings)..    No Additional Complaints Reported    The following portions of the patient's history were reviewed and updated as appropriate:vital signs and   She  has a past medical history of Allergic, Anxiety, Back pain, Chronic diarrhea, Depression, ETD (eustachian tube dysfunction), Fibromyalgia, Fibromyositis, GERD (gastroesophageal reflux disease), H/O cardiomyopathy, Headache, HLD  (hyperlipidemia), Hypothyroidism, IBS (irritable bowel syndrome), Lichen sclerosus et atrophicus of the vulva, Menopause, Osteoarthritis, Osteopenia, Parvovirus B19 infection, Preventative health care, and Urinary tract infection.  She does not have any pertinent problems on file.  She  has a past surgical history that includes Total hip arthroplasty; Cryotherapy; Septoplasty; and Eye surgery (Right).  Her family history includes Alzheimer's disease in her father; Atrial fibrillation in her mother; Heart attack in her maternal grandmother; Hyperlipidemia in her brother; Hypertension in her brother; Hypothyroidism in her mother; Osteoarthritis in her mother; Stroke in her paternal grandmother.  She  reports that she quit smoking about 31 years ago. Her smoking use included cigarettes. She started smoking about 39 years ago. She has a 16.00 pack-year smoking history. She has never used smokeless tobacco. She reports previous alcohol use. She reports that she does not use drugs.  Current Outpatient Medications   Medication Sig Dispense Refill   • [START ON 7/23/2021] estradiol (VAGIFEM) 10 MCG tablet vaginal tablet Insert 1 tablet into the vagina 3 (Three) Times a Week. 36 tablet 3   • acetaminophen (TYLENOL) 325 MG tablet Take 650 mg by mouth every night at bedtime.     • B Complex Vitamins (VITAMIN B COMPLEX PO) Take 1 tablet by mouth Daily.     • Cholecalciferol (VITAMIN D3) 3000 UNITS tablet Take 1 tablet by mouth Daily.     • Coenzyme Q10 (CO Q-10) 100 MG capsule Take 400 mg by mouth Daily.     • diclofenac (VOLTAREN) 1 % gel gel Apply 1 application topically As Needed.     • dicyclomine (BENTYL) 10 MG capsule Take 1 capsule by mouth every 8 hours as needed for bowel spasm or diarrhea 30 capsule 0   • fluconazole (Diflucan) 150 MG tablet Take 1 tablet by mouth Every Other Day for 12 doses. 1 Every other day 3 tablet 3   • gabapentin (NEURONTIN) 100 MG capsule Take 1 capsule by mouth every night at bedtime. 30  "capsule 5   • Lutein 20 MG tablet Take 1 tablet by mouth Daily.     • meloxicam (MOBIC) 7.5 MG tablet TAKE 1 TABLET EVERY DAY AS NEEDED FOR PAIN AND INFLAMMATION 30 tablet 3   • Multiple Vitamins-Minerals (ICAPS AREDS 2) capsule Take 1 capsule by mouth Daily.     • Omega-3 Fatty Acids (FISH OIL) 1200 MG capsule capsule Take 1,200 mg by mouth Daily.     • SYNTHROID 88 MCG tablet Take 1 tab po qam fasting and wait 1hr for food or other meds. BRAND ONLY. 90 tablet 3   • vitamin E 400 UNIT capsule Take 400 Units by mouth Daily.       No current facility-administered medications for this visit.     She is allergic to cefdinir, celecoxib, ciprofloxacin, clarithromycin, doxycycline, erythromycin, levofloxacin, lortab [hydrocodone-acetaminophen], lyrica [pregabalin], nexium [esomeprazole], omeprazole, phenergan [promethazine hcl], tramadol, bacitracin-polymyxin b, and sulfa antibiotics..    Review of Systems  A review of systems was taken.  Constitutional: negative for fever, chills, activity change, appetite change, fatigue and unexpected weight change.  Respiratory: negative  Cardiovascular: negative  Gastrointestinal: negative  Genitourinary:negative  Musculoskeletal:negative  Behavioral/Psych: negative     Counseling/Anticipatory Guidance Discussed: nutrition, physical activity, healthy weight, injury prevention, immunizations, screenings and self-breast exam    I have reviewed and confirmed the accuracy of the ROS as documented by the MA/LPN/RN ANNE MARIE France MD    /74   Ht 157.5 cm (62\")   Wt 54.3 kg (119 lb 12.8 oz)   LMP  (LMP Unknown) Comment: postmenopausal  Breastfeeding No   BMI 21.91 kg/m²     BMI reviewed     MEDICALLY INDICATED   Physical Exam    Neuro: alert and oriented to person, place and time   General:  alert; cooperative; well developed; well nourished   Skin:  No suspicious lesions seen   Thyroid: normal to inspection and palpation   Lungs:  breathing is unlabored  clear to auscultation " bilaterally   Heart:  regular rate and rhythm, S1, S2 normal, no murmur, click, rub or gallop  normal apical impulse   Breasts:  Examined in supine position  Symmetric without masses or skin dimpling  Nipples normal without inversion, lesions or discharge  There are no palpable axillary nodes  Fibrocystic changes are present both breasts without a discrete mass   Abdomen: soft, non-tender; no masses  no umbilical or inguinal hernias are present  no hepato-splenomegaly   Pelvis: Clinical staff was present for exam  External genitalia:  normal appearance of the external genitalia including Bartholin's and [place]'s glands.  Vaginal:  normal pink mucosa without prolapse or lesions.  Cervix:  normal appearance.  Uterus:  normal size, shape and consistency. anteverted;  Adnexa:  non palpable bilaterally.  Rectal:  anus visually normal appearing. recto-vaginal exam unremarkable and confirms findings;     Results Review:    Labs  Imaging  Meds  Media :23}      Labs reviewed    Imaging reviewed- mammograms    Medications reviewed            ASSESSMENT  Problems Addressed this Visit        Genitourinary and Reproductive     Menopause - Primary    Vaginal atrophy    Relevant Medications    estradiol (VAGIFEM) 10 MCG tablet vaginal tablet (Start on 7/23/2021)    Fibrocystic breast changes, bilateral      Other Visit Diagnoses     Recurrent candidiasis of vagina        Relevant Medications    fluconazole (Diflucan) 150 MG tablet      Diagnoses       Codes Comments    Menopause    -  Primary ICD-10-CM: Z78.0  ICD-9-CM: 627.2     Vaginal atrophy     ICD-10-CM: N95.2  ICD-9-CM: 627.3     Fibrocystic breast changes, bilateral     ICD-10-CM: N60.11, N60.12  ICD-9-CM: 610.1     Recurrent candidiasis of vagina     ICD-10-CM: B37.3  ICD-9-CM: 112.1       Vaginal atrophy secondary to menopause; fibrocystic changes of the breast; and recurrent candidiasis of the vagina are chronic problems with a good prognosis with current management.   The patient will continue using Yuvafem vaginal tablets, 10 mcg 3 times a week.  She will use fluconazole, 150 mg by mouth orally for recurrences of vaginal candidiasis.  She will continue to monthly self breast assessment and annual breast imaging to monitor her fibrocystic changes.        Substance History:   reports that she quit smoking about 31 years ago. Her smoking use included cigarettes. She started smoking about 39 years ago. She has a 16.00 pack-year smoking history. She has never used smokeless tobacco.   reports previous alcohol use.   reports no history of drug use.    Substance use counseling is not indicated based on patient history.      PLAN    Medications prescribed this encounter:    New Medications Ordered This Visit   Medications   • fluconazole (Diflucan) 150 MG tablet     Sig: Take 1 tablet by mouth Every Other Day for 12 doses. 1 Every other day     Dispense:  3 tablet     Refill:  3   • estradiol (VAGIFEM) 10 MCG tablet vaginal tablet     Sig: Insert 1 tablet into the vagina 3 (Three) Times a Week.     Dispense:  36 tablet     Refill:  3   · Monthly self breast assessment and annual breast imaging  · Calcium, 600 mg/ Vit. D, 400 IU daily; regular weight-bearing exercise  · Follow up one year  *Please note that portions of this documentation may have been completed with a voice recognition program.  Efforts were made to edit this dictation, but occasional words may have been mistranscribed.       This note was electronically signed.    ANNE MARIE France MD  July 22, 2021  16:19 EDT

## 2021-08-05 ENCOUNTER — TREATMENT (OUTPATIENT)
Dept: PHYSICAL THERAPY | Facility: CLINIC | Age: 71
End: 2021-08-05

## 2021-08-05 DIAGNOSIS — M53.3 SACROILIAC JOINT DYSFUNCTION: Primary | ICD-10-CM

## 2021-08-05 DIAGNOSIS — M54.2 CERVICAL PAIN: ICD-10-CM

## 2021-08-05 PROCEDURE — 97140 MANUAL THERAPY 1/> REGIONS: CPT | Performed by: PHYSICAL THERAPIST

## 2021-08-05 NOTE — PROGRESS NOTES
" Physical Therapy Note  SUBJECTIVE:  Changes since last seen:   Maribel relates she was \"decent\" prior to going to [place].    \"You're seeing me on one of my worst days ever.\"    She notes she drove to [place] in May and used a TENS unit on the way there and had increased pain 24-48 hrs after.  So on her trip home she did not use the TENS unit and seemed to do better.   On her trip to [place] last week, again, she used a TENS unit on the way there and again had increase pain in low back and hips 24 hrs afterwards.   However while she was there she sat watching a horse show, then sat in a movie theater and the next day sat on bleachers watching a volleyball game.  Her pain was so bad she had to leave the game and return to her daughter's house and lay down.   When returned to [place] from [place], she didn't use the TENS and found she wasn't so miserable, but she did get some numbness and ringling in the right leg but nothing like the trip down to [place]     \"Sometimes I think the increased pain is from the piriformis but now it seems more in area of quadratus lumborums.\"      She notes she likes to work in her yard, play with her dogs and water her gardens with head phones on, as all of these things seem to help her emotionally and physically.   \"It has been rough since going to [place] and have noticed when I engage the inner unit she gets achey pain in her lower abdomen.  She denies urinary symptoms but notes her irritable bowel symptoms.  Thus she has not worked on the exercises given over the last couple of visits.     She arrives today requesting help with pain relief.   She notes the humidity is really bothering her making her pain worse.     Voltaren gel continues to help her neck.       Current level of pain:   Right buttock/SI jt 0/10  Left SI joint  0/10  Lumbar spine  5/10   Right Gr troch 2/10 all time  Neck 1/10 Both upper trap when sleeps     Lowest level of pain since last " seen:    0/10   0/10    1/10     0/10              Highest level of pain since last seen:     3/10    3/10     7/10   Since [place]  4/10              Past Medical History:  1.  Cervical DDD  2.  Hx of Bilateral THR, left 2012, right 2010  3.  Septoplasty, 2011  4.  Hx of SI joint dysfunction  5.  Fibromyalgia  6.  Hx of hypothyroidism  7.  Hx of gastritis  8.  Irritable bowel syndrome/spastic colon  9.  Osteoarthritis  10.  Hx of 4 pregnancies, 2 normal vaginal deliveries  11. Hx of Dequervains syndrome  12.  Hx of fx left sesmoid  13.   Hx of positional vertigo  14. Osteopenia, diagnosed May 2014  15  Hx of migraine headaches  16.  TMJ dysfunction, 2015  17.  Hx of Cardiomyopathy/ left catheterization 2002  18.  Dyslipidemia  19.  Hx of neuroma pain left foot  20.  Hx of anxiety/ depression    Functional Limitations:  Standing, sleeping,   Patient Goals for Physical Therapy: decreased pain      OBJECTIVE:  No shift of pelvis      12/05/2019 12/17/19 01/07/20 02/18/20 03/17/20 06/11/20 07/02/20 08/13/20 09/29/20 01/07/21 02/23/21 04/01/21 05/11/21 06/22/21 08/05/21      SI Joint Positioning  Right  Left Right  Left Right  Left Right  Left Right  Left Right  Left Right  Left Right  Left Right  Left Right  Left Right  Left Right  Left Right  Left Right Left Right Left Right Left Right  Left Right  Left       Innominate                            Anterior               x              x              x                x               x              x              sl   Sl    x   x    x    x Very sl   x    x             Posterior    x   x    x   x    x    x  sl               Sll               x                x              x              x       Very sl               x               x         Sacrum                               Unilateral rotation    x   x   x   x   x    x   sl   x   x    x   x   x Very sl   x   x                              SI Joint Testing  Right  Left Right  Left Right  Left Right  Left Right  Left  Right  Left Right  Left Right  Left Right  Left Right  Left Right  Left Right  Left Right  Left Right Left Right Left Right Left Right  Left Right  Left           Distraction   +         +   +        +   +         +   +          +   +            +   +          +   +          +   +         +   +         +   +          +   +          +   +         +   +       +   +         +    +         +              Beatriz's    -        -            -         -   -          -   -         -   -          -   -          -              Compression   -         -            -          -   -         -   -          -   -         -   -           -              Prone Press Up   -            -            Sl       Sl    -           -  sl       Sl    Sl        Sl    +          +                           Special Tests  Right   Left Right  Left Right  Left Right  Left Right  Left Right  Left Right Left  Right  Left Right  Left Right  Left Right  Left Right  Left Right  Left Right Left Right Left Right Left Right Left  Right  Left      Straight Leg Raise                                     Active   -          -  -       -        -         -   -           -   -           -   -           -    -         -   -          -   -         -   -        -   -           -              Passive    -           -   -          -           -           -   -        -   -        -   -          -    -          -   -         -   -        -   -        -   -          -          Hip Scour Test                                                                      Palpation:      Muscle/Bone Irritation  Date 12/05/2019 12/17/19 01/07/20 02/18/20 03/17/20 06/11/20 07/02/20 08/13/20 09/29/20 01/07/21 02/23/21 04/01/21 05/11/21 06/22/21 08/05/21       Right  Left Right  Left Right  Left Right  Left Right  Left Right  Left Right  Left Right  Left Right  Left Right  Left Right  Left Right  Left Right  Left Right Left Right Left Right Left Right  Left Right  Left   SI joint  +         ++   +          +    +         sl   +         Sl     +          +   +         +    +           +   +         Sl    Sl        -    +       sl   +         -   +        -   -           -   +          +   +         +      Piriformis   Sl        +    ++        +     +        sl     +       sl     +          +   +        sl   +        +    +         sl     +       sl   ++      +   ++      sl   +          sl   +           +   +         +      Gr. Trochanter  ++        +   +          +     Sl      Sl     ++       sl   ++         +   +       +   +          +    +         -     +        -   ++       -  ++       sl   ++        Sl    +          ++  +         ++      IT Band  sl        sl   +         sl   Sl          -   Sl        -   Sl        Sl    -           -   -           -    +         -    -         -    -        -   -        -    Sl          -   -          -  sl        sl      Quadratus Lumborum  ++        +    +         sl   Sl         sl   -            -   -           -   -            -   -         -    Sl       sl   +          +    -         -   -        -    -           -   ++      ++      Ischial Tuberosity  -        -   -          -   -          -   -            -   -          -    -            -   -           -    -         -   -          -    -         -   Sl       Sl    -          -    -         - -           -      Sacrococcygeal Ligs  -        -   -           -   -             -   -            -   -           -   -           -   -            -    -         -   -           -   -           -   Sl       Sl     -           -     -        -   -           -      Paraspinals  +        -   Sl           -   -          sl   -            -   +         -   +          sl   Sl          +    +         sl   -           +   +          Sl       -            -     +          -   +          +      Spinous Processes                                        C-spine rotated to               C2-6           C2-5          C2  C2-3  C2             T-spine rotated to   T3-5                     T4-6  T6-8 T3-4 T3-4    T4-5 T3-6   T4-6  T4-6   -          -  T4-7 T4-6  T4-7  T4-7             L-spine rotated to  L5           L5           L4-5   -          - L3-5 L4-5            L4-5 L4-5 L4-5            L4-5  L5              L5   -          - L3-5  L5      Adductor Fabio  -        -   -           -   -           sl   -           -   -        -   -          -   Sl         Sl   Sl       sl Sl          Sl   sl        Sl   sl            +   +         Sl   sl         Sl   +          +      Iliopsoas  +        +   Sl          sl   Sl           sl   Sl         -   Sl       -   +          +   +         +   sl         sl   +        sl   +          +   -        -   -          -   +          +  +        +      Quad Origin  -        -    -          -    -            -   -           -   -           -   -            -    -          -   -          -   -          -    -          -   -          -   -           -      Pubic Symphysis         +                   Sl       sl        -           sl         sl         sl        +       -         -         +         +      Bakers cyst        -         sl    -         +   -          sl   -            -    -          -    +         +   +        Sl    -         -   -          -   -         -   -          -      Pes Anserine Bursa      +          -   +         ++   Sl         +   -sl        -    -           -   -          -   -         -   -        -   -          -      -          -   -         -      Rectus Diastasis   2 finger                      IT Band insertion           +         -   -           -   -          -   -        -   -         -   -            -   -            -        All 4s Positioning: Pt not able to assume full lumbar extension in this position      Monthly Objective Measurement/Functional Activity  Date: 12/05/2019 01/07/20 02/18/20 03/17/20 06/11/20 07/02/20 08/13/20 09/29/20 01/07/21 02/23/21 04/01/21  05/11/21 06/22/21 08/05/21     Oswestry Pain Score 52/100    45/100    44/100 44/100     44/100   44/100    44/100   44/100     44/100     60/100    50/100    48/100    50/100     56/100     LE Functional Scale                   Neck Disability Index      32/100      42/100     44/100       44/100     46/100     36/100    42/100       50/100                        AROM Lumbar Spine: Degrees   Degrees      Degrees      Degrees      Degrees    Degrees Degrees Degrees Degrees Degrees Degrees Degrees Degrees Degrees Degrees    Degrees      Flexion 114           97      112      112    110     89    95    78        93     98      86        114     94       90        Extension 23 pain on right            27      36      29      20     30 pain right scapula    27 pain right SI and Scap     37        16     24      18        18      18       21        Right Lat. Flexion 33           34      27 pain right SI        31       31    28  25 32        22      20     19        20      32       21        Left Lat. Flexion 25           27      28       24        22     25    26  24        25       25       24        22      13       18        Right Lat Shift Pelvis No change No change  No change No change  no change No cahnge No change  No change No cahnge  stiff stiff      stiff   stiff     stiff        Left Lat Shift Pelvis No change pulls on right  Tightness No pain  No change  no change  pain right buttock  tight  no change  no change  stiff  stiff      stiff   stiff      stiff                        AROM Cervical Spine  Degrees Degrees Degrees Degrees Degrees Degrees Degrees Degrees Degrees Degrees Degrees Degrees Degrees Degrees Degrees     flexion    45 pain        45        59       45 pain      45         58               63      62     48       3      58       extension    51 dizzy        50        62       58       60         72        61      50      54      50      53       Right lateral flex    56 pain r upper trap          55        47       47 pain       49        54       45      48      48      46      51       Left lateral flex  57 pain right elbow        58        40      39      43       48       47       46      49      45       43       Right rotation          44         45       46     42 pain       40     45       48       40      47       45       46       Left rotation           40          40       41     40      37       40        39        37      43      41       38                        AROM Hips:  (degrees) Right      Left Right Left Right  Left Right  Left Right  Left Right  Left Right Left Right Left Right Left Right Left Right  Left Right Left Right Left Right Left Right Left Right  Left      Flexion 95       98  98        100  100      95  98        105   95       100 96       98  98       95  100     95  105       100    112    108 114       110 115     110  110    107 110       105        Abduction 42       45  45         45   45         45  42        46  40          45 40       40 38         39  36      37 38         40    39        37 38          37  35       35   32      35  30         35        Int. Rotation 25       15  25         28  25          25   26       28    25        30   25      32   22        30  25       30   28       22  25          24  23         24  25       30   24      32  22         30        Ext. Rotation  30        40              32        40   31       46     45       46  45       47  42        45  40      45   32       45   31        38  34         38  35       40   36      40  35         40                        Flexibility:   (degrees) Right    Left Right  Left Right  Left Right  Left Right  Left Right  Left Right Left Right Left Right Left Right Left Right  Left Right Left Right Left Right Left Right Left Right  Left      Hamstrings -47       -49   -45   -50    -45      -48   -43     -42   -42      -41  -45      -48  _46      -48 - 45     -46  -52    -47     -50     -48    -48     -47  -45    -45   -42    -45  -45       -45        Quadriceps 45       40  48       45     55      54   58       60    65        67  63        65  60        63   58      60   60     63    62     65    63      61  60      60 Not tested  not tested        HipExt/Rot(piriformis) 42       43    45       45   45    48   43       46    45        45  45        40  42        40  38      41  47      45   46        45    47        45  45       45    39        45  40        45        Hip Int/Rotators 38       37   37        35   35      35   32        36    25         30  30        35  32        37  30       35   28      34    25       32   28         31  25       30     28     35  26        32        Hip Flexors (iliopsoas) Not tested         Not tested Not tested Not tested Not tested Not tested Not tested  not tested Not tested Not tested Not tested Not tested Not tested Not tested        IT Band Not tested          not tested  not tested  not tested  not tested  not tested  not tested  not tested  not tested   not tested  not tested  not tested  not tested  not tested                                           Root Level  - Motor Right      Left Right  Left Right  Left Right  Left Right  Left Right  Left Right Left Right Left Right Left Right Left Right  Left Right Left Right Left Right Left Right Left Right  Left     T12             Rectus Abdominus 4+/5        4+/5         4+/5      4+/5       4+/5    4 to 4+/5    4 to 4+/5   4 to 4+/5       4+/5        4+/5      4+/5      4+/5        4+/5        4+/5        L1              Paraspinals 5/5      5/5   5/5       5/5  5/5        5/5  5/5        5/5   5/5       5/5  5/5        5/5 5/5        5/5 5/5        5/5   5/5        5/5  5/5       5/5            L2              Hip Flexion 4+/5      4+/5  4+/5    4+/5  5/5        5/5  55        5/5   5/5       5/5   5/5        5/5 5/5        5/5  5/5        5/5  5/5        5/5   5/5       5/5           L3             Quads 5/5       5/5  5/5       5/5   5/5        5/5  5/5        5/5   5/5       5/5   5/5        5/5 5/5        5/5  5/5        5/5  5/5        5/5   5/5       5/5           L4              Anterior Tibialis 5/5      5/5  5/5       5/5  5/5        5/5  5/5        5/5   5/5       5/5   5/5        5/5 5/5        5/5  5/5       5/5  5/5        5/5   5/5       5/5           L5             Gr. Toe Extension 5/5      5/5  5/5       5/5   5/5        5/5 5/5         5/5   5/5       5/5   5/5        5/5 5/5        5/5  5/5        5/5  5/5        5/5   5/5       5/5           S1            Gastroc/Sol/Peroneals 4+/5    4+/5   4+/5    4+/5  4+/5    4+/5  4+/5     4+/5   4+/5   4+/5 4+/5      4+/5 5/5        5/5  5/5        5/5  5/5        5/5   5/5       5/5                            Functional Strength:                     Single LegStanceTime (seconds) R:24   L:25   R:30  L:27 R:30   L:30 R:     L: R:      L: R:22    L:15  R: 30 L:30   R:30   L:15   R:30   L:30   R:      L: R:      L:   R:      L:   R:      L:    R:      L:    R:      L: R:      L:     Pelvic Floor Isolation (seconds) 10 sec  10 sec                    Inner Unit  Isolation (seconds) 10 sec  10 sec                                                       Functional Activities:                      Sit w/o pain? Pain increases (minutes) yes   Yes/ 30-60 min  yes/ 30-60 Yes/ 20 min  Yes/ 30-60 min Yes/ 30-60 Yes/ 30 min  Yes/ 30-60 min yes30 min  Yes/ 30  Yes/ 30-45 min Yes/ 30  min Yes/ 30-60 min  no/ 30 min        Stand w/o pain? 20 min Yes/ 30 min Yes/ 30 min Yes/ 30 min Yes/ 30 min Yes/ 30 min Yes/ 15 min   yes/ 15-30 min Yes/ 15 min  Yes/ 15 min  Yes/ 15-30 Yes/ 15-30 min Yes/ 30 min No/ 30 min        Walk w/o pain? 30 min   yes/ 1/4 mile Yes/ 30 min Yes/ 45 min  Yes/ 1 mile Yes/ 30 min  Yes/ 30 min   yes/ 1/4 mile Yes/ 30 min  Yes/ 30 Yes/ 60 min   yes/ 1/4 mile  yes/ 1/4 mile  no/ 1/4 mile        Steps w/o pain? Yes, ignores pain  Yes/ ignores pain Yes/ no limit Yes/  no limit Yes/ no limit Yes/ no limit No/ worst getting winded   no/ more winded than pain No problem  No problem  No problem  Yes/  1 fl  yes/ 1 fl  no/ 1 fl        Drive w/o pain? Uncomfortable, 60 min  Yes/ w/ supports and TENS 2 hrs Yes/ 60 min  Yes/ 60 min  Yes/ 1-2 hrs Yes/ 3 hrs Yes/ 1 hr   yes/ 1-2 hrs Yes/ 90  min with TENS Yes/ hasn't gone anyway  Yes/ 30-45 min  Yes/ 60 min  yes/ 60 min  no/ 30 min        Work w/o pain? n/a   n/a     n/a    n/a   n/a  n/a  n/a    n/a   n/a  n/a n/a        # times wakes w/ pain? Sleeping on couch b/c or right shld pain, since July Continues to sleep on couch  Continues to sleep on the couch  2x from right shoulder and neck, she is still sleeping on couch  1-2x   1-2x   3-4x , some pain and some not sure   1-2x 2x  More comfortable on left  Every 2 hrs, back neck and shoulder  Wakes evry 3 hrs, brain activity and some pain  Wakes several times, not necessarily  from pain con't to wake many times, not necessarily from pain Wakes several times, not always from pain         PLAN OF CARE:    ASSESSMENT:  Problem List:   1. Flare up of SI joint dysfunction  2. Decreased spinal stabilization  3. Postural dysfunction     Discussion:  Maribel has had a flare up of pain since traveling to [place].  Her Oswestry Low Back Pain Score worsened from 50/100 to 56/100 since last seen.  Her Neck Disability Score also worsened from 42/100 to 50/100.     Spent the entire visit on manual work bringing symmetry to her innominates and sacrum and myofascial work to the trunk, neck, head and LEs.   She responded extremely well to the myofascial work on the trunk and states the aching in the abdomen decreased significantly by the end of the visit.     Maribel needs to be attentive to her posture and positioning and incorporate instruction into her ADLs.     Plan to resume progression of spinal stabilization on next visit.        Maribel continues to request to be seen on a once every 4-6 week basis to review  "and progress her home program and to have manual work done to augment her home program.     Short Term Goals: (4-6 weeks)                                   Date Met:                1.   pt independent in postural correction         02/18/20  2.   pt independent in SI joint self-corrections        12/17/19  3.   pt independent in exer to decrease post. disc pressures      12/17/19  4.   pt able to sleep through night without pain        09/29/20  5.   pt able to stand 10  minutes without pain        01/07/20  6.   Reduce pt pain to no greater than 4/10         01/07/20  7.   Decrease Oswestry Pain Score to no greater than 45/100      01/07/20  8.  Decrease Oswestry Pain Score to no greater than  40/100  9.  Pt able to perform hip abduction x6 w/o pain        09/29/20  10. Decrease pain right gr trochanter to no greater than 4/10      03/17/20  11. Pt able to engage inner unit in supine w/ legs on chair, moving in int/ext rotation w/o pain x6  12.  Pt able to contract pelvic floor on exhaling a diaphragmatic breath x6    07/02/20  13.  Pt able to isolate the transverse abdominus with forearms on counter and in slight lumbar ext x6 08/13/20  14.  Pt able to perform step up/down with inner unit musculature engaged x6 without pain  15.   Pt able to engage inner unit musculature on sit to stand and stand to sit, x6 w/o low back pain  16.  Pt able to perform diaphragmatic breathing x6, in supine      05/11/21  17.  Pt able to do diaphrag breath engaging transv abdominus exhaling,  3 breaths w/ lower costals 05/11/21  18.  Decrease Neck Disability score to no greater than 35/100  19.  Pt able to bridge with inner unit engaged, 2\" w/o pain, x6      06/22/21    Long Term Goals:(3-5 months)      Date Met:      1.  pt independent in self-management of symptoms       2.  pt independent in home program      3. Reduce pain to no greater than 2/10      4.  Pt able to  puppies without pain.      Treatment Plan:   1.  " Ultrasound to increase extensibility, decrease pain, if needed  2.  Electrical stimulation for muscle relaxation, decrease pain, if needed, iontophoresis  3.  Manual therapy to increase function, decrease pain  4.  Therapeutic exercise to increase function, decrease pain  5.  Home programming and d/c planning to increase function and decrease pain    Frequency & Duration:  Pt will be seen on a once/every 4-6 week basis for 1 more visits to meet remaining goals.     Patient Participated in and Agrees With This Plan of Care and Goals:  YES  Rehab Potential:  Fair to good      Karen Chatterjee, PT, MS  Physical Therapist  [place]# [id number]      Medicare Certification:  90 Day Recertification  Certification Period: 05/11/21 to 08/11/21    ITREATMENT TODAY:    Total Treatment Time: (time in clinic)            60  Timed Code Treatment Minutes:   60      Re-Evaluation:   RX min:      Manual Therapy:  (MA)  RX min:   55  Manual posterior rotation of right innominate    Positional Isometric contraction (PIC) of left iliopsoas    Positional Isometric contraction of (PIC) right gluteus minimus    Distraction of both SI jts in open pack hip position  Piriformis stretching, bilaterally    Quadratus lumborum stretching, bilaterally    Anterior/Inferior Mobilization of right  hip    Myofascial work trunk  Manual spinal distraction        Therapeutic Activities:  (TA)  RX min:    5  Neuromuscular Reeducation: (NMR)  RX min:      Ultrasound:  RX min:          1.6 piper/cm2       Location:      Iontophoresis:  6 hr patch                                Location:                               Procedures:      Key:  i=instructed        r=review        c=corrected        a=adapted   Code Procedure/Instruction 12/05/2019 12/17/19 01/07/20 02/18/20 03/17/20 06/11/20 07/02/20 08/13/20 09/29/20 01/07/21 02/23/21 04/01/21 05/11/21 06/22/21 08/05/21    TA         Sleeping Positions                reviewed reviewed     corrected           TA Sternum-Up  Posture  reviewed corrected       reviewed     review    TA SI jt. self-correction reviewed   reviewed reviewed              TA Lumbar Facet PIC reviewed   reviewed reviewed              TA   Order of Self-Corrections                   TA Use of lumbar pillow  reviewed                 TA Use of cervical roll        instructed           TA Use of orthotics                   TA Use of SI belt                   TA Use of Nada chair                   TA Use of Ice  reviewed                 TA Use of [employer]/ heat  reviewed   Pain cakes              TA Use of TENS unit  In car, while driving             Stop use    TA Use of iontophoresis                   TA Decreasing Disc Pressures   reviewed                TA Step up/down w/ inner unit engaged          instructed                             NMR Diaphragmatic Breathing      instructed      Review/correct reviewed correct     NMR Diaphragmatic breath w/ pelvic floor            instruct reviewed correct     NMR Diaphragmatic breath w/ trans abdom x3 lower costal breaths            instruct reviewed correct     NMR diaphragmatic breath w/ transverse abdom bridging             instructed correct     NMR Diaphragmatic breath w/ TA engaged, heel slides              instructed     NMR Pelvic Floor Isolation       review      reviewed      NMR Transverse Abdominus       Instruct/ adapt correct           NMR Multifidus Isolation                   NMR InnerUnit against Gravity           reviewed        NMR Sit-stand w/ inner unit                   NMR Roll w/ inner unit                   NMR Walk w/ inner unit                    NMR Up/down steps w/ inner unit                   NMR Water Exer Instruction                   NMR Hip Abd w/o piriformis    instructed corrected corrected             NMR Hip int/ext rotation supine w/ feet up on chair     instructed corrected             NMR Diaphragmatic Breathing w/ pelvic floor      instructed review            NMR Pelvic floor  in sitting on towel foll      instructed stopped            NMR Hip Add w/o iliop/ham                    NMR Lower abs in supine                   NMR Abd obliques in supine                   NMR Prone Knee Flexion                   NMR Prone glut valentino                   NMR Retro Step                   NMR Retro Walk                   NMR Retro walk on treadmill                   NMR Forward walk w/ inner unit                   NMR Balance Instruction                   NMR Stability Ball A/P & Lateral                   NMR Ball Catch/Throw /Supine                   NMR Ball Catch/Throw /Stand                   NMR StabilityBall All 4's Arm Lift                   NMR StabilityBall Prone Walk Out                   NMR StabilityBall Supine Oblique                   NMR Stability Ball sit-supine-sit                   NMR Walking Prog Progression                   MNR Home program sequencing                                       TA Hamstring stretch                   TA Hip Ext Rot Stretch          review          TA Hip Int Rot Stretch                   TA Hip Flex/IT Stretch                   TA Hip Add Stretch                   TA Lats Dorsi Stretch                   TA Gastroc/soleus stretch                   TA QuadratusLumborm Stretch                      Supine                      Stance                   TA Quad Stretch                                       NMR Wall Push Ups                   NMR Planking                   NMR BOSU Ball Exercises                   NMR Lateral Bridge Drop                   NMR [place]ers [place]                   NMR O'Feldt Lat Pull Down                   NMR O'Feldt Hip Ext                   NMR O'Feldt Trunk Ext                                       TA CervDiscExtSup/stnd   reviewed                TA Cerv Stretches                   TA Self PIC Cervical Spine                   TA Neural Gliding                   TA Ant/Mid Scalene Strch                   TA Biceps stretch                    TA Rotator Cuff Stretch                   TA Anterior Chest Stretch                   TA Wrist Ext Stretch                   TA Lats Dorsi stretch    instructed               NMR Prone Arm Lifts                   NMR Scapula Stabilization                   NMR Occulomotor Isometrics                   NMR Cervical Isometrics                                       TA Shld AROM w/bar                   TA Shld Capsular Stretching                   TA Self PIC Thor Spine   reviewed  Adapt and corrected reviewed             TA Rot Cuff Therabd, beginner                   TA Rot Cuff Therabd, skinny Chatterjee, PT, MS  Physical Therapist  [place]# [id number]

## 2021-08-11 ENCOUNTER — OFFICE VISIT (OUTPATIENT)
Dept: CARDIOLOGY | Facility: CLINIC | Age: 71
End: 2021-08-11

## 2021-08-11 VITALS
DIASTOLIC BLOOD PRESSURE: 80 MMHG | HEART RATE: 71 BPM | HEIGHT: 62 IN | WEIGHT: 119 LBS | SYSTOLIC BLOOD PRESSURE: 120 MMHG | BODY MASS INDEX: 21.9 KG/M2 | OXYGEN SATURATION: 98 %

## 2021-08-11 DIAGNOSIS — I10 ESSENTIAL HYPERTENSION: Primary | ICD-10-CM

## 2021-08-11 DIAGNOSIS — E78.2 MIXED HYPERLIPIDEMIA: ICD-10-CM

## 2021-08-11 DIAGNOSIS — R94.31 ABNORMAL EKG: ICD-10-CM

## 2021-08-11 PROCEDURE — 93000 ELECTROCARDIOGRAM COMPLETE: CPT | Performed by: INTERNAL MEDICINE

## 2021-08-11 PROCEDURE — 99214 OFFICE O/P EST MOD 30 MIN: CPT | Performed by: INTERNAL MEDICINE

## 2021-08-11 NOTE — PROGRESS NOTES
[facility] Cardiology  Office Progress Note  Barbara Hernandez  1950  [address] [facility] [place] [address]       Visit Date: 08/11/21    PCP: Fiordaliza Suh MD  [address]  [facility] [address]    IDENTIFICATION: A 70 y.o. 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 Postprandial dizziness/lightheadedness.  2. History of cardiomyopathy:  a. Left heart catheterization, 06/06/2002:  i. Normal coronary artery anatomy.  b. LVEF 55% to 60%.   c. 8/21/2020 2D echo: LVEF =55%.  Mild MR.  Mild TR.  LV systolic function normal.  LV diastolic function (grade 1) impaired relaxation.  RVSP due to TR = 23.  3. Palpitations:  Holter monitor, May 2002:  a. Sinus rhythm with rare PAC's, PVC's.  b. Short [place] interval.  4. Dyslipidemia.-Statin intolerant  10/18 241/58/71/175  7/30/20 257/65/73.8/170  5. VHD  a. [id number] multiple echo, muga   b. 5/16 SE per CAK EF 55% mild AI, mild diastolic dysfxn  6. 2019 viral illness NOS ? Parvovirus (multiorgan system- not hospitalized.)  7. Hypothyroidism.  8. Osteoarthritis.  9. Gastritis.  10. Irritable bowel syndrome/spastic colon.  11. Hay fever.  12. Perimenopause.  13. Herpes zoster, October 2008, (affecting left ear).  14. Lichen sclerosis (affecting vaginal tissue).  15. Fibromyalgia.  16. Surgeries:  a. Right hip replacement, 11/15/2010.  b. Septoplasty, March 2011.  c. Left hip replacement, November 2012.        CC:   Chief Complaint   Patient presents with   • Chest Pain on Breathing   • Chronic Fatigue   • Claudication       Allergies  Allergies   Allergen Reactions   • Bacitracin-Polymyxin B Rash   • Cefdinir GI Intolerance   • Celecoxib Unknown - Low Severity   • Ciprofloxacin GI Intolerance   • Clarithromycin GI Intolerance   • Doxycycline GI Intolerance   • Erythromycin Nausea Only   • Levofloxacin GI Intolerance   • Lortab [Hydrocodone-Acetaminophen] GI Intolerance   • Lyrica [Pregabalin]  Unknown - Low Severity   • Nexium [Esomeprazole] Myalgia   • Omeprazole Myalgia   • Phenergan [Promethazine Hcl] Delirium   • Sulfa Antibiotics Rash   • Tramadol Unknown - Low Severity       Current Medications    Current Outpatient Medications:   •  acetaminophen (TYLENOL) 325 MG tablet, Take 650 mg by mouth every night at bedtime., Disp: , Rfl:   •  B Complex Vitamins (VITAMIN B COMPLEX PO), Take 1 tablet by mouth Daily., Disp: , Rfl:   •  Cholecalciferol (VITAMIN D3) 3000 UNITS tablet, Take 1 tablet by mouth Daily., Disp: , Rfl:   •  Coenzyme Q10 (CO Q-10) 100 MG capsule, Take 400 mg by mouth Daily., Disp: , Rfl:   •  diclofenac (VOLTAREN) 1 % gel gel, Apply 1 application topically As Needed., Disp: , Rfl:   •  dicyclomine (BENTYL) 10 MG capsule, Take 1 capsule by mouth every 8 hours as needed for bowel spasm or diarrhea, Disp: 30 capsule, Rfl: 0  •  estradiol (VAGIFEM) 10 MCG tablet vaginal tablet, Insert 1 tablet into the vagina 3 (Three) Times a Week., Disp: 36 tablet, Rfl: 3  •  fluconazole (Diflucan) 150 MG tablet, Take 1 tablet by mouth Every Other Day for 12 doses. 1 Every other day (Patient taking differently: Take 150 mg by mouth As Needed.), Disp: 3 tablet, Rfl: 3  •  gabapentin (NEURONTIN) 100 MG capsule, Take 1 capsule by mouth every night at bedtime., Disp: 30 capsule, Rfl: 5  •  Lutein 20 MG tablet, Take 1 tablet by mouth Daily., Disp: , Rfl:   •  meloxicam (MOBIC) 7.5 MG tablet, TAKE 1 TABLET EVERY DAY AS NEEDED FOR PAIN AND INFLAMMATION, Disp: 30 tablet, Rfl: 3  •  Multiple Vitamins-Minerals (ICAPS AREDS 2) capsule, Take 1 capsule by mouth Daily., Disp: , Rfl:   •  Omega-3 Fatty Acids (FISH OIL) 1200 MG capsule capsule, Take 1,200 mg by mouth Daily., Disp: , Rfl:   •  SYNTHROID 88 MCG tablet, Take 1 tab po qam fasting and wait 1hr for food or other meds. BRAND ONLY., Disp: 90 tablet, Rfl: 3  •  vitamin E 400 UNIT capsule, Take 400 Units by mouth Daily., Disp: , Rfl:       History of Present  "Illness   Barbara Hernandez is a 70 y.o. year old female here for follow up.  No new cardiac issues but states her fibromyalgia has worsened over the last 1 year.          OBJECTIVE:  Vitals:    08/11/21 1337   BP: 120/80   BP Location: Left arm   Patient Position: Sitting   Cuff Size: Adult   Pulse: 71   SpO2: 98%   Weight: 54 kg (119 lb)   Height: 157.5 cm (62\")     Body mass index is 21.77 kg/m².    Constitutional:       Appearance: Healthy appearance. Not in distress.   Neck:      Vascular: No JVR. JVD normal.   Pulmonary:      Effort: Pulmonary effort is normal.      Breath sounds: Normal breath sounds. No wheezing. No rhonchi. No rales.   Chest:      Chest wall: Not tender to palpatation.   Cardiovascular:      PMI at left midclavicular line. Normal rate. Regular rhythm. Normal S1. Normal S2.      Murmurs: There is no murmur.      No gallop. No click. No rub.   Pulses:     Intact distal pulses.   Edema:     Peripheral edema absent.   Abdominal:      General: Bowel sounds are normal.      Palpations: Abdomen is soft.      Tenderness: There is no abdominal tenderness.   Musculoskeletal: Normal range of motion.         General: No tenderness. Skin:     General: Skin is warm and dry.   Neurological:      General: No focal deficit present.      Mental Status: Alert and oriented to person, place and time.         Diagnostic Data:    ECG 12 Lead    Date/Time: 8/11/2021 4:25 PM  Performed by: Jonathon Bermudez MD  Authorized by: Jonathon Bermudez MD   Comparison: compared with previous ECG from 4/26/2016  Rhythm: sinus rhythm  BPM: 71  T inversion: all    Clinical impression: abnormal EKG              ASSESSMENT:   Diagnosis Plan   1. Essential hypertension     2. Abnormal EKG     3. Mixed hyperlipidemia         PLAN:  Hypertension controlled current      Abnormal EKG unchanged from last 5 years    Dyslipidemia intolerant to statin therapy cardiac calcium scoring could be undertaken to delineate necessity for further " attempts at lipid modification        Jonathon Bermudez MD, FACC

## 2021-09-23 ENCOUNTER — TREATMENT (OUTPATIENT)
Dept: PHYSICAL THERAPY | Facility: CLINIC | Age: 71
End: 2021-09-23

## 2021-09-23 DIAGNOSIS — M54.2 CERVICAL PAIN: ICD-10-CM

## 2021-09-23 DIAGNOSIS — M53.3 SACROILIAC JOINT DYSFUNCTION: Primary | ICD-10-CM

## 2021-09-23 PROCEDURE — 97112 NEUROMUSCULAR REEDUCATION: CPT | Performed by: PHYSICAL THERAPIST

## 2021-09-23 PROCEDURE — 97140 MANUAL THERAPY 1/> REGIONS: CPT | Performed by: PHYSICAL THERAPIST

## 2021-09-23 NOTE — PROGRESS NOTES
" Physical Therapy Note and Updated Plan of Care  SUBJECTIVE:  Changes since last seen:     \"My  got Covid 3 weeks ago, breakthrough case, and I have been taking care of him which has been a lot.\" \"I didn't get Covid, though!\"     \"My low back is about the same; pain comes and goes, I have good and bad days.\"  \"I drove to [place] and managed the trip well,; I can handle an hour and half trip, just not a 3 hour trip.\"   She notes she went to [place] and walked 3 miles; \"I haven't been able to do this in a long time!\"   \"I can't seem to turn my left leg out in sitting and I can't bend both knees as much; it hurts my knees to stand up.\"  She c/o not being able to stand up erectly without feeling fatigue in low back and legs.      My neck is worse, I'm having trouble sleeping because of spasming in the left upper trap; I used Volataren gel on it but didn't help.\"  She c/o of pain in the left upper trap when reads, but notes she has not been putting her reading material up on pillows as instructed previously.        Current level of pain:   Right buttock/SI jt 1/10  Left SI joint  1/10  Lumbar spine  0/10   Right Gr troch 3/10 all time  Neck 4/10 Both upper trap when sleeps, left > right    Lowest level of pain since last seen:    0/10   0/10    1/10     1/10              Highest level of pain since last seen:     3/10    2/10     4/10       4/10    5/10  At night           Past Medical History:  1.  Cervical DDD  2.  Hx of Bilateral THR, left 2012, right 2010  3.  Septoplasty, 2011  4.  Hx of SI joint dysfunction  5.  Fibromyalgia  6.  Hx of hypothyroidism  7.  Hx of gastritis  8.  Irritable bowel syndrome/spastic colon  9.  Osteoarthritis  10.  Hx of 4 pregnancies, 2 normal vaginal deliveries  11. Hx of Dequervains syndrome  12.  Hx of fx left sesmoid  13.   Hx of positional vertigo  14. Osteopenia, diagnosed May 2014  15  Hx of migraine headaches  16.  TMJ dysfunction, 2015  17.  Hx of Cardiomyopathy/ left " catheterization 2002  18.  Dyslipidemia  19.  Hx of neuroma pain left foot  20.  Hx of anxiety/ depression    Functional Limitations:  Standing, sleeping,   Patient Goals for Physical Therapy: decreased pain      OBJECTIVE:  No shift of pelvis      12/05/2019 12/17/19 01/07/20 02/18/20 03/17/20 06/11/20 07/02/20 08/13/20 09/29/20 01/07/21 02/23/21 04/01/21 05/11/21 06/22/21 08/05/21 09/23/21     SI Joint Positioning  Right  Left Right  Left Right  Left Right  Left Right  Left Right  Left Right  Left Right  Left Right  Left Right  Left Right  Left Right  Left Right  Left Right Left Right Left Right Left Right  Left Right  Left       Innominate                            Anterior               x              x              x                x               x              x              sl   Sl    x   x    x    x Very sl   x    x   x            Posterior    x   x    x   x    x    x  sl               Sll               x                x              x              x       Very sl               x               x               x        Sacrum                               Unilateral rotation    x   x   x   x   x    x   sl   x   x    x   x   x Very sl   x   x   x                             SI Joint Testing  Right  Left Right  Left Right  Left Right  Left Right  Left Right  Left Right  Left Right  Left Right  Left Right  Left Right  Left Right  Left Right  Left Right Left Right Left Right Left Right  Left Right  Left           Distraction   +         +   +        +   +         +   +          +   +            +   +          +   +          +   +         +   +         +   +          +   +          +   +         +   +       +   +         +    +         +   +       +             Beatriz's    -        -            -         -   -          -   -         -   -          -   -          -   -          -             Compression   -         -            -          -   -         -   -          -   -         -   -           -   -          -              Prone Press Up   -            -            Sl       Sl    -           -  sl       Sl    Sl        Sl    +          +   -           -                          Special Tests  Right   Left Right  Left Right  Left Right  Left Right  Left Right  Left Right Left  Right  Left Right  Left Right  Left Right  Left Right  Left Right  Left Right Left Right Left Right Left Right Left  Right  Left      Straight Leg Raise                                     Active   -          -  -       -        -         -   -           -   -           -   -           -    -         -   -          -   -         -   -        -   -           -   -         -             Passive    -           -   -          -           -           -   -        -   -        -   -          -    -          -   -         -   -        -   -        -   -          -   -       -         Hip Scour Test                                                                      Palpation:      Muscle/Bone Irritation  Date 12/05/2019 12/17/19 01/07/20 02/18/20 03/17/20 06/11/20 07/02/20 08/13/20 09/29/20 01/07/21 02/23/21 04/01/21 05/11/21 06/22/21 08/05/21 09/23/21      Right  Left Right  Left Right  Left Right  Left Right  Left Right  Left Right  Left Right  Left Right  Left Right  Left Right  Left Right  Left Right  Left Right Left Right Left Right Left Right  Left Right  Left   SI joint  +        ++   +          +    +         sl   +         Sl     +          +   +         +    +           +   +         Sl    Sl        -    +       sl   +         -   +        -   -           -   +          +   +         +   Sl        Sl      Piriformis   Sl        +    ++        +     +        sl     +       sl     +          +   +        sl   +        +    +         sl     +       sl   ++      +   ++      sl   +          sl   +           +   +         +    +       Sl      Gr. Trochanter  ++        +   +          +     Sl      Sl     ++       sl   ++         +   +       +   +           +    +         -     +        -   ++       -  ++       sl   ++        Sl    +          ++  +         ++  +         sl     IT Band  sl        sl   +         sl   Sl          -   Sl        -   Sl        Sl    -           -   -           -    +         -    -         -    -        -   -        -    Sl          -   -          -  sl        sl   Sl         -     Quadratus Lumborum  ++        +    +         sl   Sl         sl   -            -   -           -   -            -   -         -    Sl       sl   +          +    -         -   -        -    -           -   ++      ++   +         +     Ischial Tuberosity  -        -   -          -   -          -   -            -   -          -    -            -   -           -    -         -   -          -    -         -   Sl       Sl    -          -    -         - -           -  -          -     Sacrococcygeal Ligs  -        -   -           -   -             -   -            -   -           -   -           -   -            -    -         -   -           -   -           -   Sl       Sl     -           -     -        -   -           -   -           -     Paraspinals  +        -   Sl           -   -          sl   -            -   +         -   +          sl   Sl          +    +         sl   -           +   +          Sl       -            -     +          -   +          +   +          -     Spinous Processes                                        C-spine rotated to               C2-6           C2-5          C2  C2-3  C2 C2           T-spine rotated to   T3-5                     T4-6  T6-8 T3-4 T3-4    T4-5 T3-6   T4-6  T4-6   -          -  T4-7 T4-6  T4-7  T4-7 T4-6            L-spine rotated to  L5           L5           L4-5   -          - L3-5 L4-5            L4-5 L4-5 L4-5            L4-5  L5              L5   -          - L3-5  L5 L4-5     Adductor Fabio  -        -   -           -   -           sl   -           -   -        -   -          -   Sl         Sl   Sl       sl Sl           Sl   sl        Sl   sl            +   +         Sl   sl         Sl   +          +  +          sl     Iliopsoas  +        +   Sl          sl   Sl           sl   Sl         -   Sl       -   +          +   +         +   sl         sl   +        sl   +          +   -        -   -          -   +          +  +        +  +          sl     Quad Origin  -        -    -          -    -            -   -           -   -           -   -            -    -          -   -          -   -          -    -          -   -          -   -           -  -            -     Pubic Symphysis         +                   Sl       sl        -           sl         sl         sl        +       -         -         +         +          +      Bakers cyst        -         sl    -         +   -          sl   -            -    -          -    +         +   +        Sl    -         -   -          -   -         -   -          -  -         -     Pes Anserine Bursa      +          -   +         ++   Sl         +   -sl        -    -           -   -          -   -         -   -        -   -          -      -          -   -         -   -          -     Rectus Diastasis   2 finger                      IT Band insertion           +         -   -           -   -          -   -        -   -         -   -            -   -            -   -          -         All 4s Positioning: Pt not able to assume full lumbar extension in this position      Monthly Objective Measurement/Functional Activity  Date: 12/05/2019 01/07/20 02/18/20 03/17/20 06/11/20 07/02/20 08/13/20 09/29/20 01/07/21 02/23/21 04/01/21 05/11/21 06/22/21 08/05/21 09/23/21    Oswestry Pain Score 52/100    45/100    44/100 44/100     44/100   44/100    44/100   44/100     44/100     60/100    50/100    48/100    50/100     56/100     52/100    LE Functional Scale                   Neck Disability Index      32/100      42/100     44/100       44/100     46/100     36/100    42/100       50/100      46/100                       AROM Lumbar Spine: Degrees   Degrees      Degrees      Degrees      Degrees    Degrees Degrees Degrees Degrees Degrees Degrees Degrees Degrees Degrees Degrees    Degrees      Flexion 114           97      112      112    110     89    95    78        93     98      86        114     94       90    94       Extension 23 pain on right            27      36      29      20     30 pain right scapula    27 pain right SI and Scap     37        16     24      18        18      18       21     18       Right Lat. Flexion 33           34      27 pain right SI        31       31    28  25 32        22      20     19        20      32       21      23       Left Lat. Flexion 25           27      28       24        22     25    26  24        25       25       24        22      13       18      25       Right Lat Shift Pelvis No change No change  No change No change  no change No cahnge No change  No change No cahnge  stiff stiff      stiff   stiff     stiff  stiff       Left Lat Shift Pelvis No change pulls on right  Tightness No pain  No change  no change  pain right buttock  tight  no change  no change  stiff  stiff      stiff   stiff      stiff  stiff                       AROM Cervical Spine  Degrees Degrees Degrees Degrees Degrees Degrees Degrees Degrees Degrees Degrees Degrees Degrees Degrees Degrees Degrees     flexion    45 pain        45        59       45 pain      45         58               63      62     48       3      58     58      extension    51 dizzy        50        62       58       60         72        61      50      54      50      53     53      Right lateral flex    56 pain r upper trap         55        47       47 pain       49        54       45      48      48      46      51      49      Left lateral flex  57 pain right elbow        58        40      39      43       48       47       46      49      45       43      46      Right rotation          44         45        46     42 pain       40     45       48       40      47       45       46      48      Left rotation           40          40       41     40      37       40        39        37      43      41       38      42                       AROM Hips:  (degrees) Right      Left Right Left Right  Left Right  Left Right  Left Right  Left Right Left Right Left Right Left Right Left Right  Left Right Left Right Left Right Left Right Left Right  Left      Flexion 95       98  98        100  100      95  98        105   95       100 96       98  98       95  100     95  105       100    112    108 114       110 115     110  110    107 110       105        Abduction 42       45  45         45   45         45  42        46  40          45 40       40 38         39  36      37 38         40    39        37 38          37  35       35   32      35  30         35        Int. Rotation 25       15  25         28  25          25   26       28    25        30   25      32   22        30  25       30   28       22  25          24  23         24  25       30   24      32  22         30        Ext. Rotation  30        40              32        40   31       46     45       46  45       47  42        45  40      45   32       45   31        38  34         38  35       40   36      40  35         40                        Flexibility:   (degrees) Right    Left Right  Left Right  Left Right  Left Right  Left Right  Left Right Left Right Left Right Left Right Left Right  Left Right Left Right Left Right Left Right Left Right  Left      Hamstrings -47       -49   -45   -50    -45      -48   -43     -42   -42      -41  -45      -48  _46      -48 - 45     -46  -52    -47     -50     -48   -48     -47  -45    -45   -42    -45  -45       -45        Quadriceps 45       40  48       45     55      54   58       60    65        67  63        65  60        63   58      60   60     63    62     65    63      61  60      60 Not tested  not tested         HipExt/Rot(piriformis) 42       43    45       45   45    48   43       46    45        45  45        40  42        40  38      41  47      45   46        45    47        45  45       45    39        45  40        45    48      45       Hip Int/Rotators 38       37   37        35   35      35   32        36    25         30  30        35  32        37  30       35   28      34    25       32   28         31  25       30     28     35  26        32        Hip Flexors (iliopsoas) Not tested         Not tested Not tested Not tested Not tested Not tested Not tested  not tested Not tested Not tested Not tested Not tested Not tested Not tested        IT Band Not tested          not tested  not tested  not tested  not tested  not tested  not tested  not tested  not tested   not tested  not tested  not tested  not tested  not tested                                           Root Level  - Motor Right      Left Right  Left Right  Left Right  Left Right  Left Right  Left Right Left Right Left Right Left Right Left Right  Left Right Left Right Left Right Left Right Left Right  Left     T12             Rectus Abdominus 4+/5        4+/5         4+/5      4+/5       4+/5    4 to 4+/5    4 to 4+/5   4 to 4+/5       4+/5        4+/5      4+/5      4+/5        4+/5        4+/5     4+/5       L1              Paraspinals 5/5      5/5   5/5       5/5  5/5        5/5  5/5        5/5   5/5       5/5  5/5        5/5 5/5        5/5 5/5        5/5   5/5        5/5  5/5       5/5            L2              Hip Flexion 4+/5      4+/5  4+/5    4+/5  5/5        5/5  55        5/5   5/5       5/5   5/5        5/5 5/5        5/5  5/5        5/5  5/5        5/5   5/5       5/5           L3             Quads 5/5      5/5  5/5       5/5   5/5        5/5  5/5        5/5   5/5       5/5   5/5        5/5 5/5        5/5  5/5        5/5  5/5        5/5   5/5       5/5           L4              Anterior Tibialis 5/5      5/5  5/5       5/5  5/5        5/5   5/5        5/5   5/5       5/5   5/5        5/5 5/5        5/5  5/5       5/5  5/5        5/5   5/5       5/5           L5             Gr. Toe Extension 5/5      5/5  5/5       5/5   5/5        5/5 5/5         5/5   5/5       5/5   5/5        5/5 5/5        5/5  5/5        5/5  5/5        5/5   5/5       5/5           S1            Gastroc/Sol/Peroneals 4+/5    4+/5   4+/5    4+/5  4+/5    4+/5  4+/5     4+/5   4+/5   4+/5 4+/5      4+/5 5/5        5/5  5/5        5/5  5/5        5/5   5/5       5/5                            Functional Strength:                     Single LegStanceTime (seconds) R:24   L:25   R:30  L:27 R:30   L:30 R:     L: R:      L: R:22    L:15  R: 30 L:30   R:30   L:15   R:30   L:30   R:      L: R:      L:   R:      L:   R:      L:    R:    L:  R:25   L:15 R:      L:     Pelvic Floor Isolation (seconds) 10 sec  10 sec                    Inner Unit  Isolation (seconds) 10 sec  10 sec                                                       Functional Activities:                      Sit w/o pain? Pain increases (minutes) yes   Yes/ 30-60 min  yes/ 30-60 Yes/ 20 min  Yes/ 30-60 min Yes/ 30-60 Yes/ 30 min  Yes/ 30-60 min yes30 min  Yes/ 30  Yes/ 30-45 min Yes/ 30  min Yes/ 30-60 min  no/ 30 min No/ 60 min       Stand w/o pain? 20 min Yes/ 30 min Yes/ 30 min Yes/ 30 min Yes/ 30 min Yes/ 30 min Yes/ 15 min   yes/ 15-30 min Yes/ 15 min  Yes/ 15 min  Yes/ 15-30 Yes/ 15-30 min Yes/ 30 min No/ 30 min No/ 30 min       Walk w/o pain? 30 min   yes/ 1/4 mile Yes/ 30 min Yes/ 45 min  Yes/ 1 mile Yes/ 30 min  Yes/ 30 min   yes/ 1/4 mile Yes/ 30 min  Yes/ 30 Yes/ 60 min   yes/ 1/4 mile  yes/ 1/4 mile  no/ 1/4 mile  no/ 3 mile       Steps w/o pain? Yes, ignores pain  Yes/ ignores pain Yes/ no limit Yes/ no limit Yes/ no limit Yes/ no limit No/ worst getting winded   no/ more winded than pain No problem  No problem  No problem  Yes/  1 fl  yes/ 1 fl  no/ 1 fl No/ 1 fl       Drive w/o pain? Uncomfortable, 60 min   Yes/ w/ supports and TENS 2 hrs Yes/ 60 min  Yes/ 60 min  Yes/ 1-2 hrs Yes/ 3 hrs Yes/ 1 hr   yes/ 1-2 hrs Yes/ 90  min with TENS Yes/ hasn't gone anyway  Yes/ 30-45 min  Yes/ 60 min  yes/ 60 min  no/ 30 min No/  min       Work w/o pain? n/a   n/a     n/a    n/a   n/a  n/a  n/a    n/a   n/a  n/a n/a n/a       # times wakes w/ pain? Sleeping on couch b/c or right shld pain, since July Continues to sleep on couch  Continues to sleep on the couch  2x from right shoulder and neck, she is still sleeping on couch  1-2x   1-2x   3-4x , some pain and some not sure   1-2x 2x  More comfortable on left  Every 2 hrs, back neck and shoulder  Wakes evry 3 hrs, brain activity and some pain  Wakes several times, not necessarily  from pain con't to wake many times, not necessarily from pain Wakes several times, not always from pain  wakes many times but not sure from pain        PLAN OF CARE:    ASSESSMENT:  Problem List:   1. Flare up of SI joint dysfunction  2. Decreased spinal stabilization  3. Postural dysfunction     Discussion:  Barbara did improve in low back as she self managed with her home exercises reflected in improved Oswestry Low Back Pain Score from 56/100 to 52/100.  Functionally, she has been able to walk more and travel more with fewer symptoms.     Despite her subjective complaint of increased pain in the left upper trap, her Neck Disability Index score also improved from 50/100 to 46/100.    Reviewed how to position pillows on her lap and under both axilla to raise her reading material and rest her arms to minimize pain in the left upper trap.     Attempted to do positional isometric contraction technique to C2 but Maribel was concerned it would set off her dizziness.  Thus reviewed stretching of upper trap and ant/mid scalenes in sitting.  Also instructed in use of theracane on trigger points.  She will purchase a Theracane for home use.  She has used the Voltaren gel on the left upper trap but without relief,  she has not tried the Theraworx Relief foam when she has spasm in the left upper trap.  She will try this before next visit.     Barbara self manages well for about 6-8 weeks and then she is in need of manual work for pain relief in order for her home exercises to be effective.  Thus plan to see her once every 6-8 weeks to re-assess, use manual technique for pain relief and progress her home exercises.       Short Term Goals: (4-6 weeks)                                   Date Met:                1.   pt independent in postural correction         02/18/20  2.   pt independent in SI joint self-corrections        12/17/19  3.   pt independent in exer to decrease post. disc pressures      12/17/19  4.   pt able to sleep through night without pain        09/29/20  5.   pt able to stand 10  minutes without pain        01/07/20  6.   Reduce pt pain to no greater than 4/10         01/07/20  7.   Decrease Oswestry Pain Score to no greater than 45/100      01/07/20  8.  Decrease Oswestry Pain Score to no greater than  40/100  9.  Pt able to perform hip abduction x6 w/o pain        09/29/20  10. Decrease pain right gr trochanter to no greater than 4/10      03/17/20  11. Pt able to engage inner unit in supine w/ legs on chair, moving in int/ext rotation w/o pain x6 Unable to do, not trying   12.  Pt able to contract pelvic floor on exhaling a diaphragmatic breath x6    07/02/20  13.  Pt able to isolate the transverse abdominus with forearms on counter and in slight lumbar ext x6 08/13/20  14.  Pt able to perform step up/down with inner unit musculature engaged x6 without pain  Able to do once secondary to chornic fatigue, no pain   15.   Pt able to engage inner unit musculature on sit to stand and stand to sit, x6 w/o low back pain Hard for her to keep inner unit on .   16.  Pt able to perform diaphragmatic breathing x6, in supine      05/11/21  17.  Pt able to do diaphrag breath engaging transv abdominus exhaling,  3 breaths  "w/ lower costals 05/11/21  18.  Decrease Neck Disability score to no greater than 35/100  19.  Pt able to bridge with inner unit engaged, 2\" w/o pain, x6      06/22/21  20.  Pt able to use theracane to minimize pain left upper trap  21.  Pt able to effectively stretch upper trap and ant/mid scalene on left    Long Term Goals:(3-5 months)      Date Met:      1.  pt independent in self-management of symptoms       2.  pt independent in home program      3. Reduce pain to no greater than 2/10      4.  Pt able to  puppies without pain.      Treatment Plan:   1.  Ultrasound to increase extensibility, decrease pain, if needed  2.  Electrical stimulation for muscle relaxation, decrease pain, if needed, iontophoresis  3.  Manual therapy to increase function, decrease pain  4.  Therapeutic exercise to increase function, decrease pain  5.  Home programming and d/c planning to increase function and decrease pain    Frequency & Duration:  Pt will be seen on a once/every 4-6 week basis for 6 more visits to meet remaining goals.     Patient Participated in and Agrees With This Plan of Care and Goals:  YES  Rehab Potential:  Fair to good      Karen Chatterjee, PT, MS  Physical Therapist  [place]# [id number]        Medicare Certification:  90 Day Recertification  Certification Period: 09/23/21 to 12/23/21  I certify that the therapy services are furnished while this patient is under my care.  The services outlined above are required by this patient, and will be reviewed every 90  days.     PHYSICIAN:     DATE:     Please sign and return via fax to [facility] [address] Fax # [phone]. Thank you, [facility].        Below is the Physical Therapy Re-evaluation for your patient, Barbara Hernandez.  If this meets with your approval please sign the Medicare Re-Certification at the end of the evaluation and return to our office.  If you have any questions, please contact me.  Best Regards,  Karen" Walker, PT, MS      ITREATMENT TODAY:    Total Treatment Time: (time in clinic)            65  Timed Code Treatment Minutes:   65      Re-Evaluation:   RX min:      Manual Therapy:  (MA)  RX min:   50  Manual posterior rotation of right innominate    Positional Isometric contraction (PIC) of left iliopsoas    Positional Isometric contraction of (PIC) right gluteus minimus    Distraction of both SI jts in open pack hip position  Piriformis stretching, bilaterally    Quadratus lumborum stretching, bilaterally    Anterior/Inferior Mobilization of right  hip    Myofascial work trunk  Manual spinal distraction        Therapeutic Activities:  (TA)  RX min:    5  Neuromuscular Reeducation: (NMR)  RX min:   10    Ultrasound:  RX min:          1.6 piper/cm2       Location:      Iontophoresis:  6 hr patch                                Location:                               Procedures:      Key:  i=instructed        r=review        c=corrected        a=adapted   Code Procedure/Instruction 12/05/2019 12/17/19 01/07/20 02/18/20 03/17/20 06/11/20 07/02/20 08/13/20 09/29/20 01/07/21 02/23/21 04/01/21 05/11/21 06/22/21 08/05/21 09/23/21      TA         Sleeping Positions                reviewed reviewed     corrected              TA Sternum-Up Posture  reviewed corrected       reviewed     review In sitting to read      TA SI jt. self-correction reviewed   reviewed reviewed                 TA Lumbar Facet PIC reviewed   reviewed reviewed                 TA   Order of Self-Corrections                      TA Use of lumbar pillow  reviewed                    TA Use of cervical roll        instructed              TA Use of orthotics                      TA Use of SI belt                      TA Use of Nada chair                      TA Use of Ice  reviewed                    TA Use of [employer]/ heat  reviewed   Pain cakes                 TA Use of TENS unit  In car, while driving             Stop use       TA Use of iontophoresis                       TA Decreasing Disc Pressures   reviewed                   TA Step up/down w/ inner unit engaged          instructed            TA Use of theracane                Instructed in use on upper quarter & piriformis      NMR Diaphragmatic Breathing      instructed      Review/correct reviewed correct        NMR Diaphragmatic breath w/ pelvic floor            instruct reviewed correct        NMR Diaphragmatic breath w/ trans abdom x3 lower costal breaths            instruct reviewed correct        NMR diaphragmatic breath w/ transverse abdom bridging             instructed correct        NMR Diaphragmatic breath w/ TA engaged, heel slides              instructed        NMR Capital nodding following diaphr breath engaging TA                Attempted, pt not able coordinate and stretch suboccip      NMR Pelvic Floor Isolation       review      reviewed         NMR Transverse Abdominus       Instruct/ adapt correct              NMR Multifidus Isolation                      NMR InnerUnit against Gravity           reviewed           NMR Sit-stand w/ inner unit                      NMR Roll w/ inner unit                      NMR Walk w/ inner unit                       NMR Up/down steps w/ inner unit                      NMR Water Exer Instruction                      NMR Hip Abd w/o piriformis    instructed corrected corrected                NMR Hip int/ext rotation supine w/ feet up on chair     instructed corrected                NMR Diaphragmatic Breathing w/ pelvic floor      instructed review               NMR Pelvic floor in sitting on towel foll      instructed stopped               NMR Hip Add w/o iliop/ham                       NMR Lower abs in supine                      NMR Abd obliques in supine                      NMR Prone Knee Flexion                      NMR Prone glut valentino                      NMR Retro Step                      NMR Retro Walk                      NMR Retro walk on  treadmill                      NMR Forward walk w/ inner unit                      NMR Balance Instruction                      NMR Stability Ball A/P & Lateral                      NMR Ball Catch/Throw /Supine                      NMR Ball Catch/Throw /Stand                      NMR StabilityBall All 4's Arm Lift                      NMR StabilityBall Prone Walk Out                      NMR StabilityBall Supine Oblique                      NMR Stability Ball sit-supine-sit                      NMR Walking Prog Progression                      MNR Home program sequencing                                             TA Hamstring stretch                      TA Hip Ext Rot Stretch          review             TA Hip Int Rot Stretch                      TA Hip Flex/IT Stretch                      TA Hip Add Stretch                      TA Lats Dorsi Stretch                      TA Gastroc/soleus stretch                      TA QuadratusLumborm Stretch                         Supine                         Stance                      TA Quad Stretch                                             NMR Wall Push Ups                      NMR Planking                      NMR BOSU Ball Exercises                      NMR Lateral Bridge Drop                      NMR Waiters [place]                      NMR O'Feldt Lat Pull Down                      NMR O'Feldt Hip Ext                      NMR O'Feldt Trunk Ext                                             TA CervDiscExtSup/stnd   reviewed                   TA Cerv Stretches                      TA Self PIC Cervical Spine                      TA Neural Gliding                      TA Ant/Mid Scalene Strch                      TA Biceps stretch                      TA Rotator Cuff Stretch                      TA Anterior Chest Stretch                      TA Wrist Ext Stretch                      TA Lats Dorsi stretch    instructed                  NMR Prone Arm Lifts                       NMR Scapula Stabilization                      NMR Occulomotor Isometrics                      NMR Cervical Isometrics                                             TA Shld AROM w/bar                      TA Shld Capsular Stretching                      TA Self PIC Thor Spine   reviewed  Adapt and corrected reviewed                TA Rot Cuff Therabd, beginner                      ABIGAIL Rot Cuff Therabd, skinny Chatterjee, PT, MS  Physical Therapist  [place]# [id number]

## 2021-12-02 ENCOUNTER — TREATMENT (OUTPATIENT)
Dept: PHYSICAL THERAPY | Facility: CLINIC | Age: 71
End: 2021-12-02

## 2021-12-02 DIAGNOSIS — M53.3 SACROILIAC JOINT DYSFUNCTION: Primary | ICD-10-CM

## 2021-12-02 PROCEDURE — 97112 NEUROMUSCULAR REEDUCATION: CPT | Performed by: PHYSICAL THERAPIST

## 2021-12-02 PROCEDURE — 97140 MANUAL THERAPY 1/> REGIONS: CPT | Performed by: PHYSICAL THERAPIST

## 2021-12-02 NOTE — PROGRESS NOTES
" Physical Therapy Note   SUBJECTIVE:  Changes since last seen:   \"I took plane to [place] and slept on a different bed, went to [facility] and able to walk the whole [facility] and no residual problems!\"       Recently Maribel has noticed increased numbness and tingling in left thigh.  \"I am so much stiffer and can't sit on my knees anymore.\"  \"I'm having to medicate more; I took Mobic and Tylenol to be able to go out and blow leaves this week.\"     She requests to focus on pain in area of right quadratus lumborum insertion today.   She notes she is having trouble at night getting just the right position with her pillow to no tweak her neck pain.       She notes she recently stepped on something in a parking lot that tweaked the neuroma on her right foot.  She is using Voltaren gel on it.      She recently got her Covid booster shot and notes it made her sick and increased her pain for 2 days.         Current level of pain:   Right buttock/SI jt 1/10  Left SI joint  1/10  Lumbar spine  0/10   Right Gr troch 3/10 all time  Neck 4/10 Both upper trap when sleeps, left > right    Lowest level of pain since last seen:    0/10   0/10    1/10     1/10              Highest level of pain since last seen:     3/10    2/10     4/10       4/10    5/10  At night           Past Medical History:  1.  Cervical DDD  2.  Hx of Bilateral THR, left 2012, right 2010  3.  Septoplasty, 2011  4.  Hx of SI joint dysfunction  5.  Fibromyalgia  6.  Hx of hypothyroidism  7.  Hx of gastritis  8.  Irritable bowel syndrome/spastic colon  9.  Osteoarthritis  10.  Hx of 4 pregnancies, 2 normal vaginal deliveries  11. Hx of Dequervains syndrome  12.  Hx of fx left sesmoid  13.   Hx of positional vertigo  14. Osteopenia, diagnosed May 2014  15  Hx of migraine headaches  16.  TMJ dysfunction, 2015  17.  Hx of Cardiomyopathy/ left catheterization 2002  18.  Dyslipidemia  19.  Hx of neuroma pain left foot  20.  Hx of anxiety/ depression    Functional " Limitations:  Standing, sleeping,   Patient Goals for Physical Therapy: decreased pain      OBJECTIVE:  No shift of pelvis      12/02/21                  SI Joint Positioning  Right  Left Right  Left Right  Left Right  Left Right  Left Right  Left Right  Left Right  Left Right  Left Right  Left Right  Left Right  Left Right  Left Right Left Right Left Right Left       Innominate                          Anterior    x                         Posterior               x                     Sacrum                        Unilateral rotation   x                                     SI Joint Testing  Right  Left Right  Left Right  Left Right  Left Right  Left Right  Left Right  Left Right  Left Right  Left Right  Left Right  Left Right  Left Right  Left Right Left Right Left Right Left           Distraction                           Beatriz's                           Compression                           Prone Press Up                                      Special Tests  Right   Left Right  Left Right  Left Right  Left Right  Left Right  Left Right Left  Right  Left Right  Left Right  Left Right  Left Right  Left Right  Left Right Left Right Left Right Left      Straight Leg Raise                           Active                           Passive                       Hip Scour Test                                                              12/05/2019 12/17/19 01/07/20 02/18/20 03/17/20 06/11/20 07/02/20 08/13/20 09/29/20 01/07/21 02/23/21 04/01/21 05/11/21 06/22/21 08/05/21 09/23/21   SI Joint Positioning  Right  Left Right  Left Right  Left Right  Left Right  Left Right  Left Right  Left Right  Left Right  Left Right  Left Right  Left Right  Left Right  Left Right Left Right Left Right Left       Innominate                          Anterior               x              x              x                x               x              x              sl   Sl    x   x    x    x Very sl   x    x   x          Posterior    x   x     x   x    x    x  sl               Sll               x                x              x              x       Very sl               x               x               x      Sacrum                             Unilateral rotation    x   x   x   x   x    x   sl   x   x    x   x   x Very sl   x   x   x                         SI Joint Testing  Right  Left Right  Left Right  Left Right  Left Right  Left Right  Left Right  Left Right  Left Right  Left Right  Left Right  Left Right  Left Right  Left Right Left Right Left Right Left           Distraction   +         +   +        +   +         +   +          +   +            +   +          +   +          +   +         +   +         +   +          +   +          +   +         +   +       +   +         +    +         +   +       +           Beatriz's    -        -            -         -   -          -   -         -   -          -   -          -   -          -           Compression   -         -            -          -   -         -   -          -   -         -   -           -   -          -           Prone Press Up   -            -            Sl       Sl    -           -  sl       Sl    Sl        Sl    +          +   -           -                      Special Tests  Right   Left Right  Left Right  Left Right  Left Right  Left Right  Left Right Left  Right  Left Right  Left Right  Left Right  Left Right  Left Right  Left Right Left Right Left Right Left      Straight Leg Raise                                   Active   -          -  -       -        -         -   -           -   -           -   -           -    -         -   -          -   -         -   -        -   -           -   -         -           Passive    -           -   -          -           -           -   -        -   -        -   -          -    -          -   -         -   -        -   -        -   -          -   -       -       Hip Scour Test                                                                Palpation:       Muscle/Bone Irritation    Date 12/02/21                   Right  Left Right  Left Right  Left Right  Left Right  Left Right  Left Right  Left Right  Left Right  Left Right  Left Right  Left Right  Left Right  Left Right Left Right Left Right Left   SI joint    +         sl                  Piriformis   ++        +                  Gr. Trochanter ++         +                  IT Band  -          -                  Quadratus Lumborum   +        sl                  Ischial Tuberosity   -          -                  Sacrococcygeal Ligs   -         -                  Paraspinals   -          +                  Spinous Processes                                                T-spine rotated to  T6-7                         L-spine rotated to           L3-5                  Adductor Fabio   -          -                  Iliopsoas  -           -                  Quad Origin   -           -                  Pubic Symphysis       Sl                    Bakers cyst  -          -                  Pes Anserine Bursa   -            -                                                            Date 12/05/2019 12/17/19 01/07/20 02/18/20 03/17/20 06/11/20 07/02/20 08/13/20 09/29/20 01/07/21 02/23/21 04/01/21 05/11/21 06/22/21 08/05/21 09/23/21    Right  Left Right  Left Right  Left Right  Left Right  Left Right  Left Right  Left Right  Left Right  Left Right  Left Right  Left Right  Left Right  Left Right Left Right Left Right Left   SI joint  +        ++   +          +    +         sl   +         Sl     +          +   +         +    +           +   +         Sl    Sl        -    +       sl   +         -   +        -   -           -   +          +   +         +   Sl        Sl    Piriformis   Sl        +    ++        +     +        sl     +       sl     +          +   +        sl   +        +    +         sl     +       sl   ++      +   ++      sl   +          sl   +           +   +         +    +       Sl    Gr. Trochanter  ++         +   +          +     Sl      Sl     ++       sl   ++         +   +       +   +          +    +         -     +        -   ++       -  ++       sl   ++        Sl    +          ++  +         ++  +         sl   IT Band  sl        sl   +         sl   Sl          -   Sl        -   Sl        Sl    -           -   -           -    +         -    -         -    -        -   -        -    Sl          -   -          -  sl        sl   Sl         -   Quadratus Lumborum  ++        +    +         sl   Sl         sl   -            -   -           -   -            -   -         -    Sl       sl   +          +    -         -   -        -    -           -   ++      ++   +         +   Ischial Tuberosity  -        -   -          -   -          -   -            -   -          -    -            -   -           -    -         -   -          -    -         -   Sl       Sl    -          -    -         - -           -  -          -   Sacrococcygeal Ligs  -        -   -           -   -             -   -            -   -           -   -           -   -            -    -         -   -           -   -           -   Sl       Sl     -           -     -        -   -           -   -           -   Paraspinals  +        -   Sl           -   -          sl   -            -   +         -   +          sl   Sl          +    +         sl   -           +   +          Sl       -            -     +          -   +          +   +          -   Spinous Processes                                      C-spine rotated to               C2-6           C2-5          C2  C2-3  C2 C2         T-spine rotated to   T3-5                     T4-6  T6-8 T3-4 T3-4    T4-5 T3-6   T4-6  T4-6   -          -  T4-7 T4-6  T4-7  T4-7 T4-6          L-spine rotated to  L5           L5           L4-5   -          - L3-5 L4-5            L4-5 L4-5 L4-5            L4-5  L5              L5   -          - L3-5  L5 L4-5   Adductor Fabio  -        -   -           -   -           sl   -            -   -        -   -          -   Sl         Sl   Sl       sl Sl          Sl   sl        Sl   sl            +   +         Sl   sl         Sl   +          +  +          sl   Iliopsoas  +        +   Sl          sl   Sl           sl   Sl         -   Sl       -   +          +   +         +   sl         sl   +        sl   +          +   -        -   -          -   +          +  +        +  +          sl   Quad Origin  -        -    -          -    -            -   -           -   -           -   -            -    -          -   -          -   -          -    -          -   -          -   -           -  -            -   Pubic Symphysis         +                   Sl       sl        -           sl         sl         sl        +       -         -         +         +          +    Bakers cyst        -         sl    -         +   -          sl   -            -    -          -    +         +   +        Sl    -         -   -          -   -         -   -          -  -         -   Pes Anserine Bursa      +          -   +         ++   Sl         +   -sl        -    -           -   -          -   -         -   -        -   -          -      -          -   -         -   -          -   Rectus Diastasis   2 finger                    IT Band insertion           +         -   -           -   -          -   -        -   -         -   -            -   -            -   -          -       All 4s Positioning: Pt not able to assume full lumbar extension in this position        Monthly Objective Measurement/Functional Activity  Date: 12/02/21                Oswestry Pain Score 52/100                LE Functional Scale                 Neck Disability Index 40/100                                 AROM Lumbar Spine: Degrees   Degrees      Degrees      Degrees      Degrees    Degrees Degrees Degrees Degrees Degrees Degrees Degrees Degrees Degrees      Flexion 91                   Extension 18 pain                    Right Lat. Flexion 18                    Left Lat. Flexion 21                   Right Lat Shift Pelvis No pain                    Left Lat Shift Pelvis  no pain                                  AROM Cervical Spine Degrees Degrees Degrees Degrees Degrees Degrees Degrees Degrees Degrees Degrees Degrees Degrees Degrees Degrees     flexion 41                  extension 47                  Right lateral flex 42                  Left lateral flex 52                  Right rotation 42                  Left rotation  45                                 AROM Hips:  (degrees) Right      Left Right Left Right  Left Right  Left Right  Left Right  Left Right Left Right Left Right Left Right Left Right  Left Right Left Right Left Right Left      Flexion 117                       Abduction 38                   Int. Rotation 18                   Ext. Rotation  40                                 Flexibility:   (degrees) Right    Left Right  Left Right  Left Right  Left Right  Left Right  Left Right Left Right Left Right Left Right Left Right  Left Right Left Right Left Right Left      Hamstrings -31    -35                   Quadriceps 70    68                   HipExt/Rot(piriformis) 46     45                    Hip Int/Rotators 28    30                   Hip Flexors (iliopsoas) Not tested                   IT Band  not tested                                                  Root Level  - Motor Right      Left Right  Left Right  Left Right  Left Right  Left Right  Left Right Left Right Left Right Left Right Left Right  Left Right Left Right Left Right Left     T12             Rectus Abdominus 4+/5                         L1              Paraspinals 5/5      5/5                   L2              Hip Flexion 4+/5    4+/5                   L3             Quads 4+/5    4+/5                   L4              Anterior Tibialis 5/5    5/5                   L5             Gr. Toe Extension  5/5     5/5                   S1            Gastroc/Sol/Peroneals 5/5    4+/5                                   Functional Strength:                   Single LegStanceTime (seconds) R; 25   L: 13                   Pelvic Floor Isolation (seconds) 10                   Inner Unit  Isolation (seconds) 10                                                  Functional Activities:                    Sit w/o pain? Pain increases (minutes) Yes/ 60 min                   Stand w/o pain? Yes/ 30 min                   Walk w/o pain? Yes/ 1/4 mile                   Steps w/o pain? Yes/ 3 fl                   Drive w/o pain?  yes/ 60 -90 min                   Work w/o pain? n/a                   # times wakes w/ pain? Many times, not sure from pain                   Monthly Objective Measurement/Functional Activity  Date: 12/05/2019 01/07/20 02/18/20 03/17/20 06/11/20 07/02/20 08/13/20 09/29/20 01/07/21 02/23/21 04/01/21 05/11/21 06/22/21 08/05/21 09/23/21   Oswestry Pain Score 52/100    45/100    44/100 44/100     44/100   44/100    44/100   44/100     44/100     60/100    50/100    48/100    50/100     56/100     52/100   LE Functional Scale                  Neck Disability Index      32/100      42/100     44/100       44/100     46/100     36/100    42/100       50/100     46/100                     AROM Lumbar Spine: Degrees   Degrees      Degrees      Degrees      Degrees    Degrees Degrees Degrees Degrees Degrees Degrees Degrees Degrees Degrees Degrees      Flexion 114           97      112      112    110     89    95    78        93     98      86        114     94       90    94      Extension 23 pain on right            27      36      29      20     30 pain right scapula    27 pain right SI and Scap     37        16     24      18        18      18       21     18      Right Lat. Flexion 33           34      27 pain right SI        31       31    28  25 32        22      20     19        20      32       21      23      Left Lat. Flexion 25           27      28       24        22     25    26  24        25        25       24        22      13       18      25      Right Lat Shift Pelvis No change No change  No change No change  no change No cahnge No change  No change No cahnge  stiff stiff      stiff   stiff     stiff  stiff      Left Lat Shift Pelvis No change pulls on right  Tightness No pain  No change  no change  pain right buttock  tight  no change  no change  stiff  stiff      stiff   stiff      stiff  stiff                     AROM Cervical Spine  Degrees Degrees Degrees Degrees Degrees Degrees Degrees Degrees Degrees Degrees Degrees Degrees Degrees Degrees     flexion    45 pain        45        59       45 pain      45         58               63      62     48       3      58     58     extension    51 dizzy        50        62       58       60         72        61      50      54      50      53     53     Right lateral flex    56 pain r upper trap         55        47       47 pain       49        54       45      48      48      46      51      49     Left lateral flex  57 pain right elbow        58        40      39      43       48       47       46      49      45       43      46     Right rotation          44         45       46     42 pain       40     45       48       40      47       45       46      48     Left rotation           40          40       41     40      37       40        39        37      43      41       38      42                     AROM Hips:  (degrees) Right      Left Right Left Right  Left Right  Left Right  Left Right  Left Right Left Right Left Right Left Right Left Right  Left Right Left Right Left Right Left Right Left      Flexion 95       98  98        100  100      95  98        105   95       100 96       98  98       95  100     95  105       100    112    108 114       110 115     110  110    107 110       105       Abduction 42       45  45         45   45         45  42        46  40          45 40       40 38         39  36      37 38         40    39        37  38          37  35       35   32      35  30         35       Int. Rotation 25       15  25         28  25          25   26       28    25        30   25      32   22        30  25       30   28       22  25          24  23         24  25       30   24      32  22         30       Ext. Rotation  30        40              32        40   31       46     45       46  45       47  42        45  40      45   32       45   31        38  34         38  35       40   36      40  35         40                      Flexibility:   (degrees) Right    Left Right  Left Right  Left Right  Left Right  Left Right  Left Right Left Right Left Right Left Right Left Right  Left Right Left Right Left Right Left Right Left      Hamstrings -47       -49   -45   -50    -45      -48   -43     -42   -42      -41  -45      -48  _46      -48 - 45     -46  -52    -47     -50     -48   -48     -47  -45    -45   -42    -45  -45       -45       Quadriceps 45       40  48       45     55      54   58       60    65        67  63        65  60        63   58      60   60     63    62     65    63      61  60      60 Not tested  not tested       HipExt/Rot(piriformis) 42       43    45       45   45    48   43       46    45        45  45        40  42        40  38      41  47      45   46        45    47        45  45       45    39        45  40        45    48      45      Hip Int/Rotators 38       37   37        35   35      35   32        36    25         30  30        35  32        37  30       35   28      34    25       32   28         31  25       30     28     35  26        32       Hip Flexors (iliopsoas) Not tested         Not tested Not tested Not tested Not tested Not tested Not tested  not tested Not tested Not tested Not tested Not tested Not tested Not tested       IT Band Not tested          not tested  not tested  not tested  not tested  not tested  not tested  not tested  not tested   not tested  not tested  not tested  not tested   not tested                                        Root Level  - Motor Right      Left Right  Left Right  Left Right  Left Right  Left Right  Left Right Left Right Left Right Left Right Left Right  Left Right Left Right Left Right Left Right Left     T12             Rectus Abdominus 4+/5        4+/5         4+/5      4+/5       4+/5    4 to 4+/5    4 to 4+/5   4 to 4+/5       4+/5        4+/5      4+/5      4+/5        4+/5        4+/5     4+/5      L1              Paraspinals 5/5      5/5   5/5       5/5  5/5        5/5  5/5        5/5   5/5       5/5  5/5        5/5 5/5        5/5 5/5        5/5   5/5        5/5  5/5       5/5           L2              Hip Flexion 4+/5      4+/5  4+/5    4+/5  5/5        5/5  55        5/5   5/5       5/5   5/5        5/5 5/5        5/5  5/5        5/5  5/5        5/5   5/5       5/5          L3             Quads 5/5      5/5  5/5       5/5   5/5        5/5  5/5        5/5   5/5       5/5   5/5        5/5 5/5        5/5  5/5        5/5  5/5        5/5   5/5       5/5          L4              Anterior Tibialis 5/5      5/5  5/5       5/5  5/5        5/5  5/5        5/5   5/5       5/5   5/5        5/5 5/5        5/5  5/5       5/5  5/5        5/5   5/5       5/5          L5             Gr. Toe Extension 5/5      5/5  5/5       5/5   5/5        5/5 5/5         5/5   5/5       5/5   5/5        5/5 5/5        5/5  5/5        5/5  5/5        5/5   5/5       5/5          S1            Gastroc/Sol/Peroneals 4+/5    4+/5   4+/5    4+/5  4+/5    4+/5  4+/5     4+/5   4+/5   4+/5 4+/5      4+/5 5/5        5/5  5/5        5/5  5/5        5/5   5/5       5/5                          Functional Strength:                    Single LegStanceTime (seconds) R:24   L:25   R:30  L:27 R:30   L:30 R:     L: R:      L: R:22    L:15  R: 30 L:30   R:30   L:15   R:30   L:30   R:      L: R:      L:   R:      L:   R:      L:    R:    L:  R:25   L:15     Pelvic Floor Isolation (seconds) 10 sec  10 sec                    Inner Unit  Isolation (seconds) 10 sec  10 sec                                                    Functional Activities:                     Sit w/o pain? Pain increases (minutes) yes   Yes/ 30-60 min  yes/ 30-60 Yes/ 20 min  Yes/ 30-60 min Yes/ 30-60 Yes/ 30 min  Yes/ 30-60 min yes30 min  Yes/ 30  Yes/ 30-45 min Yes/ 30  min Yes/ 30-60 min  no/ 30 min No/ 60 min      Stand w/o pain? 20 min Yes/ 30 min Yes/ 30 min Yes/ 30 min Yes/ 30 min Yes/ 30 min Yes/ 15 min   yes/ 15-30 min Yes/ 15 min  Yes/ 15 min  Yes/ 15-30 Yes/ 15-30 min Yes/ 30 min No/ 30 min No/ 30 min      Walk w/o pain? 30 min   yes/ 1/4 mile Yes/ 30 min Yes/ 45 min  Yes/ 1 mile Yes/ 30 min  Yes/ 30 min   yes/ 1/4 mile Yes/ 30 min  Yes/ 30 Yes/ 60 min   yes/ 1/4 mile  yes/ 1/4 mile  no/ 1/4 mile  no/ 3 mile      Steps w/o pain? Yes, ignores pain  Yes/ ignores pain Yes/ no limit Yes/ no limit Yes/ no limit Yes/ no limit No/ worst getting winded   no/ more winded than pain No problem  No problem  No problem  Yes/  1 fl  yes/ 1 fl  no/ 1 fl No/ 1 fl      Drive w/o pain? Uncomfortable, 60 min  Yes/ w/ supports and TENS 2 hrs Yes/ 60 min  Yes/ 60 min  Yes/ 1-2 hrs Yes/ 3 hrs Yes/ 1 hr   yes/ 1-2 hrs Yes/ 90  min with TENS Yes/ hasn't gone anyway  Yes/ 30-45 min  Yes/ 60 min  yes/ 60 min  no/ 30 min No/  min      Work w/o pain? n/a   n/a     n/a    n/a   n/a  n/a  n/a    n/a   n/a  n/a n/a n/a      # times wakes w/ pain? Sleeping on couch b/c or right shld pain, since July Continues to sleep on couch  Continues to sleep on the couch  2x from right shoulder and neck, she is still sleeping on couch  1-2x   1-2x   3-4x , some pain and some not sure   1-2x 2x  More comfortable on left  Every 2 hrs, back neck and shoulder  Wakes evry 3 hrs, brain activity and some pain  Wakes several times, not necessarily  from pain con't to wake many times, not necessarily from pain Wakes several times, not always from pain  wakes many times but not sure from  pain       PLAN OF CARE:    ASSESSMENT:  Problem List:   1. Flare up of SI joint dysfunction  2. Decreased spinal stabilization  3. Postural dysfunction     Discussion:  Maribel has self managed fairly well since last seen as evidenced by her ability to navigate trip to [place] and return without significant pain.  Her Oswestry Low Back Pain score remains the same at 52/100.  Her Neck Disability Index has improved from 46/100 to 40/100.    She arrives today requesting manual work on her low back.  She asks to not work on her cervical spine as she feels it is doing fairly well today, as confirmed by her improved Neck Pain Score.     Reviewed using diaphragmatic breath to engage the inner unit for spinal stabilization and added holding inner unit and taking 3 lower costal breaths.  This is very hard for her to do as she tends to breath with her upper costals contributing the the increased tension in the upper quarter.  She had stopped doing this exercise.  Encouraged her to work on this.     Reviewed heel slides with inner unit engaged.  She had stopped doing this.  She needed correction on how to engage the inner unit and maintain it for proximal stability of the spine with distal movement.  She will resume work on th is.     Instructed in bridging with inner unit engaged with focus on glut max to minimize the inappropriate engagement of the hamstrings and piriformis.  This was very hard for her at first.  With cueing she was able to do correctly.     Barbara continues to self manages well for about 6-8 weeks and then she is in need of manual work for pain relief in order for her home exercises to be effective.  Thus plan to see her once every 6-8 weeks to re-assess, use manual technique for pain relief and progress her home exercises.       Short Term Goals: (4-6 weeks)                                   Date Met:                1.   pt independent in postural correction         02/18/20  2.   pt independent in SI joint  "self-corrections        12/17/19  3.   pt independent in exer to decrease post. disc pressures      12/17/19  4.   pt able to sleep through night without pain        09/29/20  5.   pt able to stand 10  minutes without pain        01/07/20  6.   Reduce pt pain to no greater than 4/10         01/07/20  7.   Decrease Oswestry Pain Score to no greater than 45/100      01/07/20  8.  Decrease Oswestry Pain Score to no greater than  40/100  9.  Pt able to perform hip abduction x6 w/o pain        09/29/20  10. Decrease pain right gr trochanter to no greater than 4/10      03/17/20  11. Pt able to engage inner unit in supine w/ legs on chair, moving in int/ext rotation w/o pain x6 Unable to do, not trying   12.  Pt able to contract pelvic floor on exhaling a diaphragmatic breath x6    07/02/20  13.  Pt able to isolate the transverse abdominus with forearms on counter and in slight lumbar ext x6 08/13/20  14.  Pt able to perform step up/down with inner unit musculature engaged x6 without pain  Able to do once secondary to chornic fatigue, no pain   15.   Pt able to engage inner unit musculature on sit to stand and stand to sit, x6 w/o low back pain Hard for her to keep inner unit on .   16.  Pt able to perform diaphragmatic breathing x6, in supine      05/11/21  17.  Pt able to do diaphrag breath engaging transv abdominus exhaling,  3 breaths w/ lower costals 05/11/21  18.  Decrease Neck Disability score to no greater than 35/100  19.  Pt able to bridge with inner unit engaged, 2\" w/o pain, x6      06/22/21  20.  Pt able to use theracane to minimize pain left upper trap      12/02/21  21.  Pt able to effectively stretch upper trap and ant/mid scalene on left  22.  Pt able to perform bridge w/ inner unit on, w/o inappropriate useof hamstrings/piriformis  23.  Pt able to perform heel slides with inner unit on w/o pain, x6    Long Term Goals:(3-5 months)      Date Met:      1.  pt independent in self-management of symptoms       " 2.  pt independent in home program      3. Reduce pain to no greater than 2/10      4.  Pt able to  puppies without pain.      Treatment Plan:   1.  Ultrasound to increase extensibility, decrease pain, if needed  2.  Electrical stimulation for muscle relaxation, decrease pain, if needed, iontophoresis  3.  Manual therapy to increase function, decrease pain  4.  Therapeutic exercise to increase function, decrease pain  5.  Home programming and d/c planning to increase function and decrease pain    Frequency & Duration:  Pt will be seen on a once/every 4-6 week basis for 5 more visits to meet remaining goals.     Patient Participated in and Agrees With This Plan of Care and Goals:  YES  Rehab Potential:  Fair to good      Karen Chatterjee, PT, MS  Physical Therapist  [place]# [id number]        Medicare Certification:  90 Day Recertification  Certification Period: 09/23/21 to 12/23/21      ITREATMENT TODAY:    Total Treatment Time: (time in clinic)            60  Timed Code Treatment Minutes:   60      Re-Evaluation:   RX min:      Manual Therapy:  (MA)  RX min:   35  Manual posterior rotation of right innominate    Positional Isometric contraction (PIC) of left iliopsoas    Positional Isometric contraction of (PIC) right gluteus minimus    Distraction of both SI jts in open pack hip position  Piriformis stretching, bilaterally    Quadratus lumborum stretching, bilaterally    Anterior/Inferior Mobilization of right  hip    Myofascial work trunk  Manual spinal distraction        Therapeutic Activities:  (TA)  RX min:     Neuromuscular Reeducation: (NMR)  RX min:   25    Ultrasound:  RX min:          1.6 piper/cm2       Location:      Iontophoresis:  6 hr patch                                Location:                               Procedures:      Key:  i=instructed        r=review        c=corrected        a=adapted   Code Procedure/Instruction 12/05/2019 12/17/19 01/07/20 02/18/20 03/17/20 06/11/20 07/02/20 08/13/20  09/29/20 01/07/21 02/23/21 04/01/21 05/11/21 06/22/21 08/05/21 09/23/21 12/02/21   TA         Sleeping Positions                reviewed reviewed     corrected            TA Sternum-Up Posture  reviewed corrected       reviewed     review In sitting to read    TA SI jt. self-correction reviewed   reviewed reviewed               TA Lumbar Facet PIC reviewed   reviewed reviewed               TA   Order of Self-Corrections                    TA Use of lumbar pillow  reviewed                  TA Use of cervical roll        instructed            TA Use of orthotics                    TA Use of SI belt                    TA Use of Nada chair                    TA Use of Ice  reviewed                  TA Use of [employer]/ heat  reviewed   Pain cakes               TA Use of TENS unit  In car, while driving             Stop use     TA Use of iontophoresis                    TA Decreasing Disc Pressures   reviewed                 TA Step up/down w/ inner unit engaged          instructed          TA Use of theracane                Instructed in use on upper quarter & piriformis    NMR Diaphragmatic Breathing      instructed      Review/correct reviewed correct   correct   NMR Diaphragmatic breath w/ pelvic floor            instruct reviewed correct   correct   NMR Diaphragmatic breath w/ trans abdom x3 lower costal breaths            instruct reviewed correct   correct   NMR diaphragmatic breath w/ transverse abdom bridging             instructed correct   adapt   NMR Diaphragmatic breath w/ TA engaged, heel slides              instructed   correct   NMR [employer] nodding following diaphr breath engaging TA                Attempted, pt not able coordinate and stretch suboccip    NMR Pelvic Floor Isolation       review      reviewed       NMR Transverse Abdominus       Instruct/ adapt correct            NMR Multifidus Isolation                    NMR InnerUnit against Gravity           reviewed         NMR Sit-stand w/ inner  unit                    NMR Roll w/ inner unit                    NMR Walk w/ inner unit                     NMR Up/down steps w/ inner unit                    NMR Water Exer Instruction                    NMR Hip Abd w/o piriformis    instructed corrected corrected              NMR Hip int/ext rotation supine w/ feet up on chair     instructed corrected              NMR Diaphragmatic Breathing w/ pelvic floor      instructed review             NMR Pelvic floor in sitting on towel foll      instructed stopped             NMR Hip Add w/o iliop/ham                     NMR Lower abs in supine                    NMR Abd obliques in supine                    NMR Prone Knee Flexion                    NMR Prone glut valentino                    NMR Retro Step                    NMR Retro Walk                    NMR Retro walk on treadmill                    NMR Forward walk w/ inner unit                    NMR Balance Instruction                    NMR Stability Ball A/P & Lateral                    NMR Ball Catch/Throw /Supine                    NMR Ball Catch/Throw /Stand                    NMR StabilityBall All 4's Arm Lift                    NMR StabilityBall Prone Walk Out                    NMR StabilityBall Supine Oblique                    NMR Stability Ball sit-supine-sit                    NMR Walking Prog Progression                    MNR Home program sequencing                                         TA Hamstring stretch                    TA Hip Ext Rot Stretch          review           TA Hip Int Rot Stretch                    TA Hip Flex/IT Stretch                    TA Hip Add Stretch                    TA Lats Dorsi Stretch                    TA Gastroc/soleus stretch                    TA QuadratusLumborm Stretch                       Supine                       Stance                    TA Quad Stretch                                         NMR Wall Push Ups                    NMR Planking                     NMR BOSU Ball Exercises                    NMR Lateral Bridge Drop                    NMR [place]                    NMR O'Feldt Lat Pull Down                    NMR O'Feldt Hip Ext                    NMR O'Feldt Trunk Ext                                         TA CervDiscExtSup/stnd   reviewed                 TA Cerv Stretches                    TA Self PIC Cervical Spine                    TA Neural Gliding                    TA Ant/Mid Scalene Strch                    TA Biceps stretch                    TA Rotator Cuff Stretch                    TA Anterior Chest Stretch                    TA Wrist Ext Stretch                    TA Lats Dorsi stretch    instructed                NMR Prone Arm Lifts                    NMR Scapula Stabilization                    NMR Occulomotor Isometrics                    NMR Cervical Isometrics                                         TA Shld AROM w/bar                    TA Shld Capsular Stretching                    TA Self PIC Thor Spine   reviewed  Adapt and corrected reviewed              TA Rot Cuff Therabd, beginner                    TA Rot Cuff Therabd, intermed                                                                                                                                                                                                                                                             Karen Chatterjee, PT, MS  Physical Therapist  [place]# [id number]

## 2022-03-24 ENCOUNTER — TRANSCRIBE ORDERS (OUTPATIENT)
Dept: PHYSICAL THERAPY | Facility: CLINIC | Age: 72
End: 2022-03-24

## 2022-03-24 DIAGNOSIS — M79.7 FIBROMYALGIA: Primary | ICD-10-CM

## 2022-03-24 DIAGNOSIS — M47.818 SI JOINT ARTHRITIS: ICD-10-CM

## 2022-03-24 DIAGNOSIS — M47.812 CERVICAL SPONDYLOSIS: ICD-10-CM

## 2022-04-18 ENCOUNTER — TELEPHONE (OUTPATIENT)
Dept: CARDIOLOGY | Facility: CLINIC | Age: 72
End: 2022-04-18

## 2022-04-18 RX ORDER — LEVOTHYROXINE SODIUM 75 MCG
75 TABLET ORAL DAILY
COMMUNITY
Start: 2022-04-10

## 2022-04-18 NOTE — TELEPHONE ENCOUNTER
Barbara JOE Ontiverostz called today to report 3 week history of dizziness, shortness of breath, and palpitations with mild exertion. She reports levothyroxine was adjusted after COVID booster last fall. She has not had levels rechecked. She is on estrogen therapy 3 times weekly and experiences these symptoms the day after a dose of estradiol. She is not checking home blood pressures or HR at current.     She will see PCP and update our office. Will place on cancellation list to see ANNA MARIE.

## 2022-05-12 ENCOUNTER — TREATMENT (OUTPATIENT)
Dept: PHYSICAL THERAPY | Facility: CLINIC | Age: 72
End: 2022-05-12

## 2022-05-12 DIAGNOSIS — M53.3 SACROILIAC JOINT DYSFUNCTION: Primary | ICD-10-CM

## 2022-05-12 DIAGNOSIS — M54.2 CERVICAL PAIN: ICD-10-CM

## 2022-05-12 PROCEDURE — 97140 MANUAL THERAPY 1/> REGIONS: CPT | Performed by: PHYSICAL THERAPIST

## 2022-05-12 PROCEDURE — 97164 PT RE-EVAL EST PLAN CARE: CPT | Performed by: PHYSICAL THERAPIST

## 2022-05-12 NOTE — PROGRESS NOTES
" Physical Therapy Re-Evaluation and Updated Plan of Care   SUBJECTIVE:  Changes since last seen:   Since she has last been seen she states she has been \"stuck at home rehabbing with my dog\", got Covid booster on April 1, then several days later had inflammation in her right eye and vertigo for several weeks.  She then went to [place] for a week to visit her mom.  While there she slept on a very firm mattress which helped her low back but caused pain in her shoulders and neck.     She notes its taken a month of trying to find something appropriate to sleep on, on her couch, at home.   She has found her overall pain is the best when she sleeps on a 2\" memory foam pad from [employer] with 3 more inches of a narrow gel memory foam , so a total of 5 \" of foam is on her couch to sleep, which she states is so much better on her neck and shoulders.     So her neck and shoulders are better than they've been in a long time.  \"My back is worse; it feels like it has collapsed more.s\"  \"I now have to limit my activities; I can't vacumm and do yard work in same day, I have to spread things out over 2 days,\"     She says she finally found a lighter weight weed eater which has helped tremendously.     \"Since I've been moving more and walking with the dogs, I have increased numbness in right buttock to the right foot with some pain associated with it.\"    \"One morning, last week, I felt like I lost feeling in bottom part of my body, for a split second across my low back, feeling of not having control but once I sat on toilet and got up I was fine.\"  \"This wasn't pain but more of a lack of control which lasted one second.\"    Every once in a while she gets spasming up her neck when watching tv or sitting here on the treatment table.   She states she self corrects her SI joints once/day         Current level of pain:   Right buttock/SI jt 1/10  Left SI joint  0/10  Right 0/10       Lumbar spine   0/10   Right Gr troch 3/10 all time  Neck " 0/10   Lowest level of pain since last seen:    0/10   0/10           0/10     1/10              Highest level of pain since last seen:     2/10    2/10    3-4/10 when moves   0/10       4/10   5/10  At night           Past Medical History:  1.  Cervical DDD  2.  Hx of Bilateral THR, left 2012, right 2010  3.  Septoplasty, 2011  4.  Hx of SI joint dysfunction  5.  Fibromyalgia  6.  Hx of hypothyroidism  7.  Hx of gastritis  8.  Irritable bowel syndrome/spastic colon  9.  Osteoarthritis  10.  Hx of 4 pregnancies, 2 normal vaginal deliveries  11. Hx of Dequervains syndrome  12.  Hx of fx left sesmoid  13.   Hx of positional vertigo  14. Osteopenia, diagnosed May 2014  15  Hx of migraine headaches  16.  TMJ dysfunction, 2015  17.  Hx of Cardiomyopathy/ left catheterization 2002  18.  Dyslipidemia  19.  Hx of neuroma pain left foot  20.  Hx of anxiety/ depression    Functional Limitations:  Standing, sleeping,   Patient Goals for Physical Therapy: decreased pain      OBJECTIVE:      Slight left lateral  shift of pelvis is observed.      12/02/21 05/12/22                 SI Joint Positioning  Right  Left Right  Left Right  Left Right  Left Right  Left Right  Left Right  Left Right  Left Right  Left Right  Left Right  Left Right  Left Right  Left Right Left Right Left Right Left       Innominate                          Anterior    x   x                        Posterior               x              x                    Sacrum                        Unilateral rotation   x   x                                    SI Joint Testing  Right  Left Right  Left Right  Left Right  Left Right  Left Right  Left Right  Left Right  Left Right  Left Right  Left Right  Left Right  Left Right  Left Right Left Right Left Right Left           Distraction    +        +                         Beatriz's    Sl        -                         Compression    -        -                         Prone Press Up    Sl     Sl                                      Special Tests  Right   Left Right  Left Right  Left Right  Left Right  Left Right  Left Right Left  Right  Left Right  Left Right  Left Right  Left Right  Left Right  Left Right Left Right Left Right Left      Straight Leg Raise                           Active    -         -                         Passive    -           -                                                                                 12/05/2019 12/17/19 01/07/20 02/18/20 03/17/20 06/11/20 07/02/20 08/13/20 09/29/20 01/07/21 02/23/21 04/01/21 05/11/21 06/22/21 08/05/21 09/23/21   SI Joint Positioning  Right  Left Right  Left Right  Left Right  Left Right  Left Right  Left Right  Left Right  Left Right  Left Right  Left Right  Left Right  Left Right  Left Right Left Right Left Right Left       Innominate                          Anterior               x              x              x                x               x              x              sl   Sl    x   x    x    x Very sl   x    x   x          Posterior    x   x    x   x    x    x  sl               Sll               x                x              x              x       Very sl               x               x               x      Sacrum                             Unilateral rotation    x   x   x   x   x    x   sl   x   x    x   x   x Very sl   x   x   x                         SI Joint Testing  Right  Left Right  Left Right  Left Right  Left Right  Left Right  Left Right  Left Right  Left Right  Left Right  Left Right  Left Right  Left Right  Left Right Left Right Left Right Left           Distraction   +         +   +        +   +         +   +          +   +            +   +          +   +          +   +         +   +         +   +          +   +          +   +         +   +       +   +         +    +         +   +       +           Beatriz's    -        -            -         -   -          -   -         -   -          -   -          -   -          -           Compression   -          -            -          -   -         -   -          -   -         -   -           -   -          -           Prone Press Up   -            -            Sl       Sl    -           -  sl       Sl    Sl        Sl    +          +   -           -                      Special Tests  Right   Left Right  Left Right  Left Right  Left Right  Left Right  Left Right Left  Right  Left Right  Left Right  Left Right  Left Right  Left Right  Left Right Left Right Left Right Left      Straight Leg Raise                                   Active   -          -  -       -        -         -   -           -   -           -   -           -    -         -   -          -   -         -   -        -   -           -   -         -           Passive    -           -   -          -           -           -   -        -   -        -   -          -    -          -   -         -   -        -   -        -   -          -   -       -       Hip Scour Test                                                                Palpation:      Muscle/Bone Irritation    Date 12/02/21 05/12/22                  Right  Left Right  Left Right  Left Right  Left Right  Left Right  Left Right  Left Right  Left Right  Left Right  Left Right  Left Right  Left Right  Left Right Left Right Left Right Left   SI joint    +         sl +         sl                 Piriformis   ++        +   ++      ++                 Gr. Trochanter ++         +  ++         Sl                  IT Band  -          -   Sl         Sl                  Quadratus Lumborum   +        sl   Sl          +                 Ischial Tuberosity   -          -   -          -                 Sacrococcygeal Ligs   -         -   -          -                 Paraspinals   -          +   -          +                 Spinous Processes                                                T-spine rotated to  T6-7 T4-7                        L-spine rotated to           L3-5               L4                 Adductor Fabio   -           -   Sl          -                 Iliopsoas  -           -    -           -                 Quad Origin   -           -   -           -                 Pubic Symphysis       Sl           sl                 Bakers cyst  -          -   -           -                 Pes Anserine Bursa   -            -   -          -                                                           Date 12/05/2019 12/17/19 01/07/20 02/18/20 03/17/20 06/11/20 07/02/20 08/13/20 09/29/20 01/07/21 02/23/21 04/01/21 05/11/21 06/22/21 08/05/21 09/23/21    Right  Left Right  Left Right  Left Right  Left Right  Left Right  Left Right  Left Right  Left Right  Left Right  Left Right  Left Right  Left Right  Left Right Left Right Left Right Left   SI joint  +        ++   +          +    +         sl   +         Sl     +          +   +         +    +           +   +         Sl    Sl        -    +       sl   +         -   +        -   -           -   +          +   +         +   Sl        Sl    Piriformis   Sl        +    ++        +     +        sl     +       sl     +          +   +        sl   +        +    +         sl     +       sl   ++      +   ++      sl   +          sl   +           +   +         +    +       Sl    Gr. Trochanter  ++        +   +          +     Sl      Sl     ++       sl   ++         +   +       +   +          +    +         -     +        -   ++       -  ++       sl   ++        Sl    +          ++  +         ++  +         sl   IT Band  sl        sl   +         sl   Sl          -   Sl        -   Sl        Sl    -           -   -           -    +         -    -         -    -        -   -        -    Sl          -   -          -  sl        sl   Sl         -   Quadratus Lumborum  ++        +    +         sl   Sl         sl   -            -   -           -   -            -   -         -    Sl       sl   +          +    -         -   -        -    -           -   ++      ++   +         +   Ischial Tuberosity  -        -   -           -   -          -   -            -   -          -    -            -   -           -    -         -   -          -    -         -   Sl       Sl    -          -    -         - -           -  -          -   Sacrococcygeal Ligs  -        -   -           -   -             -   -            -   -           -   -           -   -            -    -         -   -           -   -           -   Sl       Sl     -           -     -        -   -           -   -           -   Paraspinals  +        -   Sl           -   -          sl   -            -   +         -   +          sl   Sl          +    +         sl   -           +   +          Sl       -            -     +          -   +          +   +          -   Spinous Processes                                      C-spine rotated to               C2-6           C2-5          C2  C2-3  C2 C2         T-spine rotated to   T3-5                     T4-6  T6-8 T3-4 T3-4    T4-5 T3-6   T4-6  T4-6   -          -  T4-7 T4-6  T4-7  T4-7 T4-6          L-spine rotated to  L5           L5           L4-5   -          - L3-5 L4-5            L4-5 L4-5 L4-5            L4-5  L5              L5   -          - L3-5  L5 L4-5   Adductor Fabio  -        -   -           -   -           sl   -           -   -        -   -          -   Sl         Sl   Sl       sl Sl          Sl   sl        Sl   sl            +   +         Sl   sl         Sl   +          +  +          sl   Iliopsoas  +        +   Sl          sl   Sl           sl   Sl         -   Sl       -   +          +   +         +   sl         sl   +        sl   +          +   -        -   -          -   +          +  +        +  +          sl   Quad Origin  -        -    -          -    -            -   -           -   -           -   -            -    -          -   -          -   -          -    -          -   -          -   -           -  -            -   Pubic Symphysis         +                   Sl       sl        -           sl         sl          sl        +       -         -         +         +          +    Bakers cyst        -         sl    -         +   -          sl   -            -    -          -    +         +   +        Sl    -         -   -          -   -         -   -          -  -         -   Pes Anserine Bursa      +          -   +         ++   Sl         +   -sl        -    -           -   -          -   -         -   -        -   -          -      -          -   -         -   -          -   Rectus Diastasis   2 finger                    IT Band insertion           +         -   -           -   -          -   -        -   -         -   -            -   -            -   -          -       All 4s Positioning: Pt not able to assume full lumbar extension in this position        Monthly Objective Measurement/Functional Activity  Date: 12/02/21 05/12/22               Oswestry Pain Score 52/100      48/100               LE Functional Scale                 Neck Disability Index 40/100     40/100                                AROM Lumbar Spine: Degrees   Degrees      Degrees      Degrees      Degrees    Degrees Degrees Degrees Degrees Degrees Degrees Degrees Degrees Degrees      Flexion 91       90                  Extension 18 pain       22                  Right Lat. Flexion 18      19                  Left Lat. Flexion 21      28                    Right Lat Shift Pelvis No pain  No change                  Left Lat Shift Pelvis  no pain  No change                                AROM Cervical Spine Degrees Degrees Degrees Degrees Degrees Degrees Degrees Degrees Degrees Degrees Degrees Degrees Degrees Degrees     flexion 41       54                 extension 47       51                 Right lateral flex 42       47                 Left lateral flex 52       57                 Right rotation 42       45                 Left rotation  45       42                                 AROM Hips:  (degrees) Right      Left Right Left Right  Left Right   Left Right  Left Right  Left Right Left Right Left Right Left Right Left Right  Left Right Left Right Left Right Left      Flexion 117     115    109                  Abduction 38   35      32                  Int. Rotation 18    20      28                  Ext. Rotation  40   36       40                                Flexibility:   (degrees) Right    Left Right  Left Right  Left Right  Left Right  Left Right  Left Right Left Right Left Right Left Right Left Right  Left Right Left Right Left Right Left      Hamstrings -31    -35   -35     -35                  Quadriceps 70    68    65     64                  HipExt/Rot(piriformis) 46     45    47       46                  Hip Int/Rotators 28    30  30       31                  Hip Flexors (iliopsoas) Not tested Not tested                  IT Band  not tested  not tested                                                 Root Level  - Motor Right      Left Right  Left Right  Left Right  Left Right  Left Right  Left Right Left Right Left Right Left Right Left Right  Left Right Left Right Left Right Left     T12             Rectus Abdominus 4+/5       4+/5                       L1              Paraspinals 5/5      5/5  5/5        5/5                  L2              Hip Flexion 4+/5    4+/5 4+/5     5/5                  L3             Quads 4+/5    4+/5 5/5       5/5                  L4              Anterior Tibialis 5/5    5/5  5/5       5/5                  L5             Gr. Toe Extension  5/5     5/5  5/5       5/5                  S1            Gastroc/Sol/Peroneals 5/5    4+/5  5/5     5/5                                Functional Strength:                   Single LegStanceTime (seconds) R; 25   L: 13  R:30   L:30                  Pelvic Floor Isolation (seconds) 10   10 sec                  Inner Unit  Isolation (seconds) 10    6 sec                Toe tap   x40       x33                                Functional Activities:                    Sit w/o pain? Pain  increases (minutes) Yes/ 60 min Seldom/ 30 min                  Stand w/o pain? Yes/ 30 min No10-15 min tingle worse                   Walk w/o pain? Yes/ 1/4 mile  yes/ 40 min                   Steps w/o pain? Yes/ 3 fl no/ 1l fl                   Drive w/o pain?  yes/ 60 -90 min Yes/ 30                   Work w/o pain? n/a                   # times wakes w/ pain? Many times, not sure from pain  1-2x                  Monthly Objective Measurement/Functional Activity  Date: 12/05/2019 01/07/20 02/18/20 03/17/20 06/11/20 07/02/20 08/13/20 09/29/20 01/07/21 02/23/21 04/01/21 05/11/21 06/22/21 08/05/21 09/23/21   Oswestry Pain Score 52/100    45/100    44/100 44/100     44/100   44/100    44/100   44/100     44/100     60/100    50/100    48/100    50/100     56/100     52/100   LE Functional Scale                  Neck Disability Index      32/100      42/100     44/100       44/100     46/100     36/100    42/100       50/100     46/100                     AROM Lumbar Spine: Degrees   Degrees      Degrees      Degrees      Degrees    Degrees Degrees Degrees Degrees Degrees Degrees Degrees Degrees Degrees Degrees      Flexion 114           97      112      112    110     89    95    78        93     98      86        114     94       90    94      Extension 23 pain on right            27      36      29      20     30 pain right scapula    27 pain right SI and Scap     37        16     24      18        18      18       21     18      Right Lat. Flexion 33           34      27 pain right SI        31       31    28  25 32        22      20     19        20      32       21      23      Left Lat. Flexion 25           27      28       24        22     25    26  24        25       25       24        22      13       18      25      Right Lat Shift Pelvis No change No change  No change No change  no change No cahnge No change  No change No cahnge  stiff stiff      stiff   stiff     stiff  stiff      Left Lat Shift Pelvis  No change pulls on right  Tightness No pain  No change  no change  pain right buttock  tight  no change  no change  stiff  stiff      stiff   stiff      stiff  stiff                     AROM Cervical Spine  Degrees Degrees Degrees Degrees Degrees Degrees Degrees Degrees Degrees Degrees Degrees Degrees Degrees Degrees     flexion    45 pain        45        59       45 pain      45         58               63      62     48       3      58     58     extension    51 dizzy        50        62       58       60         72        61      50      54      50      53     53     Right lateral flex    56 pain r upper trap         55        47       47 pain       49        54       45      48      48      46      51      49     Left lateral flex  57 pain right elbow        58        40      39      43       48       47       46      49      45       43      46     Right rotation          44         45       46     42 pain       40     45       48       40      47       45       46      48     Left rotation           40          40       41     40      37       40        39        37      43      41       38      42                     AROM Hips:  (degrees) Right      Left Right Left Right  Left Right  Left Right  Left Right  Left Right Left Right Left Right Left Right Left Right  Left Right Left Right Left Right Left Right Left      Flexion 95       98  98        100  100      95  98        105   95       100 96       98  98       95  100     95  105       100    112    108 114       110 115     110  110    107 110       105       Abduction 42       45  45         45   45         45  42        46  40          45 40       40 38         39  36      37 38         40    39        37 38          37  35       35   32      35  30         35       Int. Rotation 25       15  25         28  25          25   26       28    25        30   25      32   22        30  25       30   28       22  25          24  23         24  25       30    24      32  22         30       Ext. Rotation  30        40              32        40   31       46     45       46  45       47  42        45  40      45   32       45   31        38  34         38  35       40   36      40  35         40                      Flexibility:   (degrees) Right    Left Right  Left Right  Left Right  Left Right  Left Right  Left Right Left Right Left Right Left Right Left Right  Left Right Left Right Left Right Left Right Left      Hamstrings -47       -49   -45   -50    -45      -48   -43     -42   -42      -41  -45      -48  _46      -48 - 45     -46  -52    -47     -50     -48   -48     -47  -45    -45   -42    -45  -45       -45       Quadriceps 45       40  48       45     55      54   58       60    65        67  63        65  60        63   58      60   60     63    62     65    63      61  60      60 Not tested  not tested       HipExt/Rot(piriformis) 42       43    45       45   45    48   43       46    45        45  45        40  42        40  38      41  47      45   46        45    47        45  45       45    39        45  40        45    48      45      Hip Int/Rotators 38       37   37        35   35      35   32        36    25         30  30        35  32        37  30       35   28      34    25       32   28         31  25       30     28     35  26        32       Hip Flexors (iliopsoas) Not tested         Not tested Not tested Not tested Not tested Not tested Not tested  not tested Not tested Not tested Not tested Not tested Not tested Not tested       IT Band Not tested          not tested  not tested  not tested  not tested  not tested  not tested  not tested  not tested   not tested  not tested  not tested  not tested  not tested                                        Root Level  - Motor Right      Left Right  Left Right  Left Right  Left Right  Left Right  Left Right Left Right Left Right Left Right Left Right  Left Right Left Right Left Right Left Right Left      T12             Rectus Abdominus 4+/5        4+/5         4+/5      4+/5       4+/5    4 to 4+/5    4 to 4+/5   4 to 4+/5       4+/5        4+/5      4+/5      4+/5        4+/5        4+/5     4+/5      L1              Paraspinals 5/5      5/5   5/5       5/5  5/5        5/5  5/5        5/5   5/5       5/5  5/5        5/5 5/5        5/5 5/5        5/5   5/5        5/5  5/5       5/5           L2              Hip Flexion 4+/5      4+/5  4+/5    4+/5  5/5        5/5  55        5/5   5/5       5/5   5/5        5/5 5/5        5/5  5/5        5/5  5/5        5/5   5/5       5/5          L3             Quads 5/5      5/5  5/5       5/5   5/5        5/5  5/5        5/5   5/5       5/5   5/5        5/5 5/5        5/5  5/5        5/5  5/5        5/5   5/5       5/5          L4              Anterior Tibialis 5/5      5/5  5/5       5/5  5/5        5/5  5/5        5/5   5/5       5/5   5/5        5/5 5/5        5/5  5/5       5/5  5/5        5/5   5/5       5/5          L5             Gr. Toe Extension 5/5      5/5  5/5       5/5   5/5        5/5 5/5         5/5   5/5       5/5   5/5        5/5 5/5        5/5  5/5        5/5  5/5        5/5   5/5       5/5          S1            Gastroc/Sol/Peroneals 4+/5    4+/5   4+/5    4+/5  4+/5    4+/5  4+/5     4+/5   4+/5   4+/5 4+/5      4+/5 5/5        5/5  5/5        5/5  5/5        5/5   5/5       5/5                          Functional Strength:                    Single LegStanceTime (seconds) R:24   L:25   R:30  L:27 R:30   L:30 R:     L: R:      L: R:22    L:15  R: 30 L:30   R:30   L:15   R:30   L:30   R:      L: R:      L:   R:      L:   R:      L:    R:    L:  R:25   L:15     Pelvic Floor Isolation (seconds) 10 sec  10 sec                   Inner Unit  Isolation (seconds) 10 sec  10 sec                                                    Functional Activities:                     Sit w/o pain? Pain increases (minutes) yes   Yes/ 30-60 min  yes/ 30-60 Yes/ 20 min  Yes/ 30-60  min Yes/ 30-60 Yes/ 30 min  Yes/ 30-60 min yes30 min  Yes/ 30  Yes/ 30-45 min Yes/ 30  min Yes/ 30-60 min  no/ 30 min No/ 60 min      Stand w/o pain? 20 min Yes/ 30 min Yes/ 30 min Yes/ 30 min Yes/ 30 min Yes/ 30 min Yes/ 15 min   yes/ 15-30 min Yes/ 15 min  Yes/ 15 min  Yes/ 15-30 Yes/ 15-30 min Yes/ 30 min No/ 30 min No/ 30 min      Walk w/o pain? 30 min   yes/ 1/4 mile Yes/ 30 min Yes/ 45 min  Yes/ 1 mile Yes/ 30 min  Yes/ 30 min   yes/ 1/4 mile Yes/ 30 min  Yes/ 30 Yes/ 60 min   yes/ 1/4 mile  yes/ 1/4 mile  no/ 1/4 mile  no/ 3 mile      Steps w/o pain? Yes, ignores pain  Yes/ ignores pain Yes/ no limit Yes/ no limit Yes/ no limit Yes/ no limit No/ worst getting winded   no/ more winded than pain No problem  No problem  No problem  Yes/  1 fl  yes/ 1 fl  no/ 1 fl No/ 1 fl      Drive w/o pain? Uncomfortable, 60 min  Yes/ w/ supports and TENS 2 hrs Yes/ 60 min  Yes/ 60 min  Yes/ 1-2 hrs Yes/ 3 hrs Yes/ 1 hr   yes/ 1-2 hrs Yes/ 90  min with TENS Yes/ hasn't gone anyway  Yes/ 30-45 min  Yes/ 60 min  yes/ 60 min  no/ 30 min No/  min      Work w/o pain? n/a   n/a     n/a    n/a   n/a  n/a  n/a    n/a   n/a  n/a n/a n/a      # times wakes w/ pain? Sleeping on couch b/c or right shld pain, since July Continues to sleep on couch  Continues to sleep on the couch  2x from right shoulder and neck, she is still sleeping on couch  1-2x   1-2x   3-4x , some pain and some not sure   1-2x 2x  More comfortable on left  Every 2 hrs, back neck and shoulder  Wakes evry 3 hrs, brain activity and some pain  Wakes several times, not necessarily  from pain con't to wake many times, not necessarily from pain Wakes several times, not always from pain  wakes many times but not sure from pain       PLAN OF CARE:    ASSESSMENT:  Problem List:   1. Flare up of SI joint dysfunction  2. Decreased spinal stabilization  3. Postural dysfunction     Discussion:  Maribel has self managed her symptoms fairly well since last seen, using the  principles and techniques for self correction that she has been taught.     Her Oswestry Low Back Pain Score has improved from 52/100 to 48/100 over the last 5 months.  Her Neck Disability Score has remained the same at 401/00 despite the flare up she experienced over the last month.      Spent a great deal of today's visit on manual work for pain relief and increasing extensibility.    Reviewed and corrected how to engage the inner unit musculature for spinal stabilization using a diaphragmatic breath and then maintaining core stabilization while breathing with lower costals.   jhe still has a tendency to over engage the upper quarter musculature and inappropriately breath with the upper costal musculature.  Spent time on focusing on lower costal breathing with inner unit engaged.     Barbara continues to self manages well for about 6-8 weeks and then she is in need of manual work for pain relief in order for her home exercises to be effective.  Thus plan to see her once every 4-6 weeks to re-assess, use manual technique for pain relief and progress her home exercises augmenting her home program to meet remaining goals.     Short Term Goals: (4-6 weeks)                                   Date Met:                1.   pt independent in postural correction         02/18/20  2.   pt independent in SI joint self-corrections        12/17/19  3.   pt independent in exer to decrease post. disc pressures      12/17/19  4.   pt able to sleep through night without pain        09/29/20  5.   pt able to stand 10  minutes without pain        01/07/20  6.   Reduce pt pain to no greater than 4/10         01/07/20  7.   Decrease Oswestry Pain Score to no greater than 45/100      01/07/20  8.  Decrease Oswestry Pain Score to no greater than  40/100  9.  Pt able to perform hip abduction x6 w/o pain        09/29/20  10. Decrease pain right gr trochanter to no greater than 4/10      03/17/20  11. Pt able to engage inner unit in supine  "w/ legs on chair, moving in int/ext rotation w/o pain x6 Unable to do, not trying   12.  Pt able to contract pelvic floor on exhaling a diaphragmatic breath x6    07/02/20  13.  Pt able to isolate the transverse abdominus with forearms on counter and in slight lumbar ext x6 08/13/20  14.  Pt able to perform step up/down with inner unit musculature engaged x6 without pain  Able to do once secondary to chornic fatigue, no pain   15.   Pt able to engage inner unit musculature on sit to stand and stand to sit, x6 w/o low back pain Hard for her to keep inner unit on .   16.  Pt able to perform diaphragmatic breathing x6, in supine      05/11/21  17.  Pt able to do diaphrag breath engaging transv abdominus exhaling,  3 breaths w/ lower costals 05/11/21  18.  Decrease Neck Disability score to no greater than 35/100  19.  Pt able to bridge with inner unit engaged, 2\" w/o pain, x6      06/22/21  20.  Pt able to use theracane to minimize pain left upper trap      12/02/21  21.  Pt able to effectively stretch upper trap and ant/mid scalene on left  22.  Pt able to perform bridge w/ inner unit on, w/o inappropriate useof hamstrings/piriformis  23.  Pt able to perform heel slides with inner unit on w/o pain, x6    Long Term Goals:(3-5 months)      Date Met:      1.  pt independent in self-management of symptoms       2.  pt independent in home program      3. Reduce pain to no greater than 2/10      4.  Pt able to  puppies without pain.      Treatment Plan:   1.  Ultrasound to increase extensibility, decrease pain, if needed  2.  Electrical stimulation for muscle relaxation, decrease pain, if needed, iontophoresis  3.  Manual therapy to increase function, decrease pain  4.  Therapeutic exercise to increase function, decrease pain  5.  Home programming and d/c planning to increase function and decrease pain    Frequency & Duration:  Pt will be seen on a once/every 4-6 week basis for 8 more visits to meet remaining goals. "     Patient Participated in and Agrees With This Plan of Care and Goals:  YES  Rehab Potential:  Fair to good      Karen Chatterjee PT, MS  Physical Therapist  [place]# [id number]      Medicare Certification:  90 Day Recertification  Certification Period: 05/12/11 to 08/11/22  I certify that the therapy services are furnished while this patient is under my care.  The services outlined above are required by this patient, and will be reviewed every 90  days.     PHYSICIAN:   ______________________________________      Fiordaliza Suh MD    DATE: _____________________    Please sign and return via fax to [facility] [address] Fax # [phone]. Thank you, [facility].              ITREATMENT TODAY:    Total Treatment Time: (time in clinic)            60  Timed Code Treatment Minutes:   60      Re-Evaluation:   RX min:  [id number]    Manual Therapy:  (MA)  RX min:   30  Manual posterior rotation of right innominate    Positional Isometric contraction (PIC) of left iliopsoas    Positional Isometric contraction of (PIC) right gluteus minimus    Distraction of both SI jts in open pack hip position  Piriformis stretching, bilaterally    Quadratus lumborum stretching, bilaterally    Glut minimus stretching  Anterior/Inferior Mobilization of right  hip    Myofascial work trunk  Manual spinal distraction        Therapeutic Activities:  (TA)  RX min:       Neuromuscular Reeducation: (NMR)  RX min:   5  Diaphragmatic breath w/ transverse abdominus engaged followed by 3 lower costal breaths       Ultrasound:  RX min:          1.6 piper/cm2       Location:      Iontophoresis:  6 hr patch                                Location:

## 2022-07-07 ENCOUNTER — TREATMENT (OUTPATIENT)
Dept: PHYSICAL THERAPY | Facility: CLINIC | Age: 72
End: 2022-07-07

## 2022-07-07 DIAGNOSIS — M54.2 CERVICAL PAIN: ICD-10-CM

## 2022-07-07 DIAGNOSIS — M53.3 SACROILIAC JOINT DYSFUNCTION: Primary | ICD-10-CM

## 2022-07-07 PROCEDURE — 97140 MANUAL THERAPY 1/> REGIONS: CPT | Performed by: PHYSICAL THERAPIST

## 2022-07-07 PROCEDURE — 97164 PT RE-EVAL EST PLAN CARE: CPT | Performed by: PHYSICAL THERAPIST

## 2022-07-07 PROCEDURE — 97035 APP MDLTY 1+ULTRASOUND EA 15: CPT | Performed by: PHYSICAL THERAPIST

## 2022-07-07 NOTE — PROGRESS NOTES
" Physical Therapy Re-Evaluation   SUBJECTIVE:  Changes since last seen:   Pt drove to [facility] and back and \"did pretty well.\"  \"Now I do better with a softer mattress compared to firmer mattress.\"  \"I came back from [place], [facility] with a severe sinus infection, last week of May; I coughed to point of blood coming out of nose.\"   On June 5th, she stepped on a pebble fell on her right hip and tore skin off right elbow, \"I sort of blacked out\".   Two men lifted her up, walkied about 25 feet area and realized blood all over her right elbow.  Went back to her car, drove home and started icing.   Went to PCP, a week ago, for 6 germania appointment  Got staph infection in right elbow, was put on antibiotic.    Going Monday to be seen by PA at ortho office as a new pt and xray of right hip.     She fell again today, when dog got behind her; she fell on hard grass on right hip and right elbow, again.      After first fall, she notes she had incredible nerve pain down lateral thigh to foot on right and she had a big bruise over right gr trochanter.   \"My right piriformis has throbbed for 2 weeks, then laid on right side and got the sciatic burning pain to decrease.     The worst part of the fall was my elbow with the staph infection.   She notes that after her 2nd Covid Booster shot she has had all kinds of problems, eyes, vertigo, increased laxity in ligaments, sinus infection and staph infection       Current level of pain:   Right buttock/SI jt 4/10  Left SI joint  0/10         Lumbar spine   0/10   Right Gr troch 6/10 all time  Neck 1/10   Lowest level of pain since last seen:    1/10   0/10         0/10     1/10    1/10          Highest level of pain since last seen:     8/10    1/10       0/10       810   3-4/10 soreness in am           Past Medical History:  1.  Cervical DDD  2.  Hx of Bilateral THR, left 2012, right 2010  3.  Septoplasty, 2011  4.  Hx of SI joint dysfunction  5.  Fibromyalgia  6.  Hx of " hypothyroidism  7.  Hx of gastritis  8.  Irritable bowel syndrome/spastic colon  9.  Osteoarthritis  10.  Hx of 4 pregnancies, 2 normal vaginal deliveries  11. Hx of Dequervains syndrome  12.  Hx of fx left sesmoid  13.   Hx of positional vertigo  14. Osteopenia, diagnosed May 2014  15  Hx of migraine headaches  16.  TMJ dysfunction, 2015  17.  Hx of Cardiomyopathy/ left catheterization 2002  18.  Dyslipidemia  19.  Hx of neuroma pain left foot  20.  Hx of anxiety/ depression    Functional Limitations:  Standing, sleeping,   Patient Goals for Physical Therapy: decreased pain      OBJECTIVE:      Slight left lateral  shift of pelvis is observed.      12/02/21 05/12/22 07/07/22                SI Joint Positioning  Right  Left Right  Left Right  Left Right  Left Right  Left Right  Left Right  Left Right  Left Right  Left Right  Left Right  Left Right  Left Right  Left Right Left Right Left Right Left       Innominate                          Anterior    x   x   x                       Posterior               x              x                x                   Sacrum                        Unilateral rotation   x   x   x                                   SI Joint Testing  Right  Left Right  Left Right  Left Right  Left Right  Left Right  Left Right  Left Right  Left Right  Left Right  Left Right  Left Right  Left Right  Left Right Left Right Left Right Left           Distraction    +        +   +         +                        Beatriz's    Sl        -   sl          -                        Compression    -        -   -         -                        Prone Press Up    Sl     Sl    +          +                                   Special Tests  Right   Left Right  Left Right  Left Right  Left Right  Left Right  Left Right Left  Right  Left Right  Left Right  Left Right  Left Right  Left Right  Left Right Left Right Left Right Left      Straight Leg Raise                           Active    -         -   -          -                         Passive    -           -   -         -                                                                                12/05/2019 12/17/19 01/07/20 02/18/20 03/17/20 06/11/20 07/02/20 08/13/20 09/29/20 01/07/21 02/23/21 04/01/21 05/11/21 06/22/21 08/05/21 09/23/21   SI Joint Positioning  Right  Left Right  Left Right  Left Right  Left Right  Left Right  Left Right  Left Right  Left Right  Left Right  Left Right  Left Right  Left Right  Left Right Left Right Left Right Left       Innominate                          Anterior               x              x              x                x               x              x              sl   Sl    x   x    x    x Very sl   x    x   x          Posterior    x   x    x   x    x    x  sl               Sll               x                x              x              x       Very sl               x               x               x      Sacrum                             Unilateral rotation    x   x   x   x   x    x   sl   x   x    x   x   x Very sl   x   x   x                         SI Joint Testing  Right  Left Right  Left Right  Left Right  Left Right  Left Right  Left Right  Left Right  Left Right  Left Right  Left Right  Left Right  Left Right  Left Right Left Right Left Right Left           Distraction   +         +   +        +   +         +   +          +   +            +   +          +   +          +   +         +   +         +   +          +   +          +   +         +   +       +   +         +    +         +   +       +           Beatriz's    -        -            -         -   -          -   -         -   -          -   -          -   -          -           Compression   -         -            -          -   -         -   -          -   -         -   -           -   -          -           Prone Press Up   -            -            Sl       Sl    -           -  sl       Sl    Sl        Sl    +          +   -           -                      Special Tests   Right   Left Right  Left Right  Left Right  Left Right  Left Right  Left Right Left  Right  Left Right  Left Right  Left Right  Left Right  Left Right  Left Right Left Right Left Right Left      Straight Leg Raise                                   Active   -          -  -       -        -         -   -           -   -           -   -           -    -         -   -          -   -         -   -        -   -           -   -         -           Passive    -           -   -          -           -           -   -        -   -        -   -          -    -          -   -         -   -        -   -        -   -          -   -       -       Hip Scour Test                                                                Palpation:      Muscle/Bone Irritation    Date 12/02/21 05/12/22 07/07/22                 Right  Left Right  Left Right  Left Right  Left Right  Left Right  Left Right  Left Right  Left Right  Left Right  Left Right  Left Right  Left Right  Left Right Left Right Left Right Left   SI joint    +         sl +         sl ++         Sl                 Piriformis   ++        +   ++      ++  ++        sl                Gr. Trochanter ++         +  ++         Sl   ++         sl                IT Band  -          -   Sl         Sl    ++        sl                Quadratus Lumborum   +        sl   Sl          +   Sl         sl                Ischial Tuberosity   -          -   -          -   -          -                Sacrococcygeal Ligs   -         -   -          -   -          -                Paraspinals   -          +   -          +   -          +                Spinous Processes                                                T-spine rotated to  T6-7 T4-7 T4-8                       L-spine rotated to           L3-5               L4              L4                Adductor Fabio   -          -   Sl          -  +           -                Iliopsoas  -           -    -           -   +          -                Quad  Origin   -           -   -           -   -           -                Pubic Symphysis       Sl           sl        Sl                 Bakers cyst  -          -   -           -   -            -                Pes Anserine Bursa   -            -   -          -   -         -                                                          Date 12/05/2019 12/17/19 01/07/20 02/18/20 03/17/20 06/11/20 07/02/20 08/13/20 09/29/20 01/07/21 02/23/21 04/01/21 05/11/21 06/22/21 08/05/21 09/23/21    Right  Left Right  Left Right  Left Right  Left Right  Left Right  Left Right  Left Right  Left Right  Left Right  Left Right  Left Right  Left Right  Left Right Left Right Left Right Left   SI joint  +        ++   +          +    +         sl   +         Sl     +          +   +         +    +           +   +         Sl    Sl        -    +       sl   +         -   +        -   -           -   +          +   +         +   Sl        Sl    Piriformis   Sl        +    ++        +     +        sl     +       sl     +          +   +        sl   +        +    +         sl     +       sl   ++      +   ++      sl   +          sl   +           +   +         +    +       Sl    Gr. Trochanter  ++        +   +          +     Sl      Sl     ++       sl   ++         +   +       +   +          +    +         -     +        -   ++       -  ++       sl   ++        Sl    +          ++  +         ++  +         sl   IT Band  sl        sl   +         sl   Sl          -   Sl        -   Sl        Sl    -           -   -           -    +         -    -         -    -        -   -        -    Sl          -   -          -  sl        sl   Sl         -   Quadratus Lumborum  ++        +    +         sl   Sl         sl   -            -   -           -   -            -   -         -    Sl       sl   +          +    -         -   -        -    -           -   ++      ++   +         +   Ischial Tuberosity  -        -   -          -   -          -   -            -   -           -    -            -   -           -    -         -   -          -    -         -   Sl       Sl    -          -    -         - -           -  -          -   Sacrococcygeal Ligs  -        -   -           -   -             -   -            -   -           -   -           -   -            -    -         -   -           -   -           -   Sl       Sl     -           -     -        -   -           -   -           -   Paraspinals  +        -   Sl           -   -          sl   -            -   +         -   +          sl   Sl          +    +         sl   -           +   +          Sl       -            -     +          -   +          +   +          -   Spinous Processes                                      C-spine rotated to               C2-6           C2-5          C2  C2-3  C2 C2         T-spine rotated to   T3-5                     T4-6  T6-8 T3-4 T3-4    T4-5 T3-6   T4-6  T4-6   -          -  T4-7 T4-6  T4-7  T4-7 T4-6          L-spine rotated to  L5           L5           L4-5   -          - L3-5 L4-5            L4-5 L4-5 L4-5            L4-5  L5              L5   -          - L3-5  L5 L4-5   Adductor Fabio  -        -   -           -   -           sl   -           -   -        -   -          -   Sl         Sl   Sl       sl Sl          Sl   sl        Sl   sl            +   +         Sl   sl         Sl   +          +  +          sl   Iliopsoas  +        +   Sl          sl   Sl           sl   Sl         -   Sl       -   +          +   +         +   sl         sl   +        sl   +          +   -        -   -          -   +          +  +        +  +          sl   Quad Origin  -        -    -          -    -            -   -           -   -           -   -            -    -          -   -          -   -          -    -          -   -          -   -           -  -            -   Pubic Symphysis         +                   Sl       sl        -           sl         sl         sl        +       -         -         +          +          +    Bakers cyst        -         sl    -         +   -          sl   -            -    -          -    +         +   +        Sl    -         -   -          -   -         -   -          -  -         -   Pes Anserine Bursa      +          -   +         ++   Sl         +   -sl        -    -           -   -          -   -         -   -        -   -          -      -          -   -         -   -          -   Rectus Diastasis   2 finger                    IT Band insertion           +         -   -           -   -          -   -        -   -         -   -            -   -            -   -          -       All 4s Positioning: Pt not able to assume full lumbar extension in this position        Monthly Objective Measurement/Functional Activity  Date: 12/02/21 05/12/22 07/07/22              Oswestry Pain Score 52/100      48/100    50/100              LE Functional Scale                 Neck Disability Index 40/100     40/100     46/100                               AROM Lumbar Spine: Degrees   Degrees   Degrees      Degrees      Degrees    Degrees Degrees Degrees Degrees Degrees Degrees Degrees Degrees Degrees      Flexion 91       90       82                 Extension 18 pain       22       15                 Right Lat. Flexion 18      19       24                 Left Lat. Flexion 21      28         17                 Right Lat Shift Pelvis No pain  No change  no change                 Left Lat Shift Pelvis  no pain  No change  inc right hip pain                                AROM Cervical Spine Degrees Degrees Degrees Degrees Degrees Degrees Degrees Degrees Degrees Degrees Degrees Degrees Degrees Degrees     flexion 41       54       54                extension 47       51       54                Right lateral flex 42       47       43                Left lateral flex 52       57       46                Right rotation 42       45       40                Left rotation  45       42        41                                AROM Hips:  (degrees) Right      Left Right Left Right  Left Right  Left Right  Left Right  Left Right Left Right Left Right Left Right Left Right  Left Right Left Right Left Right Left      Flexion 117     115    109 114      110                 Abduction 38   35      32  30        36                 Int. Rotation 18    20      28   18        25                 Ext. Rotation  40   36       40   34        36                               Flexibility:   (degrees) Right    Left Right  Left Right  Left Right  Left Right  Left Right  Left Right Left Right Left Right Left Right Left Right  Left Right Left Right Left Right Left      Hamstrings -31    -35   -35     -35  -30     -35                 Quadriceps 70    68    65     64   62       64                 HipExt/Rot(piriformis) 46     45    47       46    45     47                 Hip Int/Rotators 28    30  30       31   28       32                 Hip Flexors (iliopsoas) Not tested Not tested Not tested                 IT Band  not tested  not tested Not tested                                                Root Level  - Motor Right      Left Right  Left Right  Left Right  Left Right  Left Right  Left Right Left Right Left Right Left Right Left Right  Left Right Left Right Left Right Left     T12             Rectus Abdominus 4+/5       4+/5        4/5                      L1              Paraspinals 5/5      5/5  5/5        5/5  5/5        5/5                 L2              Hip Flexion 4+/5    4+/5 4+/5     5/5 4/5 p      5/5                 L3             Quads 4+/5    4+/5 5/5       5/5  4+/5 p   5/5                 L4              Anterior Tibialis 5/5    5/5  5/5       5/5  5/5       5/5                 L5             Gr. Toe Extension  5/5     5/5  5/5       5/5  5/5       5/5                 S1            Gastroc/Sol/Peroneals 5/5    4+/5  5/5     5/5  5/5       5/5                               Functional Strength:                   Single  LegStanceTime (seconds) R; 25   L: 13  R:30   L:30 Cant do today                 Pelvic Floor Isolation (seconds) 10   10 sec                  Inner Unit  Isolation (seconds) 10    6 sec                Toe tap   x40       x33   x36      x35                               Functional Activities:                    Sit w/o pain? Pain increases (minutes) Yes/ 60 min Seldom/ 30 min No/ 60 min                 Stand w/o pain? Yes/ 30 min No10-15 min tingle worse  No / 10 min                 Walk w/o pain? Yes/ 1/4 mile  yes/ 40 min   no/ 1 mile                 Steps w/o pain? Yes/ 3 fl no/ 1l fl   no/ 1 fl                 Drive w/o pain?  yes/ 60 -90 min Yes/ 30   no/ 2 hrs                 Work w/o pain? n/a   n/a                 # times wakes w/ pain? Many times, not sure from pain  1-2x   1-2x                Monthly Objective Measurement/Functional Activity  Date: 12/05/2019 01/07/20 02/18/20 03/17/20 06/11/20 07/02/20 08/13/20 09/29/20 01/07/21 02/23/21 04/01/21 05/11/21 06/22/21 08/05/21 09/23/21   Oswestry Pain Score 52/100    45/100    44/100 44/100     44/100   44/100    44/100   44/100     44/100     60/100    50/100    48/100    50/100     56/100     52/100   LE Functional Scale                  Neck Disability Index      32/100      42/100     44/100       44/100     46/100     36/100    42/100       50/100     46/100                     AROM Lumbar Spine: Degrees   Degrees      Degrees      Degrees      Degrees    Degrees Degrees Degrees Degrees Degrees Degrees Degrees Degrees Degrees Degrees      Flexion 114           97      112      112    110     89    95    78        93     98      86        114     94       90    94      Extension 23 pain on right            27      36      29      20     30 pain right scapula    27 pain right SI and Scap     37        16     24      18        18      18       21     18      Right Lat. Flexion 33           34      27 pain right SI        31       31    28  25 32        22       20     19        20      32       21      23      Left Lat. Flexion 25           27      28       24        22     25    26  24        25       25       24        22      13       18      25      Right Lat Shift Pelvis No change No change  No change No change  no change No cahnge No change  No change No cahnge  stiff stiff      stiff   stiff     stiff  stiff      Left Lat Shift Pelvis No change pulls on right  Tightness No pain  No change  no change  pain right buttock  tight  no change  no change  stiff  stiff      stiff   stiff      stiff  stiff                     AROM Cervical Spine  Degrees Degrees Degrees Degrees Degrees Degrees Degrees Degrees Degrees Degrees Degrees Degrees Degrees Degrees     flexion    45 pain        45        59       45 pain      45         58               63      62     48       3      58     58     extension    51 dizzy        50        62       58       60         72        61      50      54      50      53     53     Right lateral flex    56 pain r upper trap         55        47       47 pain       49        54       45      48      48      46      51      49     Left lateral flex  57 pain right elbow        58        40      39      43       48       47       46      49      45       43      46     Right rotation          44         45       46     42 pain       40     45       48       40      47       45       46      48     Left rotation           40          40       41     40      37       40        39        37      43      41       38      42                     AROM Hips:  (degrees) Right      Left Right Left Right  Left Right  Left Right  Left Right  Left Right Left Right Left Right Left Right Left Right  Left Right Left Right Left Right Left Right Left      Flexion 95       98  98        100  100      95  98        105   95       100 96       98  98       95  100     95  105       100    112    108 114       110 115     110  110    107 110       105       Abduction  42       45  45         45   45         45  42        46  40          45 40       40 38         39  36      37 38         40    39        37 38          37  35       35   32      35  30         35       Int. Rotation 25       15  25         28  25          25   26       28    25        30   25      32   22        30  25       30   28       22  25          24  23         24  25       30   24      32  22         30       Ext. Rotation  30        40              32        40   31       46     45       46  45       47  42        45  40      45   32       45   31        38  34         38  35       40   36      40  35         40                      Flexibility:   (degrees) Right    Left Right  Left Right  Left Right  Left Right  Left Right  Left Right Left Right Left Right Left Right Left Right  Left Right Left Right Left Right Left Right Left      Hamstrings -47       -49   -45   -50    -45      -48   -43     -42   -42      -41  -45      -48  _46      -48 - 45     -46  -52    -47     -50     -48   -48     -47  -45    -45   -42    -45  -45       -45       Quadriceps 45       40  48       45     55      54   58       60    65        67  63        65  60        63   58      60   60     63    62     65    63      61  60      60 Not tested  not tested       HipExt/Rot(piriformis) 42       43    45       45   45    48   43       46    45        45  45        40  42        40  38      41  47      45   46        45    47        45  45       45    39        45  40        45    48      45      Hip Int/Rotators 38       37   37        35   35      35   32        36    25         30  30        35  32        37  30       35   28      34    25       32   28         31  25       30     28     35  26        32       Hip Flexors (iliopsoas) Not tested         Not tested Not tested Not tested Not tested Not tested Not tested  not tested Not tested Not tested Not tested Not tested Not tested Not tested       IT Band Not tested           not tested  not tested  not tested  not tested  not tested  not tested  not tested  not tested   not tested  not tested  not tested  not tested  not tested                                        Root Level  - Motor Right      Left Right  Left Right  Left Right  Left Right  Left Right  Left Right Left Right Left Right Left Right Left Right  Left Right Left Right Left Right Left Right Left     T12             Rectus Abdominus 4+/5        4+/5         4+/5      4+/5       4+/5    4 to 4+/5    4 to 4+/5   4 to 4+/5       4+/5        4+/5      4+/5      4+/5        4+/5        4+/5     4+/5      L1              Paraspinals 5/5      5/5   5/5       5/5  5/5        5/5  5/5        5/5   5/5       5/5  5/5        5/5 5/5        5/5 5/5        5/5   5/5        5/5  5/5       5/5           L2              Hip Flexion 4+/5      4+/5  4+/5    4+/5  5/5        5/5  55        5/5   5/5       5/5   5/5        5/5 5/5        5/5  5/5        5/5  5/5        5/5   5/5       5/5          L3             Quads 5/5      5/5  5/5       5/5   5/5        5/5  5/5        5/5   5/5       5/5   5/5        5/5 5/5        5/5  5/5        5/5  5/5        5/5   5/5       5/5          L4              Anterior Tibialis 5/5      5/5  5/5       5/5  5/5        5/5  5/5        5/5   5/5       5/5   5/5        5/5 5/5        5/5  5/5       5/5  5/5        5/5   5/5       5/5          L5             Gr. Toe Extension 5/5      5/5  5/5       5/5   5/5        5/5 5/5         5/5   5/5       5/5   5/5        5/5 5/5        5/5  5/5        5/5  5/5        5/5   5/5       5/5          S1            Gastroc/Sol/Peroneals 4+/5    4+/5   4+/5    4+/5  4+/5    4+/5  4+/5     4+/5   4+/5   4+/5 4+/5      4+/5 5/5        5/5  5/5        5/5  5/5        5/5   5/5       5/5                          Functional Strength:                    Single LegStanceTime (seconds) R:24   L:25   R:30  L:27 R:30   L:30 R:     L: R:      L: R:22    L:15  R: 30 L:30   R:30   L:15    R:30   L:30   R:      L: R:      L:   R:      L:   R:      L:    R:    L:  R:25   L:15     Pelvic Floor Isolation (seconds) 10 sec  10 sec                   Inner Unit  Isolation (seconds) 10 sec  10 sec                                                    Functional Activities:                     Sit w/o pain? Pain increases (minutes) yes   Yes/ 30-60 min  yes/ 30-60 Yes/ 20 min  Yes/ 30-60 min Yes/ 30-60 Yes/ 30 min  Yes/ 30-60 min yes30 min  Yes/ 30  Yes/ 30-45 min Yes/ 30  min Yes/ 30-60 min  no/ 30 min No/ 60 min      Stand w/o pain? 20 min Yes/ 30 min Yes/ 30 min Yes/ 30 min Yes/ 30 min Yes/ 30 min Yes/ 15 min   yes/ 15-30 min Yes/ 15 min  Yes/ 15 min  Yes/ 15-30 Yes/ 15-30 min Yes/ 30 min No/ 30 min No/ 30 min      Walk w/o pain? 30 min   yes/ 1/4 mile Yes/ 30 min Yes/ 45 min  Yes/ 1 mile Yes/ 30 min  Yes/ 30 min   yes/ 1/4 mile Yes/ 30 min  Yes/ 30 Yes/ 60 min   yes/ 1/4 mile  yes/ 1/4 mile  no/ 1/4 mile  no/ 3 mile      Steps w/o pain? Yes, ignores pain  Yes/ ignores pain Yes/ no limit Yes/ no limit Yes/ no limit Yes/ no limit No/ worst getting winded   no/ more winded than pain No problem  No problem  No problem  Yes/  1 fl  yes/ 1 fl  no/ 1 fl No/ 1 fl      Drive w/o pain? Uncomfortable, 60 min  Yes/ w/ supports and TENS 2 hrs Yes/ 60 min  Yes/ 60 min  Yes/ 1-2 hrs Yes/ 3 hrs Yes/ 1 hr   yes/ 1-2 hrs Yes/ 90  min with TENS Yes/ hasn't gone anyway  Yes/ 30-45 min  Yes/ 60 min  yes/ 60 min  no/ 30 min No/  min      Work w/o pain? n/a   n/a     n/a    n/a   n/a  n/a  n/a    n/a   n/a  n/a n/a n/a      # times wakes w/ pain? Sleeping on couch b/c or right shld pain, since July Continues to sleep on couch  Continues to sleep on the couch  2x from right shoulder and neck, she is still sleeping on couch  1-2x   1-2x   3-4x , some pain and some not sure   1-2x 2x  More comfortable on left  Every 2 hrs, back neck and shoulder  Wakes evry 3 hrs, brain activity and some pain  Wakes several times, not  necessarily  from pain con't to wake many times, not necessarily from pain Wakes several times, not always from pain  wakes many times but not sure from pain       PLAN OF CARE:    ASSESSMENT:  Problem List:   1. Flare up of SI joint dysfunction  2. Decreased spinal stabilization  3. Postural dysfunction     Discussion:  Maribel arrives with increased pain correlating with the fall she had one month ago and again today.  Her Oswestry Low Back Pain Score worsened from 48/100 to 50/100 today.  Likewise ehr Neck Disability Index also worsened from 40/100 to 46/100 today.     Since Maribel has had to soften her mattress to accomodate her shoulder pain, this has also flared up her low back.  Discussed using a Tomeka Night time lumbar support.  Tried one here in the clinic which was too firm for her.  She will either use a softer one or try using a lumbar support under her lumbar spine in side lying.      AROM of her right shoulder and elbow are all WNL.     Her biggest complaint today is the increased activity and pain in the right piriformis.  Following manual work to bring symmetry to the iliums and sacrum, able to stretch the piriformis and followed this with ultrasound to the piriformis to facilitate healing from fall.     Will look forward to ortho assessment of right hip to make sure no negative impact on THR from the 2 falls.       Barbara continues to self manage fairly well until she fell 4 weeks ago and yesterday. She was in need of manual work for pain relief in order for her home exercises to continue to be effective.  Thus plan to see her once every 4-6 weeks to re-assess, use manual technique for pain relief and progress her home exercises augmenting her home program to meet remaining goals as long as she is cleared by ortho next week.     Short Term Goals: (4-6 weeks)                                   Date Met:                1.   pt independent in postural correction         02/18/20  2.   pt independent in SI  "joint self-corrections        12/17/19  3.   pt independent in exer to decrease post. disc pressures      12/17/19  4.   pt able to sleep through night without pain        09/29/20  5.   pt able to stand 10  minutes without pain        01/07/20  6.   Reduce pt pain to no greater than 4/10         01/07/20  7.   Decrease Oswestry Pain Score to no greater than 45/100      01/07/20  8.  Decrease Oswestry Pain Score to no greater than  40/100  9.  Pt able to perform hip abduction x6 w/o pain        09/29/20  10. Decrease pain right gr trochanter to no greater than 4/10      03/17/20  11. Pt able to engage inner unit in supine w/ legs on chair, moving in int/ext rotation w/o pain x6 Unable to do, not trying   12.  Pt able to contract pelvic floor on exhaling a diaphragmatic breath x6    07/02/20  13.  Pt able to isolate the transverse abdominus with forearms on counter and in slight lumbar ext x6 08/13/20  14.  Pt able to perform step up/down with inner unit musculature engaged x6 without pain  Able to do once secondary to chornic fatigue, no pain   15.   Pt able to engage inner unit musculature on sit to stand and stand to sit, x6 w/o low back pain Hard for her to keep inner unit on .   16.  Pt able to perform diaphragmatic breathing x6, in supine      05/11/21  17.  Pt able to do diaphrag breath engaging transv abdominus exhaling,  3 breaths w/ lower costals 05/11/21  18.  Decrease Neck Disability score to no greater than 35/100  19.  Pt able to bridge with inner unit engaged, 2\" w/o pain, x6      06/22/21  20.  Pt able to use theracane to minimize pain left upper trap      12/02/21  21.  Pt able to effectively stretch upper trap and ant/mid scalene on left  22.  Pt able to perform bridge w/ inner unit on, w/o inappropriate useof hamstrings/piriformis  23.  Pt able to perform heel slides with inner unit on w/o pain, x6    Long Term Goals:(3-5 months)      Date Met:      1.  pt independent in self-management of " symptoms       2.  pt independent in home program      3. Reduce pain to no greater than 2/10      4.  Pt able to  puppies without pain.      Treatment Plan:   1.  Ultrasound to increase extensibility, decrease pain, if needed  2.  Electrical stimulation for muscle relaxation, decrease pain, if needed, iontophoresis  3.  Manual therapy to increase function, decrease pain  4.  Therapeutic exercise to increase function, decrease pain  5.  Home programming and d/c planning to increase function and decrease pain    Frequency & Duration:  Pt will be seen on a once/every 4-6 week basis for 7 more visits to meet remaining goals.     Patient Participated in and Agrees With This Plan of Care and Goals:  YES  Rehab Potential:  Fair to good      Karen Chatterjee, PT, MS  Physical Therapist  [place]# [id number]      Medicare Certification:  90 Day Recertification  Certification Period: 05/12/11 to 08/11/22  I          ITREATMENT TODAY:    Total Treatment Time: (time in clinic)            60  Timed Code Treatment Minutes:   60      Re-Evaluation:   RX min:  53885    Manual Therapy:  (MA)  RX min:   30  Manual posterior rotation of right innominate    Positional Isometric contraction (PIC) of left iliopsoas    Positional Isometric contraction of (PIC) right gluteus minimus    Distraction of both SI jts in open pack hip position  Piriformis stretching, bilaterally    Quadratus lumborum stretching, bilaterally    Glut minimus stretching  Anterior/Inferior Mobilization of right  hip    Myofascial work trunk  Manual spinal distraction        Therapeutic Activities:  (TA)  RX min:   5   Positioning in car with wedge    Neuromuscular Reeducation: (NMR)  RX min:        Ultrasound:  RX min:   10        1.6 piper/cm2       Location:  Right piriformis    Iontophoresis:  6 hr patch                                Location:

## 2022-07-11 RX ORDER — ESTRADIOL 10 UG/1
1 INSERT VAGINAL 3 TIMES WEEKLY
Qty: 36 TABLET | Refills: 0 | Status: SHIPPED | OUTPATIENT
Start: 2022-07-11 | End: 2022-07-13 | Stop reason: SDUPTHER

## 2022-07-11 NOTE — TELEPHONE ENCOUNTER
Rx Refill Note  Patient has appointment 7/26/22.  We have received a fax request from the pharmacy.  Requested Prescriptions      No prescriptions requested or ordered in this encounter      Last office visit with prescribing clinician: Visit date not found      Next office visit with prescribing clinician: 7/26/2022            Nida Powers MA  07/11/22, 13:34 EDT

## 2022-07-13 ENCOUNTER — TELEPHONE (OUTPATIENT)
Dept: OBSTETRICS AND GYNECOLOGY | Facility: CLINIC | Age: 72
End: 2022-07-13

## 2022-07-13 RX ORDER — ESTRADIOL 10 UG/1
1 INSERT VAGINAL 3 TIMES WEEKLY
Qty: 36 TABLET | Refills: 0 | Status: SHIPPED | OUTPATIENT
Start: 2022-07-13 | End: 2022-07-26 | Stop reason: SDUPTHER

## 2022-07-13 NOTE — TELEPHONE ENCOUNTER
Former patient of Dr. France last seen on 7/22/2021, per his note return in 1 year. Patient has an appointment with LESA Neal on 7/26/2022. I will send in 1 refill on her vagifem to her pharmacy.

## 2022-07-13 NOTE — TELEPHONE ENCOUNTER
A supply of Vagifem was sent in for patient on 7/11/2022. Electronic receipt confirmation from pharmacy received on 7/11. Called patient and confirmed with her. She verified understanding.

## 2022-07-26 ENCOUNTER — OFFICE VISIT (OUTPATIENT)
Dept: OBSTETRICS AND GYNECOLOGY | Facility: CLINIC | Age: 72
End: 2022-07-26

## 2022-07-26 VITALS
HEIGHT: 62 IN | BODY MASS INDEX: 22.41 KG/M2 | WEIGHT: 121.8 LBS | DIASTOLIC BLOOD PRESSURE: 80 MMHG | SYSTOLIC BLOOD PRESSURE: 138 MMHG

## 2022-07-26 DIAGNOSIS — N90.4 LICHEN SCLEROSUS OF FEMALE GENITALIA: ICD-10-CM

## 2022-07-26 DIAGNOSIS — Z01.411 ENCOUNTER FOR GYNECOLOGICAL EXAMINATION WITH ABNORMAL FINDING: Primary | ICD-10-CM

## 2022-07-26 DIAGNOSIS — N95.2 ATROPHY OF VAGINA: ICD-10-CM

## 2022-07-26 PROCEDURE — G0101 CA SCREEN;PELVIC/BREAST EXAM: HCPCS | Performed by: NURSE PRACTITIONER

## 2022-07-26 RX ORDER — FLUCONAZOLE 150 MG/1
150 TABLET ORAL DAILY
Qty: 3 TABLET | Refills: 3 | Status: SHIPPED | OUTPATIENT
Start: 2022-07-26 | End: 2022-10-20

## 2022-07-26 RX ORDER — ESTRADIOL 10 UG/1
1 INSERT VAGINAL 3 TIMES WEEKLY
Qty: 36 TABLET | Refills: 3 | Status: SHIPPED | OUTPATIENT
Start: 2022-07-27

## 2022-07-26 NOTE — PROGRESS NOTES
Chief Complaint  Barbara Hernandez is a 71 y.o.  female presenting for Gynecologic Exam and Med Refill (Vagifem (generic) 10 mcg (#36 with refills).  [facility]Fluconazole 150 mg (#3 with refills).  [facility].)    History of Present Illness  Barbara is a 72yo woman, , longtime pt of Dr. France's, here today for annual gyn exam.  She uses OTC steroid topically prn for the Lichen (cannot tolerate stronger steroid preparations).  She treats vaginal atrophy with generic Vagifem.  And must use prn Fluconazole because of frequent yeast.  Preventive health procedures are up to date, except colonoscopies.  It is too difficult for her, and she declines another one.  (Last wnl 3/2017)    The following portions of the patient's history were reviewed and updated as appropriate: allergies, current medications, past family history, past medical history, past social history, past surgical history and problem list.    Allergies   Allergen Reactions   • Bacitracin-Polymyxin B Rash   • Cefdinir GI Intolerance   • Celecoxib Unknown - Low Severity   • Ciprofloxacin GI Intolerance   • Clarithromycin GI Intolerance   • Doxycycline GI Intolerance   • Erythromycin Nausea Only   • Levofloxacin GI Intolerance   • Lortab [Hydrocodone-Acetaminophen] GI Intolerance   • Lyrica [Pregabalin] Unknown - Low Severity   • Nexium [Esomeprazole] Myalgia   • Omeprazole Myalgia   • Phenergan [Promethazine Hcl] Delirium   • Sulfa Antibiotics Rash   • Tramadol Unknown - Low Severity         Current Outpatient Medications:   •  [START ON 2022] estradiol (Vagifem) 10 MCG tablet vaginal tablet, Insert 1 tablet into the vagina 3 (Three) Times a Week., Disp: 36 tablet, Rfl: 3  •  acetaminophen (TYLENOL) 325 MG tablet, Take 650 mg by mouth every night at bedtime., Disp: , Rfl:   •  B Complex Vitamins (VITAMIN B COMPLEX PO), Take 1 tablet by mouth Daily., Disp: , Rfl:   •  Cholecalciferol (VITAMIN D3) 3000 UNITS tablet, Take 1 tablet by mouth Daily., Disp: ,  Rfl:   •  Coenzyme Q10 (CO Q-10) 100 MG capsule, Take 400 mg by mouth Daily., Disp: , Rfl:   •  diclofenac (VOLTAREN) 1 % gel gel, Apply 1 application topically As Needed., Disp: , Rfl:   •  dicyclomine (BENTYL) 10 MG capsule, Take 1 capsule by mouth every 8 hours as needed for bowel spasm or diarrhea, Disp: 30 capsule, Rfl: 0  •  fluconazole (Diflucan) 150 MG tablet, Take 1 tablet by mouth Daily. (Once daily, prn s/sx yeast vulvovaginitis), Disp: 3 tablet, Rfl: 3  •  gabapentin (NEURONTIN) 100 MG capsule, Take 1 capsule by mouth every night at bedtime., Disp: 30 capsule, Rfl: 5  •  Lutein 20 MG tablet, Take 1 tablet by mouth Daily., Disp: , Rfl:   •  meloxicam (MOBIC) 7.5 MG tablet, TAKE 1 TABLET EVERY DAY AS NEEDED FOR PAIN AND INFLAMMATION, Disp: 30 tablet, Rfl: 3  •  Multiple Vitamins-Minerals (ICAPS AREDS 2) capsule, Take 1 capsule by mouth Daily., Disp: , Rfl:   •  Omega-3 Fatty Acids (FISH OIL) 1200 MG capsule capsule, Take 1,200 mg by mouth Daily., Disp: , Rfl:   •  Synthroid 75 MCG tablet, Take 75 mcg by mouth Daily., Disp: , Rfl:   •  vitamin E 400 UNIT capsule, Take 400 Units by mouth Daily., Disp: , Rfl:     Past Medical History:   Diagnosis Date   • Allergic    • Anxiety    • Back pain     osteoarthritis   • Chronic diarrhea    • Depression    • ETD (eustachian tube dysfunction)    • Fibromyalgia    • Fibromyositis    • GERD (gastroesophageal reflux disease)    • H/O cardiomyopathy    • Headache    • HLD (hyperlipidemia)    • Hypothyroidism    • IBS (irritable bowel syndrome)    • Lichen sclerosus et atrophicus of the vulva    • Menopause    • Osteoarthritis    • Osteopenia    • Parvovirus B19 infection    • Preventative health care     · Age-appropriate counseling discussed. Will notify patient of lab result   • Urinary tract infection         Past Surgical History:   Procedure Laterality Date   • CRYOTHERAPY      cervix   • EYE SURGERY Right     cyst removal right eyelid   • SEPTOPLASTY     • TOTAL  "HIP ARTHROPLASTY      2010 total right hip Dr. Lovelace; 2012 total Left hip       Objective  /80   Ht 157.5 cm (62\")   Wt 55.2 kg (121 lb 12.8 oz)   LMP  (LMP Unknown) Comment: postmenopausal  Breastfeeding No   BMI 22.28 kg/m²     Physical Exam  Exam conducted with a chaperone present.   Constitutional:       Appearance: Normal appearance.   HENT:      Head: Normocephalic.   Neck:      Thyroid: No thyroid mass or thyromegaly.   Cardiovascular:      Rate and Rhythm: Normal rate and regular rhythm.      Heart sounds: Normal heart sounds.   Pulmonary:      Effort: Pulmonary effort is normal.      Breath sounds: Normal breath sounds.   Chest:   Breasts:      Right: No inverted nipple, mass, nipple discharge, axillary adenopathy or supraclavicular adenopathy.      Left: No inverted nipple, mass, nipple discharge, axillary adenopathy or supraclavicular adenopathy.       Abdominal:      Palpations: Abdomen is soft. There is no mass.      Tenderness: There is no abdominal tenderness.   Genitourinary:     General: Normal vulva.      Labia:         Right: No lesion.         Left: No lesion.       Vagina: Normal. No erythema.      Cervix: No discharge, lesion or erythema.      Uterus: Not enlarged and not tender.       Adnexa:         Right: No mass or tenderness.          Left: No mass or tenderness.        Comments: Anus appears wnl.  No rectal exam performed.  Lymphadenopathy:      Upper Body:      Right upper body: No supraclavicular or axillary adenopathy.      Left upper body: No supraclavicular or axillary adenopathy.   Neurological:      Mental Status: She is alert.         Assessment/Plan   Diagnoses and all orders for this visit:    1. Encounter for gynecological examination with abnormal finding (Primary)    2. Atrophy of vagina    3. Lichen sclerosus of female genitalia    Other orders  -     estradiol (Vagifem) 10 MCG tablet vaginal tablet; Insert 1 tablet into the vagina 3 (Three) Times a Week.  " Dispense: 36 tablet; Refill: 3  -     fluconazole (Diflucan) 150 MG tablet; Take 1 tablet by mouth Daily. (Once daily, prn s/sx yeast vulvovaginitis)  Dispense: 3 tablet; Refill: 3    We discussed calcium & vitamin D recommendations.    Procedures    40 to 64: Counseling/Anticipatory Guidance Discussed: nutrition, screenings and self-breast exam    Return in about 1 year (around 7/26/2023) for Annual physical.    Shanita Degroot, LESA  07/26/2022

## 2022-08-18 ENCOUNTER — OFFICE VISIT (OUTPATIENT)
Dept: CARDIOLOGY | Facility: CLINIC | Age: 72
End: 2022-08-18

## 2022-08-18 VITALS
HEIGHT: 62 IN | SYSTOLIC BLOOD PRESSURE: 132 MMHG | OXYGEN SATURATION: 97 % | DIASTOLIC BLOOD PRESSURE: 82 MMHG | HEART RATE: 78 BPM | BODY MASS INDEX: 22.26 KG/M2 | WEIGHT: 121 LBS

## 2022-08-18 DIAGNOSIS — I10 PRIMARY HYPERTENSION: ICD-10-CM

## 2022-08-18 DIAGNOSIS — E78.2 MIXED HYPERLIPIDEMIA: ICD-10-CM

## 2022-08-18 DIAGNOSIS — I10 ESSENTIAL HYPERTENSION: Primary | ICD-10-CM

## 2022-08-18 PROCEDURE — 99214 OFFICE O/P EST MOD 30 MIN: CPT | Performed by: INTERNAL MEDICINE

## 2022-08-18 RX ORDER — TRIAMCINOLONE ACETONIDE 1 MG/G
CREAM TOPICAL
COMMUNITY
End: 2022-10-20

## 2022-08-18 NOTE — PROGRESS NOTES
[facility] Cardiology  Office Progress Note  Barbara Hernandez  1950  [address] [facility] [address]       Visit Date: 08/18/22    PCP: Fiordaliza Suh MD  [address]  [facility] [address]    IDENTIFICATION: A 71 y.o. 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 Postprandial dizziness/lightheadedness.  2. History of cardiomyopathy:  a. Left heart catheterization, 06/06/2002:  i. Normal coronary artery anatomy.  b. LVEF 55% to 60%.   c. 8/21/2020 2D echo: LVEF =55%.  Mild MR.  Mild TR.  LV systolic function normal.  LV diastolic function (grade 1) impaired relaxation.  RVSP due to TR = 23.  3. Palpitations:  Holter monitor, May 2002:  a. Sinus rhythm with rare PAC's, PVC's.  b. Short ND interval.  4. Dyslipidemia.-Statin intolerant  10/18 241/58/71/175  7/30/20 257/65/73.8/170  5. VHD  a. 9962-6734 multiple echo, muga   b. 5/16 SE per CAK EF 55% mild AI, mild diastolic dysfxn  6. 2019 viral illness NOS ? Parvovirus (multiorgan system- not hospitalized.)  7. Hypothyroidism.  8. Osteoarthritis.  9. Gastritis.  10. Irritable bowel syndrome/spastic colon.  11. Hay fever.  12. Perimenopause.  13. Herpes zoster, October 2008, (affecting left ear).  14. Lichen sclerosis (affecting vaginal tissue).  15. Fibromyalgia.  16. Surgeries:  a. Right hip replacement, 11/15/2010.  b. Septoplasty, March 2011.  c. Left hip replacement, November 2012.        CC:   Chief Complaint   Patient presents with   • Essential hypertension       Allergies  Allergies   Allergen Reactions   • Bacitracin-Polymyxin B Rash   • Cefdinir GI Intolerance   • Celecoxib Unknown - Low Severity   • Ciprofloxacin GI Intolerance   • Clarithromycin GI Intolerance   • Doxycycline GI Intolerance   • Erythromycin Nausea Only   • Levofloxacin GI Intolerance   • Lortab [Hydrocodone-Acetaminophen] GI Intolerance   • Lyrica [Pregabalin] Unknown - Low Severity   • Nexium  [Esomeprazole] Myalgia   • Omeprazole Myalgia   • Phenergan [Promethazine Hcl] Delirium   • Sulfa Antibiotics Rash   • Tramadol Unknown - Low Severity       Current Medications    Current Outpatient Medications:   •  acetaminophen (TYLENOL) 500 MG tablet, Take 650 mg by mouth every night at bedtime., Disp: , Rfl:   •  B Complex Vitamins (VITAMIN B COMPLEX PO), Take 1 tablet by mouth Daily., Disp: , Rfl:   •  Cholecalciferol (VITAMIN D3) 3000 UNITS tablet, Take 1 tablet by mouth Daily., Disp: , Rfl:   •  Coenzyme Q10 (CO Q-10) 100 MG capsule, Take 200 mg by mouth Daily., Disp: , Rfl:   •  diclofenac (VOLTAREN) 1 % gel gel, Apply 1 application topically As Needed., Disp: , Rfl:   •  dicyclomine (BENTYL) 10 MG capsule, Take 1 capsule by mouth every 8 hours as needed for bowel spasm or diarrhea, Disp: 30 capsule, Rfl: 0  •  estradiol (Vagifem) 10 MCG tablet vaginal tablet, Insert 1 tablet into the vagina 3 (Three) Times a Week., Disp: 36 tablet, Rfl: 3  •  fluconazole (Diflucan) 150 MG tablet, Take 1 tablet by mouth Daily. (Once daily, prn s/sx yeast vulvovaginitis), Disp: 3 tablet, Rfl: 3  •  gabapentin (NEURONTIN) 100 MG capsule, Take 1 capsule by mouth every night at bedtime., Disp: 30 capsule, Rfl: 5  •  Lutein 20 MG tablet, Take 1 tablet by mouth Daily., Disp: , Rfl:   •  meloxicam (MOBIC) 7.5 MG tablet, TAKE 1 TABLET EVERY DAY AS NEEDED FOR PAIN AND INFLAMMATION, Disp: 30 tablet, Rfl: 3  •  Multiple Vitamins-Minerals (ICAPS AREDS 2) capsule, Take 1 capsule by mouth Daily., Disp: , Rfl:   •  Omega-3 Fatty Acids (FISH OIL) 1200 MG capsule capsule, Take 1,200 mg by mouth Daily., Disp: , Rfl:   •  Synthroid 75 MCG tablet, Take 75 mcg by mouth Daily., Disp: , Rfl:   •  triamcinolone (KENALOG) 0.1 % cream, triamcinolone acetonide 0.1 % topical cream  Apply to the affected area of scar on the elbow, daily for up to 2 weeks, then only if needed, for flares., Disp: , Rfl:   •  vitamin E 400 UNIT capsule, Take 400 Units by  "mouth Daily., Disp: , Rfl:       History of Present Illness   Barbara Hernandez is a 71 y.o. year old female here for follow up.  She had a fall at [employer] this summer injuring her hip and elbow.  She had no cardiac concerns since most recent.  Unfortunately she has had again intolerance to lipid modifying statin therapy          OBJECTIVE:  Vitals:    08/18/22 1455   BP: 132/82   BP Location: Left arm   Patient Position: Sitting   Pulse: 78   SpO2: 97%   Weight: 54.9 kg (121 lb)   Height: 157.5 cm (62\")     Body mass index is 22.13 kg/m².    Constitutional:       Appearance: Healthy appearance. Not in distress.   Neck:      Vascular: No JVR. JVD normal.   Pulmonary:      Effort: Pulmonary effort is normal.      Breath sounds: Normal breath sounds. No wheezing. No rhonchi. No rales.   Chest:      Chest wall: Not tender to palpatation.   Cardiovascular:      PMI at left midclavicular line. Normal rate. Regular rhythm. Normal S1. Normal S2.      Murmurs: There is no murmur.      No gallop. No click. No rub.   Pulses:     Intact distal pulses.   Edema:     Peripheral edema absent.   Abdominal:      General: Bowel sounds are normal.      Palpations: Abdomen is soft.      Tenderness: There is no abdominal tenderness.   Musculoskeletal: Normal range of motion.         General: No tenderness. Skin:     General: Skin is warm and dry.   Neurological:      General: No focal deficit present.      Mental Status: Alert and oriented to person, place and time.         Diagnostic Data:  Procedures      ASSESSMENT:   Diagnosis Plan   1. Essential hypertension  CT Cardiac Calcium Score Without Dye   2. Mixed hyperlipidemia  CT Cardiac Calcium Score Without Dye   3. Primary hypertension         PLAN:  Hypertension controlled current      Abnormal EKG unchanged from last 5 years    Dyslipidemia intolerant to statin therapy cardiac calcium scoring will be undertaken to delineate necessity for further attempts at lipid " modification        Jonathon Bermudez MD, [facility]

## 2022-08-25 DIAGNOSIS — I10 ESSENTIAL HYPERTENSION: ICD-10-CM

## 2022-08-25 DIAGNOSIS — E78.2 MIXED HYPERLIPIDEMIA: ICD-10-CM

## 2022-08-30 ENCOUNTER — TREATMENT (OUTPATIENT)
Dept: PHYSICAL THERAPY | Facility: CLINIC | Age: 72
End: 2022-08-30

## 2022-08-30 DIAGNOSIS — M53.3 SACROILIAC JOINT DYSFUNCTION: Primary | ICD-10-CM

## 2022-08-30 DIAGNOSIS — M54.2 CERVICAL PAIN: ICD-10-CM

## 2022-08-30 PROCEDURE — 97112 NEUROMUSCULAR REEDUCATION: CPT | Performed by: PHYSICAL THERAPIST

## 2022-08-30 PROCEDURE — 97164 PT RE-EVAL EST PLAN CARE: CPT | Performed by: PHYSICAL THERAPIST

## 2022-08-30 PROCEDURE — 97140 MANUAL THERAPY 1/> REGIONS: CPT | Performed by: PHYSICAL THERAPIST

## 2022-08-30 NOTE — PROGRESS NOTES
" Physical Therapy Re-Evaluation & Updated Plan of Care  SUBJECTIVE:  Changes since last seen:   Maribel was seen by orthopedics and had hips x-rayed, \"everything is fine.\"  She states she can now lay on her right side; its  if she pushes hard on the right gr trochanter.  She saw Cardiologist who ordered a calcium test, which came back \"96, not low , not high, just moderate for cardiac issues.\"   She states she doesn't want to take the cholesterol drug that's not a statin because its so expensive, so Cardiologist has recommended another one and a shot, she states she is not wanting to do this.   She indicates she wants to start walking more to get her cholesterol levels down.       Current level of pain:   Right buttock/SI jt 0/10  Left SI joint  0/10         Lumbar spine   3/10   Right Gr troch 3/10 all time  Neck 2/10   Lowest level of pain since last seen:    0/10   0/10         0/10     1/10    0/10          Highest level of pain since last seen:     3/10    3/10       7/10       3-410   5/10 soreness in am           Past Medical History:  1.  Cervical DDD  2.  Hx of Bilateral THR, left 2012, right 2010  3.  Septoplasty, 2011  4.  Hx of SI joint dysfunction  5.  Fibromyalgia  6.  Hx of hypothyroidism  7.  Hx of gastritis  8.  Irritable bowel syndrome/spastic colon  9.  Osteoarthritis  10.  Hx of 4 pregnancies, 2 normal vaginal deliveries  11. Hx of Dequervains syndrome  12.  Hx of fx left sesmoid  13.   Hx of positional vertigo  14. Osteopenia, diagnosed May 2014  15  Hx of migraine headaches  16.  TMJ dysfunction, 2015  17.  Hx of Cardiomyopathy/ left catheterization 2002  18.  Dyslipidemia  19.  Hx of neuroma pain left foot  20.  Hx of anxiety/ depression    Functional Limitations:  Standing, sleeping,   Patient Goals for Physical Therapy: decreased pain      OBJECTIVE:     12/02/21 05/12/22 07/07/22 08/30/22               SI Joint Positioning  Right  Left Right  Left Right  Left Right  Left Right  Left " Right  Left Right  Left Right  Left Right  Left Right  Left Right  Left Right  Left Right  Left Right Left Right Left Right Left       Innominate                          Anterior    x   x   x Sl                       Posterior               x              x                x               Sl                   Sacrum                        Unilateral rotation   x   x   x   Sl                                   SI Joint Testing  Right  Left Right  Left Right  Left Right  Left Right  Left Right  Left Right  Left Right  Left Right  Left Right  Left Right  Left Right  Left Right  Left Right Left Right Left Right Left           Distraction    +        +   +         +   +          +                       Beatriz's    Sl        -   sl          -  -          -                       Compression    -        -   -         -   -           -                       Prone Press Up    Sl     Sl    +          +   Sl        Sl                                   Special Tests  Right   Left Right  Left Right  Left Right  Left Right  Left Right  Left Right Left  Right  Left Right  Left Right  Left Right  Left Right  Left Right  Left Right Left Right Left Right Left      Straight Leg Raise                           Active    -         -   -          -   -             -                       Passive    -           -   -         -   -          -                                                                             12/05/2019 12/17/19 01/07/20 02/18/20 03/17/20 06/11/20 07/02/20 08/13/20 09/29/20 01/07/21 02/23/21 04/01/21 05/11/21 06/22/21 08/05/21 09/23/21   SI Joint Positioning  Right  Left Right  Left Right  Left Right  Left Right  Left Right  Left Right  Left Right  Left Right  Left Right  Left Right  Left Right  Left Right  Left Right Left Right Left Right Left       Innominate                          Anterior               x              x              x                x               x              x              sl   Sl    x   x     x    x Very sl   x    x   x          Posterior    x   x    x   x    x    x  sl               Sll               x                x              x              x       Very sl               x               x               x      Sacrum                             Unilateral rotation    x   x   x   x   x    x   sl   x   x    x   x   x Very sl   x   x   x                         SI Joint Testing  Right  Left Right  Left Right  Left Right  Left Right  Left Right  Left Right  Left Right  Left Right  Left Right  Left Right  Left Right  Left Right  Left Right Left Right Left Right Left           Distraction   +         +   +        +   +         +   +          +   +            +   +          +   +          +   +         +   +         +   +          +   +          +   +         +   +       +   +         +    +         +   +       +           Beatriz's    -        -            -         -   -          -   -         -   -          -   -          -   -          -           Compression   -         -            -          -   -         -   -          -   -         -   -           -   -          -           Prone Press Up   -            -            Sl       Sl    -           -  sl       Sl    Sl        Sl    +          +   -           -                      Special Tests  Right   Left Right  Left Right  Left Right  Left Right  Left Right  Left Right Left  Right  Left Right  Left Right  Left Right  Left Right  Left Right  Left Right Left Right Left Right Left      Straight Leg Raise                                   Active   -          -  -       -        -         -   -           -   -           -   -           -    -         -   -          -   -         -   -        -   -           -   -         -           Passive    -           -   -          -           -           -   -        -   -        -   -          -    -          -   -         -   -        -   -        -   -          -   -       -       Hip Scour Test                                                                 Palpation:      Muscle/Bone Irritation:  Date 12/02/21 05/12/22 07/07/22 08/30/22                Right  Left Right  Left Right  Left Right  Left Right  Left Right  Left Right  Left Right  Left Right  Left Right  Left Right  Left Right  Left Right  Left Right Left Right Left Right Left   SI joint    +         sl +         sl ++         Sl   sl          +               Piriformis   ++        +   ++      ++  ++        sl   +         +               Gr. Trochanter ++         +  ++         Sl   ++         sl   +          -               IT Band  -          -   Sl         Sl    ++        sl   -        -               Quadratus Lumborum   +        sl   Sl          +   Sl         sl   -         Sl                Ischial Tuberosity   -          -   -          -   -          -   -           -               Sacrococcygeal Ligs   -         -   -          -   -          -   -          -               Paraspinals   -          +   -          +   -          +   -           +               Spinous Processes                                                T-spine rotated to  T6-7 T4-7 T4-8  T4-6                      L-spine rotated to           L3-5               L4              L4              L5               Adductor Fabio   -          -   Sl          -  +           -  sl          -               Iliopsoas  -           -    -           -   +          -  Sl          -               Quad Origin   -           -   -           -   -           -    -          -               Pubic Symphysis       Sl           sl        Sl     very sl                Bakers cyst  -          -   -           -   -            -   -         -               Pes Anserine Bursa   -            -   -          -   -         -   -           -                                                       Date 12/05/2019 12/17/19 01/07/20 02/18/20 03/17/20 06/11/20 07/02/20 08/13/20 09/29/20 01/07/21 02/23/21 04/01/21 05/11/21  06/22/21 08/05/21 09/23/21    Right  Left Right  Left Right  Left Right  Left Right  Left Right  Left Right  Left Right  Left Right  Left Right  Left Right  Left Right  Left Right  Left Right Left Right Left Right Left   SI joint  +        ++   +          +    +         sl   +         Sl     +          +   +         +    +           +   +         Sl    Sl        -    +       sl   +         -   +        -   -           -   +          +   +         +   Sl        Sl    Piriformis   Sl        +    ++        +     +        sl     +       sl     +          +   +        sl   +        +    +         sl     +       sl   ++      +   ++      sl   +          sl   +           +   +         +    +       Sl    Gr. Trochanter  ++        +   +          +     Sl      Sl     ++       sl   ++         +   +       +   +          +    +         -     +        -   ++       -  ++       sl   ++        Sl    +          ++  +         ++  +         sl   IT Band  sl        sl   +         sl   Sl          -   Sl        -   Sl        Sl    -           -   -           -    +         -    -         -    -        -   -        -    Sl          -   -          -  sl        sl   Sl         -   Quadratus Lumborum  ++        +    +         sl   Sl         sl   -            -   -           -   -            -   -         -    Sl       sl   +          +    -         -   -        -    -           -   ++      ++   +         +   Ischial Tuberosity  -        -   -          -   -          -   -            -   -          -    -            -   -           -    -         -   -          -    -         -   Sl       Sl    -          -    -         - -           -  -          -   Sacrococcygeal Ligs  -        -   -           -   -             -   -            -   -           -   -           -   -            -    -         -   -           -   -           -   Sl       Sl     -           -     -        -   -           -   -           -   Paraspinals  +        -   Sl            -   -          sl   -            -   +         -   +          sl   Sl          +    +         sl   -           +   +          Sl       -            -     +          -   +          +   +          -   Spinous Processes                                      C-spine rotated to               C2-6           C2-5          C2  C2-3  C2 C2         T-spine rotated to   T3-5                     T4-6  T6-8 T3-4 T3-4    T4-5 T3-6   T4-6  T4-6   -          -  T4-7 T4-6  T4-7  T4-7 T4-6          L-spine rotated to  L5           L5           L4-5   -          - L3-5 L4-5            L4-5 L4-5 L4-5            L4-5  L5              L5   -          - L3-5  L5 L4-5   Adductor Fabio  -        -   -           -   -           sl   -           -   -        -   -          -   Sl         Sl   Sl       sl Sl          Sl   sl        Sl   sl            +   +         Sl   sl         Sl   +          +  +          sl   Iliopsoas  +        +   Sl          sl   Sl           sl   Sl         -   Sl       -   +          +   +         +   sl         sl   +        sl   +          +   -        -   -          -   +          +  +        +  +          sl   Quad Origin  -        -    -          -    -            -   -           -   -           -   -            -    -          -   -          -   -          -    -          -   -          -   -           -  -            -   Pubic Symphysis         +                   Sl       sl        -           sl         sl         sl        +       -         -         +         +          +    Bakers cyst        -         sl    -         +   -          sl   -            -    -          -    +         +   +        Sl    -         -   -          -   -         -   -          -  -         -   Pes Anserine Bursa      +          -   +         ++   Sl         +   -sl        -    -           -   -          -   -         -   -        -   -          -      -          -   -         -   -          -   Rectus Diastasis   2 finger                     IT Band insertion           +         -   -           -   -          -   -        -   -         -   -            -   -            -   -          -         Monthly Objective Measurement/Functional Activity  Date: 12/02/21 05/12/22 07/07/22 08/30/22             Oswestry Pain Score 52/100      48/100    50/100     48/100             LE Functional Scale                 Neck Disability Index 40/100     40/100     46/100    31/100             Degrees from erect in stance           -10             AROM Lumbar Spine: Degrees   Degrees   Degrees    Degrees      Degrees    Degrees Degrees Degrees Degrees Degrees Degrees Degrees Degrees Degrees      Flexion 91       90       82        82                Extension 18 pain       22       15       18                Right Lat. Flexion 18      19       24       19                Left Lat. Flexion 21      28         17       27                Right Lat Shift Pelvis No pain  No change  no change No pain                 Left Lat Shift Pelvis  no pain  No change  inc right hip pain  No pain                               AROM Cervical Spine Degrees Degrees Degrees Degrees Degrees Degrees Degrees Degrees Degrees Degrees Degrees Degrees Degrees Degrees     flexion 41       54       54       55               extension 47       51       54       55               Right lateral flex 42       47       43       50               Left lateral flex 52       57       46       59               Right rotation 42       45       40       38               Left rotation  45       42        41       44                              AROM Hips:  (degrees) Right      Left Right Left Right  Left Right  Left Right  Left Right  Left Right Left Right Left Right Left Right Left Right  Left Right Left Right Left Right Left      Flexion 117     115    109 114      110 113      110                Abduction 38   35      32  30        36   38        39                Int. Rotation 18    20      28   18         25   20         26                Ext. Rotation  40   36       40   34        36  38        39                              Flexibility:   (degrees) Right    Left Right  Left Right  Left Right  Left Right  Left Right  Left Right Left Right Left Right Left Right Left Right  Left Right Left Right Left Right Left      Hamstrings -31    -35   -35     -35  -30     -35 -28      -33                Quadriceps 70    68    65     64   62       64 65         65                HipExt/Rot(piriformis) 46     45    47       46    45     47 47        47                Hip Int/Rotators 28    30  30       31   28       32   30       32                Hip Flexors (iliopsoas) Not tested Not tested Not tested Not tested                IT Band  not tested  not tested Not tested  not tested                                               Root Level  - Motor Right      Left Right  Left Right  Left Right  Left Right  Left Right  Left Right Left Right Left Right Left Right Left Right  Left Right Left Right Left Right Left     T12             Rectus Abdominus 4+/5       4+/5        4/5  4/5 to 4+/5                     L1              Paraspinals 5/5      5/5  5/5        5/5  5/5        5/5 5/5         5/5                L2              Hip Flexion 4+/5    4+/5 4+/5     5/5 4/5 p      5/5  5/5        5/5                L3             Quads 4+/5    4+/5 5/5       5/5  4+/5 p   5/5  5/5         5/5                L4              Anterior Tibialis 5/5    5/5  5/5       5/5  5/5       5/5  5/5        5/5                L5             Gr. Toe Extension  5/5     5/5  5/5       5/5  5/5       5/5  5/5        5/5                S1            Gastroc/Sol/Peroneals 5/5    4+/5  5/5     5/5  5/5       5/5  5/5        5/5                              Functional Strength:                   Single LegStanceTime (seconds) R; 25   L: 13  R:30   L:30 Cant do today                 Pelvic Floor Isolation (seconds) 10   10 sec                  Inner Unit   Isolation (seconds) 10    6 sec                Toe tap   x40       x33   x36      x35                               Functional Activities:                    Sit w/o pain? Pain increases (minutes) Yes/ 60 min Seldom/ 30 min No/ 60 min No/ 60 min                Stand w/o pain? Yes/ 30 min No10-15 min tingle worse  No / 10 min  no/ 30 min                Walk w/o pain? Yes/ 1/4 mile  yes/ 40 min   no/ 1 mile No/ 1/4 mile                Steps w/o pain? Yes/ 3 fl no/ 1l fl   no/ 1 fl No/ 1 fl                Drive w/o pain?  yes/ 60 -90 min Yes/ 30   no/ 2 hrs No/ 2 hrs                Work w/o pain? n/a   n/a n/a                # times wakes w/ pain? Many times, not sure from pain  1-2x   1-2x 0-1x               Monthly Objective Measurement/Functional Activity  Date: 12/05/2019 01/07/20 02/18/20 03/17/20 06/11/20 07/02/20 08/13/20 09/29/20 01/07/21 02/23/21 04/01/21 05/11/21 06/22/21 08/05/21 09/23/21   Oswestry Pain Score 52/100    45/100    44/100 44/100     44/100   44/100    44/100   44/100     44/100     60/100    50/100    48/100    50/100     56/100     52/100   LE Functional Scale                  Neck Disability Index      32/100      42/100     44/100       44/100     46/100     36/100    42/100       50/100     46/100                     AROM Lumbar Spine: Degrees   Degrees      Degrees      Degrees      Degrees    Degrees Degrees Degrees Degrees Degrees Degrees Degrees Degrees Degrees Degrees      Flexion 114           97      112      112    110     89    95    78        93     98      86        114     94       90    94      Extension 23 pain on right            27      36      29      20     30 pain right scapula    27 pain right SI and Scap     37        16     24      18        18      18       21     18      Right Lat. Flexion 33           34      27 pain right SI        31       31    28  25 32        22      20     19        20      32       21      23      Left Lat. Flexion 25           27      28        24        22     25    26  24        25       25       24        22      13       18      25      Right Lat Shift Pelvis No change No change  No change No change  no change No cahnge No change  No change No cahnge  stiff stiff      stiff   stiff     stiff  stiff      Left Lat Shift Pelvis No change pulls on right  Tightness No pain  No change  no change  pain right buttock  tight  no change  no change  stiff  stiff      stiff   stiff      stiff  stiff                     AROM Cervical Spine  Degrees Degrees Degrees Degrees Degrees Degrees Degrees Degrees Degrees Degrees Degrees Degrees Degrees Degrees     flexion    45 pain        45        59       45 pain      45         58               63      62     48       3      58     58     extension    51 dizzy        50        62       58       60         72        61      50      54      50      53     53     Right lateral flex    56 pain r upper trap         55        47       47 pain       49        54       45      48      48      46      51      49     Left lateral flex  57 pain right elbow        58        40      39      43       48       47       46      49      45       43      46     Right rotation          44         45       46     42 pain       40     45       48       40      47       45       46      48     Left rotation           40          40       41     40      37       40        39        37      43      41       38      42                     AROM Hips:  (degrees) Right      Left Right Left Right  Left Right  Left Right  Left Right  Left Right Left Right Left Right Left Right Left Right  Left Right Left Right Left Right Left Right Left      Flexion 95       98  98        100  100      95  98        105   95       100 96       98  98       95  100     95  105       100    112    108 114       110 115     110  110    107 110       105       Abduction 42       45  45         45   45         45  42        46  40          45 40       40 38         39   36      37 38         40    39        37 38          37  35       35   32      35  30         35       Int. Rotation 25       15  25         28  25          25   26       28    25        30   25      32   22        30  25       30   28       22  25          24  23         24  25       30   24      32  22         30       Ext. Rotation  30        40              32        40   31       46     45       46  45       47  42        45  40      45   32       45   31        38  34         38  35       40   36      40  35         40                      Flexibility:   (degrees) Right    Left Right  Left Right  Left Right  Left Right  Left Right  Left Right Left Right Left Right Left Right Left Right  Left Right Left Right Left Right Left Right Left      Hamstrings -47       -49   -45   -50    -45      -48   -43     -42   -42      -41  -45      -48  _46      -48 - 45     -46  -52    -47     -50     -48   -48     -47  -45    -45   -42    -45  -45       -45       Quadriceps 45       40  48       45     55      54   58       60    65        67  63        65  60        63   58      60   60     63    62     65    63      61  60      60 Not tested  not tested       HipExt/Rot(piriformis) 42       43    45       45   45    48   43       46    45        45  45        40  42        40  38      41  47      45   46        45    47        45  45       45    39        45  40        45    48      45      Hip Int/Rotators 38       37   37        35   35      35   32        36    25         30  30        35  32        37  30       35   28      34    25       32   28         31  25       30     28     35  26        32       Hip Flexors (iliopsoas) Not tested         Not tested Not tested Not tested Not tested Not tested Not tested  not tested Not tested Not tested Not tested Not tested Not tested Not tested       IT Band Not tested          not tested  not tested  not tested  not tested  not tested  not tested  not tested  not tested   not  tested  not tested  not tested  not tested  not tested                                        Root Level  - Motor Right      Left Right  Left Right  Left Right  Left Right  Left Right  Left Right Left Right Left Right Left Right Left Right  Left Right Left Right Left Right Left Right Left     T12             Rectus Abdominus 4+/5        4+/5         4+/5      4+/5       4+/5    4 to 4+/5    4 to 4+/5   4 to 4+/5       4+/5        4+/5      4+/5      4+/5        4+/5        4+/5     4+/5      L1              Paraspinals 5/5      5/5   5/5       5/5  5/5        5/5  5/5        5/5   5/5       5/5  5/5        5/5 5/5        5/5 5/5        5/5   5/5        5/5  5/5       5/5           L2              Hip Flexion 4+/5      4+/5  4+/5    4+/5  5/5        5/5  55        5/5   5/5       5/5   5/5        5/5 5/5        5/5  5/5        5/5  5/5        5/5   5/5       5/5          L3             Quads 5/5      5/5  5/5       5/5   5/5        5/5  5/5        5/5   5/5       5/5   5/5        5/5 5/5        5/5  5/5        5/5  5/5        5/5   5/5       5/5          L4              Anterior Tibialis 5/5      5/5  5/5       5/5  5/5        5/5  5/5        5/5   5/5       5/5   5/5        5/5 5/5        5/5  5/5       5/5  5/5        5/5   5/5       5/5          L5             Gr. Toe Extension 5/5      5/5  5/5       5/5   5/5        5/5 5/5         5/5   5/5       5/5   5/5        5/5 5/5        5/5  5/5        5/5  5/5        5/5   5/5       5/5          S1            Gastroc/Sol/Peroneals 4+/5    4+/5   4+/5    4+/5  4+/5    4+/5  4+/5     4+/5   4+/5   4+/5 4+/5      4+/5 5/5        5/5  5/5        5/5  5/5        5/5   5/5       5/5                          Functional Strength:                    Single LegStanceTime (seconds) R:24   L:25   R:30  L:27 R:30   L:30 R:     L: R:      L: R:22    L:15  R: 30 L:30   R:30   L:15   R:30   L:30   R:      L: R:      L:   R:      L:   R:      L:    R:    L:  R:25   L:15     Pelvic  Floor Isolation (seconds) 10 sec  10 sec                   Inner Unit  Isolation (seconds) 10 sec  10 sec                                                    Functional Activities:                     Sit w/o pain? Pain increases (minutes) yes   Yes/ 30-60 min  yes/ 30-60 Yes/ 20 min  Yes/ 30-60 min Yes/ 30-60 Yes/ 30 min  Yes/ 30-60 min yes30 min  Yes/ 30  Yes/ 30-45 min Yes/ 30  min Yes/ 30-60 min  no/ 30 min No/ 60 min      Stand w/o pain? 20 min Yes/ 30 min Yes/ 30 min Yes/ 30 min Yes/ 30 min Yes/ 30 min Yes/ 15 min   yes/ 15-30 min Yes/ 15 min  Yes/ 15 min  Yes/ 15-30 Yes/ 15-30 min Yes/ 30 min No/ 30 min No/ 30 min      Walk w/o pain? 30 min   yes/ 1/4 mile Yes/ 30 min Yes/ 45 min  Yes/ 1 mile Yes/ 30 min  Yes/ 30 min   yes/ 1/4 mile Yes/ 30 min  Yes/ 30 Yes/ 60 min   yes/ 1/4 mile  yes/ 1/4 mile  no/ 1/4 mile  no/ 3 mile      Steps w/o pain? Yes, ignores pain  Yes/ ignores pain Yes/ no limit Yes/ no limit Yes/ no limit Yes/ no limit No/ worst getting winded   no/ more winded than pain No problem  No problem  No problem  Yes/  1 fl  yes/ 1 fl  no/ 1 fl No/ 1 fl      Drive w/o pain? Uncomfortable, 60 min  Yes/ w/ supports and TENS 2 hrs Yes/ 60 min  Yes/ 60 min  Yes/ 1-2 hrs Yes/ 3 hrs Yes/ 1 hr   yes/ 1-2 hrs Yes/ 90  min with TENS Yes/ hasn't gone anyway  Yes/ 30-45 min  Yes/ 60 min  yes/ 60 min  no/ 30 min No/  min      Work w/o pain? n/a   n/a     n/a    n/a   n/a  n/a  n/a    n/a   n/a  n/a n/a n/a      # times wakes w/ pain? Sleeping on couch b/c or right shld pain, since July Continues to sleep on couch  Continues to sleep on the couch  2x from right shoulder and neck, she is still sleeping on couch  1-2x   1-2x   3-4x , some pain and some not sure   1-2x 2x  More comfortable on left  Every 2 hrs, back neck and shoulder  Wakes evry 3 hrs, brain activity and some pain  Wakes several times, not necessarily  from pain con't to wake many times, not necessarily from pain Wakes several times, not always  from pain  wakes many times but not sure from pain       PLAN OF CARE:    ASSESSMENT:  Problem List:   1. Flare up of SI joint dysfunction  2. Decreased spinal stabilization  3. Postural dysfunction     Discussion:  Maribel arrives with subjective improvement which is affirmed with her improved Oswestry Low Back Pain Score from 50/100 to 48/100 and the improved Neck Disability Index from 46/100 to 31/100.     She does complain of feeling like her balance is not optimal.  Assessed her ability to  corner of room with feet together.  She is able to maintain her balance with her eyes open moving her head up and down but she is not able to maintain balance with feet together, eyes open, moving her head to the right and the left.  She was instructed in the Corner Balance Exercise Routine.  She demonstrated a good understanding of this and will work on this on a daily basis.     Discussed how swimming would be optimal form of exercise for her.  She states she has never enjoyed being in the water and would rather not use this as a form of exercise.    She expressed her desire to walk more. Recommend she purchase a fitness tracker to help her track her steps. She thinks she is going up and down steps a lot and walking a lot but she is not sure of just how many steps she is taking.     She has a thorough home program.  She will continue with this.  She notes that without manual work once every 4-8 weeks her home program is not as effective.  Recommend she continue with manual work on her spine/SI joints once every 4-8 weeks and progression of the Corner Balance Exercise Routine, over the next 4-8 weeks to meet remaining goals.       Short Term Goals: (4-6 weeks)                                   Date Met:                1.   pt independent in postural correction         02/18/20  2.   pt independent in SI joint self-corrections        12/17/19  3.   pt independent in exer to decrease post. disc pressures      12/17/19  4.  "  pt able to sleep through night without pain        09/29/20  5.   pt able to stand 10  minutes without pain        01/07/20  6.   Reduce pt pain to no greater than 4/10         01/07/20  7.   Decrease Oswestry Pain Score to no greater than 45/100      01/07/20  8.  Decrease Oswestry Pain Score to no greater than  40/100  9.  Pt able to perform hip abduction x6 w/o pain        09/29/20  10. Decrease pain right gr trochanter to no greater than 4/10      03/17/20  11. Pt able to engage inner unit in supine w/ legs on chair, moving in int/ext rotation w/o pain x6 Unable to do, not trying   12.  Pt able to contract pelvic floor on exhaling a diaphragmatic breath x6    07/02/20  13.  Pt able to isolate the transverse abdominus with forearms on counter and in slight lumbar ext x6 08/13/20  14.  Pt able to perform step up/down with inner unit musculature engaged x6 without pain  Able to do once secondary to chornic fatigue, no pain   15.   Pt able to engage inner unit musculature on sit to stand and stand to sit, x6 w/o low back pain Hard for her to keep inner unit on .   16.  Pt able to perform diaphragmatic breathing x6, in supine      05/11/21  17.  Pt able to do diaphrag breath engaging transv abdominus exhaling,  3 breaths w/ lower costals 05/11/21  18.  Decrease Neck Disability score to no greater than 35/100      08/30/22  19.  Pt able to bridge with inner unit engaged, 2\" w/o pain, x6      06/22/21  20.  Pt able to use theracane to minimize pain left upper trap      12/02/21  21.  Pt able to effectively stretch upper trap and ant/mid scalene on left     08/30/22  22.  Pt able to perform bridge w/ inner unit on, w/o inappropriate useof hamstrings/piriformis  23.  Pt able to perform heel slides with inner unit on w/o pain, x6  24.  Pt able to maintain balance in corner with feet together moving head side to side, x10     Long Term Goals:(3-5 months)      Date Met:      1.  pt independent in self-management of " symptoms       2.  pt independent in home program      3. Reduce pain to no greater than 2/10      4.  Pt able to  puppies without pain.      Treatment Plan:   1.  Ultrasound to increase extensibility, decrease pain, if needed  2.  Electrical stimulation for muscle relaxation, decrease pain, if needed, iontophoresis  3.  Manual therapy to increase function, decrease pain  4.  Therapeutic exercise to increase function, decrease pain  5.  Home programming and d/c planning to increase function and decrease pain    Frequency & Duration:  Pt will be seen on a once/every 4-8 week basis for 6 more visits to meet remaining goals.     Patient Participated in and Agrees With This Plan of Care and Goals:  YES  Rehab Potential:  Fair to good      Karen Chatterjee, PT, MS  Physical Therapist  [place]# [id number]        Medicare Certification:  90 Day Recertification  Certification Period: 08/11/22 to 11/29/22  I certify that the therapy services are furnished while this patient is under my care.  The services outlined above are required by this patient, and will be reviewed every 90  days.     PHYSICIAN:   ______________________________________      Fiordaliza Suh M.D.    DATE: _____________________    Please sign and return via fax to [facility] [address] Fax # [phone]. Thank you, [facility].            TREATMENT TODAY:    Total Treatment Time: (time in clinic)            60  Timed Code Treatment Minutes:   60      Re-Evaluation:  [id number]    Manual Therapy:  (MA)  RX min:   25  Manual posterior rotation of right innominate    Positional Isometric contraction (PIC) of left iliopsoas    Positional Isometric contraction of (PIC) right gluteus minimus    Distraction of both SI jts in open pack hip position  Piriformis stretching, bilaterally    Quadratus lumborum stretching, bilaterally    Glut minimus stretching  Anterior/Inferior Mobilization of right  hip    Myofascial work  trunk  Manual spinal distraction        Therapeutic Activities:  (TA)  RX min:          Neuromuscular Reeducation: (NMR)  RX min:  20   Corner Balance Progression    Ultrasound:  RX min:           1.6 piper/cm2       Location:     Iontophoresis:  6 hr patch                                Location:

## 2022-09-01 ENCOUNTER — TELEPHONE (OUTPATIENT)
Dept: CARDIOLOGY | Facility: CLINIC | Age: 72
End: 2022-09-01

## 2022-09-01 RX ORDER — BEMPEDOIC ACID 180 MG/1
180 TABLET, FILM COATED ORAL DAILY
Qty: 30 TABLET | Refills: 11 | Status: ON HOLD | OUTPATIENT
Start: 2022-09-01 | End: 2022-10-21

## 2022-09-01 NOTE — TELEPHONE ENCOUNTER
----- Message from Jonathon Bermudez MD sent at 8/26/2022  8:34 AM EDT -----  [facility]  Nexlitol samples and Rx price check

## 2022-10-18 ENCOUNTER — HOSPITAL ENCOUNTER (EMERGENCY)
Facility: HOSPITAL | Age: 72
Discharge: HOME | End: 2022-10-18
Attending: EMERGENCY MEDICINE | Admitting: EMERGENCY MEDICINE

## 2022-10-18 ENCOUNTER — APPOINTMENT (OUTPATIENT)
Dept: CT IMAGING | Facility: HOSPITAL | Age: 72
End: 2022-10-18

## 2022-10-18 ENCOUNTER — APPOINTMENT (OUTPATIENT)
Dept: GENERAL RADIOLOGY | Facility: HOSPITAL | Age: 72
End: 2022-10-18

## 2022-10-18 VITALS
RESPIRATION RATE: 18 BRPM | SYSTOLIC BLOOD PRESSURE: 128 MMHG | DIASTOLIC BLOOD PRESSURE: 74 MMHG | HEART RATE: 67 BPM | BODY MASS INDEX: 21.71 KG/M2 | HEIGHT: 62 IN | WEIGHT: 118 LBS | TEMPERATURE: 98.1 F | OXYGEN SATURATION: 98 %

## 2022-10-18 DIAGNOSIS — S42.401A CLOSED FRACTURE OF RIGHT ELBOW, INITIAL ENCOUNTER: Primary | ICD-10-CM

## 2022-10-18 PROCEDURE — 99284 EMERGENCY DEPT VISIT MOD MDM: CPT

## 2022-10-18 PROCEDURE — 73200 CT UPPER EXTREMITY W/O DYE: CPT

## 2022-10-18 PROCEDURE — 73070 X-RAY EXAM OF ELBOW: CPT

## 2022-10-18 RX ORDER — OXYCODONE HYDROCHLORIDE AND ACETAMINOPHEN 5; 325 MG/1; MG/1
1 TABLET ORAL ONCE
Status: COMPLETED | OUTPATIENT
Start: 2022-10-18 | End: 2022-10-18

## 2022-10-18 RX ORDER — OXYCODONE AND ACETAMINOPHEN 7.5; 325 MG/1; MG/1
1 TABLET ORAL EVERY 6 HOURS PRN
Qty: 15 TABLET | Refills: 0 | Status: SHIPPED | OUTPATIENT
Start: 2022-10-18 | End: 2022-10-21 | Stop reason: HOSPADM

## 2022-10-18 RX ADMIN — OXYCODONE HYDROCHLORIDE AND ACETAMINOPHEN 1 TABLET: 5; 325 TABLET ORAL at 16:43

## 2022-10-18 NOTE — DISCHARGE INSTRUCTIONS
Call Dr Valle's office in the morning.  He will be in the office Thursday, or if you feel you need to be seen sooner his partner Dr Lee can see you tomorrow morning.  Tell the office that Dr Iqbal talked to Dr Diez about you.

## 2022-10-18 NOTE — ED PROVIDER NOTES
Subjective   History of Present Illness    Pt presents with right elbow pain.   Just PTA she stepped off of a curb and stepped on a stone, causing her to fall.  Her weight came down on the right elbow.  She has severe pain and some mild deformity.  She denies injury to the head, neck, back or legs.  She is not on a blood thinner.    History provided by:  Patient      Review of Systems   Cardiovascular: Negative for chest pain.   Gastrointestinal: Negative for abdominal pain.   Musculoskeletal: Negative for back pain and neck pain.   Skin: Negative for wound.   Neurological: Negative for headaches.   All other systems reviewed and are negative.      Past Medical History:   Diagnosis Date   • Allergic    • Anxiety    • Back pain     osteoarthritis   • Chronic diarrhea    • COVID-19 09/22/2022   • Depression    • ETD (eustachian tube dysfunction)    • Fibromyalgia    • Fibromyositis    • GERD (gastroesophageal reflux disease)    • H/O cardiomyopathy    • Headache    • HLD (hyperlipidemia)    • Hypothyroidism    • IBS (irritable bowel syndrome)    • Lichen sclerosus et atrophicus of the vulva    • Menopause    • Osteoarthritis    • Osteopenia    • Parvovirus B19 infection    • Preventative health care     · Age-appropriate counseling discussed. Will notify patient of lab result   • Urinary tract infection        Allergies   Allergen Reactions   • Bacitracin-Polymyxin B Rash   • Cefdinir GI Intolerance   • Celecoxib Unknown - Low Severity   • Ciprofloxacin GI Intolerance   • Clarithromycin GI Intolerance   • Doxycycline GI Intolerance   • Erythromycin Nausea Only   • Levofloxacin GI Intolerance   • Lortab [Hydrocodone-Acetaminophen] GI Intolerance   • Lyrica [Pregabalin] Unknown - Low Severity   • Nexium [Esomeprazole] Myalgia   • Omeprazole Myalgia   • Phenergan [Promethazine Hcl] Delirium   • Sulfa Antibiotics Rash   • Tramadol Unknown - Low Severity       Past Surgical History:   Procedure Laterality Date   •  CRYOTHERAPY      cervix   • EYE SURGERY Right     cyst removal right eyelid   • SEPTOPLASTY     • TOTAL HIP ARTHROPLASTY      2010 total right hip Dr. Lovelace;  total Left hip       Family History   Problem Relation Age of Onset   • Alzheimer's disease Father    • Stroke Mother    • Osteoarthritis Mother          A-86   • Hypothyroidism Mother    • Atrial fibrillation Mother    • Hyperlipidemia Brother    • Hypertension Brother    • Stroke Paternal Grandmother    • Heart attack Maternal Grandmother        Social History     Socioeconomic History   • Marital status:    Tobacco Use   • Smoking status: Former     Packs/day: 2.00     Years: 8.00     Pack years: 16.00     Types: Cigarettes     Start date:      Quit date:      Years since quittin.8   • Smokeless tobacco: Never   • Tobacco comments:     20 YR PACK HISTORY   Vaping Use   • Vaping Use: Never used   Substance and Sexual Activity   • Alcohol use: Not Currently     Comment: SOCIAL   • Drug use: No   • Sexual activity: Defer     Birth control/protection: Post-menopausal           Objective   Physical Exam  Vitals and nursing note reviewed.   Constitutional:       General: She is not in acute distress.     Appearance: Normal appearance. She is not ill-appearing.   HENT:      Head: Normocephalic and atraumatic.   Eyes:      General: No scleral icterus.        Right eye: No discharge.         Left eye: No discharge.      Conjunctiva/sclera: Conjunctivae normal.   Cardiovascular:      Rate and Rhythm: Normal rate.   Pulmonary:      Effort: Pulmonary effort is normal. No respiratory distress.   Musculoskeletal:         General: No swelling or deformity.      Cervical back: Normal range of motion and neck supple.      Comments: The right elbow is tender diffusely but moreso over the proximal ulna which has deformity suggesting fracture.  Elbow ROM not attempted due to high suspicion of fracture.  Wrist flex/ext is intact and sensation intact  median ulnar and radial in the hand and she can thumbs up, oppose and OK sign.  R shoulder nontender normal ROM.    Except as documented there is no tenderness to gross palpation of the arms or legs, and intact painless ROM to the shoulders, elbows, wrists, hips, knees and ankles.   Skin:     General: Skin is dry.      Findings: No rash.   Neurological:      General: No focal deficit present.      Mental Status: She is alert. Mental status is at baseline.   Psychiatric:         Mood and Affect: Mood normal.         Behavior: Behavior normal.         Thought Content: Thought content normal.         Procedures           ED Course         XR shows complicated fracture of ulna and radius.  I discussed with Dr Diez who reviewed images and either Dr Valle or Dr Lee will be able to manage this.  Pt is already a patient of Dr Valle and she wishes to see him.  Splint and sling by nurse.  Patient stable on serial rechecks.  Discussed findings, concerns, plan of care, expected course, reasons to return and followup.  Provided the opportunity to ask questions.                            BRIAN reviewed by Amador Iqbal MD       MDM  Number of Diagnoses or Management Options     Amount and/or Complexity of Data Reviewed  Tests in the radiology section of CPT®: reviewed and ordered  Independent visualization of images, tracings, or specimens: yes        Final diagnoses:   Closed fracture of right elbow, initial encounter       ED Disposition  ED Disposition     ED Disposition   Discharge    Condition   Stable    Comment   --             Willam Valle MD  [address]  [address]  [facility] [address]  [phone]               Medication List      New Prescriptions    oxyCODONE-acetaminophen 7.5-325 MG per tablet  Commonly known as: PERCOCET  Take 1 tablet by mouth Every 6 (Six) Hours As Needed for Severe Pain.           Where to Get Your Medications      These medications were sent to [facility] [id number]  - [place], [place] - [address] [place] [address] [address] & [address] - [phone] PH - [phone] FX  [address] [place], [place] [address]    Phone: [phone]   · oxyCODONE-acetaminophen 7.5-325 MG per tablet          Amador Iqbal MD  10/18/22 0437

## 2022-10-20 ENCOUNTER — HOSPITAL ENCOUNTER (OUTPATIENT)
Dept: GENERAL RADIOLOGY | Facility: HOSPITAL | Age: 72
Discharge: HOME | End: 2022-10-20

## 2022-10-20 ENCOUNTER — PRE-ADMISSION TESTING (OUTPATIENT)
Dept: PREADMISSION TESTING | Facility: HOSPITAL | Age: 72
End: 2022-10-20

## 2022-10-20 ENCOUNTER — ANESTHESIA EVENT (OUTPATIENT)
Dept: PERIOP | Facility: HOSPITAL | Age: 72
End: 2022-10-20

## 2022-10-20 ENCOUNTER — TELEPHONE (OUTPATIENT)
Dept: CARDIOLOGY | Facility: CLINIC | Age: 72
End: 2022-10-20

## 2022-10-20 VITALS — BODY MASS INDEX: 23.04 KG/M2 | HEIGHT: 62 IN | WEIGHT: 125.22 LBS

## 2022-10-20 LAB
ANION GAP SERPL CALCULATED.3IONS-SCNC: 10 MMOL/L (ref 5–15)
BUN SERPL-MCNC: 16 MG/DL (ref 8–23)
BUN/CREAT SERPL: 21.6 (ref 7–25)
CALCIUM SPEC-SCNC: 9.6 MG/DL (ref 8.6–10.5)
CHLORIDE SERPL-SCNC: 99 MMOL/L (ref 98–107)
CO2 SERPL-SCNC: 29 MMOL/L (ref 22–29)
CREAT SERPL-MCNC: 0.74 MG/DL (ref 0.57–1)
DEPRECATED RDW RBC AUTO: 43.1 FL (ref 37–54)
EGFRCR SERPLBLD CKD-EPI 2021: 86.6 ML/MIN/1.73
ERYTHROCYTE [DISTWIDTH] IN BLOOD BY AUTOMATED COUNT: 12.2 % (ref 12.3–15.4)
GLUCOSE SERPL-MCNC: 97 MG/DL (ref 65–99)
HBA1C MFR BLD: 5.9 % (ref 4.8–5.6)
HCT VFR BLD AUTO: 37.4 % (ref 34–46.6)
HGB BLD-MCNC: 12.3 G/DL (ref 12–15.9)
MCH RBC QN AUTO: 31.8 PG (ref 26.6–33)
MCHC RBC AUTO-ENTMCNC: 32.9 G/DL (ref 31.5–35.7)
MCV RBC AUTO: 96.6 FL (ref 79–97)
PLATELET # BLD AUTO: 225 10*3/MM3 (ref 140–450)
PMV BLD AUTO: 9.5 FL (ref 6–12)
POTASSIUM SERPL-SCNC: 4.1 MMOL/L (ref 3.5–5.2)
QT INTERVAL: 400 MS
QTC INTERVAL: 425 MS
RBC # BLD AUTO: 3.87 10*6/MM3 (ref 3.77–5.28)
SODIUM SERPL-SCNC: 138 MMOL/L (ref 136–145)
WBC NRBC COR # BLD: 7.99 10*3/MM3 (ref 3.4–10.8)

## 2022-10-20 PROCEDURE — 93010 ELECTROCARDIOGRAM REPORT: CPT | Performed by: INTERNAL MEDICINE

## 2022-10-20 PROCEDURE — 71046 X-RAY EXAM CHEST 2 VIEWS: CPT

## 2022-10-20 PROCEDURE — 85027 COMPLETE CBC AUTOMATED: CPT

## 2022-10-20 PROCEDURE — 36415 COLL VENOUS BLD VENIPUNCTURE: CPT

## 2022-10-20 PROCEDURE — 80048 BASIC METABOLIC PNL TOTAL CA: CPT

## 2022-10-20 PROCEDURE — 93005 ELECTROCARDIOGRAM TRACING: CPT

## 2022-10-20 PROCEDURE — 83036 HEMOGLOBIN GLYCOSYLATED A1C: CPT

## 2022-10-20 RX ORDER — MULTIPLE VITAMINS W/ MINERALS TAB 9MG-400MCG
1 TAB ORAL DAILY
COMMUNITY

## 2022-10-20 RX ORDER — FAMOTIDINE 10 MG/ML
20 INJECTION, SOLUTION INTRAVENOUS ONCE
Status: CANCELLED | OUTPATIENT
Start: 2022-10-20 | End: 2022-10-20

## 2022-10-20 NOTE — PAT
Patient to apply Chlorhexadine wipes  to surgical area (as instructed) the night before procedure and the AM of procedure. Wipes provided.    Patient instructed to drink 20 ounces of Gatorade and it needs to be completed 1 hour (for Main OR patients) or 2 hours (scheduled  section & [facility] patients) before given arrival time for procedure (NO RED Gatorade)    Patient verbalized understanding.    Patient directed to Radiology Department for CXR after Pre Admission Testing Appointment.     Patient viewed general PAT education video as instructed in their preoperative information received from their surgeon.  Patient stated the general PAT education video was viewed in its entirety and survey completed.  Copies of PAT general education handouts (Incentive Spirometry, Meds to Beds Program, Patient Belongings, Pre-op skin preparation instructions, Blood Glucose testing, Visitor policy, Surgery FAQ, Code H) distributed to patient if not printed. Education related to the PAT pass and skin preparation for surgery (if applicable) completed in PAT as a reinforcement to PAT education video. Patient instructed to return PAT pass provided today as well as completed skin preparation sheet (if applicable) on the day of procedure.     Additionally if patient had not viewed video yet but intended to view it at home or in our waiting area, then referred them to the handout with QR code/link provided during PAT visit.  Instructed patient to complete survey after viewing the video in its entirety.  Encouraged patient/family to read PAT general education handouts thoroughly and notify PAT staff with any questions or concerns. Patient verbalized understanding of all information and priority content.    EKG AND HISTORY REVIEWED WITH PABLO PAGE TO PROCEED WITH SURGERY

## 2022-10-20 NOTE — TELEPHONE ENCOUNTER
Patient is to have ORIF of right olecranon fracture and ORIF vs replacement of right radial head fracture on 10/21/22 with Dr. Valle and is needing pre-surgical cardiac risk assessment. Please advise.     F: Bonnie @ [phone]  P: [phone]

## 2022-10-21 ENCOUNTER — ANESTHESIA (OUTPATIENT)
Dept: PERIOP | Facility: HOSPITAL | Age: 72
End: 2022-10-21

## 2022-10-21 ENCOUNTER — ANESTHESIA EVENT CONVERTED (OUTPATIENT)
Dept: ANESTHESIOLOGY | Facility: HOSPITAL | Age: 72
End: 2022-10-21

## 2022-10-21 ENCOUNTER — HOSPITAL ENCOUNTER (OUTPATIENT)
Dept: GENERAL RADIOLOGY | Facility: HOSPITAL | Age: 72
Discharge: HOME | End: 2022-10-21

## 2022-10-21 ENCOUNTER — HOSPITAL ENCOUNTER (OUTPATIENT)
Facility: HOSPITAL | Age: 72
Setting detail: HOSPITAL OUTPATIENT SURGERY
Discharge: HOME | End: 2022-10-21
Attending: ORTHOPAEDIC SURGERY | Admitting: ORTHOPAEDIC SURGERY

## 2022-10-21 VITALS
WEIGHT: 118 LBS | DIASTOLIC BLOOD PRESSURE: 62 MMHG | TEMPERATURE: 97.1 F | HEART RATE: 78 BPM | SYSTOLIC BLOOD PRESSURE: 110 MMHG | RESPIRATION RATE: 16 BRPM | BODY MASS INDEX: 21.71 KG/M2 | OXYGEN SATURATION: 97 % | HEIGHT: 62 IN

## 2022-10-21 DIAGNOSIS — S49.109A: ICD-10-CM

## 2022-10-21 DIAGNOSIS — G62.9 PERIPHERAL POLYNEUROPATHY: ICD-10-CM

## 2022-10-21 PROCEDURE — 25010000002 FENTANYL CITRATE (PF) 50 MCG/ML SOLUTION

## 2022-10-21 PROCEDURE — 76942 ECHO GUIDE FOR BIOPSY: CPT | Performed by: ORTHOPAEDIC SURGERY

## 2022-10-21 PROCEDURE — C1713 ANCHOR/SCREW BN/BN,TIS/BN: HCPCS | Performed by: ORTHOPAEDIC SURGERY

## 2022-10-21 PROCEDURE — 25010000002 PROPOFOL 10 MG/ML EMULSION: Performed by: ANESTHESIOLOGY

## 2022-10-21 PROCEDURE — 25010000002 ONDANSETRON PER 1 MG: Performed by: ANESTHESIOLOGY

## 2022-10-21 PROCEDURE — C1776 JOINT DEVICE (IMPLANTABLE): HCPCS | Performed by: ORTHOPAEDIC SURGERY

## 2022-10-21 PROCEDURE — 76000 FLUOROSCOPY <1 HR PHYS/QHP: CPT

## 2022-10-21 PROCEDURE — 25010000002 FENTANYL CITRATE (PF) 50 MCG/ML SOLUTION: Performed by: NURSE ANESTHETIST, CERTIFIED REGISTERED

## 2022-10-21 PROCEDURE — 25010000002 ROPIVACAINE PER 1 MG: Performed by: NURSE ANESTHETIST, CERTIFIED REGISTERED

## 2022-10-21 PROCEDURE — 25010000002 DEXAMETHASONE PER 1 MG: Performed by: ANESTHESIOLOGY

## 2022-10-21 DEVICE — IMPLANTABLE DEVICE: Type: IMPLANTABLE DEVICE | Site: OLECRANON | Status: FUNCTIONAL

## 2022-10-21 DEVICE — SCRW DIST/ELBW PA NL TI 3.5X20MM: Type: IMPLANTABLE DEVICE | Site: OLECRANON | Status: FUNCTIONAL

## 2022-10-21 DEVICE — SCRW M/THRD LK TI 3.5X18MM: Type: IMPLANTABLE DEVICE | Site: OLECRANON | Status: FUNCTIONAL

## 2022-10-21 RX ORDER — SODIUM CHLORIDE 0.9 % (FLUSH) 0.9 %
3-10 SYRINGE (ML) INJECTION AS NEEDED
Status: DISCONTINUED | OUTPATIENT
Start: 2022-10-21 | End: 2022-10-21 | Stop reason: HOSPADM

## 2022-10-21 RX ORDER — FENTANYL CITRATE 50 UG/ML
INJECTION, SOLUTION INTRAMUSCULAR; INTRAVENOUS
Status: COMPLETED
Start: 2022-10-21 | End: 2022-10-21

## 2022-10-21 RX ORDER — FAMOTIDINE 20 MG/1
20 TABLET, FILM COATED ORAL ONCE
Status: COMPLETED | OUTPATIENT
Start: 2022-10-21 | End: 2022-10-21

## 2022-10-21 RX ORDER — DROPERIDOL 2.5 MG/ML
0.62 INJECTION, SOLUTION INTRAMUSCULAR; INTRAVENOUS
Status: DISCONTINUED | OUTPATIENT
Start: 2022-10-21 | End: 2022-10-21 | Stop reason: HOSPADM

## 2022-10-21 RX ORDER — EPHEDRINE SULFATE 50 MG/ML
5 INJECTION, SOLUTION INTRAVENOUS ONCE AS NEEDED
Status: DISCONTINUED | OUTPATIENT
Start: 2022-10-21 | End: 2022-10-21 | Stop reason: HOSPADM

## 2022-10-21 RX ORDER — EPHEDRINE SULFATE 50 MG/ML
INJECTION, SOLUTION INTRAVENOUS AS NEEDED
Status: DISCONTINUED | OUTPATIENT
Start: 2022-10-21 | End: 2022-10-21 | Stop reason: SURG

## 2022-10-21 RX ORDER — SODIUM CHLORIDE, SODIUM LACTATE, POTASSIUM CHLORIDE, CALCIUM CHLORIDE 600; 310; 30; 20 MG/100ML; MG/100ML; MG/100ML; MG/100ML
9 INJECTION, SOLUTION INTRAVENOUS CONTINUOUS
Status: DISCONTINUED | OUTPATIENT
Start: 2022-10-21 | End: 2022-10-21 | Stop reason: HOSPADM

## 2022-10-21 RX ORDER — FENTANYL CITRATE 50 UG/ML
INJECTION, SOLUTION INTRAMUSCULAR; INTRAVENOUS
Status: COMPLETED | OUTPATIENT
Start: 2022-10-21 | End: 2022-10-21

## 2022-10-21 RX ORDER — LIDOCAINE HYDROCHLORIDE 10 MG/ML
0.5 INJECTION, SOLUTION EPIDURAL; INFILTRATION; INTRACAUDAL; PERINEURAL ONCE AS NEEDED
Status: DISCONTINUED | OUTPATIENT
Start: 2022-10-21 | End: 2022-10-21 | Stop reason: HOSPADM

## 2022-10-21 RX ORDER — HYDROCODONE BITARTRATE AND ACETAMINOPHEN 5; 325 MG/1; MG/1
TABLET ORAL
Status: DISCONTINUED
Start: 2022-10-21 | End: 2022-10-21 | Stop reason: WASHOUT

## 2022-10-21 RX ORDER — HYDROMORPHONE HYDROCHLORIDE 1 MG/ML
0.5 INJECTION, SOLUTION INTRAMUSCULAR; INTRAVENOUS; SUBCUTANEOUS
Status: DISCONTINUED | OUTPATIENT
Start: 2022-10-21 | End: 2022-10-21 | Stop reason: HOSPADM

## 2022-10-21 RX ORDER — SODIUM CHLORIDE 0.9 % (FLUSH) 0.9 %
3 SYRINGE (ML) INJECTION EVERY 12 HOURS SCHEDULED
Status: DISCONTINUED | OUTPATIENT
Start: 2022-10-21 | End: 2022-10-21 | Stop reason: HOSPADM

## 2022-10-21 RX ORDER — HYDROCODONE BITARTRATE AND ACETAMINOPHEN 5; 325 MG/1; MG/1
1 TABLET ORAL ONCE AS NEEDED
Status: DISCONTINUED | OUTPATIENT
Start: 2022-10-21 | End: 2022-10-21 | Stop reason: HOSPADM

## 2022-10-21 RX ORDER — NALOXONE HCL 0.4 MG/ML
0.4 VIAL (ML) INJECTION AS NEEDED
Status: DISCONTINUED | OUTPATIENT
Start: 2022-10-21 | End: 2022-10-21 | Stop reason: HOSPADM

## 2022-10-21 RX ORDER — DEXAMETHASONE SODIUM PHOSPHATE 4 MG/ML
INJECTION, SOLUTION INTRA-ARTICULAR; INTRALESIONAL; INTRAMUSCULAR; INTRAVENOUS; SOFT TISSUE AS NEEDED
Status: DISCONTINUED | OUTPATIENT
Start: 2022-10-21 | End: 2022-10-21 | Stop reason: SURG

## 2022-10-21 RX ORDER — HYDRALAZINE HYDROCHLORIDE 20 MG/ML
5 INJECTION INTRAMUSCULAR; INTRAVENOUS
Status: DISCONTINUED | OUTPATIENT
Start: 2022-10-21 | End: 2022-10-21 | Stop reason: HOSPADM

## 2022-10-21 RX ORDER — ROPIVACAINE HYDROCHLORIDE 2 MG/ML
INJECTION, SOLUTION EPIDURAL; INFILTRATION; PERINEURAL CONTINUOUS
Status: DISCONTINUED | OUTPATIENT
Start: 2022-10-21 | End: 2022-10-21 | Stop reason: HOSPADM

## 2022-10-21 RX ORDER — BUPIVACAINE HYDROCHLORIDE 2.5 MG/ML
INJECTION, SOLUTION EPIDURAL; INFILTRATION; INTRACAUDAL
Status: COMPLETED | OUTPATIENT
Start: 2022-10-21 | End: 2022-10-21

## 2022-10-21 RX ORDER — DROPERIDOL 2.5 MG/ML
0.62 INJECTION, SOLUTION INTRAMUSCULAR; INTRAVENOUS ONCE AS NEEDED
Status: DISCONTINUED | OUTPATIENT
Start: 2022-10-21 | End: 2022-10-21 | Stop reason: HOSPADM

## 2022-10-21 RX ORDER — ONDANSETRON 2 MG/ML
4 INJECTION INTRAMUSCULAR; INTRAVENOUS ONCE AS NEEDED
Status: DISCONTINUED | OUTPATIENT
Start: 2022-10-21 | End: 2022-10-21 | Stop reason: HOSPADM

## 2022-10-21 RX ORDER — MAGNESIUM HYDROXIDE 1200 MG/15ML
LIQUID ORAL AS NEEDED
Status: DISCONTINUED | OUTPATIENT
Start: 2022-10-21 | End: 2022-10-21 | Stop reason: HOSPADM

## 2022-10-21 RX ORDER — SODIUM CHLORIDE 0.9 % (FLUSH) 0.9 %
10 SYRINGE (ML) INJECTION EVERY 12 HOURS SCHEDULED
Status: DISCONTINUED | OUTPATIENT
Start: 2022-10-21 | End: 2022-10-21 | Stop reason: HOSPADM

## 2022-10-21 RX ORDER — SODIUM CHLORIDE 0.9 % (FLUSH) 0.9 %
10 SYRINGE (ML) INJECTION AS NEEDED
Status: DISCONTINUED | OUTPATIENT
Start: 2022-10-21 | End: 2022-10-21 | Stop reason: HOSPADM

## 2022-10-21 RX ORDER — BUPIVACAINE HCL/0.9 % NACL/PF 0.125 %
PLASTIC BAG, INJECTION (ML) EPIDURAL AS NEEDED
Status: DISCONTINUED | OUTPATIENT
Start: 2022-10-21 | End: 2022-10-21 | Stop reason: SURG

## 2022-10-21 RX ORDER — OXYCODONE HYDROCHLORIDE 5 MG/1
5 TABLET ORAL EVERY 4 HOURS PRN
Qty: 25 TABLET | Refills: 0 | Status: SHIPPED | OUTPATIENT
Start: 2022-10-21

## 2022-10-21 RX ORDER — MIDAZOLAM HYDROCHLORIDE 1 MG/ML
0.5 INJECTION INTRAMUSCULAR; INTRAVENOUS
Status: DISCONTINUED | OUTPATIENT
Start: 2022-10-21 | End: 2022-10-21 | Stop reason: HOSPADM

## 2022-10-21 RX ORDER — LABETALOL HYDROCHLORIDE 5 MG/ML
5 INJECTION, SOLUTION INTRAVENOUS
Status: DISCONTINUED | OUTPATIENT
Start: 2022-10-21 | End: 2022-10-21 | Stop reason: HOSPADM

## 2022-10-21 RX ORDER — CEFAZOLIN SODIUM IN 0.9 % NACL 2 G/100 ML
2 PLASTIC BAG, INJECTION (ML) INTRAVENOUS ONCE
Status: COMPLETED | OUTPATIENT
Start: 2022-10-21 | End: 2022-10-21

## 2022-10-21 RX ORDER — LIDOCAINE HYDROCHLORIDE 10 MG/ML
INJECTION, SOLUTION EPIDURAL; INFILTRATION; INTRACAUDAL; PERINEURAL AS NEEDED
Status: DISCONTINUED | OUTPATIENT
Start: 2022-10-21 | End: 2022-10-21 | Stop reason: SURG

## 2022-10-21 RX ORDER — PROPOFOL 10 MG/ML
VIAL (ML) INTRAVENOUS AS NEEDED
Status: DISCONTINUED | OUTPATIENT
Start: 2022-10-21 | End: 2022-10-21 | Stop reason: SURG

## 2022-10-21 RX ORDER — ONDANSETRON 2 MG/ML
INJECTION INTRAMUSCULAR; INTRAVENOUS AS NEEDED
Status: DISCONTINUED | OUTPATIENT
Start: 2022-10-21 | End: 2022-10-21 | Stop reason: SURG

## 2022-10-21 RX ORDER — ACETAMINOPHEN 500 MG
1000 TABLET ORAL ONCE
Status: COMPLETED | OUTPATIENT
Start: 2022-10-21 | End: 2022-10-21

## 2022-10-21 RX ORDER — FENTANYL CITRATE 50 UG/ML
50 INJECTION, SOLUTION INTRAMUSCULAR; INTRAVENOUS
Status: DISCONTINUED | OUTPATIENT
Start: 2022-10-21 | End: 2022-10-21 | Stop reason: HOSPADM

## 2022-10-21 RX ORDER — OXYCODONE HYDROCHLORIDE AND ACETAMINOPHEN 5; 325 MG/1; MG/1
1 TABLET ORAL ONCE AS NEEDED
Status: COMPLETED | OUTPATIENT
Start: 2022-10-21 | End: 2022-10-21

## 2022-10-21 RX ORDER — SODIUM CHLORIDE, SODIUM LACTATE, POTASSIUM CHLORIDE, CALCIUM CHLORIDE 600; 310; 30; 20 MG/100ML; MG/100ML; MG/100ML; MG/100ML
100 INJECTION, SOLUTION INTRAVENOUS CONTINUOUS
Status: DISCONTINUED | OUTPATIENT
Start: 2022-10-21 | End: 2022-10-21 | Stop reason: HOSPADM

## 2022-10-21 RX ORDER — OXYCODONE HYDROCHLORIDE AND ACETAMINOPHEN 5; 325 MG/1; MG/1
TABLET ORAL
Status: COMPLETED
Start: 2022-10-21 | End: 2022-10-21

## 2022-10-21 RX ORDER — ROCURONIUM BROMIDE 10 MG/ML
INJECTION, SOLUTION INTRAVENOUS AS NEEDED
Status: DISCONTINUED | OUTPATIENT
Start: 2022-10-21 | End: 2022-10-21 | Stop reason: SURG

## 2022-10-21 RX ORDER — IPRATROPIUM BROMIDE AND ALBUTEROL SULFATE 2.5; .5 MG/3ML; MG/3ML
3 SOLUTION RESPIRATORY (INHALATION) ONCE AS NEEDED
Status: DISCONTINUED | OUTPATIENT
Start: 2022-10-21 | End: 2022-10-21 | Stop reason: HOSPADM

## 2022-10-21 RX ADMIN — Medication 100 MCG: at 14:27

## 2022-10-21 RX ADMIN — BUPIVACAINE HYDROCHLORIDE 20 ML: 2.5 INJECTION, SOLUTION EPIDURAL; INFILTRATION; INTRACAUDAL at 11:55

## 2022-10-21 RX ADMIN — ACETAMINOPHEN 1000 MG: 500 TABLET, FILM COATED ORAL at 11:24

## 2022-10-21 RX ADMIN — OXYCODONE HYDROCHLORIDE AND ACETAMINOPHEN 1 TABLET: 5; 325 TABLET ORAL at 17:16

## 2022-10-21 RX ADMIN — Medication 100 MCG: at 13:04

## 2022-10-21 RX ADMIN — Medication 100 MCG: at 12:52

## 2022-10-21 RX ADMIN — PROPOFOL 25 MCG/KG/MIN: 10 INJECTION, EMULSION INTRAVENOUS at 12:42

## 2022-10-21 RX ADMIN — FENTANYL CITRATE 100 MCG: 50 INJECTION, SOLUTION INTRAMUSCULAR; INTRAVENOUS at 11:40

## 2022-10-21 RX ADMIN — PROPOFOL 120 MG: 10 INJECTION, EMULSION INTRAVENOUS at 12:36

## 2022-10-21 RX ADMIN — ROCURONIUM BROMIDE 50 MG: 10 INJECTION, SOLUTION INTRAVENOUS at 12:36

## 2022-10-21 RX ADMIN — Medication 100 MCG: at 14:03

## 2022-10-21 RX ADMIN — SUGAMMADEX 200 MG: 100 INJECTION, SOLUTION INTRAVENOUS at 15:03

## 2022-10-21 RX ADMIN — EPHEDRINE SULFATE 5 MG: 50 INJECTION INTRAVENOUS at 13:17

## 2022-10-21 RX ADMIN — Medication 100 MCG: at 12:55

## 2022-10-21 RX ADMIN — LIDOCAINE HYDROCHLORIDE 40 MG: 10 INJECTION, SOLUTION EPIDURAL; INFILTRATION; INTRACAUDAL; PERINEURAL at 12:36

## 2022-10-21 RX ADMIN — Medication 100 MCG: at 13:33

## 2022-10-21 RX ADMIN — Medication 100 MCG: at 14:43

## 2022-10-21 RX ADMIN — Medication 100 MCG: at 13:49

## 2022-10-21 RX ADMIN — Medication 1000 MG: at 15:41

## 2022-10-21 RX ADMIN — SODIUM CHLORIDE, POTASSIUM CHLORIDE, SODIUM LACTATE AND CALCIUM CHLORIDE: 600; 310; 30; 20 INJECTION, SOLUTION INTRAVENOUS at 14:05

## 2022-10-21 RX ADMIN — EPHEDRINE SULFATE 5 MG: 50 INJECTION INTRAVENOUS at 14:43

## 2022-10-21 RX ADMIN — Medication 100 MCG: at 13:18

## 2022-10-21 RX ADMIN — EPHEDRINE SULFATE 5 MG: 50 INJECTION INTRAVENOUS at 13:33

## 2022-10-21 RX ADMIN — DEXAMETHASONE SODIUM PHOSPHATE 4 MG: 4 INJECTION, SOLUTION INTRA-ARTICULAR; INTRALESIONAL; INTRAMUSCULAR; INTRAVENOUS; SOFT TISSUE at 12:55

## 2022-10-21 RX ADMIN — FENTANYL CITRATE 50 MCG: 50 INJECTION, SOLUTION INTRAMUSCULAR; INTRAVENOUS at 15:57

## 2022-10-21 RX ADMIN — PROPOFOL 40 MG: 10 INJECTION, EMULSION INTRAVENOUS at 13:44

## 2022-10-21 RX ADMIN — ONDANSETRON 4 MG: 2 INJECTION INTRAMUSCULAR; INTRAVENOUS at 14:55

## 2022-10-21 RX ADMIN — CEFAZOLIN 2 G: 10 INJECTION, POWDER, FOR SOLUTION INTRAVENOUS at 12:40

## 2022-10-21 RX ADMIN — FAMOTIDINE 20 MG: 20 TABLET ORAL at 11:24

## 2022-10-21 RX ADMIN — EPHEDRINE SULFATE 5 MG: 50 INJECTION INTRAVENOUS at 14:03

## 2022-10-21 RX ADMIN — SODIUM CHLORIDE, POTASSIUM CHLORIDE, SODIUM LACTATE AND CALCIUM CHLORIDE 9 ML/HR: 600; 310; 30; 20 INJECTION, SOLUTION INTRAVENOUS at 11:24

## 2022-10-21 NOTE — ANESTHESIA PROCEDURE NOTES
Infraclavicular catheter      Patient reassessed immediately prior to procedure    Patient location during procedure: pre-op  Reason for block: at surgeon's request and post-op pain management  Performed by  CRNA/CAA: Skyler Moser, CRNA  Assisted by: Miranda High RN  Preanesthetic Checklist  Completed: patient identified, IV checked, site marked, risks and benefits discussed, surgical consent, monitors and equipment checked, pre-op evaluation and timeout performed  Prep:  Pt Position: supine  Sterile barriers:cap, gloves, mask and washed/disinfected hands  Prep: ChloraPrep  Patient monitoring: blood pressure monitoring, continuous pulse oximetry and EKG  Procedure    Sedation: yes  Performed under: local infiltration  Guidance:ultrasound guided    ULTRASOUND INTERPRETATION.  Using ultrasound guidance a 20 G gauge needle was placed in close proximity to the brachial plexus nerve, at which point, under ultrasound guidance anesthetic was injected in the area of the nerve and spread of the anesthesia was seen on ultrasound in close proximity thereto.  There were no abnormalities seen on ultrasound; a digital image was taken; and the patient tolerated the procedure with no complications. Images:still images obtained, printed/placed on chart    Laterality:right  Block Type:infraclavicular  Injection Technique:catheter  Needle Type:Tuohy and echogenic  Needle Gauge:18 G  Resistance on Injection: none  Catheter Size:20 G  Cath Depth at skin: 8 cm    Medications Used: fentaNYL citrate (PF) (SUBLIMAZE) injection - Intravenous   100 mcg - 10/21/2022 11:40:00 AM  bupivacaine PF (MARCAINE) 0.25 % injection - Injection   20 mL - 10/21/2022 11:55:00 AM      Post Assessment  Injection Assessment: negative aspiration for heme, no paresthesia on injection and incremental injection  Patient Tolerance:comfortable throughout block  Complications:no  Additional Notes  SINGLE shot   The affected upper extremity was abducted and  "rotated 90 degrees at the shoulder, within the patients range of motion. A high-frequency linear transducer, with sterile cover, was placed just medial to the coracoid process, distal third of the clavicle, and inferior to the clavicle. Keeping the transducer is in a parasagittal plane (cephalad to caudad), scanning medially to laterally. The Pectoralis major and minor, brachial plexus, axillary artery and vein, rib and pleura where visualized. The insertion site was prepped and draped in sterile fashion. Skin and cutaneous tissue was infiltrated with 2-5 ml of 1% Lidocaine. Using ultrasound-guidance, a 20-gauge B-Elias 4\" Ultraplex 360 non-stimulating echogenic needle was then inserted and advanced in-plane lateral to the axillary artery and lateral cord of the brachial plexus. Preservative-free normal saline was utilized for hydro-dissection of tissue, advancement of needle, and to confirm final needle placement posterior to the axillary artery and posterior cord of the brachial plexus. Local anesthetic injection spread, in incremental 3-5 ml injections, was confirmed. Aspiration every 5 ml to prevent intravascular injection. Injection was completed with negative aspiration of blood and negative intravascular injection. Injection pressures were normal with minimal resistance.    CATHETER  The affected upper extremity was abducted and rotated 90 degrees at the shoulder, within the patients range of motion. A high-frequency linear transducer, with sterile cover, was placed just medial to the coracoid process, distal third of the clavicle, and inferior to the clavicle. Keeping the transducer is in a parasagittal plane (cephalad to caudad), scanning medially to laterally. The Pectoralis major and minor, brachial plexus, axillary artery and vein, rib and pleura where visualized. The insertion site was prepped and draped in sterile fashion. Skin and cutaneous tissue was infiltrated with 2-5 ml of 1% Lidocaine. Using " "ultrasound-guidance, an 18-gauge Contiplex Ultra 360 Touhy needle was advanced in plane lateral to the axillary artery and lateral cord of the brachial plexus. Preservative-free normal saline was utilized for hydro-dissection of tissue, advancement of Touhy needle, and to confirm final needle placement posterior to the axillary artery and posterior cord of the brachial plexus. Local anesthetic injection spread, in incremental 3-5 ml injections, was confirmed. Aspiration every 5 ml to prevent intravascular injection. Injection was completed with negative aspiration of blood and negative intravascular injection. Injection pressures were normal with minimal resistance. A 20-gauge Contiplex Echo catheter was placed through the needle and advance out the tip of the Touhy 1-3 cm. The Touhy needle was then removed, and final catheter position verified at the 5-6 o'clock position to the axillary artery and posterior cord. The catheter was secured in usual fashion with skin glue, benzoin, steri-strips, CHG tegaderm and Label noting \"Nerve Block Catheter\". Jerk tape applied at yellow connector and catheter connection.          "

## 2022-10-21 NOTE — OP NOTE
DATE OF OPERATION:  10/21/22  PREOPERATIVE DIAGNOSIS:   Right elbow fracture dislocation  Closed fracture of right olecranon process, initial encounter  Closed fracture right radial head and neck  POSTOPERATIVE DIAGNOSES:  Right elbow fracture dislocation  Closed fracture of right olecranon process, initial encounter  Closed fracture right radial head and neck  PROCEDURES PERFORMED:  1.  Open reduction internal fixation right olecranon fracture  2.  Right radial head replacement  3.  Repair right lateral collateral ligament  SURGEON: Willam Valle MD  ASSISTANTS:  1. Clover Malave MD, PGY-5  ANESTHESIA: General plus block.    ESTIMATED BLOOD LOSS:min mL.  TT: 130 min at 250mm hg  COMPLICATIONS: None.    IMPLANT: Skeletal dynamics locking proximal ulna plate ; skeletal dynamics radial head size 8 stem, +8, 20 mm radial head  COMPLICATIONS: None.    DISPOSITION: Recovery room in stable condition.     INDICATIONS: This is a  72-year-old female who sustained a right elbow fracture dislocation with displaced olecranon fracture and fracture of the radial head and neck.  Risks, benefits, and alternatives were discussed and surgery was recommended. After discussion the patient wished to proceed with surgery.  DESCRIPTION OF PROCEDURE: On the day of surgery, the patient identified the  right elbow as the correct operative extremity. This was initialed by the surgeon with the patient's acknowledgment. The patient underwent placement of an infraclavicular block and was taken to the operating room and placed in the supine position. Upon induction of adequate anesthesia, the patient was rolled to the left lateral decubitus position and the  right upper extremity had a well-padded tourniquet placed around it and was then prepped and draped in the usual sterile fashion.  Timeout confirmed the correct patient and operative extremity as well as that antibiotics were on board.   The arm was exsanguinated with an Esmarch and the  tourniquet inflated to 250 mmHg.  A posterior incision was made curvilinear around the lateral aspect of the olecranon.  It was carried sharply through the skin and subcutaneous tissue.  Hematoma was immediately identified and the fracture separation was noted.  Subcutaneous flaps were developed.  The fracture hematoma was evacuated and the fracture edges were cleaned.  Subperiosteal elevation exposed the fracture edges for alignment.  The subcutaneous border the ulna was exposed by releasing the interval between the ECU and the FCU.  The fracture was reduced a reduction clamp was used to hold the reduction which was then held with 2 K wires.  C arm confirmed anatomic alignment of the fracture.  A skeletal dynamics proximal ulna plate was applied with a a single homerun screw compression bicortical, with 2 additional locking proximal screws and 3 distal bicortical screws.  There is comminution at the remaining screw holes. Proximally a split was made in the triceps for the plate to compress directly to the bone and the split was closed with 0 Vicryl. Excellent fixation of the fracture was achieved and anatomic alignment of the fracture was confirmed by C arm imaging.  At this point a subcutaneous flap was created and an extensor split was made exposing the radial head.  The radial head fracture was a single split with a very comminuted radial neck component.  Given bone quality the decision was made to proceed to radial head replacement.  An osteotomy was made and sizing was carried out.  The radial canal was broached and a trial stem inserted.  Trial neck lengths and head sizes were placed.  The head diameter was based on the inner diameter of the native head that was excised.  Adjustments were made until stability was achieved.  Despite appropriate length and alignment residual posterolateral instability was present until securing or tightening the lateral collateral ligament.  After final implants were placed #2  FiberWire was used to repair the ulnar insertion of the lateral collateral ligament as well as imbricate the ligament and repair the extensor split.  This management of the lateral collateral ligament resulted in stable range of motion through full flexion extension and pronation supination.  The wound was carefully irrigated. The fascia was closed with 0 Vicryl and the subcutaneous tissue with 2-0 Vicryl and the skin was closed with 3-0 nylon in a horizontal mattress fashion. A sterile dressing was placed.   The patient was placed in a long-arm splint.  Anesthesia was reversed and the patient was taken to the recovery room in stable condition. All instrument, needle, and sponge counts were correct.  They will be discharged home in a splint for 1 week at which time the splint will be removed and range of motion exercises active and passive initiated.   She will be placed into a hinged elbow brace allowing full flexion and extension to 30 degrees.  They will be discharged home with a prescription for oxycodone 5 mg #25.

## 2022-10-21 NOTE — ANESTHESIA PREPROCEDURE EVALUATION
Anesthesia Evaluation                  Airway   Mallampati: II  Dental      Pulmonary    Cardiovascular         Neuro/Psych  GI/Hepatic/Renal/Endo    (+)  GERD,  thyroid problem     Musculoskeletal     (+) back pain,   Abdominal    Substance History      OB/GYN          Other                        Anesthesia Plan    ASA 3     general with block     intravenous induction     Anesthetic plan, risks, benefits, and alternatives have been provided, discussed and informed consent has been obtained with: patient.        CODE STATUS:

## 2022-10-21 NOTE — ANESTHESIA POSTPROCEDURE EVALUATION
Patient: Barbara Hernandez    Procedure Summary     Date: 10/21/22 Room / Location:  [facility]    Anesthesia Start: 1230 Anesthesia Stop: 1536    Procedure: OPEN REDUCTION INTERNAL FIXATION RIGHT OLECRANNON FRACTURE AND OPEN REDUCTION INTERNAL FIXATION RIGHT RADIAL HEAD FRACTURE (Right: Elbow) Diagnosis:       Closed fracture dislocation of right elbow joint, initial encounter      Closed olecranon fracture, right, initial encounter      Fracture of radial head, right, closed      (Closed fracture dislocation of right elbow joint, initial encounter [[id number]])    Surgeons: Willam Valle MD Provider: Dionisio Stanley MD    Anesthesia Type: general with block ASA Status: 3          Anesthesia Type: general with block    Vitals  Vitals Value Taken Time   /65 10/21/22 1537   Temp 97.6 °F (36.4 °C) 10/21/22 1537   Pulse 91 10/21/22 1537   Resp 15 10/21/22 1537   SpO2 100 % 10/21/22 1537           Post Anesthesia Care and Evaluation    Patient location during evaluation: PACU  Patient participation: complete - patient participated  Level of consciousness: awake and alert  Pain management: adequate    Airway patency: patent  Anesthetic complications: No anesthetic complications  PONV Status: none  Cardiovascular status: hemodynamically stable and acceptable  Respiratory status: nonlabored ventilation, acceptable and nasal cannula  Hydration status: acceptable

## 2022-10-21 NOTE — ANESTHESIA PROCEDURE NOTES
Infraclavicular catheter      Patient reassessed immediately prior to procedure    Patient location during procedure: pre-op  Reason for block: at surgeon's request and post-op pain management  Preanesthetic Checklist  Completed: patient identified, IV checked, site marked, risks and benefits discussed, surgical consent, monitors and equipment checked, pre-op evaluation and timeout performed  Prep:  Pt Position: supine  Sterile barriers:cap, gloves, mask and washed/disinfected hands  Prep: ChloraPrep  Patient monitoring: blood pressure monitoring, continuous pulse oximetry and EKG  Procedure    Sedation: yes  Performed under: local infiltration  Guidance:ultrasound guided    ULTRASOUND INTERPRETATION.  Using ultrasound guidance a 20 G gauge needle was placed in close proximity to the brachial plexus nerve, at which point, under ultrasound guidance anesthetic was injected in the area of the nerve and spread of the anesthesia was seen on ultrasound in close proximity thereto.  There were no abnormalities seen on ultrasound; a digital image was taken; and the patient tolerated the procedure with no complications. Images:still images obtained, printed/placed on chart    Block Type:infraclavicular  Injection Technique:catheter  Needle Type:Tuohy and echogenic  Needle Gauge:18 G  Resistance on Injection: none  Catheter Size:20 G          Post Assessment  Injection Assessment: negative aspiration for heme, no paresthesia on injection and incremental injection  Patient Tolerance:comfortable throughout block  Complications:no  Additional Notes  SINGLE shot   The affected upper extremity was abducted and rotated 90 degrees at the shoulder, within the patients range of motion. A high-frequency linear transducer, with sterile cover, was placed just medial to the coracoid process, distal third of the clavicle, and inferior to the clavicle. Keeping the transducer is in a parasagittal plane (cephalad to caudad), scanning medially  "to laterally. The Pectoralis major and minor, brachial plexus, axillary artery and vein, rib and pleura where visualized. The insertion site was prepped and draped in sterile fashion. Skin and cutaneous tissue was infiltrated with 2-5 ml of 1% Lidocaine. Using ultrasound-guidance, a 20-gauge B-Elias 4\" Ultraplex 360 non-stimulating echogenic needle was then inserted and advanced in-plane lateral to the axillary artery and lateral cord of the brachial plexus. Preservative-free normal saline was utilized for hydro-dissection of tissue, advancement of needle, and to confirm final needle placement posterior to the axillary artery and posterior cord of the brachial plexus. Local anesthetic injection spread, in incremental 3-5 ml injections, was confirmed. Aspiration every 5 ml to prevent intravascular injection. Injection was completed with negative aspiration of blood and negative intravascular injection. Injection pressures were normal with minimal resistance.    CATHETER  The affected upper extremity was abducted and rotated 90 degrees at the shoulder, within the patients range of motion. A high-frequency linear transducer, with sterile cover, was placed just medial to the coracoid process, distal third of the clavicle, and inferior to the clavicle. Keeping the transducer is in a parasagittal plane (cephalad to caudad), scanning medially to laterally. The Pectoralis major and minor, brachial plexus, axillary artery and vein, rib and pleura where visualized. The insertion site was prepped and draped in sterile fashion. Skin and cutaneous tissue was infiltrated with 2-5 ml of 1% Lidocaine. Using ultrasound-guidance, an 18-gauge Contiplex Ultra 360 Touhy needle was advanced in plane lateral to the axillary artery and lateral cord of the brachial plexus. Preservative-free normal saline was utilized for hydro-dissection of tissue, advancement of Touhy needle, and to confirm final needle placement posterior to the axillary " "artery and posterior cord of the brachial plexus. Local anesthetic injection spread, in incremental 3-5 ml injections, was confirmed. Aspiration every 5 ml to prevent intravascular injection. Injection was completed with negative aspiration of blood and negative intravascular injection. Injection pressures were normal with minimal resistance. A 20-gauge Contiplex Echo catheter was placed through the needle and advance out the tip of the Touhy 1-3 cm. The Touhy needle was then removed, and final catheter position verified at the 5-6 o'clock position to the axillary artery and posterior cord. The catheter was secured in usual fashion with skin glue, benzoin, steri-strips, CHG tegaderm and Label noting \"Nerve Block Catheter\". Jerk tape applied at yellow connector and catheter connection.          "

## 2022-10-21 NOTE — ANESTHESIA PROCEDURE NOTES
Airway  Urgency: elective    Airway not difficult    General Information and Staff    Patient location during procedure: OR  SRNA: Noemi Mendiola SRNA  Indications and Patient Condition  Indications for airway management: airway protection    Preoxygenated: yes  MILS not maintained throughout  Mask difficulty assessment: 2 - vent by mask + OA or adjuvant +/- NMBA    Final Airway Details  Final airway type: endotracheal airway      Successful airway: ETT  Cuffed: yes   Successful intubation technique: direct laryngoscopy  Facilitating devices/methods: intubating stylet  Endotracheal tube insertion site: oral  Blade: Bk  Blade size: 3  ETT size (mm): 7.0  Cormack-Lehane Classification: grade I - full view of glottis  Placement verified by: chest auscultation and capnometry   Cuff volume (mL): 8  Measured from: lips  ETT/EBT  to lips (cm): 21  Number of attempts at approach: 1  Assessment: lips, teeth, and gum same as pre-op and atraumatic intubation    Additional Comments  Negative epigastric sounds, Breath sound equal bilaterally with symmetric chest rise and fall

## 2022-10-21 NOTE — H&P
Pre-Op H&P  Barbara Hernandez  [id number]  1950    Chief complaint: right elbow pain     HPI:    Patient is a 72 y.o.female who presents with a history of pain in the right elbow due to a right olecranon fracture and right radial head fracture sustained after a fall. She presents to the operating room today for surgical management with an ORIF of the right olecranon and right radial head.      Review of Systems:  General ROS: negative for chills, fever or skin lesions;  No changes since last office visit.  Neg for recent sick exposure  Cardiovascular ROS: no chest pain or dyspnea on exertion  Respiratory ROS: no cough, shortness of breath, or wheezing    Allergies:   Allergies   Allergen Reactions   • Sulfa Antibiotics Rash   • Bacitracin-Polymyxin B Rash   • Cefdinir GI Intolerance   • Celecoxib GI Intolerance   • Ciprofloxacin GI Intolerance   • Clarithromycin GI Intolerance   • Doxycycline GI Intolerance   • Erythromycin Nausea Only and Other (See Comments)     RED EYES WHEN USING EYE DROP    • Levofloxacin GI Intolerance   • Lortab [Hydrocodone-Acetaminophen] GI Intolerance   • Lyrica [Pregabalin] Unknown - Low Severity     PREVIOUSLY CHARTED, PATIENT DENIES ALLERGY    • Nexium [Esomeprazole] Myalgia   • Omeprazole Myalgia   • Phenergan [Promethazine Hcl] Delirium   • Statins Headache   • Tramadol GI Intolerance and Dizziness       Home Meds:    No current facility-administered medications on file prior to encounter.     Current Outpatient Medications on File Prior to Encounter   Medication Sig Dispense Refill   • acetaminophen (TYLENOL) 500 MG tablet Take 2 tablets by mouth every night at bedtime.     • B Complex Vitamins (VITAMIN B COMPLEX PO) Take 1 tablet by mouth Daily.     • Bempedoic Acid (Nexletol) 180 MG tablet Take 1 tablet by mouth Daily. 30 tablet 11   • CALCIUM PO Take 1 tablet by mouth 3 (Three) Times a Week.     • Cholecalciferol (VITAMIN D3) 3000 UNITS tablet Take 1 tablet by mouth Daily.      • Coenzyme Q10 (CO Q-10) 100 MG capsule Take 200 mg by mouth Daily.     • diclofenac (VOLTAREN) 1 % gel gel Apply 1 application topically to the appropriate area as directed 4 (Four) Times a Day As Needed (JOINT PAIN).     • dicyclomine (BENTYL) 10 MG capsule Take 1 capsule by mouth every 8 hours as needed for bowel spasm or diarrhea (Patient taking differently: Take 1 capsule by mouth Every 8 (Eight) Hours As Needed (ABDOMINAL CRAMPING). Take 1 capsule by mouth every 8 hours as needed for bowel spasm or diarrhea) 30 capsule 0   • estradiol (Vagifem) 10 MCG tablet vaginal tablet Insert 1 tablet into the vagina 3 (Three) Times a Week. 36 tablet 3   • gabapentin (NEURONTIN) 100 MG capsule Take 1 capsule by mouth every night at bedtime. (Patient taking differently: Take 2 capsules by mouth every night at bedtime.) 30 capsule 5   • Lutein 20 MG tablet Take 1 tablet by mouth Daily.     • meloxicam (MOBIC) 7.5 MG tablet TAKE 1 TABLET EVERY DAY AS NEEDED FOR PAIN AND INFLAMMATION (Patient taking differently: Take 1 tablet by mouth Daily As Needed. TAKE 1 TABLET EVERY DAY AS NEEDED FOR PAIN AND INFLAMMATION) 30 tablet 3   • multivitamin with minerals (ICAPS AREDS FORMULA PO) Take 1 tablet by mouth Daily.     • Omega-3 Fatty Acids (FISH OIL) 1200 MG capsule capsule Take 2 capsules by mouth Daily.     • oxyCODONE-acetaminophen (PERCOCET) 7.5-325 MG per tablet Take 1 tablet by mouth Every 6 (Six) Hours As Needed for Severe Pain. 15 tablet 0   • Synthroid 75 MCG tablet Take 75 mcg by mouth Daily.     • vitamin E 400 UNIT capsule Take 400 Units by mouth Daily.         PMH:   Past Medical History:   Diagnosis Date   • Allergic    • Anxiety    • Back pain     osteoarthritis   • Chronic diarrhea    • COVID-19 09/22/2022   • ETD (eustachian tube dysfunction)    • Fibromyalgia    • Fibromyositis    • GERD (gastroesophageal reflux disease)    • H/O cardiomyopathy    • Headache    • History of transfusion 2011    AFTER HIP  REPLACEMENT, SELF DONATED, NO REACTION   • HLD (hyperlipidemia)    • Hypothyroidism    • IBS (irritable bowel syndrome)    • Lichen sclerosus et atrophicus of the vulva    • Menopause    • Osteoarthritis    • Osteopenia    • Parvovirus B19 infection 2019   • PONV (postoperative nausea and vomiting)    • Preventative health care     · Age-appropriate counseling discussed. Will notify patient of lab result   • Staph infection 2022    ORAL ANTIBIOTICS   • Urinary tract infection      PSH:    Past Surgical History:   Procedure Laterality Date   • COLONOSCOPY     • CRYOTHERAPY      cervix   • EYE SURGERY Right     cyst removal right eyelid   • SEPTOPLASTY     • TOTAL HIP ARTHROPLASTY Bilateral     2010 total right hip Dr. Lovelace;  total Left hip       Immunization History:  Influenza:   Pneumococcal: patient unsure of date   Tetanus: >10 years     Social History:   Tobacco:   Social History     Tobacco Use   Smoking Status Former   • Packs/day: 1.50   • Years: 8.00   • Pack years: 12.00   • Types: Cigarettes   • Start date:    • Quit date:    • Years since quittin.8   Smokeless Tobacco Never      Alcohol:     Social History     Substance and Sexual Activity   Alcohol Use Yes    Comment: SOCIAL       Vitals:           LMP  (LMP Unknown) Comment: postmenopausal    Physical Exam:  General Appearance:    Alert, cooperative, no distress, appears stated age   Head:    Normocephalic, without obvious abnormality, atraumatic   Lungs:     Clear to auscultation bilaterally, respirations unlabored    Heart:   Regular rate and rhythm, S1 and S2 normal, no murmur, rub    or gallop    Abdomen:    Soft, nontender.  +bowel sounds   Breast Exam:    deferred   Genitalia:    deferred   Extremities:   Extremities normal, atraumatic, no cyanosis or edema   Skin:   Skin color, texture, turgor normal, no rashes or lesions   Neurologic:   Grossly intact   Results Review  LABS:  Lab Results   Component Value Date    WBC 7.99  10/20/2022    HGB 12.3 10/20/2022    HCT 37.4 10/20/2022    MCV 96.6 10/20/2022     10/20/2022    NEUTROABS 2.62 10/26/2018    GLUCOSE 97 10/20/2022    BUN 16 10/20/2022    CREATININE 0.74 10/20/2022    EGFRIFNONA 85 10/26/2018     10/20/2022    K 4.1 10/20/2022    CL 99 10/20/2022    CO2 29.0 10/20/2022    CALCIUM 9.6 10/20/2022    ALBUMIN 4.58 10/26/2018    AST 21 10/26/2018    ALT 18 10/26/2018    BILITOT 0.8 10/26/2018       RADIOLOGY:  XR Elbow 2 View Right    Result Date: 10/18/2022  DATE OF EXAM: 10/18/2022 4:39 PM  PROCEDURE: XR ELBOW 2 VW RIGHT-  INDICATIONS: right elbow pain, suspect prox ulna fx  COMPARISON: No comparisons available.  TECHNIQUE: Two Radiologic views of the right elbow were obtained.  FINDINGS: Mildly limited exam due to difficulty positioning from patient pain.  Comminuted fracture of the proximal ulna and olecranon with apparent lateral displacement/dislocation of the distal ulnar fragment and mild posterior rotation of the proximal fragment. Comminuted fracture of the radial head and neck. The radiocapitellar alignment appears abnormal as well. No definite fracture of the distal humerus is seen though evaluation is limited due to lack of true AP view. Soft tissue swelling of the elbow.      Comminuted displaced fracture dislocation of the proximal ulna/olecranon  Comminuted fracture of the radial head and neck. The radiocapitellar alignment appears abnormal as well suggesting subluxation/dislocation.  No definite fracture of the distal humerus is seen though evaluation is limited due to lack of true AP view  This report was finalized on 10/18/2022 5:10 PM by Kyle Carpio.      Infraclavicular catheter    Result Date: 10/21/2022  Skyler Moser CRNA     10/21/2022 11:11 AM Infraclavicular catheter Patient reassessed immediately prior to procedure Patient location during procedure: pre-op Reason for block: at surgeon's request and post-op pain management Preanesthetic  Checklist Completed: patient identified, IV checked, site marked, risks and benefits discussed, surgical consent, monitors and equipment checked, pre-op evaluation and timeout performed Prep: Pt Position: supine Sterile barriers:cap, gloves, mask and washed/disinfected hands Prep: ChloraPrep Patient monitoring: blood pressure monitoring, continuous pulse oximetry and EKG Procedure Sedation: yes Performed under: local infiltration Guidance:ultrasound guided ULTRASOUND INTERPRETATION.  Using ultrasound guidance a 20 G gauge needle was placed in close proximity to the brachial plexus nerve, at which point, under ultrasound guidance anesthetic was injected in the area of the nerve and spread of the anesthesia was seen on ultrasound in close proximity thereto.  There were no abnormalities seen on ultrasound; a digital image was taken; and the patient tolerated the procedure with no complications. Images:still images obtained, printed/placed on chart Block Type:infraclavicular Injection Technique:catheter Needle Type:Tuohy and echogenic Needle Gauge:18 G Resistance on Injection: none Catheter Size:20 G Post Assessment Injection Assessment: negative aspiration for heme, no paresthesia on injection and incremental injection Patient Tolerance:comfortable throughout block Complications:no Additional Notes SINGLE shot The affected upper extremity was abducted and rotated 90 degrees at the shoulder, within the patients range of motion. A high-frequency linear transducer, with sterile cover, was placed just medial to the coracoid process, distal third of the clavicle, and inferior to the clavicle. Keeping the transducer is in a parasagittal plane (cephalad to caudad), scanning medially to laterally. The Pectoralis major and minor, brachial plexus, axillary artery and vein, rib and pleura where visualized. The insertion site was prepped and draped in sterile fashion. Skin and cutaneous tissue was infiltrated with 2-5 ml of 1%  "Lidocaine. Using ultrasound-guidance, a 20-gauge B-Elias 4\" Ultraplex 360 non-stimulating echogenic needle was then inserted and advanced in-plane lateral to the axillary artery and lateral cord of the brachial plexus. Preservative-free normal saline was utilized for hydro-dissection of tissue, advancement of needle, and to confirm final needle placement posterior to the axillary artery and posterior cord of the brachial plexus. Local anesthetic injection spread, in incremental 3-5 ml injections, was confirmed. Aspiration every 5 ml to prevent intravascular injection. Injection was completed with negative aspiration of blood and negative intravascular injection. Injection pressures were normal with minimal resistance. CATHETER The affected upper extremity was abducted and rotated 90 degrees at the shoulder, within the patients range of motion. A high-frequency linear transducer, with sterile cover, was placed just medial to the coracoid process, distal third of the clavicle, and inferior to the clavicle. Keeping the transducer is in a parasagittal plane (cephalad to caudad), scanning medially to laterally. The Pectoralis major and minor, brachial plexus, axillary artery and vein, rib and pleura where visualized. The insertion site was prepped and draped in sterile fashion. Skin and cutaneous tissue was infiltrated with 2-5 ml of 1% Lidocaine. Using ultrasound-guidance, an 18-gauge Contiplex Ultra 360 Touhy needle was advanced in plane lateral to the axillary artery and lateral cord of the brachial plexus. Preservative-free normal saline was utilized for hydro-dissection of tissue, advancement of Touhy needle, and to confirm final needle placement posterior to the axillary artery and posterior cord of the brachial plexus. Local anesthetic injection spread, in incremental 3-5 ml injections, was confirmed. Aspiration every 5 ml to prevent intravascular injection. Injection was completed with negative aspiration of " "blood and negative intravascular injection. Injection pressures were normal with minimal resistance. A 20-gauge Contiplex Echo catheter was placed through the needle and advance out the tip of the Touhy 1-3 cm. The Touhy needle was then removed, and final catheter position verified at the 5-6 o'clock position to the axillary artery and posterior cord. The catheter was secured in usual fashion with skin glue, benzoin, steri-strips, CHG tegaderm and Label noting \"Nerve Block Catheter\". Jerk tape applied at yellow connector and catheter connection.     CT Upper Extremity Right Without Contrast    Result Date: 10/18/2022  CT UPPER EXTREMITY RIGHT WO CONTRAST Date of Exam: 10/18/2022 17:18 EDT Indication: elbow fracture, operative planning. Comparison Exams: Elbow radiograph 10/18/2022 Technique: Contiguous axial images obtained through the elbow. Coronal and sagittal reconstructions were performed. Automated exposure control and iterative reconstruction methods were used. FINDINGS: There is comminuted fracture of the olecranon. It is displaced. The fracture reaches the joint space and vertical position and there is displacement of the olecranon posterior fracture fragment posterior to the proximal fracture fragment by 1.2 cm. There  is a fracture of the coronoid process. This fracture fragment measures 5 x 10 x 11 mm. Is displaced posteriorly and superiorly by about 5 mm. There is a impacted mildly displaced fracture of the radial neck. The impaction measures approximately 4 mm. Given the position during imaging is difficult to evaluate for the alignment. However, the humeral head has more anatomic position on this exam. There is a large joint effusion. The soft tissues are better evaluated with MRI.     There are findings of elbow fracture dislocation. Comminuted fracture of the ulna involving the olecranon process with posterior displacement of the large olecranon fracture fragment. There is a minimally displaced " fracture of the coronoid process of the  ulna. There is an impacted mildly displaced fracture of the radial neck. Large joint effusion. Electronically Signed: Deepthi Cruz MD 10/18/2022 19:46 EDT Workstation ID: [id number]    XR Chest PA & Lateral    Result Date: 10/20/2022  DATE OF EXAM: 10/20/2022 2:05 PM  PROCEDURE: XR CHEST PA AND LATERAL-  INDICATIONS: preop  COMPARISON: 10/22/2012  TECHNIQUE: Two radiologic views of the chest, PA and lateral, were obtained.  FINDINGS: No focal consolidations or pleural effusions are observed. A nodular density is noted in the left lower lobe suggesting a granuloma. The cardiac silhouette is within normal limits for size. The mediastinal soft tissues are unremarkable. No acute osseous abnormalities are identified.      1.  No evidence for acute cardiopulmonary process. 2.  Evidence for a new nodule within the left lower lobe suggesting a granuloma. Recommend correlation with follow-up nonemergent outpatient CT of the chest for confirmation.  This report was finalized on 10/20/2022 2:21 PM by Declan Degroot MD.         Cancer Staging (if applicable)  Cancer Patient: __ yes _x_no __unknown; If yes, clinical stage T:__ N:__M:__, stage group or __N/A    Impression: right olecranon fracture; right radial head fracture    Plan: open reduction internal fixation right olecranon fracture and right radial head fracture       Killian Tadeo PA-C   10/21/22   11:13 AM EDT

## 2022-10-22 ENCOUNTER — HOSPITAL ENCOUNTER (EMERGENCY)
Facility: HOSPITAL | Age: 72
Discharge: HOME | End: 2022-10-22
Attending: EMERGENCY MEDICINE | Admitting: EMERGENCY MEDICINE

## 2022-10-22 VITALS
WEIGHT: 118 LBS | DIASTOLIC BLOOD PRESSURE: 70 MMHG | SYSTOLIC BLOOD PRESSURE: 121 MMHG | BODY MASS INDEX: 21.71 KG/M2 | RESPIRATION RATE: 18 BRPM | OXYGEN SATURATION: 94 % | TEMPERATURE: 98.5 F | HEART RATE: 85 BPM | HEIGHT: 62 IN

## 2022-10-22 DIAGNOSIS — G89.18 POST-OPERATIVE PAIN: Primary | ICD-10-CM

## 2022-10-22 PROCEDURE — 96372 THER/PROPH/DIAG INJ SC/IM: CPT

## 2022-10-22 PROCEDURE — 99282 EMERGENCY DEPT VISIT SF MDM: CPT

## 2022-10-22 PROCEDURE — 25010000002 HYDROMORPHONE 1 MG/ML SOLUTION: Performed by: EMERGENCY MEDICINE

## 2022-10-22 RX ADMIN — HYDROMORPHONE HYDROCHLORIDE 1 MG: 1 INJECTION, SOLUTION INTRAMUSCULAR; INTRAVENOUS; SUBCUTANEOUS at 16:32

## 2022-10-23 NOTE — ED PROVIDER NOTES
Subjective   History of Present Illness  72-year-old female with a history of fibromyalgia presents for evaluation of postoperative right upper extremity pain.  Of note, the patient underwent an ORIF of her right olecranon and radial head yesterday by Dr. Valle of orthopedics.  She had a postoperative pain pump placed for pain control which she decided not to use because it was too bulky and heavy to carry around.  Today, she states that her pain is intolerable.  She called the on-call orthopedic surgeon who advised her to come to the emergency department to be evaluated.  She states that she has been taking her Tylenol and narcotic as prescribed but notes that it does not seem to be helping very well.  She denies any paresthesias.  She currently rates her pain at 9 out of 10 in severity.        Review of Systems   Musculoskeletal:        Right arm pain   All other systems reviewed and are negative.      Past Medical History:   Diagnosis Date   • Allergic    • Anxiety    • Back pain     osteoarthritis   • Chronic diarrhea    • COVID-19 09/22/2022   • ETD (eustachian tube dysfunction)    • Fibromyalgia    • Fibromyositis    • GERD (gastroesophageal reflux disease)    • H/O cardiomyopathy    • Headache    • History of transfusion 2011    AFTER HIP REPLACEMENT, SELF DONATED, NO REACTION   • HLD (hyperlipidemia)    • Hypothyroidism    • IBS (irritable bowel syndrome)    • Lichen sclerosus et atrophicus of the vulva    • Menopause    • Osteoarthritis    • Osteopenia    • Parvovirus B19 infection 2019   • PONV (postoperative nausea and vomiting)    • Preventative health care     · Age-appropriate counseling discussed. Will notify patient of lab result   • Staph infection 06/2022    ORAL ANTIBIOTICS   • Urinary tract infection        Allergies   Allergen Reactions   • Sulfa Antibiotics Rash   • Bacitracin-Polymyxin B Rash   • Cefdinir GI Intolerance   • Celecoxib GI Intolerance   • Ciprofloxacin GI Intolerance   •  Clarithromycin GI Intolerance   • Doxycycline GI Intolerance   • Erythromycin Nausea Only and Other (See Comments)     RED EYES WHEN USING EYE DROP    • Levofloxacin GI Intolerance   • Lortab [Hydrocodone-Acetaminophen] GI Intolerance   • Lyrica [Pregabalin] Unknown - Low Severity     PREVIOUSLY CHARTED, PATIENT DENIES ALLERGY    • Nexium [Esomeprazole] Myalgia   • Omeprazole Myalgia   • Phenergan [Promethazine Hcl] Delirium   • Statins Headache   • Tramadol GI Intolerance and Dizziness       Past Surgical History:   Procedure Laterality Date   • COLONOSCOPY     • CRYOTHERAPY      cervix   • EYE SURGERY Right     cyst removal right eyelid   • SEPTOPLASTY     • TOTAL HIP ARTHROPLASTY Bilateral     2010 total right hip Dr. Lovelace;  total Left hip       Family History   Problem Relation Age of Onset   • Alzheimer's disease Father    • Stroke Mother    • Osteoarthritis Mother          [id number]   • Hypothyroidism Mother    • Atrial fibrillation Mother    • Hyperlipidemia Brother    • Hypertension Brother    • Stroke Paternal Grandmother    • Heart attack Maternal Grandmother        Social History     Socioeconomic History   • Marital status:    Tobacco Use   • Smoking status: Former     Packs/day: 1.50     Years: 8.00     Pack years: 12.00     Types: Cigarettes     Start date:      Quit date:      Years since quittin.8   • Smokeless tobacco: Never   Vaping Use   • Vaping Use: Never used   Substance and Sexual Activity   • Alcohol use: Yes     Comment: SOCIAL   • Drug use: No   • Sexual activity: Defer     Birth control/protection: Post-menopausal           Objective   Physical Exam  Vitals and nursing note reviewed.   Constitutional:       Appearance: She is well-developed.   HENT:      Head: Normocephalic and atraumatic.   Cardiovascular:      Rate and Rhythm: Normal rate and regular rhythm.      Heart sounds: Normal heart sounds. No murmur heard.    No friction rub. No gallop.   Pulmonary:       Effort: Pulmonary effort is normal. No respiratory distress.      Breath sounds: Normal breath sounds. No wheezing or rales.   Musculoskeletal:         General: Normal range of motion.      Comments: Minimal soft tissue swelling noted to right hand when compared to the left on visual inspection   Skin:     General: Skin is warm and dry.      Findings: No erythema or rash.      Comments: Surgical site to posterior aspect of right elbow appears clean, dry, and intact without any surrounding warmth erythema noted, no crepitus, no purulence noted   Neurological:      Mental Status: She is alert and oriented to person, place, and time.      Comments: Right upper extremity is neurovascularly intact distally with bounding distal pulses and normal sensation   Psychiatric:         Mood and Affect: Mood normal.         Thought Content: Thought content normal.         Judgment: Judgment normal.         Procedures           ED Course  ED Course as of 10/23/22 0153   Sat Oct 22, 2022   1648 72-year-old female with a history of fibromyalgia presents for evaluation of postoperative right upper extremity pain.  Of note, the patient underwent an ORIF of her right olecranon and radial head yesterday by Dr. Valle of orthopedics.  She had a postoperative pump placed for pain control which she decided to not use because it was too bulky and heavy to carry around.  Today, she states that her pain is intolerable.  She called the on-call orthopedic surgeon who advised her to come to the ED to be evaluated.  On arrival, the patient is nontoxic-appearing.  I took down her splint/dressing and her surgical site appears clean, dry, and intact without any surrounding warmth erythema or signs of infection.  Right upper extremity is neurovascularly intact distally with bounding distal pulses and normal sensation.  IM pain control given here in the ED.  I discussed the patient's case with Dr. Valle who recommended that if she was not going to use  "her pain pump as directed that she should use her prescribed narcotics in conjunction with alternating Tylenol and NSAIDs.  I am in agreement.  She will follow-up with  as previously scheduled this week.  Agreeable with plan and given appropriate strict return precautions. [DD]      ED Course User Index  [DD] Declan Tse MD                                       No results found for this or any previous visit (from the past 24 hour(s)).  Note: In addition to lab results from this visit, the labs listed above may include labs taken at another facility or during a different encounter within the last 24 hours. Please correlate lab times with ED admission and discharge times for further clarification of the services performed during this visit.    No orders to display     Vitals:    10/22/22 1512 10/22/22 1652   BP: 124/69 121/70   BP Location: Left arm Left arm   Patient Position: Sitting Sitting   Pulse: 95 85   Resp: 18    Temp: 98.5 °F (36.9 °C)    TempSrc: Oral    SpO2: 94%    Weight: 53.5 kg (118 lb)    Height: 157.5 cm (62\")      Medications   HYDROmorphone (DILAUDID) injection 1 mg (1 mg Intramuscular Given 10/22/22 1632)     ECG/EMG Results (last 24 hours)     ** No results found for the last 24 hours. **        No orders to display              MDM    Final diagnoses:   Post-operative pain       ED Disposition  ED Disposition     ED Disposition   Discharge    Condition   Stable    Comment   --             Willam Valle MD  [address] [facility] [address]  [place] [address]  [phone]    In 2 days           Medication List      Changed    dicyclomine 10 MG capsule  Commonly known as: BENTYL  Take 1 capsule by mouth every 8 hours as needed for bowel spasm or diarrhea  What changed:   · how much to take  · how to take this  · when to take this  · reasons to take this     gabapentin 100 MG capsule  Commonly known as: NEURONTIN  Take 1 capsule by mouth every night at bedtime.  What changed: " how much to take     meloxicam 7.5 MG tablet  Commonly known as: MOBIC  TAKE 1 TABLET EVERY DAY AS NEEDED FOR PAIN AND INFLAMMATION  What changed:   · how much to take  · how to take this  · when to take this  · reasons to take this             Declan Tse MD  10/23/22 0155

## 2022-10-24 NOTE — ADDENDUM NOTE
Addendum  created 10/24/22 0918 by Skyler Moser CRNA    Clinical Note Signed, Intraprocedure Blocks edited

## 2023-03-22 ENCOUNTER — TELEPHONE (OUTPATIENT)
Dept: CARDIOLOGY | Facility: CLINIC | Age: 73
End: 2023-03-22
Payer: MEDICARE

## 2023-03-22 NOTE — TELEPHONE ENCOUNTER
Returned pt's initial call from 3/20/2023  She would like earlier FU with [facility] for profound fatigue. She has not seen PCP since prior to orthopedic surgery in October.  She has had one brief episode of palpitations. Will place on cancellation list to see [facility] but recommended short term evaluation with PCP office for basic workup to check for anemia, thyroid, electrolyte imbalances, etc.    She had breakthrough COVID infection last August and has never really regained strength. Fall resulted in fractured elbow requiring surgery in October 2022.     She will call when PCP eval complete for definitive appt with [facility]. If palps recur could offer heart monitor prior to appt.

## 2023-05-10 ENCOUNTER — DOCUMENTATION (OUTPATIENT)
Dept: CARDIOLOGY | Facility: CLINIC | Age: 73
End: 2023-05-10
Payer: MEDICARE

## 2023-05-10 NOTE — PROGRESS NOTES
PA for Nexletol completed over the phone [phone] - not working in [facility]  Originally approved from 9/1/2022-12/31/2022 - pt did not start due to fear of AE. She had COVID and orthopedic injuries from fall. She is willing to try again now.  Last lipid panel requested from PCP.     Ref # [id number]  Statin intolerant  CAD as evidenced by CCS of 96 (60th percentile, burden is moderate for CV risk)  Last     [employer] ID: [id number]  BIN: [id number]  PCN: [id number]  GRP:     PA approved through 12/31/2023

## 2023-08-01 ENCOUNTER — OFFICE VISIT (OUTPATIENT)
Dept: OBSTETRICS AND GYNECOLOGY | Facility: CLINIC | Age: 73
End: 2023-08-01
Payer: MEDICARE

## 2023-08-01 VITALS
DIASTOLIC BLOOD PRESSURE: 70 MMHG | WEIGHT: 120.2 LBS | BODY MASS INDEX: 22.12 KG/M2 | SYSTOLIC BLOOD PRESSURE: 122 MMHG | HEIGHT: 62 IN

## 2023-08-01 DIAGNOSIS — N76.2 ACUTE VULVITIS: Primary | ICD-10-CM

## 2023-08-01 RX ORDER — FLUCONAZOLE 150 MG/1
150 TABLET ORAL DAILY
Qty: 2 TABLET | Refills: 2 | Status: SHIPPED | OUTPATIENT
Start: 2023-08-01

## 2023-08-01 RX ORDER — ESTRADIOL 10 UG/1
1 INSERT VAGINAL 2 TIMES WEEKLY
Qty: 8 TABLET | Refills: 12 | Status: SHIPPED | OUTPATIENT
Start: 2023-08-03

## 2023-08-23 ENCOUNTER — OFFICE VISIT (OUTPATIENT)
Dept: CARDIOLOGY | Facility: CLINIC | Age: 73
End: 2023-08-23
Payer: MEDICARE

## 2023-08-23 VITALS
SYSTOLIC BLOOD PRESSURE: 118 MMHG | BODY MASS INDEX: 22.19 KG/M2 | WEIGHT: 120.6 LBS | OXYGEN SATURATION: 97 % | HEART RATE: 77 BPM | DIASTOLIC BLOOD PRESSURE: 74 MMHG | HEIGHT: 62 IN

## 2023-08-23 DIAGNOSIS — E78.2 MIXED HYPERLIPIDEMIA: ICD-10-CM

## 2023-08-23 DIAGNOSIS — R94.31 ABNORMAL EKG: ICD-10-CM

## 2023-08-23 DIAGNOSIS — I10 PRIMARY HYPERTENSION: Primary | ICD-10-CM

## 2023-08-23 PROCEDURE — 99214 OFFICE O/P EST MOD 30 MIN: CPT | Performed by: INTERNAL MEDICINE

## 2023-08-23 NOTE — PROGRESS NOTES
[facility] Cardiology  Office Progress Note  Barbara Hernandez  1950  [address] [facility] [address]       Visit Date: 08/23/23    PCP: Fiordaliza Suh MD  [address] [facility] [address]  [facility] [address]    IDENTIFICATION: A 72 y.o. 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  History of cardiomyopathy:  Left heart catheterization, 06/06/2002:  Normal coronaries  LVEF 55% to 60%.   8/21/2020 2D echo: LVEF =55%.  Mild MR.  Mild TR.  LV systolic function normal.  LV diastolic function (grade 1) impaired relaxation.  RVSP due to TR = 23.  Palpitations:  Holter monitor, May 2002:  Sinus rhythm with rare PAC's, PVC's.  Short NH interval.  Dyslipidemia.-Statin/Nexletol/red yeast rice intolerant  10/18 241/58/71/175  7/30/20 257/65/73.8/170  12/22   8/22 [facility] 96-   D  [id number] multiple echo, muga   5/16 SE per [facility] EF 55% mild AI, mild diastolic dysfxn  2019 viral illness NOS ? Parvovirus (multiorgan system- not hospitalized.)  8/22 covid with prolonged fatigue  Hypothyroidism.  Irritable bowel syndrome/spastic colon.  Herpes zoster, October 2008, (affecting left ear).  Lichen sclerosis (affecting vaginal tissue).  Fibromyalgia.  Surgeries:  Right hip replacement, 11/15/2010.  Septoplasty, March 2011.  Left hip replacement, November 2012.  10/22 mechanical fall elbow fx sx        CC:   Chief Complaint   Patient presents with    Essential hypertension       Allergies  Allergies   Allergen Reactions    Sulfa Antibiotics Rash    Bacitracin-Polymyxin B Rash    Cefdinir GI Intolerance    Celecoxib GI Intolerance    Ciprofloxacin GI Intolerance    Clarithromycin GI Intolerance    Doxycycline GI Intolerance    Erythromycin Nausea Only and Other (See Comments)     RED EYES WHEN USING EYE DROP     Levofloxacin GI Intolerance    Lortab [Hydrocodone-Acetaminophen] GI Intolerance    Lyrica [Pregabalin]  Unknown - Low Severity     PREVIOUSLY CHARTED, PATIENT DENIES ALLERGY     Nexium [Esomeprazole] Myalgia    Omeprazole Myalgia    Phenergan [Promethazine Hcl] Delirium    Statins Headache    Tramadol GI Intolerance and Dizziness       Current Medications    Current Outpatient Medications:     acetaminophen (TYLENOL) 500 MG tablet, Take 2 tablets by mouth every night at bedtime., Disp: , Rfl:     B Complex Vitamins (VITAMIN B COMPLEX PO), Take 1 tablet by mouth Daily., Disp: , Rfl:     CALCIUM PO, Take 1 tablet by mouth 3 (Three) Times a Week., Disp: , Rfl:     Cholecalciferol (VITAMIN D3) 3000 UNITS tablet, Take 1 tablet by mouth Daily., Disp: , Rfl:     Coenzyme Q10 (CO Q-10) 100 MG capsule, Take 200 mg by mouth Daily., Disp: , Rfl:     diclofenac (VOLTAREN) 1 % gel gel, Apply 1 application topically to the appropriate area as directed 4 (Four) Times a Day As Needed (JOINT PAIN)., Disp: , Rfl:     dicyclomine (BENTYL) 10 MG capsule, Take 1 capsule by mouth every 8 hours as needed for bowel spasm or diarrhea (Patient taking differently: Take 1 capsule by mouth Every 8 (Eight) Hours As Needed (ABDOMINAL CRAMPING). Take 1 capsule by mouth every 8 hours as needed for bowel spasm or diarrhea), Disp: 30 capsule, Rfl: 0    estradiol (Vagifem) 10 MCG tablet vaginal tablet, Insert 1 tablet into the vagina 2 (Two) Times a Week., Disp: 8 tablet, Rfl: 12    fluconazole (Diflucan) 150 MG tablet, Take 1 tablet by mouth Daily. 1 po now;  repeat 1 po on Day #4, Disp: 2 tablet, Rfl: 2    gabapentin (NEURONTIN) 100 MG capsule, Take 1 capsule by mouth every night at bedtime. (Patient taking differently: Take 2 capsules by mouth every night at bedtime.), Disp: 30 capsule, Rfl: 5    Lutein 20 MG tablet, Take 1 tablet by mouth Daily., Disp: , Rfl:     meloxicam (MOBIC) 7.5 MG tablet, TAKE 1 TABLET EVERY DAY AS NEEDED FOR PAIN AND INFLAMMATION (Patient taking differently: Take 1 tablet by mouth Daily As Needed. TAKE 1 TABLET EVERY DAY AS  "NEEDED FOR PAIN AND INFLAMMATION), Disp: 30 tablet, Rfl: 3    multivitamin with minerals tablet tablet, Take 1 tablet by mouth Daily., Disp: , Rfl:     Omega-3 Fatty Acids (FISH OIL) 1200 MG capsule capsule, Take 2 capsules by mouth Daily., Disp: , Rfl:     Synthroid 75 MCG tablet, Take 75 mcg by mouth Daily., Disp: , Rfl:     vitamin E 400 UNIT capsule, Take 400 Units by mouth Daily., Disp: , Rfl:       History of Present Illness   Barbara Hernandez is a 72 y.o. year old female here for follow up.    Unfortunately Mrs Hernandez had yet another fall injuring her right elbow requiring mechanical surgical  She is however able to endure the pain that she went through and is back to exercise regimen.  She states that she has increased her exercise tolerance over the last few weeks with simple walk    OBJECTIVE:  Vitals:    08/23/23 1349   BP: 118/74   BP Location: Left arm   Patient Position: Sitting   Pulse: 77   SpO2: 97%   Weight: 54.7 kg (120 lb 9.6 oz)   Height: 157.5 cm (62\")     Body mass index is 22.06 kg/mý.    Constitutional:       Appearance: Healthy appearance. Not in distress.   Neck:      Vascular: No JVR. JVD normal.   Pulmonary:      Effort: Pulmonary effort is normal.      Breath sounds: Normal breath sounds. No wheezing. No rhonchi. No rales.   Chest:      Chest wall: Not tender to palpatation.   Cardiovascular:      PMI at left midclavicular line. Normal rate. Regular rhythm. Normal S1. Normal S2.       Murmurs: There is no murmur.      No gallop.  No click. No rub.   Pulses:     Intact distal pulses.   Edema:     Peripheral edema absent.   Abdominal:      General: Bowel sounds are normal.      Palpations: Abdomen is soft.      Tenderness: There is no abdominal tenderness.   Musculoskeletal: Normal range of motion.         General: No tenderness. Skin:     General: Skin is warm and dry.   Neurological:      General: No focal deficit present.      Mental Status: Alert and oriented to person, place and " time.       Diagnostic Data:  Procedures      ASSESSMENT:   Diagnosis Plan   1. Primary hypertension        2. Abnormal EKG        3. Mixed hyperlipidemia              PLAN:  Hypertension controlled current      Abnormal EKG unchanged from last 5 years    Dyslipidemia intolerant to all attempted therapies.  Patient will continue diet and exercise with reevaluation lipid profile in 2 months.  She is reticent to initiate injectable agents.  Discussed with her potentially PCSK9 oral agent may be available within the next 1 year        Jonathon Bermudez MD, FACC

## 2023-09-14 ENCOUNTER — OFFICE VISIT (OUTPATIENT)
Dept: OBSTETRICS AND GYNECOLOGY | Facility: CLINIC | Age: 73
End: 2023-09-14
Payer: MEDICARE

## 2023-09-14 VITALS
HEIGHT: 62 IN | SYSTOLIC BLOOD PRESSURE: 112 MMHG | DIASTOLIC BLOOD PRESSURE: 80 MMHG | BODY MASS INDEX: 22.08 KG/M2 | WEIGHT: 120 LBS

## 2023-09-14 DIAGNOSIS — N95.2 ATROPHY OF VAGINA: Primary | ICD-10-CM

## 2023-09-14 DIAGNOSIS — N90.4 LICHEN SCLEROSUS OF FEMALE GENITALIA: ICD-10-CM

## 2023-09-14 DIAGNOSIS — Z09 FOLLOW-UP EXAM: ICD-10-CM

## 2023-09-14 NOTE — PROGRESS NOTES
Chief Complaint  Barbara Hernandez is a 73 y.o.  female presenting for Follow-up (Follow-up acute vulvitis.  Patient c/o itching and took one dose of Diflucan yesterday.)    History of Present Illness  Barbara is a pleasant 74yo woman, , here for short-term follow up of acute vulvovaginitis.  She has vaginal atrophy and external Lichen Sclerosus.    She took Diflucan yesterday because of itching.  Overall, she feels better.    The following portions of the patient's history were reviewed and updated as appropriate: allergies, current medications, past family history, past medical history, past social history, past surgical history, and problem list.    Allergies   Allergen Reactions    Sulfa Antibiotics Rash    Bacitracin-Polymyxin B Rash    Cefdinir GI Intolerance    Celecoxib GI Intolerance    Ciprofloxacin GI Intolerance    Clarithromycin GI Intolerance    Doxycycline GI Intolerance    Erythromycin Nausea Only and Other (See Comments)     RED EYES WHEN USING EYE DROP     Levofloxacin GI Intolerance    Lortab [Hydrocodone-Acetaminophen] GI Intolerance    Lyrica [Pregabalin] Unknown - Low Severity     PREVIOUSLY CHARTED, PATIENT DENIES ALLERGY     Nexium [Esomeprazole] Myalgia    Omeprazole Myalgia    Phenergan [Promethazine Hcl] Delirium    Statins Headache    Tramadol GI Intolerance and Dizziness         Current Outpatient Medications:     acetaminophen (TYLENOL) 500 MG tablet, Take 2 tablets by mouth every night at bedtime., Disp: , Rfl:     B Complex Vitamins (VITAMIN B COMPLEX PO), Take 1 tablet by mouth Daily., Disp: , Rfl:     CALCIUM PO, Take 1 tablet by mouth 3 (Three) Times a Week., Disp: , Rfl:     Cholecalciferol (VITAMIN D3) 3000 UNITS tablet, Take 1 tablet by mouth Daily., Disp: , Rfl:     Coenzyme Q10 (CO Q-10) 100 MG capsule, Take 200 mg by mouth Daily., Disp: , Rfl:     diclofenac (VOLTAREN) 1 % gel gel, Apply 1 application topically to the appropriate area as directed 4 (Four) Times a  Day As Needed (JOINT PAIN)., Disp: , Rfl:     dicyclomine (BENTYL) 10 MG capsule, Take 1 capsule by mouth every 8 hours as needed for bowel spasm or diarrhea (Patient taking differently: Take 1 capsule by mouth Every 8 (Eight) Hours As Needed (ABDOMINAL CRAMPING). Take 1 capsule by mouth every 8 hours as needed for bowel spasm or diarrhea), Disp: 30 capsule, Rfl: 0    estradiol (Vagifem) 10 MCG tablet vaginal tablet, Insert 1 tablet into the vagina 2 (Two) Times a Week., Disp: 8 tablet, Rfl: 12    fluconazole (Diflucan) 150 MG tablet, Take 1 tablet by mouth Daily. 1 po now;  repeat 1 po on Day #4, Disp: 2 tablet, Rfl: 2    gabapentin (NEURONTIN) 100 MG capsule, Take 1 capsule by mouth every night at bedtime. (Patient taking differently: Take 2 capsules by mouth every night at bedtime.), Disp: 30 capsule, Rfl: 5    Lutein 20 MG tablet, Take 1 tablet by mouth Daily., Disp: , Rfl:     meloxicam (MOBIC) 7.5 MG tablet, TAKE 1 TABLET EVERY DAY AS NEEDED FOR PAIN AND INFLAMMATION (Patient taking differently: Take 1 tablet by mouth Daily As Needed. TAKE 1 TABLET EVERY DAY AS NEEDED FOR PAIN AND INFLAMMATION), Disp: 30 tablet, Rfl: 3    multivitamin with minerals tablet tablet, Take 1 tablet by mouth Daily., Disp: , Rfl:     Omega-3 Fatty Acids (FISH OIL) 1200 MG capsule capsule, Take 2 capsules by mouth Daily., Disp: , Rfl:     Synthroid 75 MCG tablet, Take 75 mcg by mouth Daily., Disp: , Rfl:     vitamin E 400 UNIT capsule, Take 400 Units by mouth Daily., Disp: , Rfl:     Past Medical History:   Diagnosis Date    Allergic     Anxiety     Back pain     osteoarthritis    Chronic diarrhea     COVID-19 09/22/2022    ETD (eustachian tube dysfunction)     Fibromyalgia     Fibromyositis     GERD (gastroesophageal reflux disease)     H/O cardiomyopathy     Headache     History of transfusion 2011    AFTER HIP REPLACEMENT, SELF DONATED, NO REACTION    HLD (hyperlipidemia)     Hypothyroidism     IBS (irritable bowel syndrome)      "Lichen sclerosus et atrophicus of the vulva     Menopause     Osteoarthritis     Osteopenia     Parvovirus B19 infection 2019    PONV (postoperative nausea and vomiting)     Preventative health care     · Age-appropriate counseling discussed. Will notify patient of lab result    Staph infection 06/2022    ORAL ANTIBIOTICS    Urinary tract infection         Past Surgical History:   Procedure Laterality Date    COLONOSCOPY      CRYOTHERAPY      cervix    ELBOW OPEN REDUCTION INTERNAL FIXATION Right 10/21/2022    Procedure: OPEN REDUCTION INTERNAL FIXATION RIGHT OLECRANNON FRACTURE AND OPEN REDUCTION INTERNAL FIXATION RIGHT RADIAL HEAD FRACTURE;  Surgeon: Willam Valle MD;  Location: [facility];  Service: Orthopedics;  Laterality: Right;    EYE SURGERY Right     cyst removal right eyelid    SEPTOPLASTY      TOTAL HIP ARTHROPLASTY Bilateral     2010 total right hip Dr. Lovelace; 2012 total Left hip       Objective  /80   Ht 157.5 cm (62\")   Wt 54.4 kg (120 lb)   LMP  (LMP Unknown) Comment: postmenopausal  Breastfeeding No   BMI 21.95 kg/m²     Physical Exam  Vitals and nursing note reviewed. Exam conducted with a chaperone present.   Constitutional:       General: She is not in acute distress.     Appearance: Normal appearance. She is not ill-appearing.   Abdominal:      General: Bowel sounds are normal.      Palpations: Abdomen is soft. There is no mass.      Tenderness: There is no abdominal tenderness.   Genitourinary:     General: Normal vulva.      Labia:         Right: No rash, tenderness or lesion.         Left: No rash, tenderness or lesion.       Vagina: Normal. No erythema.      Cervix: No cervical motion tenderness, discharge, lesion or erythema.      Uterus: Normal. Not enlarged and not tender.       Adnexa: Right adnexa normal and left adnexa normal.        Right: No mass or tenderness.          Left: No mass or tenderness.        Rectum: Normal.      Comments: No edema now.  Tissue is pink.  " Lichen appearance is much better!  And the vaginal atrophy appears resolved (now pink and appears to be well estrogenized).    Anus appears wnl.  (No rectal exam performed.)  Skin:     General: Skin is warm and dry.   Neurological:      Mental Status: She is alert and oriented to person, place, and time.   Psychiatric:         Mood and Affect: Mood normal.         Behavior: Behavior normal.       Assessment/Plan   Diagnoses and all orders for this visit:    1. Atrophy of vagina (Primary)    2. Lichen sclerosus of female genitalia    3. Follow-up exam    Much improved exam for both atrophy & LS.  To cont med for both 2x/ wk at HS.  RTC for annual.    Procedures            Return for Annual physical.    Shanita Degroot, APRN  09/14/2023

## 2024-02-09 NOTE — PATIENT INSTRUCTIONS
1. ORTHO- tarsal tunnel. Discussed that her symptoms and results suggest that she actually had a tendontitis in her feet that was pinching a nerve and causing referred numbness, most c/w tarsal tunnel. Education provided re neuroma/ ganglion cysts.  2. RESP- bening positional vertigo. Discussed that this was caused by the head trauma with naif pathophysiology describes. Will treat with meclizine. Pt instructed in extinguishing maneuver to do at home.  3. RECHECK- prn   Will be notified at upcoming visit

## 2024-03-07 ENCOUNTER — TELEPHONE (OUTPATIENT)
Dept: OBSTETRICS AND GYNECOLOGY | Facility: CLINIC | Age: 74
End: 2024-03-07
Payer: MEDICARE

## 2024-03-07 NOTE — TELEPHONE ENCOUNTER
Patient states that she has had an allergic reaction to fluconazole (hives).  She has taken Zyrtec/antihistamine to treat the hives.  She is very concerned because she states that she is unable to use vaginal yeast medications.    I have told her that Jenny is out of the office until Monday, but that as soon as we get a comment from her, someone will call her.  She has multiple drug allergies.  Fluconazole has been added to her drug allergy list.

## 2024-03-07 NOTE — TELEPHONE ENCOUNTER
Pt is calling and seems to think that when she takes the Fluconazole she breaks out into hives. Is there any other yeast infection medication that she can take? If I understood her correctly she said she has lichen sclerosus (?) and is very sensitive to any type of cream inserted into the vaginal area.    Please advise    Thank you

## 2024-03-11 NOTE — TELEPHONE ENCOUNTER
Pt feels fine now, and not symptomatic.  But, she is troubled re: what will happen the next time she gets yeast.  How can we treat it at that time??  I will research and consult with one of our physicians about other options.  (Please note her allergies x 17.)

## 2024-03-18 ENCOUNTER — TELEPHONE (OUTPATIENT)
Dept: OBSTETRICS AND GYNECOLOGY | Facility: CLINIC | Age: 74
End: 2024-03-18
Payer: MEDICARE

## 2024-03-18 RX ORDER — ESTRADIOL 10 UG/1
1 INSERT VAGINAL 3 TIMES WEEKLY
Qty: 12 TABLET | Refills: 12 | Status: SHIPPED | OUTPATIENT
Start: 2024-03-18

## 2024-03-18 NOTE — TELEPHONE ENCOUNTER
Patient calls today to remind Jenny about looking for an alternative to fluconazole.  Patient states that this was discussed previously and that she was told that Jenny would consult with someone else.  The patient states that she is not infected at this time, but (1) wants to know if this has been done, and (2) wants to be informed what her alternative would be.    Also requesting a new prescription for Vagifem.  She states that she requested to decrease to 2 X week, and prescription was sent in with those directions.  States that her lichen sclerosus became worse at that dose, and she has increased to 3 X week and is better.  Needs new prescription sent to [facility].  Uses 1% hydrocortisone cream externally for LS.  All other steroids caused issues.  Patient has multiple drug allergies/sensitivities.    Please advise.  Please send new prescription or send back to me so that I can do so.

## 2024-03-18 NOTE — TELEPHONE ENCOUNTER
PT CALLING HAVING ISSUES WITH MEDIC TION AND HAS QUESTIONS ABOUT IT AS WELL - PLEASE GIVE HER A CALL

## 2024-03-19 ENCOUNTER — TELEPHONE (OUTPATIENT)
Dept: OBSTETRICS AND GYNECOLOGY | Facility: CLINIC | Age: 74
End: 2024-03-19
Payer: MEDICARE

## 2024-03-19 NOTE — TELEPHONE ENCOUNTER
I have left a message on self-identifying voicemail letting Marible know that a new prescription was sent to her pharmacy.  I have also let her know that Jenny sent a message to one of our physicians regarding alternative treatment for vaginal yeast infections.  She will be informed when Jenny receives this information.

## 2024-03-19 NOTE — TELEPHONE ENCOUNTER
Jenny has spoken with the patient to let her know that she needs to have allergy testing done per Dr. Nuñez.  I have informed the patient that allergy testing is not done in our office.      Patient states that she is going to try RepHresh to improve vaginal pH and will call if she has any further problems.

## 2024-03-26 ENCOUNTER — OFFICE VISIT (OUTPATIENT)
Dept: OBSTETRICS AND GYNECOLOGY | Facility: CLINIC | Age: 74
End: 2024-03-26
Payer: MEDICARE

## 2024-03-26 VITALS
HEIGHT: 62 IN | WEIGHT: 124.2 LBS | SYSTOLIC BLOOD PRESSURE: 124 MMHG | DIASTOLIC BLOOD PRESSURE: 70 MMHG | BODY MASS INDEX: 22.86 KG/M2

## 2024-03-26 DIAGNOSIS — R10.2 VULVAR PAIN: Primary | ICD-10-CM

## 2024-03-26 PROCEDURE — 1160F RVW MEDS BY RX/DR IN RCRD: CPT | Performed by: NURSE PRACTITIONER

## 2024-03-26 PROCEDURE — 1159F MED LIST DOCD IN RCRD: CPT | Performed by: NURSE PRACTITIONER

## 2024-03-26 PROCEDURE — 99213 OFFICE O/P EST LOW 20 MIN: CPT | Performed by: NURSE PRACTITIONER

## 2024-03-26 NOTE — PROGRESS NOTES
Chief Complaint  Barbara Hernandez is a 73 y.o.  female presenting for Follow-up (Patient took fluconazole one month ago and developed hives.  Did have improvement in vaginal symptoms.  Now c/o vulvar pain.  No discharge.  )    History of Present Illness  Barbara is a 74yo woman, , here for a problem visit.  She was treated for yeast ~ 1 month ago, and the treatment helped her symptoms.    But, she developed hives from the medication.  (Now her allergies number:  17)  We have discussed allergy testing;  she is reluctant to do so at this time.    She has begun to have in the past week some vulvar pain, burning, slight itching.    She does not feel that it is the same as when she has had yeast infections in the past.    She questions if it could be a vulvodynia.  She is willing to have OneSwab testing to rule out infections.  She has not used any new products which would have caused any irritation.      The following portions of the patient's history were reviewed and updated as appropriate: allergies, current medications, past family history, past medical history, past social history, past surgical history, and problem list.    Allergies   Allergen Reactions    Sulfa Antibiotics Rash    Fluconazole Hives    Bacitracin-Polymyxin B Rash    Cefdinir GI Intolerance    Celecoxib GI Intolerance    Ciprofloxacin GI Intolerance    Clarithromycin GI Intolerance    Doxycycline GI Intolerance    Erythromycin Nausea Only and Other (See Comments)     RED EYES WHEN USING EYE DROP     Levofloxacin GI Intolerance    Lortab [Hydrocodone-Acetaminophen] GI Intolerance    Lyrica [Pregabalin] Unknown - Low Severity     PREVIOUSLY CHARTED, PATIENT DENIES ALLERGY     Nexium [Esomeprazole] Myalgia    Omeprazole Myalgia    Phenergan [Promethazine Hcl] Delirium    Statins Headache    Tramadol GI Intolerance and Dizziness         Current Outpatient Medications:     acetaminophen (TYLENOL) 500 MG tablet, Take 2 tablets by mouth  every night at bedtime., Disp: , Rfl:     B Complex Vitamins (VITAMIN B COMPLEX PO), Take 1 tablet by mouth Daily., Disp: , Rfl:     CALCIUM PO, Take 1 tablet by mouth 3 (Three) Times a Week., Disp: , Rfl:     Cholecalciferol (VITAMIN D3) 3000 UNITS tablet, Take 1 tablet by mouth Daily., Disp: , Rfl:     Coenzyme Q10 (CO Q-10) 100 MG capsule, Take 200 mg by mouth Daily., Disp: , Rfl:     diclofenac (VOLTAREN) 1 % gel gel, Apply 1 application topically to the appropriate area as directed 4 (Four) Times a Day As Needed (JOINT PAIN)., Disp: , Rfl:     dicyclomine (BENTYL) 10 MG capsule, Take 1 capsule by mouth every 8 hours as needed for bowel spasm or diarrhea (Patient taking differently: Take 1 capsule by mouth Every 8 (Eight) Hours As Needed (ABDOMINAL CRAMPING). Take 1 capsule by mouth every 8 hours as needed for bowel spasm or diarrhea), Disp: 30 capsule, Rfl: 0    estradiol (Vagifem) 10 MCG tablet vaginal tablet, Insert 1 tablet into the vagina 3 (Three) Times a Week., Disp: 12 tablet, Rfl: 12    gabapentin (NEURONTIN) 100 MG capsule, Take 1 capsule by mouth every night at bedtime. (Patient taking differently: Take 2 capsules by mouth every night at bedtime.), Disp: 30 capsule, Rfl: 5    Lutein 20 MG tablet, Take 1 tablet by mouth Daily., Disp: , Rfl:     meloxicam (MOBIC) 7.5 MG tablet, TAKE 1 TABLET EVERY DAY AS NEEDED FOR PAIN AND INFLAMMATION (Patient taking differently: Take 1 tablet by mouth Daily As Needed. TAKE 1 TABLET EVERY DAY AS NEEDED FOR PAIN AND INFLAMMATION), Disp: 30 tablet, Rfl: 3    multivitamin with minerals tablet tablet, Take 1 tablet by mouth Daily., Disp: , Rfl:     Omega-3 Fatty Acids (FISH OIL) 1200 MG capsule capsule, Take 2 capsules by mouth Daily., Disp: , Rfl:     Synthroid 75 MCG tablet, Take 75 mcg by mouth Daily., Disp: , Rfl:     vitamin E 400 UNIT capsule, Take 400 Units by mouth Daily., Disp: , Rfl:     Past Medical History:   Diagnosis Date    Allergic     Anxiety     Back  "pain     osteoarthritis    Chronic diarrhea     COVID-19 09/22/2022    ETD (eustachian tube dysfunction)     Fibromyalgia     Fibromyositis     GERD (gastroesophageal reflux disease)     H/O cardiomyopathy     Headache     History of transfusion 2011    AFTER HIP REPLACEMENT, SELF DONATED, NO REACTION    HLD (hyperlipidemia)     Hypothyroidism     IBS (irritable bowel syndrome)     Lichen sclerosus et atrophicus of the vulva     Menopause     Osteoarthritis     Osteopenia     Parvovirus B19 infection 2019    PONV (postoperative nausea and vomiting)     Preventative health care     · Age-appropriate counseling discussed. Will notify patient of lab result    Staph infection 06/2022    ORAL ANTIBIOTICS    Urinary tract infection         Past Surgical History:   Procedure Laterality Date    COLONOSCOPY      CRYOTHERAPY      cervix    ELBOW OPEN REDUCTION INTERNAL FIXATION Right 10/21/2022    Procedure: OPEN REDUCTION INTERNAL FIXATION RIGHT OLECRANNON FRACTURE AND OPEN REDUCTION INTERNAL FIXATION RIGHT RADIAL HEAD FRACTURE;  Surgeon: Willam Valle MD;  Location: [facility];  Service: Orthopedics;  Laterality: Right;    EYE SURGERY Right     cyst removal right eyelid    SEPTOPLASTY      TOTAL HIP ARTHROPLASTY Bilateral     2010 total right hip Dr. Lovelace; 2012 total Left hip       Objective  /70   Ht 157.5 cm (62\")   Wt 56.3 kg (124 lb 3.2 oz)   LMP  (LMP Unknown) Comment: postmenopausal  Breastfeeding No   BMI 22.72 kg/m²     Physical Exam  Vitals and nursing note reviewed. Exam conducted with a chaperone present.   Constitutional:       Appearance: Normal appearance.   Abdominal:      Palpations: Abdomen is soft. There is no mass.      Tenderness: There is no abdominal tenderness.   Genitourinary:     General: Normal vulva.      Labia:         Right: Tenderness present. No rash or lesion.         Left: Tenderness present. No rash or lesion.       Vagina: Normal. No vaginal discharge or erythema.      " Rectum: Normal.      Comments: There is mild erythema and slight edema around the clitoral area.  The vaginal mucosa is pink;  small amt white flocculent discharge.  OneSwab taken.    Skin:     General: Skin is warm and dry.   Neurological:      Mental Status: She is alert and oriented to person, place, and time.   Psychiatric:         Mood and Affect: Mood normal.         Behavior: Behavior normal.         Assessment/Plan   Diagnoses and all orders for this visit:    1. Vulvar pain (Primary)  -     OneSwab - Swab, Vagina; Future    Couns re: comfort measures / increasing dryness / avoiding irritants.    Procedures    Return if symptoms worsen or fail to improve.    Shanita Degroot, APRN  03/26/2024

## 2024-08-29 ENCOUNTER — OFFICE VISIT (OUTPATIENT)
Dept: CARDIOLOGY | Facility: CLINIC | Age: 74
End: 2024-08-29
Payer: MEDICARE

## 2024-08-29 VITALS
OXYGEN SATURATION: 98 % | DIASTOLIC BLOOD PRESSURE: 76 MMHG | HEIGHT: 62 IN | BODY MASS INDEX: 22.08 KG/M2 | WEIGHT: 120 LBS | SYSTOLIC BLOOD PRESSURE: 140 MMHG | HEART RATE: 73 BPM

## 2024-08-29 DIAGNOSIS — E78.2 MIXED HYPERLIPIDEMIA: ICD-10-CM

## 2024-08-29 DIAGNOSIS — I25.10 CORONARY ARTERY DISEASE INVOLVING NATIVE CORONARY ARTERY OF NATIVE HEART WITHOUT ANGINA PECTORIS: Primary | ICD-10-CM

## 2024-08-29 DIAGNOSIS — I10 PRIMARY HYPERTENSION: ICD-10-CM

## 2024-08-29 PROCEDURE — 99214 OFFICE O/P EST MOD 30 MIN: CPT | Performed by: INTERNAL MEDICINE

## 2024-08-29 RX ORDER — TRIAMCINOLONE ACETONIDE 1 MG/G
CREAM TOPICAL
COMMUNITY

## 2024-08-29 NOTE — PROGRESS NOTES
"[facility] Cardiology  Office Progress Note  Barbara Hernandez  1950  [address] [facility] [address]       Visit Date: 08/29/24    PCP: Fiordaliza Suh MD  [address]  [facility] [address]    IDENTIFICATION: A 73 y.o. 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  History of \"cardiomyopathy\"  2468-2238 multiple echo, muga   Left heart catheterization, 06/06/2002:  Normal coronaries  8/21/2020 2D echo: LVEF =55%.  Mild MR.  Mild TR.  LV systolic function normal.  LV diastolic function (grade 1) impaired relaxation.  RVSP due to TR = 23.  Palpitations:  Holter monitor, May 2002:  Sinus rhythm with rare PAC's, PVC's.  Short MT interval anterior T inversions   Dyslipidemia.-Statin/Nexletol/red yeast rice intolerant  12/22   8/22 CCS 96-   VHD 2019 viral illness NOS ? Parvovirus (multiorgan system- not hospitalized.)  8/22 covid with prolonged fatigue  Hypothyroidism.  Irritable bowel syndrome/spastic colon.  Herpes zoster, October 2008, (affecting left ear).  Lichen sclerosis (affecting vaginal tissue).  Fibromyalgia.  Surgeries:  Right hip replacement, 11/15/2010.  Septoplasty, March 2011.  Left hip replacement, November 2012.  10/22 mechanical fall elbow fx sx        CC:   Chief Complaint   Patient presents with    Primary hypertension     Pt states she is concerned with her ongoing fatigue.       Allergies  Allergies   Allergen Reactions    Sulfa Antibiotics Rash    Fluconazole Hives    Bacitracin-Polymyxin B Rash    Cefdinir GI Intolerance    Celecoxib GI Intolerance    Ciprofloxacin GI Intolerance    Clarithromycin GI Intolerance    Doxycycline GI Intolerance    Erythromycin Nausea Only and Other (See Comments)     RED EYES WHEN USING EYE DROP     Levofloxacin GI Intolerance    Lortab [Hydrocodone-Acetaminophen] GI Intolerance    Lyrica [Pregabalin] " Unknown - Low Severity     PREVIOUSLY CHARTED, PATIENT DENIES ALLERGY     Nexium [Esomeprazole] Myalgia    Omeprazole Myalgia    Phenergan [Promethazine Hcl] Delirium    Statins Headache    Tramadol GI Intolerance and Dizziness       Current Medications    Current Outpatient Medications:     acetaminophen (TYLENOL) 500 MG tablet, Take 2 tablets by mouth every night at bedtime., Disp: , Rfl:     B Complex Vitamins (VITAMIN B COMPLEX PO), Take 1 tablet by mouth Daily., Disp: , Rfl:     Cholecalciferol (VITAMIN D3) 3000 UNITS tablet, Take 1 tablet by mouth Daily., Disp: , Rfl:     Coenzyme Q10 (CO Q-10) 100 MG capsule, Take 200 mg by mouth Daily., Disp: , Rfl:     diclofenac (VOLTAREN) 1 % gel gel, Apply 1 application topically to the appropriate area as directed 4 (Four) Times a Day As Needed (JOINT PAIN)., Disp: , Rfl:     dicyclomine (BENTYL) 10 MG capsule, Take 1 capsule by mouth every 8 hours as needed for bowel spasm or diarrhea (Patient taking differently: Take 1 capsule by mouth Every 8 (Eight) Hours As Needed (ABDOMINAL CRAMPING). Take 1 capsule by mouth every 8 hours as needed for bowel spasm or diarrhea), Disp: 30 capsule, Rfl: 0    estradiol (Vagifem) 10 MCG tablet vaginal tablet, Insert 1 tablet into the vagina 3 (Three) Times a Week., Disp: 12 tablet, Rfl: 12    gabapentin (NEURONTIN) 100 MG capsule, Take 1 capsule by mouth every night at bedtime. (Patient taking differently: Take 2 capsules by mouth every night at bedtime.), Disp: 30 capsule, Rfl: 5    meloxicam (MOBIC) 7.5 MG tablet, TAKE 1 TABLET EVERY DAY AS NEEDED FOR PAIN AND INFLAMMATION (Patient taking differently: Take 1 tablet by mouth Daily As Needed. TAKE 1 TABLET EVERY DAY AS NEEDED FOR PAIN AND INFLAMMATION), Disp: 30 tablet, Rfl: 3    Omega-3 Fatty Acids (FISH OIL) 1200 MG capsule capsule, Take 2 capsules by mouth Daily., Disp: , Rfl:     Synthroid 75 MCG tablet, Take 1 tablet by mouth Daily., Disp: , Rfl:     triamcinolone (KENALOG) 0.1 %  "cream, , Disp: , Rfl:     vitamin E 400 UNIT capsule, Take 1 capsule by mouth Daily., Disp: , Rfl:       History of Present Illness   Barbara Hernandez is a 73 y.o. year old female here for follow up.  She has had yet another episode of COVID and states she has had progressive fatigue      OBJECTIVE:  Vitals:    08/29/24 1541   BP: 140/76   BP Location: Left arm   Patient Position: Sitting   Cuff Size: Adult   Pulse: 73   SpO2: 98%   Weight: 54.4 kg (120 lb)   Height: 157.5 cm (62.01\")       Body mass index is 21.94 kg/m².    Constitutional:       Appearance: Healthy appearance. Not in distress.   Neck:      Vascular: No JVR. JVD normal.   Pulmonary:      Effort: Pulmonary effort is normal.      Breath sounds: Normal breath sounds. No wheezing. No rhonchi. No rales.   Chest:      Chest wall: Not tender to palpatation.   Cardiovascular:      PMI at left midclavicular line. Normal rate. Regular rhythm. Normal S1. Normal S2.       Murmurs: There is no murmur.      No gallop.  No click. No rub.   Pulses:     Intact distal pulses.   Edema:     Peripheral edema absent.   Abdominal:      General: Bowel sounds are normal.      Palpations: Abdomen is soft.      Tenderness: There is no abdominal tenderness.   Musculoskeletal: Normal range of motion.         General: No tenderness. Skin:     General: Skin is warm and dry.   Neurological:      General: No focal deficit present.      Mental Status: Alert and oriented to person, place and time.         Diagnostic Data:  Procedures      ASSESSMENT:   Diagnosis Plan   1. Coronary artery disease involving native coronary artery of native heart without angina pectoris        2. Primary hypertension        3. Mixed hyperlipidemia                PLAN:  Cad as manifested by elevated Cardiac calcium score recommended trial of Leqvio    Hypertension controlled current      Abnormal EKG unchanged from last 5 years    Dyslipidemia intolerant to all attempted therapies.  Trial of " Claus Bermudez MD, FACC

## 2024-08-30 ENCOUNTER — SPECIALTY PHARMACY (OUTPATIENT)
Dept: CARDIOLOGY | Facility: CLINIC | Age: 74
End: 2024-08-30
Payer: MEDICARE

## 2024-09-05 ENCOUNTER — TELEPHONE (OUTPATIENT)
Dept: CARDIOLOGY | Facility: CLINIC | Age: 74
End: 2024-09-05
Payer: MEDICARE

## 2024-09-05 NOTE — TELEPHONE ENCOUNTER
Left pt a voice mail regarding leqvio pending insurance authorization.  [facility] is also following and assisting approval . No further questions at this time.

## 2024-09-09 PROBLEM — I25.119 CORONARY ARTERY DISEASE INVOLVING NATIVE HEART WITH ANGINA PECTORIS: Status: ACTIVE | Noted: 2024-09-09

## 2024-09-17 ENCOUNTER — TELEPHONE (OUTPATIENT)
Dept: CARDIOLOGY | Facility: CLINIC | Age: 74
End: 2024-09-17
Payer: MEDICARE

## 2024-09-20 ENCOUNTER — TELEPHONE (OUTPATIENT)
Dept: CARDIOLOGY | Facility: CLINIC | Age: 74
End: 2024-09-20
Payer: MEDICARE

## 2024-09-26 ENCOUNTER — TELEPHONE (OUTPATIENT)
Dept: CARDIOLOGY | Facility: CLINIC | Age: 74
End: 2024-09-26
Payer: MEDICARE

## 2024-09-26 RX ORDER — DIPHENHYDRAMINE HYDROCHLORIDE 50 MG/ML
50 INJECTION INTRAMUSCULAR; INTRAVENOUS AS NEEDED
OUTPATIENT
Start: 2024-10-28

## 2024-09-26 RX ORDER — SODIUM CHLORIDE 9 MG/ML
20 INJECTION, SOLUTION INTRAVENOUS ONCE
OUTPATIENT
Start: 2024-10-28

## 2024-09-26 RX ORDER — MEPERIDINE HYDROCHLORIDE 25 MG/ML
25 INJECTION INTRAMUSCULAR; INTRAVENOUS; SUBCUTANEOUS AS NEEDED
OUTPATIENT
Start: 2024-10-28

## 2024-09-26 RX ORDER — PRAVASTATIN SODIUM 10 MG
10 TABLET ORAL EVERY OTHER DAY
Qty: 56 TABLET | Refills: 0 | Status: SHIPPED | OUTPATIENT
Start: 2024-09-26

## 2024-09-26 RX ORDER — FAMOTIDINE 10 MG/ML
20 INJECTION, SOLUTION INTRAVENOUS AS NEEDED
OUTPATIENT
Start: 2024-10-28

## 2024-11-19 ENCOUNTER — OFFICE VISIT (OUTPATIENT)
Age: 74
End: 2024-11-19
Payer: MEDICARE

## 2024-11-19 ENCOUNTER — LAB (OUTPATIENT)
Facility: HOSPITAL | Age: 74
End: 2024-11-19
Payer: MEDICARE

## 2024-11-19 VITALS
HEART RATE: 83 BPM | TEMPERATURE: 97.3 F | DIASTOLIC BLOOD PRESSURE: 72 MMHG | SYSTOLIC BLOOD PRESSURE: 122 MMHG | WEIGHT: 120.6 LBS | HEIGHT: 62 IN | BODY MASS INDEX: 22.19 KG/M2

## 2024-11-19 DIAGNOSIS — M19.041 PRIMARY OSTEOARTHRITIS OF BOTH HANDS: ICD-10-CM

## 2024-11-19 DIAGNOSIS — R53.82 CHRONIC FATIGUE: ICD-10-CM

## 2024-11-19 DIAGNOSIS — Z96.643 S/P HIP REPLACEMENT, BILATERAL: ICD-10-CM

## 2024-11-19 DIAGNOSIS — M19.042 PRIMARY OSTEOARTHRITIS OF BOTH HANDS: ICD-10-CM

## 2024-11-19 DIAGNOSIS — M54.2 NECK PAIN: Primary | ICD-10-CM

## 2024-11-19 DIAGNOSIS — H04.123 DRY MOUTH AND EYES: ICD-10-CM

## 2024-11-19 DIAGNOSIS — Z79.1 LONG TERM (CURRENT) USE OF NON-STEROIDAL ANTI-INFLAMMATORIES (NSAID): ICD-10-CM

## 2024-11-19 DIAGNOSIS — R68.2 DRY MOUTH AND EYES: ICD-10-CM

## 2024-11-19 DIAGNOSIS — G89.29 CHRONIC BILATERAL BACK PAIN, UNSPECIFIED BACK LOCATION: ICD-10-CM

## 2024-11-19 DIAGNOSIS — M79.7 FIBROMYALGIA: ICD-10-CM

## 2024-11-19 DIAGNOSIS — R76.8 POSITIVE ANA (ANTINUCLEAR ANTIBODY): ICD-10-CM

## 2024-11-19 DIAGNOSIS — M54.9 CHRONIC BILATERAL BACK PAIN, UNSPECIFIED BACK LOCATION: ICD-10-CM

## 2024-11-19 LAB
BILIRUB UR QL STRIP: NEGATIVE
CLARITY UR: CLEAR
COLOR UR: YELLOW
DEPRECATED RDW RBC AUTO: 41 FL (ref 37–54)
ERYTHROCYTE [DISTWIDTH] IN BLOOD BY AUTOMATED COUNT: 11.7 % (ref 12.3–15.4)
GLUCOSE UR STRIP-MCNC: NEGATIVE MG/DL
HCT VFR BLD AUTO: 42.5 % (ref 34–46.6)
HGB BLD-MCNC: 14.2 G/DL (ref 12–15.9)
HGB UR QL STRIP.AUTO: NEGATIVE
HOLD SPECIMEN: NORMAL
KETONES UR QL STRIP: NEGATIVE
LEUKOCYTE ESTERASE UR QL STRIP.AUTO: NEGATIVE
MCH RBC QN AUTO: 31.7 PG (ref 26.6–33)
MCHC RBC AUTO-ENTMCNC: 33.4 G/DL (ref 31.5–35.7)
MCV RBC AUTO: 94.9 FL (ref 79–97)
NITRITE UR QL STRIP: NEGATIVE
PH UR STRIP.AUTO: 6.5 [PH] (ref 5–8)
PLATELET # BLD AUTO: 262 10*3/MM3 (ref 140–450)
PMV BLD AUTO: 9.8 FL (ref 6–12)
PROT UR QL STRIP: NEGATIVE
RBC # BLD AUTO: 4.48 10*6/MM3 (ref 3.77–5.28)
SP GR UR STRIP: 1.01 (ref 1–1.03)
UROBILINOGEN UR QL STRIP: NORMAL
WBC NRBC COR # BLD AUTO: 6.08 10*3/MM3 (ref 3.4–10.8)

## 2024-11-19 PROCEDURE — 86160 COMPLEMENT ANTIGEN: CPT

## 2024-11-19 PROCEDURE — 1160F RVW MEDS BY RX/DR IN RCRD: CPT | Performed by: INTERNAL MEDICINE

## 2024-11-19 PROCEDURE — 36415 COLL VENOUS BLD VENIPUNCTURE: CPT

## 2024-11-19 PROCEDURE — 85027 COMPLETE CBC AUTOMATED: CPT

## 2024-11-19 PROCEDURE — 86235 NUCLEAR ANTIGEN ANTIBODY: CPT

## 2024-11-19 PROCEDURE — 80053 COMPREHEN METABOLIC PANEL: CPT

## 2024-11-19 PROCEDURE — 86225 DNA ANTIBODY NATIVE: CPT

## 2024-11-19 PROCEDURE — 86038 ANTINUCLEAR ANTIBODIES: CPT

## 2024-11-19 PROCEDURE — 85007 BL SMEAR W/DIFF WBC COUNT: CPT

## 2024-11-19 PROCEDURE — 86140 C-REACTIVE PROTEIN: CPT

## 2024-11-19 PROCEDURE — 99204 OFFICE O/P NEW MOD 45 MIN: CPT | Performed by: INTERNAL MEDICINE

## 2024-11-19 PROCEDURE — 86255 FLUORESCENT ANTIBODY SCREEN: CPT

## 2024-11-19 PROCEDURE — 1159F MED LIST DOCD IN RCRD: CPT | Performed by: INTERNAL MEDICINE

## 2024-11-19 PROCEDURE — 86431 RHEUMATOID FACTOR QUANT: CPT

## 2024-11-19 PROCEDURE — 81003 URINALYSIS AUTO W/O SCOPE: CPT

## 2024-11-19 PROCEDURE — 84443 ASSAY THYROID STIM HORMONE: CPT

## 2024-11-19 PROCEDURE — 85652 RBC SED RATE AUTOMATED: CPT

## 2024-11-19 NOTE — ASSESSMENT & PLAN NOTE
Currently on meloxicam and gabapentin with good results.  She can continue follow-up with her primary care provider for this.       
Known cervical spondylosis.  No current neurologic symptoms.       
Long-term fatigue but much worse since COVID in 2022.  No history of sleep apnea and the patient does not note that she is not overly sleepy.  No muscle weakness.  She does have a history of positive MARGARET 1: 160 speckled with negative CONNOR including negative SSA and SSB.    I see no clear rheumatologic reason for her fatigue.  I will recheck MARGARET, CONNOR panel, SSA and SSB, crithidia double-stranded DNA, and complement levels.  I will obtain a few other basic labs.  If those look good, I have little more to add.  It is possible that she has chronic fatigue associated with COVID.  This is reported.  I can see her in follow-up in about 6 weeks.  Orders:    TSH; Future    
Longstanding.  Negative SSA and SSB in the past.       
Meloxicam per PCP.  Risks of nonsteroidal anti-inflammatory drugs discussed including GI upset, GI bleeding, renal and hepatic risk, and the risks of cardiovascular disease.  Warned not to take with other NSAIDs including over-the-counter NSAIDs.         
She has chronic low back pain without radicular symptoms.       
She has significant deformities but little pain.       
Vaccine status unknown

## 2024-11-19 NOTE — PROGRESS NOTES
Office Visit       Date: 11/19/2024   Patient Name: Barbara Hernandez  MRN: [id number]  YOB: 1950    Referring Physician: Fiordaliza Suh MD     Chief Complaint: Joint pain    History of Present Illness: Barbara Hernandez is a 74 y.o. female who is here today in consultation for fatigue with joint pain and fibromyalgia.  I had seen this patient in the past for joint pain from osteoarthritis and fibromyalgia.  Initial workup at that time showed she had a low positive MARGARET without findings of associated disease.  She did very well with fibromyalgia and osteoarthritis and she was lost to follow-up in 2019.    She returns today with fairly stable pain.  Her fibromyalgia overall has done very well.  Primary care provider has her on gabapentin and meloxicam with good results.    Her osteoarthritis has been fairly stable.  She has chronic cervical spondylosis but has no neurologic symptoms.  Her neck pain has been manageable.  Her chronic low back pain has been fairly low-grade.  In the interim, she fractured her right elbow and had to have a extensive repair.  Her outcome has been quite good and she has only minor loss of range of motion without significant pain.    Her biggest complaint today is severe fatigue.  She never feels rested.  She has noticed no muscle weakness.  She states she does not feel sleepy just tired.  She does have a history of Hashimoto's thyroiditis but thyroid studies have been normal.    She dates the worsening fatigue back to 2022 when she had COVID.  Since that time, she has had markedly worse fatigue.    Symptomatically, the only other thing she mentions is dry mouth and dry eyes.  In the past, we worked her up including SSA and SSB antibodies.  These were negative.  She does have a positive MARGARET.  She has had no malar rash, alopecia, oral or nasal ulcers, Raynaud's phenomenon, fevers, serositis, blood clots, seizures, or miscarriages.   She has no small joint pain or swelling.      Subjective   Review of Systems: Review of Systems   Constitutional:  Positive for activity change and fatigue. Negative for chills, fever and unexpected weight loss.   HENT:  Positive for congestion, postnasal drip, sinus pressure, sneezing, tinnitus and trouble swallowing. Negative for dental problem, mouth sores and swollen glands.         Dry mouth     Eyes:  Positive for blurred vision, pain, discharge, redness and itching. Negative for photophobia.        Dry eyes     Respiratory:  Positive for choking. Negative for apnea, cough, chest tightness and shortness of breath.    Cardiovascular:  Negative for chest pain and leg swelling.   Gastrointestinal:  Positive for constipation, diarrhea, nausea and indigestion. Negative for abdominal pain and GERD.   Endocrine: Positive for heat intolerance.   Genitourinary:  Positive for frequency and urgency. Negative for dysuria and hematuria.   Musculoskeletal:  Positive for back pain, neck pain and neck stiffness.   Skin:  Positive for dry skin and bruise. Negative for rash and skin lesions.   Allergic/Immunologic: Positive for environmental allergies.   Neurological:  Positive for dizziness, light-headedness and headache. Negative for seizures, syncope, weakness and memory problem.   Hematological:  Negative for adenopathy. Bruises/bleeds easily.   Psychiatric/Behavioral:  Positive for sleep disturbance. Negative for suicidal ideas, depressed mood and stress. The patient is not nervous/anxious.    All other systems reviewed and are negative.       Past Medical History:   Past Medical History:   Diagnosis Date    Allergic     Anxiety     and depression    Back pain     osteoarthritis- chronic    Chronic diarrhea     COVID-19 09/22/2022    ETD (eustachian tube dysfunction)     Fatigue     Fibromyalgia     Fibromyositis     GERD (gastroesophageal reflux disease)     H/O cardiomyopathy     Headache     History of transfusion 2011     AFTER HIP REPLACEMENT, SELF DONATED, NO REACTION    HLD (hyperlipidemia)     Hypothyroidism     IBS (irritable bowel syndrome)     Knee joint cyst     Lichen sclerosus et atrophicus of the vulva     Menopause     Neck pain     NSAID long-term use     Osteoarthritis     Osteoarthritis of finger     Osteopenia     Parvovirus B19 infection 2019    PONV (postoperative nausea and vomiting)     Poor sleep hygiene     Preventative health care     · Age-appropriate counseling discussed. Will notify patient of lab result    Staph infection 2022    ORAL ANTIBIOTICS    Urinary tract infection        Past Surgical History:   Past Surgical History:   Procedure Laterality Date    COLONOSCOPY      CRYOTHERAPY      cervix    ELBOW OPEN REDUCTION INTERNAL FIXATION Right 10/21/2022    Procedure: OPEN REDUCTION INTERNAL FIXATION RIGHT OLECRANNON FRACTURE AND OPEN REDUCTION INTERNAL FIXATION RIGHT RADIAL HEAD FRACTURE;  Surgeon: Willam Valle MD;  Location: [facility];  Service: Orthopedics;  Laterality: Right;    EYE SURGERY Right     cyst removal right eyelid    SEPTOPLASTY      TOTAL HIP ARTHROPLASTY Bilateral     2010 total right hip Dr. Lovelace;  total Left hip       Family History:   Family History   Problem Relation Age of Onset    Stroke Mother     Osteoarthritis Mother          A-86    Hypothyroidism Mother     Atrial fibrillation Mother     Alzheimer's disease Father     Osteoarthritis Father     Hyperlipidemia Brother     Hypertension Brother     Heart attack Maternal Grandmother     Stroke Paternal Grandmother        Social History:   Social History     Socioeconomic History    Marital status:    Tobacco Use    Smoking status: Former     Current packs/day: 0.00     Average packs/day: 1.5 packs/day for 8.0 years (12.0 ttl pk-yrs)     Types: Cigarettes     Start date:      Quit date:      Years since quittin.9    Smokeless tobacco: Never   Vaping Use    Vaping status: Never Used   Substance  and Sexual Activity    Alcohol use: Yes     Comment: SOCIAL    Drug use: No    Sexual activity: Defer     Birth control/protection: Post-menopausal       Medications:   Current Outpatient Medications:     acetaminophen (TYLENOL) 325 MG tablet, Take  by mouth., Disp: , Rfl:     acetaminophen (TYLENOL) 500 MG tablet, Take 2 tablets by mouth every night at bedtime., Disp: , Rfl:     Ascorbic Acid (Vitamin C) 500 MG capsule, Take  by mouth Daily., Disp: , Rfl:     B Complex Vitamins (VITAMIN B COMPLEX PO), Take 1 tablet by mouth Daily., Disp: , Rfl:     Cholecalciferol (Vitamin D3) 10 MCG (400 UNIT) capsule, Take  by mouth., Disp: , Rfl:     Cholecalciferol (VITAMIN D3) 3000 UNITS tablet, Take 1 tablet by mouth Daily., Disp: , Rfl:     Coenzyme Q10 (CO Q-10) 100 MG capsule, Take 200 mg by mouth Daily., Disp: , Rfl:     diclofenac (VOLTAREN) 1 % gel gel, Apply 1 application topically to the appropriate area as directed 4 (Four) Times a Day As Needed (JOINT PAIN)., Disp: , Rfl:     dicyclomine (BENTYL) 10 MG capsule, Take 1 capsule by mouth every 8 hours as needed for bowel spasm or diarrhea (Patient taking differently: Take 1 capsule by mouth Every 8 (Eight) Hours As Needed (ABDOMINAL CRAMPING). Take 1 capsule by mouth every 8 hours as needed for bowel spasm or diarrhea), Disp: 30 capsule, Rfl: 0    estradiol (Vagifem) 10 MCG tablet vaginal tablet, Insert 1 tablet into the vagina 3 (Three) Times a Week., Disp: 12 tablet, Rfl: 12    gabapentin (NEURONTIN) 100 MG capsule, Take 1 capsule by mouth every night at bedtime. (Patient taking differently: Take 2 capsules by mouth every night at bedtime.), Disp: 30 capsule, Rfl: 5    levothyroxine (SYNTHROID, LEVOTHROID) 88 MCG tablet, Take  by mouth., Disp: , Rfl:     meloxicam (MOBIC) 7.5 MG tablet, TAKE 1 TABLET EVERY DAY AS NEEDED FOR PAIN AND INFLAMMATION (Patient taking differently: Take 1 tablet by mouth Daily As Needed. TAKE 1 TABLET EVERY DAY AS NEEDED FOR PAIN AND  "INFLAMMATION), Disp: 30 tablet, Rfl: 3    Omega-3 Fatty Acids (Fish Oil Concentrate) 1000 MG capsule, Take  by mouth., Disp: , Rfl:     Omega-3 Fatty Acids (FISH OIL) 1200 MG capsule capsule, Take 2 capsules by mouth Daily., Disp: , Rfl:     pravastatin (PRAVACHOL) 10 MG tablet, Take 1 tablet by mouth Every Other Day., Disp: 56 tablet, Rfl: 0    raNITIdine HCl (ZANTAC 150 MAXIMUM STRENGTH PO), Take 1 tablet by mouth Daily., Disp: , Rfl:     Synthroid 75 MCG tablet, Take 1 tablet by mouth Daily., Disp: , Rfl:     triamcinolone (KENALOG) 0.1 % cream, , Disp: , Rfl:     vitamin E 400 UNIT capsule, Take 1 capsule by mouth Daily., Disp: , Rfl:     Allergies:   Allergies   Allergen Reactions    Sulfa Antibiotics Rash    Azithromycin Unknown - Low Severity    Fluconazole Hives    Bacitracin-Polymyxin B Rash    Cefdinir GI Intolerance    Celecoxib GI Intolerance    Ciprofloxacin GI Intolerance    Clarithromycin GI Intolerance    Doxycycline GI Intolerance    Erythromycin Nausea Only and Other (See Comments)     RED EYES WHEN USING EYE DROP     Levofloxacin GI Intolerance    Lortab [Hydrocodone-Acetaminophen] GI Intolerance    Lyrica [Pregabalin] Unknown - Low Severity     PREVIOUSLY CHARTED, PATIENT DENIES ALLERGY     Nexium [Esomeprazole] Myalgia    Omeprazole Myalgia    Phenergan [Promethazine Hcl] Delirium    Statins Headache    Tramadol GI Intolerance and Dizziness       I have reviewed and updated the patient's chief complaint, history of present illness, review of systems, past medical history, surgical history, family history, social history, medications and allergy list as appropriate.     Objective    Vital Signs:   Vitals:    11/19/24 1301   BP: 122/72   Pulse: 83   Temp: 97.3 °F (36.3 °C)   Weight: 54.7 kg (120 lb 9.6 oz)   Height: 157.5 cm (62.01\")   PainSc:   4   PainLoc: Back  Comment: Lower     Body mass index is 22.05 kg/m².     Physical Exam:  GENERAL: The patient is well developed and well nourished.  " Cooperative and oriented times three.  Affect is normal.  Hydration appears normal.    HEENT: Normocephalic and atraumatic.  No notable alopecia.  No facial rash.  Lids and conjunctiva are normal.  Pupils are equal and sclera are clear.  I see no oral or nasal ulcers.  Lips, teeth, and gums are within normal limits.  Oropharynx is clear. Eyes and mouth are relatively moist. No parotid or lacrimal masses.    NECK: Neck is supple without adenopathy, masses, or thyromegaly.    LUNGS: Effort is normal.  Lungs are clear without rales or rhonchi.    CARDIOVASCULAR: Normal S1, S2.  No murmurs are heard.    ABDOMEN: Soft and nontender without HSM.  EXTREMITIES: There is no edema.  Peripheral pulses are intact.  I see no cyanosis or clubbing.    SKIN: Inspection and palpation are normal.  No rashes are present.    NEUROLOGIC: Gait is normal.  Tendon reflexes are 2+ and symmetric.  MUSCULOSKELETAL: Complete joint exam was performed.  There is full range of motion of the shoulders, elbows, wrists, and hands without notable deformities, soft tissue swelling, synovitis, or atrophy.  There is mild flexion contracture of the right elbow with slightly decreased flexion.  There is a well-healed surgical scar on the elbow.  Osteoarthritic changes are seen in the PIP and DIP joints and CMC joints.  R Hip has good flexion and internal and external rotation. L hip has good IR and ER. Knees have no palpable effusions. R knee has cystic area on anterior lateral joint line.  There is full extension and full flexion of the knees without pain.  Ankles  have no soft tissue swelling, synovitis, or major deformities.  There are degenerative changes in both feet and toes.  BACK: Straight without notable scoliosis. 18 of 18 tender points are present.    Assessment / Plan        Assessment & Plan  Chronic fatigue  Long-term fatigue but much worse since COVID in 2022.  No history of sleep apnea and the patient does not note that she is not overly  sleepy.  No muscle weakness.  She does have a history of positive MARGARET 1: 160 speckled with negative CONNOR including negative SSA and SSB.    I see no clear rheumatologic reason for her fatigue.  I will recheck MARGARET, CONNOR panel, SSA and SSB, crithidia double-stranded DNA, and complement levels.  I will obtain a few other basic labs.  If those look good, I have little more to add.  It is possible that she has chronic fatigue associated with COVID.  This is reported.  I can see her in follow-up in about 6 weeks.  Orders:    TSH; Future    Dry mouth and eyes  Longstanding.  Negative SSA and SSB in the past.       Positive MARGARET (antinuclear antibody)  1: 160 speckled with negative CONNOR and double-stranded DNA in the past.  See above.  Orders:    Comprehensive Metabolic Panel; Future    C-reactive Protein; Future    Sedimentation Rate; Future    Rheumatoid Factor; Future    C4+C3; Future    MARGARET by IFA, Reflex 9-biomarkers profile; Future    Urinalysis With Culture If Indicated -; Future    dsDNA Antibody by IFA, Crithidia luciliae, with Reflex to Titer; Future    CBC With Manual Differential; Future    Fibromyalgia  Currently on meloxicam and gabapentin with good results.  She can continue follow-up with her primary care provider for this.       Neck pain  Known cervical spondylosis.  No current neurologic symptoms.       Primary osteoarthritis of both hands  She has significant deformities but little pain.       Chronic bilateral back pain, unspecified back location  She has chronic low back pain without radicular symptoms.       Long term (current) use of non-steroidal anti-inflammatories (nsaid)  Meloxicam per PCP.  Risks of nonsteroidal anti-inflammatory drugs discussed including GI upset, GI bleeding, renal and hepatic risk, and the risks of cardiovascular disease.  Warned not to take with other NSAIDs including over-the-counter NSAIDs.         S/P hip replacement, bilateral  She has done very well with this.             Follow Up:   Return in about 6 weeks (around 12/31/2024).        Chad Alegria MD  [facility] [place] [facility]

## 2024-11-20 LAB
ALBUMIN SERPL-MCNC: 4.8 G/DL (ref 3.5–5.2)
ALBUMIN/GLOB SERPL: 1.3 G/DL
ALP SERPL-CCNC: 68 U/L (ref 39–117)
ALT SERPL W P-5'-P-CCNC: 16 U/L (ref 1–33)
ANION GAP SERPL CALCULATED.3IONS-SCNC: 10.3 MMOL/L (ref 5–15)
AST SERPL-CCNC: 23 U/L (ref 1–32)
BASOPHILS # BLD MANUAL: 0.06 10*3/MM3 (ref 0–0.2)
BASOPHILS NFR BLD MANUAL: 1 % (ref 0–1.5)
BILIRUB SERPL-MCNC: 0.3 MG/DL (ref 0–1.2)
BUN SERPL-MCNC: 25 MG/DL (ref 8–23)
BUN/CREAT SERPL: 27.8 (ref 7–25)
C3 SERPL-MCNC: 136 MG/DL (ref 82–167)
C4 SERPL-MCNC: 29 MG/DL (ref 14–44)
CALCIUM SPEC-SCNC: 10.5 MG/DL (ref 8.6–10.5)
CHLORIDE SERPL-SCNC: 97 MMOL/L (ref 98–107)
CHROMATIN AB SERPL-ACNC: <10 IU/ML (ref 0–14)
CO2 SERPL-SCNC: 28.7 MMOL/L (ref 22–29)
CREAT SERPL-MCNC: 0.9 MG/DL (ref 0.57–1)
CRP SERPL-MCNC: <0.3 MG/DL (ref 0–0.5)
DACRYOCYTES BLD QL SMEAR: NORMAL
EGFRCR SERPLBLD CKD-EPI 2021: 67.6 ML/MIN/1.73
EOSINOPHIL # BLD MANUAL: 0.06 10*3/MM3 (ref 0–0.4)
EOSINOPHIL NFR BLD MANUAL: 1 % (ref 0.3–6.2)
ERYTHROCYTE [SEDIMENTATION RATE] IN BLOOD: 19 MM/HR (ref 0–30)
GLOBULIN UR ELPH-MCNC: 3.7 GM/DL
GLUCOSE SERPL-MCNC: 105 MG/DL (ref 65–99)
LYMPHOCYTES # BLD MANUAL: 1.46 10*3/MM3 (ref 0.7–3.1)
LYMPHOCYTES NFR BLD MANUAL: 6 % (ref 5–12)
MONOCYTES # BLD: 0.36 10*3/MM3 (ref 0.1–0.9)
NEUTROPHILS # BLD AUTO: 4.13 10*3/MM3 (ref 1.7–7)
NEUTROPHILS NFR BLD MANUAL: 68 % (ref 42.7–76)
PLAT MORPH BLD: NORMAL
POTASSIUM SERPL-SCNC: 4.2 MMOL/L (ref 3.5–5.2)
PROT SERPL-MCNC: 8.5 G/DL (ref 6–8.5)
SODIUM SERPL-SCNC: 136 MMOL/L (ref 136–145)
TSH SERPL DL<=0.05 MIU/L-ACNC: 2.12 UIU/ML (ref 0.27–4.2)
VARIANT LYMPHS NFR BLD MANUAL: 1 % (ref 0–5)
VARIANT LYMPHS NFR BLD MANUAL: 23 % (ref 19.6–45.3)
WBC MORPH BLD: NORMAL

## 2024-11-21 LAB — DSDNA AB SER QL CLIF: NEGATIVE

## 2024-11-22 LAB
ANA SER QL IF: POSITIVE
ANA SPECKLED TITR SER: ABNORMAL {TITER}
CENTROMERE B AB SER-ACNC: <0.2 AI (ref 0–0.9)
CHROMATIN AB SERPL-ACNC: <0.2 AI (ref 0–0.9)
DSDNA AB SER-ACNC: <1 IU/ML (ref 0–9)
ENA JO1 AB SER-ACNC: <0.2 AI (ref 0–0.9)
ENA RNP AB SER-ACNC: <0.2 AI (ref 0–0.9)
ENA SCL70 AB SER-ACNC: <0.2 AI (ref 0–0.9)
ENA SM AB SER-ACNC: <0.2 AI (ref 0–0.9)
ENA SS-A AB SER-ACNC: <0.2 AI (ref 0–0.9)
ENA SS-B AB SER-ACNC: <0.2 AI (ref 0–0.9)
LABORATORY COMMENT REPORT: ABNORMAL
Lab: ABNORMAL
Lab: ABNORMAL

## 2025-01-21 RX ORDER — PRAVASTATIN SODIUM 10 MG
10 TABLET ORAL EVERY OTHER DAY
Qty: 45 TABLET | Refills: 0 | Status: SHIPPED | OUTPATIENT
Start: 2025-01-21

## 2025-02-25 PROBLEM — Z96.643 S/P HIP REPLACEMENT, BILATERAL: Status: ACTIVE | Noted: 2025-02-25

## 2025-02-25 PROBLEM — R76.8 POSITIVE ANA (ANTINUCLEAR ANTIBODY): Status: ACTIVE | Noted: 2025-02-25

## 2025-02-25 NOTE — ASSESSMENT & PLAN NOTE
Long-term fatigue but much worse since COVID in 2022.  No history of sleep apnea and the patient does not note that she is not overly sleepy.  No muscle weakness.  She does have a history of positive MARGARET 1: 160 speckled with negative CONNOR including negative SSA and SSB.  Crithidia double-stranded DNA negative.  C3 and C4 normal.  Rheumatoid factor negative.  Sed rate and CRP normal.    I see no clear rheumatologic reason for her fatigue.  Her rheumatologic testing appears unremarkable, I have little more to add.  It is possible that she has chronic fatigue associated with COVID.  This is reported.  I can see her in follow-up in about 1 year regarding the positive MARGARET.

## 2025-02-25 NOTE — ASSESSMENT & PLAN NOTE
Longstanding.  Negative SSA and SSB .  We could pursue salivary gland biopsy but her symptoms are not severe.  She can treat them symptomatically.

## 2025-02-25 NOTE — ASSESSMENT & PLAN NOTE
Currently on meloxicam and gabapentin with good results.  She can continue follow-up with her primary care provider for this.

## 2025-02-25 NOTE — ASSESSMENT & PLAN NOTE
1: 160 speckled with negative CONNOR and double-stranded DNA in the past.  Further workup is unremarkable.  She would not fit criteria for diagnosis of any MARGARET associated disease.

## 2025-02-25 NOTE — PROGRESS NOTES
Office Visit       Date: 02/26/2025   Patient Name: Barbara Hernandez  MRN: [id number]  YOB: 1950    Referring Physician: No ref. provider found     Chief Complaint: Joint pain    History of Present Illness: Barbara Hernandez is a 74 y.o. female who is here today in follow up for fatigue with joint pain and fibromyalgia.  She has known joint pain from osteoarthritis and fibromyalgia.  Workup  showed she had a low positive MARGARET without findings of associated disease.  She did very well with fibromyalgia and osteoarthritis and she was lost to follow-up in 2019.    She returned with fairly stable pain.  Her fibromyalgia overall has done very well.  Primary care provider has her on gabapentin and meloxicam with good results.    She is having symptoms of sciatica.     Her osteoarthritis has been worse with cold weather.   She has chronic cervical spondylosis but has no neurologic symptoms.  Her neck pain has been manageable.  Her chronic low back pain has been fairly low-grade.  She fractured her right elbow and had to have a extensive repair.  Her outcome has been quite good and she has only minor loss of range of motion without significant pain.    Her biggest complaint today is severe fatigue.  She never feels rested.  She has noticed no muscle weakness.  She states she does not feel sleepy just tired.  She does have a history of Hashimoto's thyroiditis but thyroid studies have been normal.    Symptomatically, the only other thing she mentions is dry mouth and dry eyes.  In the past, we worked her up including SSA and SSB antibodies.  These were negative.  She does have a positive MARGARET.  She has had no malar rash, alopecia, oral or nasal ulcers, Raynaud's phenomenon, fevers, serositis, blood clots, seizures, or miscarriages.  She has no small joint pain or swelling.      Subjective   Review of Systems: Review of Systems   Constitutional:  Positive for activity  change. Negative for chills, fatigue, fever and unexpected weight loss.   HENT:  Positive for congestion, rhinorrhea and sinus pressure. Negative for dental problem, mouth sores and swollen glands.         Dry mouth     Eyes:  Positive for itching. Negative for photophobia, pain and redness.        Dry eyes     Respiratory:  Positive for cough and choking. Negative for apnea, chest tightness and shortness of breath.    Cardiovascular:  Negative for chest pain and leg swelling.   Gastrointestinal:  Positive for diarrhea and indigestion. Negative for abdominal pain, nausea and GERD.   Endocrine: Positive for heat intolerance and polydipsia.   Genitourinary:  Positive for frequency and urgency. Negative for dysuria and hematuria.   Musculoskeletal:  Positive for arthralgias, back pain, myalgias and neck stiffness.   Skin:  Positive for dry skin, rash and bruise. Negative for skin lesions.   Allergic/Immunologic: Positive for environmental allergies.   Neurological:  Positive for dizziness, light-headedness and headache. Negative for seizures, syncope, weakness and memory problem.   Hematological:  Negative for adenopathy. Bruises/bleeds easily.   Psychiatric/Behavioral:  Negative for sleep disturbance, suicidal ideas, depressed mood and stress. The patient is not nervous/anxious.    All other systems reviewed and are negative.       Past Medical History:   Past Medical History:   Diagnosis Date    Allergic     Anxiety     and depression    Back pain     osteoarthritis- chronic    Chronic diarrhea     COVID-19 09/22/2022    ETD (eustachian tube dysfunction)     Fatigue     Fibromyalgia     Fibromyositis     GERD (gastroesophageal reflux disease)     H/O cardiomyopathy     Headache     History of transfusion 2011    AFTER HIP REPLACEMENT, SELF DONATED, NO REACTION    HLD (hyperlipidemia)     Hypothyroidism     IBS (irritable bowel syndrome)     Knee joint cyst     Lichen sclerosus et atrophicus of the vulva     Menopause      Neck pain     NSAID long-term use     Osteoarthritis     Osteoarthritis of finger     Osteopenia     Parvovirus B19 infection 2019    PONV (postoperative nausea and vomiting)     Poor sleep hygiene     Preventative health care     · Age-appropriate counseling discussed. Will notify patient of lab result    Staph infection 2022    ORAL ANTIBIOTICS    Urinary tract infection        Past Surgical History:   Past Surgical History:   Procedure Laterality Date    COLONOSCOPY      CRYOTHERAPY      cervix    ELBOW OPEN REDUCTION INTERNAL FIXATION Right 10/21/2022    Procedure: OPEN REDUCTION INTERNAL FIXATION RIGHT OLECRANNON FRACTURE AND OPEN REDUCTION INTERNAL FIXATION RIGHT RADIAL HEAD FRACTURE;  Surgeon: Willam Valle MD;  Location: [facility];  Service: Orthopedics;  Laterality: Right;    EYE SURGERY Right     cyst removal right eyelid    SEPTOPLASTY      TOTAL HIP ARTHROPLASTY Bilateral      total right hip Dr. Lovelace;  total Left hip       Family History:   Family History   Problem Relation Age of Onset    Stroke Mother     Osteoarthritis Mother          A-86    Hypothyroidism Mother     Atrial fibrillation Mother     Alzheimer's disease Father     Osteoarthritis Father     Hyperlipidemia Brother     Hypertension Brother     Heart attack Maternal Grandmother     Stroke Paternal Grandmother        Social History:   Social History     Socioeconomic History    Marital status:    Tobacco Use    Smoking status: Former     Current packs/day: 0.00     Average packs/day: 1.5 packs/day for 8.0 years (12.0 ttl pk-yrs)     Types: Cigarettes     Start date:      Quit date:      Years since quittin.1     Passive exposure: Past    Smokeless tobacco: Never   Vaping Use    Vaping status: Never Used   Substance and Sexual Activity    Alcohol use: Yes     Comment: SOCIAL    Drug use: No    Sexual activity: Defer     Birth control/protection: Post-menopausal       Medications:   Current  Outpatient Medications:     acetaminophen (TYLENOL) 325 MG tablet, Take  by mouth., Disp: , Rfl:     acetaminophen (TYLENOL) 500 MG tablet, Take 2 tablets by mouth every night at bedtime., Disp: , Rfl:     B Complex Vitamins (VITAMIN B COMPLEX PO), Take 1 tablet by mouth Daily., Disp: , Rfl:     Cholecalciferol (VITAMIN D3) 3000 UNITS tablet, Take 1 tablet by mouth Daily., Disp: , Rfl:     Coenzyme Q10 (CO Q-10) 100 MG capsule, Take 200 mg by mouth Daily., Disp: , Rfl:     diclofenac (VOLTAREN) 1 % gel gel, Apply 1 application topically to the appropriate area as directed 4 (Four) Times a Day As Needed (JOINT PAIN)., Disp: , Rfl:     dicyclomine (BENTYL) 10 MG capsule, Take 1 capsule by mouth every 8 hours as needed for bowel spasm or diarrhea (Patient taking differently: Take 1 capsule by mouth Every 8 (Eight) Hours As Needed (ABDOMINAL CRAMPING). Take 1 capsule by mouth every 8 hours as needed for bowel spasm or diarrhea), Disp: 30 capsule, Rfl: 0    estradiol (Vagifem) 10 MCG tablet vaginal tablet, Insert 1 tablet into the vagina 3 (Three) Times a Week., Disp: 12 tablet, Rfl: 12    gabapentin (NEURONTIN) 100 MG capsule, Take 1 capsule by mouth every night at bedtime. (Patient taking differently: Take 2 capsules by mouth every night at bedtime.), Disp: 30 capsule, Rfl: 5    meloxicam (MOBIC) 7.5 MG tablet, TAKE 1 TABLET EVERY DAY AS NEEDED FOR PAIN AND INFLAMMATION (Patient taking differently: Take 1 tablet by mouth Daily As Needed. TAKE 1 TABLET EVERY DAY AS NEEDED FOR PAIN AND INFLAMMATION), Disp: 30 tablet, Rfl: 3    Omega-3 Fatty Acids (Fish Oil Concentrate) 1000 MG capsule, Take  by mouth., Disp: , Rfl:     Omega-3 Fatty Acids (FISH OIL) 1200 MG capsule capsule, Take 2 capsules by mouth Daily., Disp: , Rfl:     pravastatin (PRAVACHOL) 10 MG tablet, Take 1 tablet by mouth Every Other Day. Pt needs annual lipid panel blood work completed for future refills, Disp: 45 tablet, Rfl: 0    Synthroid 75 MCG tablet,  "Take 1 tablet by mouth Daily., Disp: , Rfl:     triamcinolone (KENALOG) 0.1 % cream, , Disp: , Rfl:     vitamin E 400 UNIT capsule, Take 1 capsule by mouth Daily., Disp: , Rfl:     Ascorbic Acid (Vitamin C) 500 MG capsule, Take  by mouth Daily., Disp: , Rfl:     Cholecalciferol (Vitamin D3) 10 MCG (400 UNIT) capsule, Take  by mouth., Disp: , Rfl:     levothyroxine (SYNTHROID, LEVOTHROID) 88 MCG tablet, Take  by mouth., Disp: , Rfl:     raNITIdine HCl (ZANTAC 150 MAXIMUM STRENGTH PO), Take 1 tablet by mouth Daily., Disp: , Rfl:     Allergies:   Allergies   Allergen Reactions    Sulfa Antibiotics Rash    Azithromycin Unknown - Low Severity    Fluconazole Hives    Bacitracin-Polymyxin B Rash    Cefdinir GI Intolerance    Celecoxib GI Intolerance    Ciprofloxacin GI Intolerance    Clarithromycin GI Intolerance    Doxycycline GI Intolerance    Erythromycin Nausea Only and Other (See Comments)     RED EYES WHEN USING EYE DROP     Levofloxacin GI Intolerance    Lortab [Hydrocodone-Acetaminophen] GI Intolerance    Lyrica [Pregabalin] Unknown - Low Severity     PREVIOUSLY CHARTED, PATIENT DENIES ALLERGY     Nexium [Esomeprazole] Myalgia    Omeprazole Myalgia    Phenergan [Promethazine Hcl] Delirium    Statins Headache    Tramadol GI Intolerance and Dizziness       I have reviewed and updated the patient's chief complaint, history of present illness, review of systems, past medical history, surgical history, family history, social history, medications and allergy list as appropriate.     Objective    Vital Signs:   Vitals:    02/26/25 1545   BP: 120/66   Pulse: 79   Temp: 97.2 °F (36.2 °C)   Weight: 54.7 kg (120 lb 11.2 oz)   Height: 157.5 cm (62.01\")   PainSc: 7    PainLoc: Back  Comment: Right leg       Body mass index is 22.07 kg/m².     Physical Exam:  GENERAL: The patient is well developed and well nourished.  Cooperative and oriented times three.  Affect is normal.  Hydration appears normal.    HEENT: Normocephalic and " atraumatic.  No notable alopecia.  No facial rash.  Lids and conjunctiva are normal.  Pupils are equal and sclera are clear.  I see no oral or nasal ulcers.  Lips, teeth, and gums are within normal limits.  Oropharynx is clear. Eyes and mouth are relatively moist. No parotid or lacrimal masses.    NECK: Neck is supple without adenopathy, masses, or thyromegaly.    LUNGS: Effort is normal.  Lungs are clear without rales or rhonchi.    CARDIOVASCULAR: Normal S1, S2.  No murmurs are heard.    ABDOMEN: Soft and nontender without HSM.  EXTREMITIES: There is no edema.  Peripheral pulses are intact.  I see no cyanosis or clubbing.    SKIN: Inspection and palpation are normal.  No rashes are present.    NEUROLOGIC: Gait is normal.  Tendon reflexes are 2+ and symmetric.  MUSCULOSKELETAL: Complete joint exam was performed.  There is full range of motion of the shoulders, elbows, wrists, and hands without notable deformities, soft tissue swelling, synovitis, or atrophy.  There is mild flexion contracture of the right elbow with slightly decreased flexion.  There is a well-healed surgical scar on the elbow.  Osteoarthritic changes are seen in the PIP and DIP joints and CMC joints.  R Hip has good flexion and internal and external rotation. L hip has good IR and ER. Knees have no palpable effusions. R knee has cystic area on anterior lateral joint line.  There is full extension and full flexion of the knees without pain.  Ankles  have no soft tissue swelling, synovitis, or major deformities.  There are degenerative changes in both feet and toes.  BACK: Straight without notable scoliosis. 18 of 18 tender points are present.    Assessment / Plan        Assessment & Plan  Chronic fatigue  Long-term fatigue but much worse since COVID in 2022.  No history of sleep apnea and the patient does not note that she is not overly sleepy.  No muscle weakness.  She does have a history of positive MARGARET 1: 160 speckled with negative CONNOR including  negative SSA and SSB.  Crithidia double-stranded DNA negative.  C3 and C4 normal.  Rheumatoid factor negative.  Sed rate and CRP normal.    I see no clear rheumatologic reason for her fatigue.  Her rheumatologic testing appears unremarkable, I have little more to add.  It is possible that she has chronic fatigue associated with COVID.  This is reported.  I can see her in follow-up in about 1 year regarding the positive MARGARET.         Dry mouth and eyes  Longstanding.  Negative SSA and SSB .  We could pursue salivary gland biopsy but her symptoms are not severe.  She can treat them symptomatically.         Positive MARGARET (antinuclear antibody)  1: 160 speckled with negative CONNOR and double-stranded DNA in the past.  Further workup is unremarkable.  She would not fit criteria for diagnosis of any MARGARET associated disease.       Fibromyalgia  Currently on meloxicam and gabapentin with good results.  She can continue follow-up with her primary care provider for this.         Neck pain  Known cervical spondylosis.  No current neurologic symptoms.         Primary osteoarthritis of both hands  She has significant deformities but little pain.         Chronic bilateral back pain, unspecified back location  She has chronic low back pain without radicular symptoms.         Long term (current) use of non-steroidal anti-inflammatories (nsaid)  Meloxicam per PCP.  Risks of nonsteroidal anti-inflammatory drugs discussed including GI upset, GI bleeding, renal and hepatic risk, and the risks of cardiovascular disease.  Warned not to take with other NSAIDs including over-the-counter NSAIDs.           S/P hip replacement, bilateral  Doing well.             Follow Up:   Return in about 1 year (around 2/26/2026).        Chad Alegria MD  [facility]

## 2025-02-25 NOTE — ASSESSMENT & PLAN NOTE
Meloxicam per PCP.  Risks of nonsteroidal anti-inflammatory drugs discussed including GI upset, GI bleeding, renal and hepatic risk, and the risks of cardiovascular disease.  Warned not to take with other NSAIDs including over-the-counter NSAIDs.

## 2025-02-26 ENCOUNTER — OFFICE VISIT (OUTPATIENT)
Age: 75
End: 2025-02-26
Payer: MEDICARE

## 2025-02-26 VITALS
HEART RATE: 79 BPM | SYSTOLIC BLOOD PRESSURE: 120 MMHG | BODY MASS INDEX: 22.21 KG/M2 | TEMPERATURE: 97.2 F | HEIGHT: 62 IN | WEIGHT: 120.7 LBS | DIASTOLIC BLOOD PRESSURE: 66 MMHG

## 2025-02-26 DIAGNOSIS — M19.042 PRIMARY OSTEOARTHRITIS OF BOTH HANDS: ICD-10-CM

## 2025-02-26 DIAGNOSIS — R53.82 CHRONIC FATIGUE: Primary | ICD-10-CM

## 2025-02-26 DIAGNOSIS — M79.7 FIBROMYALGIA: ICD-10-CM

## 2025-02-26 DIAGNOSIS — R68.2 DRY MOUTH AND EYES: ICD-10-CM

## 2025-02-26 DIAGNOSIS — G89.29 CHRONIC BILATERAL BACK PAIN, UNSPECIFIED BACK LOCATION: ICD-10-CM

## 2025-02-26 DIAGNOSIS — R76.8 POSITIVE ANA (ANTINUCLEAR ANTIBODY): ICD-10-CM

## 2025-02-26 DIAGNOSIS — Z79.1 LONG TERM (CURRENT) USE OF NON-STEROIDAL ANTI-INFLAMMATORIES (NSAID): ICD-10-CM

## 2025-02-26 DIAGNOSIS — Z96.643 S/P HIP REPLACEMENT, BILATERAL: ICD-10-CM

## 2025-02-26 DIAGNOSIS — H04.123 DRY MOUTH AND EYES: ICD-10-CM

## 2025-02-26 DIAGNOSIS — M54.2 NECK PAIN: ICD-10-CM

## 2025-02-26 DIAGNOSIS — M19.041 PRIMARY OSTEOARTHRITIS OF BOTH HANDS: ICD-10-CM

## 2025-02-26 DIAGNOSIS — M54.9 CHRONIC BILATERAL BACK PAIN, UNSPECIFIED BACK LOCATION: ICD-10-CM

## 2025-02-28 ENCOUNTER — SPECIALTY PHARMACY (OUTPATIENT)
Dept: CARDIOLOGY | Facility: CLINIC | Age: 75
End: 2025-02-28
Payer: COMMERCIAL

## 2025-03-19 ENCOUNTER — SPECIALTY PHARMACY (OUTPATIENT)
Facility: HOSPITAL | Age: 75
End: 2025-03-19
Payer: COMMERCIAL

## 2025-03-19 RX ORDER — GABAPENTIN 100 MG/1
200 CAPSULE ORAL NIGHTLY
COMMUNITY

## 2025-03-19 NOTE — PROGRESS NOTES
Specialty Pharmacy Patient Management Program  Cardiology Initial Assessment     Barbara Hernandez was referred by a Cardiology provider to the Cardiology Patient Management program offered by [facility] for Hyperlipidemia on 03/19/25.  An initial outreach was conducted, including assessment of therapy appropriateness and specialty medication education for Leqvio. The patient was introduced to services offered by [facility], including: regular assessments, refill coordination, mail order delivery options, prior authorization maintenance, and financial assistance programs as applicable. The patient was also provided with contact information for the pharmacy team.     Insurance Coverage & Financial Support  [employer] &  [employer]     Relevant Past Medical History and Comorbidities  Relevant medical history and concomitant health conditions were discussed with the patient. The patient's chart has been reviewed for relevant past medical history and comorbid conditions and updated as necessary.  Past Medical History:   Diagnosis Date    Allergic     Anxiety     and depression    Back pain     osteoarthritis- chronic    Chronic diarrhea     COVID-19 09/22/2022    ETD (eustachian tube dysfunction)     Fatigue     Fibromyalgia     Fibromyositis     GERD (gastroesophageal reflux disease)     H/O cardiomyopathy     Headache     History of transfusion 2011    AFTER HIP REPLACEMENT, SELF DONATED, NO REACTION    HLD (hyperlipidemia)     Hypothyroidism     IBS (irritable bowel syndrome)     Knee joint cyst     Lichen sclerosus et atrophicus of the vulva     Menopause     Neck pain     NSAID long-term use     Osteoarthritis     Osteoarthritis of finger     Osteopenia     Parvovirus B19 infection 2019    PONV (postoperative nausea and vomiting)     Poor sleep hygiene     Preventative health care     · Age-appropriate counseling discussed. Will notify patient of lab result    Evelia  infection 2022    ORAL ANTIBIOTICS    Urinary tract infection      Social History     Socioeconomic History    Marital status:    Tobacco Use    Smoking status: Former     Current packs/day: 0.00     Average packs/day: 1.5 packs/day for 8.0 years (12.0 ttl pk-yrs)     Types: Cigarettes     Start date:      Quit date:      Years since quittin.2     Passive exposure: Past    Smokeless tobacco: Never   Vaping Use    Vaping status: Never Used   Substance and Sexual Activity    Alcohol use: Yes     Comment: SOCIAL    Drug use: No    Sexual activity: Defer     Birth control/protection: Post-menopausal       Problem list reviewed by Indiana Diana, PharmD on 3/19/2025 at 10:59 AM    Allergies  Known allergies and reactions were discussed with the patient. The patient's chart has been reviewed for  allergy information and updated as necessary.   Allergies   Allergen Reactions    Sulfa Antibiotics Rash    Azithromycin Unknown - Low Severity    Fluconazole Hives    Bacitracin-Polymyxin B Rash    Cefdinir GI Intolerance    Celecoxib GI Intolerance    Ciprofloxacin GI Intolerance    Clarithromycin GI Intolerance    Doxycycline GI Intolerance    Erythromycin Nausea Only and Other (See Comments)     RED EYES WHEN USING EYE DROP     Levofloxacin GI Intolerance    Lortab [Hydrocodone-Acetaminophen] GI Intolerance    Lyrica [Pregabalin] Unknown - Low Severity     PREVIOUSLY CHARTED, PATIENT DENIES ALLERGY     Nexium [Esomeprazole] Myalgia    Omeprazole Myalgia    Phenergan [Promethazine Hcl] Delirium    Statins Headache    Tramadol GI Intolerance and Dizziness       Allergies reviewed by Indiana Diana, PharmD on 3/19/2025 at 10:56 AM    Relevant Laboratory Values  Relevant laboratory values were discussed with the patient. The following specialty medication dose adjustment(s) are recommended: Begin Leqvio for lipid lowering    Lab Results   Component Value Date    GLUCOSE 105 (H) 2024    CALCIUM  10.5 11/19/2024     11/19/2024    K 4.2 11/19/2024    CO2 28.7 11/19/2024    CL 97 (L) 11/19/2024    BUN 25 (H) 11/19/2024    CREATININE 0.90 11/19/2024    EGFRIFNONA 85 10/26/2018    BCR 27.8 (H) 11/19/2024    ANIONGAP 10.3 11/19/2024     Lab Results   Component Value Date    CHOL 241 (H) 10/26/2018    CHLPL 247 (H) 06/12/2015    TRIG 58 10/26/2018    HDL 71 (H) 10/26/2018     (H) 10/26/2018         Current Medication List  This medication list has been reviewed with the patient and evaluated for any interactions or necessary modifications/recommendations, and updated to include all prescription medications, OTC medications, and supplements the patient is currently taking.  This list reflects what is contained in the patient's profile, which has also been marked as reviewed to communicate to other providers it is the most up to date version of the patient's current medication therapy.     Current Outpatient Medications:     Inclisiran Sodium 284 MG/1.5ML solution prefilled syringe, Inject 1.5 mL under the skin into the appropriate area as directed Take As Directed. Once, then repeat in 3 months, then every 6 months thereafter, Disp: 1.5 mL, Rfl: 3    acetaminophen (TYLENOL) 500 MG tablet, Take 2 tablets by mouth every night at bedtime., Disp: , Rfl:     B Complex Vitamins (VITAMIN B COMPLEX PO), Take 1 tablet by mouth Daily., Disp: , Rfl:     Cholecalciferol (VITAMIN D3) 3000 UNITS tablet, Take 1 tablet by mouth Daily., Disp: , Rfl:     Coenzyme Q10 200 MG capsule, Take 200 mg by mouth Daily., Disp: , Rfl:     diclofenac (VOLTAREN) 1 % gel gel, Apply 1 application topically to the appropriate area as directed 4 (Four) Times a Day As Needed (JOINT PAIN)., Disp: , Rfl:     dicyclomine (BENTYL) 10 MG capsule, Take 1 capsule by mouth every 8 hours as needed for bowel spasm or diarrhea (Patient taking differently: Take 1 capsule by mouth Every 8 (Eight) Hours As Needed (ABDOMINAL CRAMPING). Take 1 capsule  by mouth every 8 hours as needed for bowel spasm or diarrhea), Disp: 30 capsule, Rfl: 0    estradiol (Vagifem) 10 MCG tablet vaginal tablet, Insert 1 tablet into the vagina 3 (Three) Times a Week., Disp: 12 tablet, Rfl: 12    gabapentin (NEURONTIN) 100 MG capsule, Take 2 capsules by mouth Every Night., Disp: , Rfl:     meloxicam (MOBIC) 7.5 MG tablet, TAKE 1 TABLET EVERY DAY AS NEEDED FOR PAIN AND INFLAMMATION (Patient taking differently: Take 1 tablet by mouth Daily As Needed. TAKE 1 TABLET EVERY DAY AS NEEDED FOR PAIN AND INFLAMMATION), Disp: 30 tablet, Rfl: 3    Omega-3 Fatty Acids (FISH OIL) 1200 MG capsule capsule, Take 2 capsules by mouth Daily., Disp: , Rfl:     Synthroid 75 MCG tablet, Take 1 tablet by mouth Daily., Disp: , Rfl:     triamcinolone (KENALOG) 0.1 % cream, , Disp: , Rfl:     vitamin E 400 UNIT capsule, Take 1 capsule by mouth Daily., Disp: , Rfl:     Medicines reviewed by Indiana Diana, PharmD on 3/19/2025 at 10:59 AM    Drug Interactions  None    Initial Education Provided for Specialty Medication  The patient has been provided with the following education and any applicable administration techniques (i.e. self-injection) have been demonstrated for the therapies indicated. All questions and concerns have been addressed prior to the patient receiving the medication, and the patient has verbalized comprehension of the education and any materials provided. Additional patient education shall be provided and documented upon request by the patient, provider, or payer.    Leqvio® (Inclisiran)  Medication Expectations   Why am I taking this medication? You are taking Leqvio to lower your “bad” cholesterol (LDL-C). This medication can be used in adults with high blood cholesterol including atherosclerotic cardiovascular disease and familial hypercholesterolemia.    What should I expect while on this medication? You should expect to see your cholesterol improve over time. Specifically, you should see  your LDL-C decrease.    How does the medication work? Leqvio works by inhibiting gene expression of PCSK9 resulting in your livers's increase ability to remove bad cholesterol from your blood.     How long will I be on this medication for? The amount of time you will be on this medication will be determined by your doctor based on your cholesterol and/or your risk of having a cardiac event. You will most likely be on this medication or another cholesterol medication throughout your lifetime. Do not abruptly stop this medication without talking to your doctor first.    How do I take this medication? Take as directed on your prescription label. Leqvio is injected under the skin (subcutaneously) of your stomach, thigh, or upper arm.    What are some possible side effects? The most common side effects of Leqvio injection site reactions (pain,swelling, and redness), signs of a common cold, joint pain or swelling, diarrhea, and pain in your arms or legs.   What happens if I miss a dose? Leqvio will be administered by your primary care provider and doses will be schedule with their office.     Medication Safety   What are things I should warn my doctor immediately about? Talk to your doctor if you are pregnant, planning to become pregnant, or breastfeeding. Stop the medication and tell your doctor or seek emergency medical help if you notice any signs/symptoms of an allergic reaction (severe rash, redness, hives, severe itching, trouble breathing, or swelling of the face, lips, or tongue).    What are things that I should be cautious of? Be cautious of any side effects from this medication. Talk to your doctor if any new ones develop or aren't getting better.   What are some medications that can interact with this one? There are no known significant drug interactions with Leqvio. Always tell your doctor or pharmacist immediately if you start taking any new medications, including over-the-counter medications, vitamins, and  herbal supplements.      Medication Storage/Handling   How should I handle this medication? This medication will be shipped to your PCP for storage and administration.   How does this medication need to be stored? This medication will be shipped to your PCP for storage and administration.   How should I dispose of this medication? Your PCP office will handle the proper disposal of the medication after administration.     Resources/Support   How can I remind myself to take this medication? Future doses will be scheduled with your doctor based on your Lipid panel.   Which vaccines are recommended for me? Talk to your doctor about these vaccines: Flu, Coronavirus (COVID-19), Pneumococcal (pneumonia), Tdap, Hepatitis B, Zoster (shingles)          Adherence and Self-Administration  Adherence related to the patient's specialty therapy was discussed with the patient. The Adherence segment of this outreach has been reviewed and updated.     Is there a concern with patient's ability to self administer the medication correctly and without issue?: No  Were any potential barriers to adherence identified during the initial assessment or patient education?: No  Are there any concerns regarding the patient's understanding of the importance of medication adherence?: No  Methods for Supporting Patient Adherence and/or Self-Administration: None- will receive at cardiology office    Open Medication Therapy Problems  No medication therapy recommendations to display    Goals of Therapy  Goals related to the patient's specialty therapy were discussed with the patient. The Patient Goals segment of this outreach has been reviewed and updated.   Goals Addressed Today        Specialty Pharmacy General Goal      LDL Goal < 100 mg/dL    Lab Results   Component Value Date     (H) 10/26/2018     (H) 10/18/2017     (H) 04/13/2017     (H) 09/21/2016     (H) 06/12/2015                  Reassessment Plan &  Follow-Up  1. Medication Therapy Changes: Begin Leqvio 284mg SC once, repeat in 3 months, then every 6 months thereafter.   2. Related Plans, Therapy Recommendations, or Therapy Problems to Be Addressed: First injection scheduled 3/28/25 @ 1400  3. Pharmacist to perform regular assessments no more than (6) months from the previous assessment.  4. Care Coordinator to set up future refill outreaches, coordinate prescription delivery, and escalate clinical questions to pharmacist.  5. Welcome information and patient satisfaction survey to be sent by specialty pharmacy team with patient's initial fill.    Attestation  Therapeutic appropriateness: Appropriate   I attest the patient was actively involved in and has agreed to the above plan of care. If the prescribed therapy is at any point deemed not appropriate based on the current or future assessments, a consultation will be initiated with the patient's specialty care provider to determine the best course of action. The revised plan of therapy will be documented along with any required assessments and/or additional patient education provided.     Indiana Diana, PharmD, BCPS  Clinical Specialty Pharmacist, Cardiology  3/19/2025  11:00 EDT

## 2025-03-28 ENCOUNTER — HOSPITAL ENCOUNTER (EMERGENCY)
Facility: HOSPITAL | Age: 75
Discharge: HOME | End: 2025-03-28
Attending: STUDENT IN AN ORGANIZED HEALTH CARE EDUCATION/TRAINING PROGRAM
Payer: MEDICARE

## 2025-03-28 ENCOUNTER — OFFICE VISIT (OUTPATIENT)
Dept: CARDIOLOGY | Facility: CLINIC | Age: 75
End: 2025-03-28

## 2025-03-28 VITALS
DIASTOLIC BLOOD PRESSURE: 86 MMHG | SYSTOLIC BLOOD PRESSURE: 156 MMHG | BODY MASS INDEX: 21.71 KG/M2 | HEIGHT: 62 IN | TEMPERATURE: 98.2 F | WEIGHT: 118 LBS | RESPIRATION RATE: 20 BRPM | OXYGEN SATURATION: 100 % | HEART RATE: 72 BPM

## 2025-03-28 DIAGNOSIS — T50.905A ADVERSE EFFECT OF DRUG, INITIAL ENCOUNTER: Primary | ICD-10-CM

## 2025-03-28 DIAGNOSIS — E78.00 PURE HYPERCHOLESTEROLEMIA: Primary | ICD-10-CM

## 2025-03-28 PROCEDURE — 99282 EMERGENCY DEPT VISIT SF MDM: CPT

## 2025-03-28 RX ORDER — EPINEPHRINE 0.3 MG/.3ML
0.3 INJECTION SUBCUTANEOUS ONCE
Qty: 1 EACH | Refills: 0 | Status: SHIPPED | OUTPATIENT
Start: 2025-03-28 | End: 2025-03-28

## 2025-03-28 NOTE — PROGRESS NOTES
Pt returned to office approx 1.5 hours after leqvio injection c/o tightness in throat, flush face. Advised to go to ER. Nelly Jacques took pt to ER via wheelchair.

## 2025-03-28 NOTE — FSED PROVIDER NOTE
"Subjective  History of Present Illness:    74 year old female who presents with medication reaction. She states around 2 pm she took a leqvio injection at cardioogy office. This is the first time she's had this injection. Pretty soon after she had the injection she developed facial burning (lower half of face) and felt like her throat was scratchy (denies feeling like it was swollen). She was driving home and looked at her face and her face was very flushed. She states she is \"very sensitive\" to medications and even with a small dose of medication she can have a very significant reaction. She denies trouble breathing, rash, abd pain, NV, SOB, drooling      Nurses Notes reviewed and agree, including vitals, allergies, social history and prior medical history.     REVIEW OF SYSTEMS: All systems reviewed and not pertinent unless noted.  Review of Systems   All other systems reviewed and are negative.      Past Medical History:   Diagnosis Date    Allergic     Anxiety     and depression    Back pain     osteoarthritis- chronic    Chronic diarrhea     COVID-19 09/22/2022    ETD (eustachian tube dysfunction)     Fatigue     Fibromyalgia     Fibromyositis     GERD (gastroesophageal reflux disease)     H/O cardiomyopathy     Headache     History of transfusion 2011    AFTER HIP REPLACEMENT, SELF DONATED, NO REACTION    HLD (hyperlipidemia)     Hypothyroidism     IBS (irritable bowel syndrome)     Knee joint cyst     Lichen sclerosus et atrophicus of the vulva     Menopause     Neck pain     NSAID long-term use     Osteoarthritis     Osteoarthritis of finger     Osteopenia     Parvovirus B19 infection 2019    PONV (postoperative nausea and vomiting)     Poor sleep hygiene     Preventative health care     · Age-appropriate counseling discussed. Will notify patient of lab result    Staph infection 06/2022    ORAL ANTIBIOTICS    Urinary tract infection        Allergies:    Sulfa antibiotics, Azithromycin, Fluconazole, " "Bacitracin-polymyxin b, Cefdinir, Celecoxib, Ciprofloxacin, Clarithromycin, Doxycycline, Erythromycin, Levofloxacin, Lortab [hydrocodone-acetaminophen], Lyrica [pregabalin], Nexium [esomeprazole], Omeprazole, Phenergan [promethazine hcl], Statins, and Tramadol      Past Surgical History:   Procedure Laterality Date    COLONOSCOPY      CRYOTHERAPY      cervix    ELBOW OPEN REDUCTION INTERNAL FIXATION Right 10/21/2022    Procedure: OPEN REDUCTION INTERNAL FIXATION RIGHT OLECRANNON FRACTURE AND OPEN REDUCTION INTERNAL FIXATION RIGHT RADIAL HEAD FRACTURE;  Surgeon: Willam Valle MD;  Location: [facility];  Service: Orthopedics;  Laterality: Right;    EYE SURGERY Right     cyst removal right eyelid    SEPTOPLASTY      TOTAL HIP ARTHROPLASTY Bilateral      total right hip Dr. Lovelace;  total Left hip         Social History     Socioeconomic History    Marital status:    Tobacco Use    Smoking status: Former     Current packs/day: 0.00     Average packs/day: 1.5 packs/day for 8.0 years (12.0 ttl pk-yrs)     Types: Cigarettes     Start date:      Quit date:      Years since quittin.2     Passive exposure: Past    Smokeless tobacco: Never   Vaping Use    Vaping status: Never Used   Substance and Sexual Activity    Alcohol use: Yes     Comment: SOCIAL    Drug use: No    Sexual activity: Defer     Birth control/protection: Post-menopausal         Family History   Problem Relation Age of Onset    Stroke Mother     Osteoarthritis Mother          A-86    Hypothyroidism Mother     Atrial fibrillation Mother     Alzheimer's disease Father     Osteoarthritis Father     Hyperlipidemia Brother     Hypertension Brother     Heart attack Maternal Grandmother     Stroke Paternal Grandmother        Objective  Physical Exam:  /86   Pulse 72   Temp 98.2 °F (36.8 °C) (Oral)   Resp 20   Ht 157.5 cm (62\")   Wt 53.5 kg (118 lb)   LMP  (LMP Unknown) Comment: postmenopausal  SpO2 100%   BMI 21.58 kg/m² "      Physical Exam  Constitutional:       Appearance: Normal appearance.   HENT:      Head: Normocephalic and atraumatic.      Comments: Bilateral cheek erythma     Nose: Nose normal.      Mouth/Throat:      Mouth: Mucous membranes are moist.      Comments: No oropharyngeal swelling  Eyes:      Extraocular Movements: Extraocular movements intact.      Conjunctiva/sclera: Conjunctivae normal.   Cardiovascular:      Rate and Rhythm: Normal rate and regular rhythm.   Pulmonary:      Effort: Pulmonary effort is normal. No respiratory distress.      Comments: Speaking in full sentences, swallowing secretions  Abdominal:      General: There is no distension.      Comments: Atraumatic    Musculoskeletal:         General: Normal range of motion.      Cervical back: Normal range of motion and neck supple.   Skin:     General: Skin is warm and dry.   Neurological:      General: No focal deficit present.      Mental Status: She is alert.      Comments: Moving all extremities spontaneously    Psychiatric:         Mood and Affect: Mood normal.         Behavior: Behavior normal.         Procedures    ED Course:         Lab Results (last 24 hours)       ** No results found for the last 24 hours. **             No radiology results from the last 24 hrs       MDM      DDX: includes but is not limited to: allergic rxn, medication side effect    Pertinent features from physical exam: flushed cheeks, patient nontoxic.      Diagnostic information from other sources: chart review    Interventions / Re-evaluation: patient declines medication. She apparently is already on medrol dose pack at home (from ortho). She declines benadryl bc she has to go home and take care of her  and does not wish to be drowsy. She would like to go home without any medication but she understands that she should present for medication should symptoms return/worsen    Medications - No data to display    Results/clinical rationale were discussed with  patient     Consultations/Discussion of results with other physicians: none    Data interpreted: Nursing notes reviewed, vital signs reviewed.  Labs independently interpreted by me .  Imaging independently interpreted by me.  EKG independently interpreted by me.      Counseling: Discussed the results above with the patient regarding need for admission or discharge.  Patient understands and agrees plan of care.      -----  ED Disposition       ED Disposition   Discharge    Condition   Stable    Comment   --             Final diagnoses:   Adverse effect of drug, initial encounter      Your Follow-Up Providers       [facility] EMERGENCY DEPARTMENT [place].    Specialty: Emergency Medicine  Follow up details: As needed, If symptoms worsen  [address]  [facility] [address] [phone]             Fiordaliza Suh MD In 1 day.    Specialty: Internal Medicine  [address]  [place] [id number] [phone]                       Contact information for after-discharge care    Follow-up information has not been specified.                    Your medication list        START taking these medications        Instructions Last Dose Given Next Dose Due   EPINEPHrine 0.3 MG/0.3ML solution auto-injector injection  Commonly known as: EPIPEN      Inject 0.3 mL under the skin into the appropriate area as directed 1 (One) Time for 1 dose.              CHANGE how you take these medications        Instructions Last Dose Given Next Dose Due   dicyclomine 10 MG capsule  Commonly known as: BENTYL  What changed:   how much to take  how to take this  when to take this  reasons to take this      Take 1 capsule by mouth every 8 hours as needed for bowel spasm or diarrhea       meloxicam 7.5 MG tablet  Commonly known as: MOBIC  What changed:   how much to take  how to take this  when to take this  reasons to take this      TAKE 1 TABLET EVERY DAY AS NEEDED FOR PAIN AND INFLAMMATION               CONTINUE taking these medications        Instructions Last Dose Given Next Dose Due   acetaminophen 500 MG tablet  Commonly known as: TYLENOL      Take 2 tablets by mouth every night at bedtime.       Coenzyme Q10 200 MG capsule      Take 200 mg by mouth Daily.       diclofenac 1 % gel gel  Commonly known as: VOLTAREN      Apply 1 application topically to the appropriate area as directed 4 (Four) Times a Day As Needed (JOINT PAIN).       estradiol 10 MCG tablet vaginal tablet  Commonly known as: Vagifem      Insert 1 tablet into the vagina 3 (Three) Times a Week.       fish oil 1200 MG capsule capsule      Take 2 capsules by mouth Daily.       gabapentin 100 MG capsule  Commonly known as: NEURONTIN      Take 2 capsules by mouth Every Night.       Leqvio 284 MG/1.5ML solution prefilled syringe  Generic drug: Inclisiran Sodium      Inject 1.5 mL under the skin into the appropriate area as directed Take As Directed. Once, then repeat in 3 months, then every 6 months thereafter       Synthroid 75 MCG tablet  Generic drug: levothyroxine      Take 1 tablet by mouth Daily.       triamcinolone 0.1 % cream  Commonly known as: KENALOG           VITAMIN B COMPLEX PO      Take 1 tablet by mouth Daily.       Vitamin D3 75 MCG (3000 UT) tablet      Take 1 tablet by mouth Daily.       vitamin E 400 UNIT capsule      Take 1 capsule by mouth Daily.                 Where to Get Your Medications        These medications were sent to [facility] [id number] - [place], [place] - [address] [place] [address] [address] - [phone]  - [phone] [address] [address]      Phone: [phone]   EPINEPHrine 0.3 MG/0.3ML solution auto-injector injection

## 2025-03-28 NOTE — PROGRESS NOTES
03/28/2025    Name: Barbara Hernandez  YOB: 1950    [facility] CARD PEDRO NURSE/MA    LOT:[id number]  EXP:2026-MAY-31    Is the patient pregnant or planning to become pregnant? no      Injection Location:  Left Arm Subcutaneous          Injection Site Appearance after 15 minute wait period:Normal           Notes:           Appointment for next injection made [x]    Future Appointments   Date Time Provider Department Center   4/3/2025  2:15 PM Suzanne Zabala APRN [facility]   10/8/2025  3:30 PM Jonathon Bermudez MD [facility]   2/10/2026  3:45 PM Chad Alegria MD [facility]          Nelly Jacques MA, 03/28/25

## 2025-03-31 ENCOUNTER — TELEPHONE (OUTPATIENT)
Dept: CARDIOLOGY | Facility: CLINIC | Age: 75
End: 2025-03-31
Payer: COMMERCIAL

## 2025-03-31 RX ORDER — ROSUVASTATIN CALCIUM 5 MG/1
5 TABLET, COATED ORAL EVERY OTHER DAY
Qty: 15 TABLET | Refills: 11 | Status: SHIPPED | OUTPATIENT
Start: 2025-03-31

## 2025-03-31 NOTE — TELEPHONE ENCOUNTER
Verbal order per LIVIER Escobar APRN to initiate 5 mg rosuvastatin 5 mg QOD . Pt agreeable , prescription updated .

## 2025-03-31 NOTE — TELEPHONE ENCOUNTER
Pt received leqvio injection 3/28/25- had a reaction had to be treated in the ED . Pt with recent lipid panel drawn at PCP office , requesting to go back to taking pravastatin 5 mg QOD as that is the most she can tolerate. Await lipid results to be reviewed withC  .

## 2025-03-31 NOTE — TELEPHONE ENCOUNTER
Caller: Barbara Hernandez    Relationship: Self    Best call back number: [phone]    What is the best time to reach you: ANYTIME    Who are you requesting to speak with (clinical staff, provider,  specific staff member): ANYONE    What was the call regarding: PATIENT HAD SEVERAL REACTIONS AFTER TAKING LEQVIO. WAS SEEN AT THE ER ON 3.28.25. WOULD LIKE A CALL BACK TO DISCUSS MEDICATION.

## 2025-04-03 ENCOUNTER — TELEPHONE (OUTPATIENT)
Dept: OBSTETRICS AND GYNECOLOGY | Facility: CLINIC | Age: 75
End: 2025-04-03
Payer: COMMERCIAL

## 2025-04-03 RX ORDER — ESTRADIOL 10 UG/1
1 TABLET, FILM COATED VAGINAL 3 TIMES WEEKLY
Qty: 12 TABLET | Refills: 12 | Status: SHIPPED | OUTPATIENT
Start: 2025-04-04

## 2025-04-03 NOTE — TELEPHONE ENCOUNTER
Caller: Barbara Hernandez    Relationship:  Self    Best call back number: [phone]    PATIENT CALLED REQUESTING TO CANCEL SAME DAY APPT.    Did the patient call AFTER the start time of their scheduled appointment?  []YES  [x]NO    Was the patient's appointment rescheduled? [x]YES  []NO    Any additional information: PT IS SICK - R/S'd TO 05/01/25 @ [facility]

## 2025-04-03 NOTE — TELEPHONE ENCOUNTER
Caller: Barbara Hernandez    Relationship: Self    Best call back number: [phone] / LVM    Requested Prescriptions: estradiol (Vagifem) 10 MCG tablet vaginal tablet     [facility] where request [facility] be sent: [facility]    Last office visit with prescribing clinician: Visit date not found   Last telemedicine visit with prescribing clinician: Visit date not found   Next office visit with prescribing clinician: 5/1/2025     Additional details provided by patient: PT WILL RUN OUT OF MEDICATION BEFORE HER R/S'd APPT ON 05/01/25 - PT IS ASKING FOR A REFILL TO LAST HER UNTIL HER VISIT - PLEASE CALL THE PT TO LET HER KNOW IF THIS REQUEST CAN BE MADE    Does the patient have less than a 3 day supply:  [] Yes  [x] No    Would you like a call back once the refill request has been completed: [x] Yes [] No    If the office needs to give you a call back, can they leave a voicemail: [x] Yes [] No    Krysta Wynne Rep   04/03/25 10:43 EDT

## 2025-05-01 ENCOUNTER — TELEPHONE (OUTPATIENT)
Age: 75
End: 2025-05-01
Payer: COMMERCIAL

## 2025-05-01 NOTE — TELEPHONE ENCOUNTER
Called and lvm for pt regarding appt scheduled on 5/1/25. Provider is out of the office. Appt will need to be rescheduled.

## 2025-05-16 ENCOUNTER — TELEPHONE (OUTPATIENT)
Dept: CARDIOLOGY | Facility: CLINIC | Age: 75
End: 2025-05-16
Payer: COMMERCIAL

## 2025-05-16 NOTE — TELEPHONE ENCOUNTER
Pt taking Crestor 5mg QOD , unable to tolerate . Pt with severe stomach pains along with palpitations. Please advise with alternative choice. Of note pt with reaction to leqvio injection.

## 2025-05-16 NOTE — TELEPHONE ENCOUNTER
Advised pt of her options , does not wish to try Repatha  due to inability to tolerate Leqvio . Pt states she is scared of the injectables. Unable to tolerate Crestor . Pt currently;y will have no coverage for her cholesterol , pt is aware and agreeable at this time .

## 2025-05-16 NOTE — TELEPHONE ENCOUNTER
Noted intolerance/allergy to Leqvio, statins, Nexletol, red yeast rice    Only other option Repatha   Otherwise will have to go untreated until new meds are approved

## 2025-05-29 ENCOUNTER — OFFICE VISIT (OUTPATIENT)
Age: 75
End: 2025-05-29
Payer: MEDICARE

## 2025-05-29 VITALS
RESPIRATION RATE: 16 BRPM | BODY MASS INDEX: 21.58 KG/M2 | DIASTOLIC BLOOD PRESSURE: 70 MMHG | SYSTOLIC BLOOD PRESSURE: 116 MMHG | WEIGHT: 118 LBS

## 2025-05-29 DIAGNOSIS — Z01.419 WELL WOMAN EXAM WITH ROUTINE GYNECOLOGICAL EXAM: Primary | ICD-10-CM

## 2025-05-29 DIAGNOSIS — N95.8 GENITOURINARY SYNDROME OF MENOPAUSE: ICD-10-CM

## 2025-05-29 DIAGNOSIS — N90.4 LICHEN SCLEROSUS ET ATROPHICUS OF THE VULVA: ICD-10-CM

## 2025-05-29 RX ORDER — ESTRADIOL 10 UG/1
1 TABLET, FILM COATED VAGINAL 3 TIMES WEEKLY
Qty: 12 TABLET | Refills: 12 | Status: SHIPPED | OUTPATIENT
Start: 2025-05-30

## 2025-05-29 NOTE — PROGRESS NOTES
"Subjective   Chief Complaint   Patient presents with    Annual Exam     Barbara Hernandez is a 74 y.o. year old  menopausal female presenting to be seen for her annual exam.  This past year she has not been on hormone replacement therapy.  There has not been vaginal bleeding in the last 12 months.  Menopausal symptoms are present (hot flashes) but not affecting her quality of life . She is using Vagifem vaginal tablets three times weekly with good success. She reports her lichen sclerosus is well controlled at this time. She uses OTC hydrocortisone cream for this.     SEXUAL Hx:  She is not currently sexually active.  In the past year there she has not been sexually active.    Condoms are not needed because she is not sexually active.  She would not like to be screened for STD's at today's exam.  [place] is painful:  She reports significant pain with intercourse despite consistent vagifem usage. She has not had intercourse for about 12ish years.   HEALTH Hx:  She exercises regularly:  Nothing \"organized\" but is out gardening, doing housework, walking, etc. She is also currently in PT .  She wears her seat belt: yes.  She has concerns about domestic violence: no.  She has noticed changes in height: yes. She reports last DEXA showed osteopenia (about a year or 2 ago with Dr Suh's office)  OTHER THINGS SHE WANTS TO DISCUSS TODAY:  Nothing else    The following portions of the patient's history were reviewed and updated as appropriate:problem list, current medications, allergies, past family history, past medical history, past social history, and past surgical history.    Social History    Tobacco Use      Smoking status: Former        Packs/day: 0.00        Years: 1.5 packs/day for 8.0 years (12.0 ttl pk-yrs)        Types: Cigarettes        Start date:         Quit date:         Years since quittin.4        Passive exposure: Past      Smokeless tobacco: Never    Past Medical History: "   Diagnosis Date    Allergic     Anxiety     and depression    Back pain     osteoarthritis- chronic    Chronic diarrhea     COVID-19 09/22/2022    ETD (eustachian tube dysfunction)     Fatigue     Fibromyalgia     Fibromyositis     GERD (gastroesophageal reflux disease)     H/O cardiomyopathy     Headache     History of transfusion 2011    AFTER HIP REPLACEMENT, SELF DONATED, NO REACTION    HLD (hyperlipidemia)     Hypothyroidism     IBS (irritable bowel syndrome)     Knee joint cyst     Lichen sclerosus et atrophicus of the vulva     Menopause     Neck pain     NSAID long-term use     Osteoarthritis     Osteoarthritis of finger     Osteopenia     Parvovirus B19 infection 2019    PONV (postoperative nausea and vomiting)     Poor sleep hygiene     Preventative health care     · Age-appropriate counseling discussed. Will notify patient of lab result    Spinal stenosis     Staph infection 06/2022    ORAL ANTIBIOTICS    Urinary tract infection      Past Surgical History:   Procedure Laterality Date    COLONOSCOPY      CRYOTHERAPY      cervix    ELBOW OPEN REDUCTION INTERNAL FIXATION Right 10/21/2022    Procedure: OPEN REDUCTION INTERNAL FIXATION RIGHT OLECRANNON FRACTURE AND OPEN REDUCTION INTERNAL FIXATION RIGHT RADIAL HEAD FRACTURE;  Surgeon: Willam Valle MD;  Location: [facility];  Service: Orthopedics;  Laterality: Right;    EYE SURGERY Right     cyst removal right eyelid    SEPTOPLASTY      TOTAL HIP ARTHROPLASTY Bilateral     2010 total right hip Dr. Lovelace; 2012 total Left hip         Review of Systems   Constitutional: Negative.  Negative for appetite change and diaphoresis.   Respiratory: Negative.     Cardiovascular: Negative.    Gastrointestinal:  Positive for diarrhea. Negative for abdominal distention, blood in stool, GERD and indigestion.   Endocrine: Negative.    Genitourinary: Negative.  Negative for breast discharge, breast lump, breast pain, dysuria and hematuria.   Skin: Negative.            Objective   /70   Resp 16   Wt 53.5 kg (118 lb)   LMP  (LMP Unknown) Comment: postmenopausal  BMI 21.58 kg/m²     Physical Exam  Vitals and nursing note reviewed. Exam conducted with a chaperone present.   Constitutional:       Appearance: Normal appearance.   Cardiovascular:      Rate and Rhythm: Normal rate and regular rhythm.      Heart sounds: Normal heart sounds.   Pulmonary:      Effort: Pulmonary effort is normal.      Breath sounds: Normal breath sounds.   Chest:   Breasts:     Right: Normal.      Left: Normal.   Abdominal:      Palpations: Abdomen is soft.   Genitourinary:     General: Normal vulva.      Exam position: Lithotomy position.      Vagina: Normal.      Cervix: Normal.      Uterus: Normal.       Adnexa: Right adnexa normal and left adnexa normal.      Rectum: Normal.      Comments: Lichen sclerosus well controlled  Mild erythema at distal end of vaginal canal consistent with genitourinary syndrome of menopause  Digital rectal exam not done but appears normal visually  Neurological:      Mental Status: She is alert and oriented to person, place, and time.   Psychiatric:         Mood and Affect: Mood normal.         Behavior: Behavior normal.         Thought Content: Thought content normal.         Judgment: Judgment normal.            Diagnoses and all orders for this visit:    1. Well woman exam with routine gynecological exam (Primary)    2. Lichen sclerosus et atrophicus of the vulva    3. Genitourinary syndrome of menopause    Other orders  -     estradiol (Vagifem) 10 MCG tablet vaginal tablet; Insert 1 tablet into the vagina 3 (Three) Times a Week.  Dispense: 12 tablet; Refill: 12     Pap was not done today.  I explained to Barbara that the Pap smears are no longer recommended in patient's after 65 years of age.   I stressed to Barbara that she still should be seen to be seen yearly for a full physical including breast and pelvic exam.      She is up to date on all relevant  gynecologic and colorectal screenings    The importance of keeping all planned follow-up and taking all medications as prescribed was emphasized.    Today I discussed with Barbara the total recommended calcium intake for a post-menopausal female is 1200 mg.  Ideally this should be from dietary sources.  I reviewed calcium content in various foods including milk, fortified orange juice and yogurt.  If she cannot get sufficient calcium through dietary means, it is recommended to supplement with either a multivitamin or calcium to reach her daily goal.  I also reviewed the difference in the bioavailability of calcium carbonate and calcium citrate containing supplements and the importance of taking calcium carbonate containing products with food. Finally, vitamin D's role in calcium absorption was reviewed and a total daily vitamin D intake of 600 units was recommended.    Her vaccine record was reviewed and updated.    I discussed with Barbara that she may be behind on needed vaccinations for  vaccines are up to date .  She may be able to obtain these vaccinations at her local pharmacy OR speak about obtaining them with her primary care.  If she does obtain her vaccines, I have asked Barbara to let us know the date each vaccine was obtained so that her medical record could be updated in our system.    New Medications Ordered This Visit   Medications    estradiol (Vagifem) 10 MCG tablet vaginal tablet     Sig: Insert 1 tablet into the vagina 3 (Three) Times a Week.     Dispense:  12 tablet     Refill:  12            Return in about 1 year (around 5/29/2026) for Annual physical.    Suzanne Zabala, LESA  May 29, 2025

## 2025-08-07 ENCOUNTER — HOSPITAL ENCOUNTER (OUTPATIENT)
Dept: GENERAL RADIOLOGY | Facility: HOSPITAL | Age: 75
Discharge: HOME | End: 2025-08-07
Payer: MEDICARE

## 2025-08-07 ENCOUNTER — TRANSCRIBE ORDERS (OUTPATIENT)
Dept: ADMINISTRATIVE | Facility: HOSPITAL | Age: 75
End: 2025-08-07
Payer: COMMERCIAL

## 2025-08-07 DIAGNOSIS — M25.511 RIGHT SHOULDER PAIN, UNSPECIFIED CHRONICITY: Primary | ICD-10-CM

## 2025-08-07 DIAGNOSIS — M25.511 RIGHT SHOULDER PAIN, UNSPECIFIED CHRONICITY: ICD-10-CM

## 2025-08-07 PROCEDURE — 73030 X-RAY EXAM OF SHOULDER: CPT

## (undated) DEVICE — ANTIBACTERIAL UNDYED BRAIDED (POLYGLACTIN 910), SYNTHETIC ABSORBABLE SUTURE: Brand: COATED VICRYL

## (undated) DEVICE — PRECISION THIN (9.0 X 0.38 X 31.0MM)

## (undated) DEVICE — PK EXTREM UPPR 10

## (undated) DEVICE — CONN TBG Y 5 IN 1 LF STRL

## (undated) DEVICE — GLV SURG SENSICARE PI ORTHO SZ7.5 LF STRL

## (undated) DEVICE — UNDERCAST PADDING: Brand: DEROYAL

## (undated) DEVICE — KWIRE STD/TP 2X152MM
Type: IMPLANTABLE DEVICE | Site: OLECRANON | Status: NON-FUNCTIONAL
Removed: 2022-10-21

## (undated) DEVICE — BIT DRL SLD SD CUT 2.7X40MM

## (undated) DEVICE — IMPLANTABLE DEVICE
Type: IMPLANTABLE DEVICE | Site: OLECRANON | Status: NON-FUNCTIONAL
Removed: 2022-10-21

## (undated) DEVICE — DRVR QC UNIV T20

## (undated) DEVICE — KT PUMP INFUBLOCK MDL 2100 PMKITSOLIS

## (undated) DEVICE — DRVR QC UNIV T10

## (undated) DEVICE — BNDG ELAS ELITE V/CLOSE 4IN 5YD LF STRL

## (undated) DEVICE — TOWEL,OR,DSP,ST,NATURAL,DLX,4/PK,20PK/CS: Brand: MEDLINE

## (undated) DEVICE — BIT DRL SLD SD CUT 2.7X80MM

## (undated) DEVICE — BNDG ELAS ELITE V/CLOSE 6IN 5YD LF STRL

## (undated) DEVICE — STERILE PVP: Brand: MEDLINE INDUSTRIES, INC.

## (undated) DEVICE — GLV SURG SIGNATURE TOUCH PF LTX 8 STRL BX/50

## (undated) DEVICE — BANDAGE,GAUZE,CONFORMING,4"X75",STRL,LF: Brand: MEDLINE

## (undated) DEVICE — SNAP KOVER: Brand: UNBRANDED